# Patient Record
Sex: FEMALE | Race: WHITE | Employment: UNEMPLOYED | URBAN - METROPOLITAN AREA
[De-identification: names, ages, dates, MRNs, and addresses within clinical notes are randomized per-mention and may not be internally consistent; named-entity substitution may affect disease eponyms.]

---

## 2017-01-17 ENCOUNTER — TELEPHONE (OUTPATIENT)
Dept: PULMONOLOGY | Age: 72
End: 2017-01-17

## 2017-01-17 NOTE — TELEPHONE ENCOUNTER
Patient reminded to get PFT before appointment with Dr Freddy Yusuf.   When patient calls back, please give her the phone # of Maria Luz Sparks [phone]

## 2017-02-09 ENCOUNTER — HOSPITAL ENCOUNTER (OUTPATIENT)
Dept: RESPIRATORY THERAPY | Age: 72
Discharge: HOME | End: 2017-02-09
Attending: INTERNAL MEDICINE
Payer: MEDICARE

## 2017-02-09 DIAGNOSIS — R06.02 SHORTNESS OF BREATH: ICD-10-CM

## 2017-02-09 PROCEDURE — 94726 PLETHYSMOGRAPHY LUNG VOLUMES: CPT

## 2017-02-09 PROCEDURE — 94060 EVALUATION OF WHEEZING: CPT

## 2017-03-02 ENCOUNTER — TELEPHONE (OUTPATIENT)
Dept: PULMONOLOGY | Age: 72
End: 2017-03-02

## 2017-03-02 NOTE — TELEPHONE ENCOUNTER
They would like results of pft done at THE [facility]. Please call either [phone] or [phone].

## 2017-08-20 ENCOUNTER — APPOINTMENT (OUTPATIENT)
Dept: GENERAL RADIOLOGY | Age: 72
End: 2017-08-20
Attending: EMERGENCY MEDICINE
Payer: MEDICARE

## 2017-08-20 ENCOUNTER — HOSPITAL ENCOUNTER (EMERGENCY)
Age: 72
Discharge: HOME | End: 2017-08-20
Attending: INTERNAL MEDICINE
Payer: MEDICARE

## 2017-08-20 VITALS
TEMPERATURE: 97.6 F | DIASTOLIC BLOOD PRESSURE: 68 MMHG | BODY MASS INDEX: 41.65 KG/M2 | SYSTOLIC BLOOD PRESSURE: 158 MMHG | RESPIRATION RATE: 23 BRPM | WEIGHT: 250 LBS | HEART RATE: 90 BPM | HEIGHT: 65 IN | OXYGEN SATURATION: 97 %

## 2017-08-20 DIAGNOSIS — R07.89 ATYPICAL CHEST PAIN: Primary | ICD-10-CM

## 2017-08-20 DIAGNOSIS — F41.1 ANXIETY STATE: ICD-10-CM

## 2017-08-20 LAB
ALBUMIN SERPL-MCNC: 3.8 G/DL (ref 3.4–5)
ALBUMIN/GLOB SERPL: 1 {RATIO} (ref 0.8–1.7)
ALP SERPL-CCNC: 101 U/L (ref 45–117)
ALT SERPL-CCNC: 18 U/L (ref 13–56)
ANION GAP SERPL CALC-SCNC: 12 MMOL/L (ref 3–18)
AST SERPL-CCNC: 15 U/L (ref 15–37)
BASOPHILS # BLD: 0.1 K/UL (ref 0–0.06)
BASOPHILS NFR BLD: 1 % (ref 0–2)
BILIRUB SERPL-MCNC: 0.4 MG/DL (ref 0.2–1)
BUN SERPL-MCNC: 18 MG/DL (ref 7–18)
BUN/CREAT SERPL: 20 (ref 12–20)
CALCIUM SERPL-MCNC: 9.1 MG/DL (ref 8.5–10.1)
CHLORIDE SERPL-SCNC: 107 MMOL/L (ref 100–108)
CK MB CFR SERPL CALC: NORMAL % (ref 0–4)
CK MB CFR SERPL CALC: NORMAL % (ref 0–4)
CK MB SERPL-MCNC: <1 NG/ML (ref 5–25)
CK MB SERPL-MCNC: <1 NG/ML (ref 5–25)
CK SERPL-CCNC: 53 U/L (ref 26–192)
CK SERPL-CCNC: 62 U/L (ref 26–192)
CO2 SERPL-SCNC: 26 MMOL/L (ref 21–32)
CREAT SERPL-MCNC: 0.88 MG/DL (ref 0.6–1.3)
D DIMER PPP FEU-MCNC: 0.41 UG/ML(FEU)
DIFFERENTIAL METHOD BLD: ABNORMAL
EOSINOPHIL # BLD: 0.7 K/UL (ref 0–0.4)
EOSINOPHIL NFR BLD: 6 % (ref 0–5)
ERYTHROCYTE [DISTWIDTH] IN BLOOD BY AUTOMATED COUNT: 14.2 % (ref 11.6–14.5)
GLOBULIN SER CALC-MCNC: 3.7 G/DL (ref 2–4)
GLUCOSE SERPL-MCNC: 98 MG/DL (ref 74–99)
HCT VFR BLD AUTO: 44.8 % (ref 35–45)
HGB BLD-MCNC: 14.7 G/DL (ref 12–16)
LYMPHOCYTES # BLD: 3 K/UL (ref 0.9–3.6)
LYMPHOCYTES NFR BLD: 29 % (ref 21–52)
MCH RBC QN AUTO: 27.9 PG (ref 24–34)
MCHC RBC AUTO-ENTMCNC: 32.8 G/DL (ref 31–37)
MCV RBC AUTO: 85.2 FL (ref 74–97)
MONOCYTES # BLD: 0.8 K/UL (ref 0.05–1.2)
MONOCYTES NFR BLD: 8 % (ref 3–10)
NEUTS SEG # BLD: 6 K/UL (ref 1.8–8)
NEUTS SEG NFR BLD: 56 % (ref 40–73)
PLATELET # BLD AUTO: 222 K/UL (ref 135–420)
PMV BLD AUTO: 10.9 FL (ref 9.2–11.8)
POTASSIUM SERPL-SCNC: 3.8 MMOL/L (ref 3.5–5.5)
PROT SERPL-MCNC: 7.5 G/DL (ref 6.4–8.2)
RBC # BLD AUTO: 5.26 M/UL (ref 4.2–5.3)
SODIUM SERPL-SCNC: 145 MMOL/L (ref 136–145)
TROPONIN I SERPL-MCNC: <0.02 NG/ML (ref 0–0.06)
TROPONIN I SERPL-MCNC: <0.02 NG/ML (ref 0–0.06)
WBC # BLD AUTO: 10.6 K/UL (ref 4.6–13.2)

## 2017-08-20 PROCEDURE — 71010 XR CHEST PORT: CPT

## 2017-08-20 PROCEDURE — 94640 AIRWAY INHALATION TREATMENT: CPT

## 2017-08-20 PROCEDURE — 93005 ELECTROCARDIOGRAM TRACING: CPT

## 2017-08-20 PROCEDURE — 85379 FIBRIN DEGRADATION QUANT: CPT | Performed by: INTERNAL MEDICINE

## 2017-08-20 PROCEDURE — 85025 COMPLETE CBC W/AUTO DIFF WBC: CPT | Performed by: EMERGENCY MEDICINE

## 2017-08-20 PROCEDURE — 74011250637 HC RX REV CODE- 250/637: Performed by: INTERNAL MEDICINE

## 2017-08-20 PROCEDURE — 99285 EMERGENCY DEPT VISIT HI MDM: CPT

## 2017-08-20 PROCEDURE — 80053 COMPREHEN METABOLIC PANEL: CPT | Performed by: EMERGENCY MEDICINE

## 2017-08-20 PROCEDURE — 82550 ASSAY OF CK (CPK): CPT | Performed by: EMERGENCY MEDICINE

## 2017-08-20 PROCEDURE — 74011000250 HC RX REV CODE- 250: Performed by: INTERNAL MEDICINE

## 2017-08-20 PROCEDURE — 77030013140 HC MSK NEB VYRM -A

## 2017-08-20 RX ORDER — CLONAZEPAM 0.5 MG/1
0.5 TABLET ORAL
Status: COMPLETED | OUTPATIENT
Start: 2017-08-20 | End: 2017-08-20

## 2017-08-20 RX ORDER — FAMOTIDINE 20 MG/1
20 TABLET, FILM COATED ORAL 2 TIMES DAILY
Qty: 20 TAB | Refills: 0 | Status: SHIPPED | OUTPATIENT
Start: 2017-08-20 | End: 2017-08-30

## 2017-08-20 RX ORDER — GUAIFENESIN 100 MG/5ML
81 LIQUID (ML) ORAL
Status: COMPLETED | OUTPATIENT
Start: 2017-08-20 | End: 2017-08-20

## 2017-08-20 RX ORDER — DOXYCYCLINE HYCLATE 100 MG
100 TABLET ORAL 2 TIMES DAILY
Qty: 20 TAB | Refills: 0 | Status: SHIPPED | OUTPATIENT
Start: 2017-08-20 | End: 2017-08-30

## 2017-08-20 RX ORDER — GUAIFENESIN 100 MG/5ML
81 LIQUID (ML) ORAL DAILY
Qty: 15 TAB | Refills: 0 | Status: ON HOLD | OUTPATIENT
Start: 2017-08-20 | End: 2020-06-22

## 2017-08-20 RX ORDER — ALBUTEROL SULFATE 0.83 MG/ML
2.5 SOLUTION RESPIRATORY (INHALATION)
Status: COMPLETED | OUTPATIENT
Start: 2017-08-20 | End: 2017-08-20

## 2017-08-20 RX ADMIN — CLONAZEPAM 0.5 MG: 0.5 TABLET ORAL at 07:50

## 2017-08-20 RX ADMIN — ASPIRIN 81 MG 81 MG: 81 TABLET ORAL at 07:49

## 2017-08-20 RX ADMIN — ALBUTEROL SULFATE 2.5 MG: 2.5 SOLUTION RESPIRATORY (INHALATION) at 07:53

## 2017-08-20 RX ADMIN — Medication 30 ML: at 07:50

## 2017-08-20 NOTE — CALL BACK NOTE
2:29 PM  08/20/17       Called pt informed of final radiology report of CXR. Will call [facility] to [facility]. Advised to f/u with PCP for recheck. Pt understands and agrees with plan.      Junior Fonseca PA-C

## 2017-08-20 NOTE — ED PROVIDER NOTES
[address]  EMERGENCY DEPARTMENT HISTORY AND PHYSICAL EXAM       Date: 8/20/2017   Patient Name: Edmond Bauman   YOB: 1945  Medical Record Number: [id number]    History of Presenting Illness     Chief Complaint   Patient presents with    Shortness of Breath    Chest Pain        History Provided By:  patient and spouse    Additional History:   7:25 AM  Edmond Bauman is a 67 y.o. female with PMHx of bipolar disorder and COPD presenting to the ED C/O gradually worsening SOB, onset last night. Associated sxs include chest tightness. Used nebulizer at home 9 hours ago to no relief. Pt states that she had a stress test about 15 years ago, results unremarkable. Daily medications include ASA 81 mg, last does 2 days ago, and cough medicine, Quetiapine Fumarate 50 mg, Prazosin 2 mg, and Fetzima ER 80 mg. Pt also notes leg swelling with no deviation from baseline. Of note, pt does not endorse tobacco use but pt's  does. Denies Hx of PE/DVT. Denies recent travel, fever, chills, cough, or any other symptoms or complaints. Primary Care Provider: Lyudmila Carrasco MD   Specialist:    Past History     Past Medical History:   Past Medical History:   Diagnosis Date    Arthritis     Hypertension 15yrs    Ill-defined condition     h/o dizzy    Psychiatric disorder     bipolar; short term memory lose    Psychiatric disorder     depressiom        Past Surgical History:   Past Surgical History:   Procedure Laterality Date    HX CATARACT REMOVAL Left     HX HYSTERECTOMY          Family History:   Family History   Problem Relation Age of Onset    Heart Disease Mother     Cancer Mother     Heart Disease Father     Cancer Father         Social History:   Social History   Substance Use Topics    Smoking status: Never Smoker    Smokeless tobacco: Never Used    Alcohol use No        Allergies:    Allergies   Allergen Reactions    Combivent [Ipratropium-Albuterol] Shortness of Breath    Ibuprofen Hives    Adhesive Tape-Silicones Rash        Review of Systems   Review of Systems   Constitutional: Negative for chills and fever. Respiratory: Positive for chest tightness and shortness of breath. Negative for cough. Cardiovascular: Positive for leg swelling (no deviation from baseline). All other systems reviewed and are negative. Physical Exam  Vitals:    08/20/17 0754 08/20/17 0800 08/20/17 0900 08/20/17 1100   BP:  143/76 120/84 138/73   Pulse:  88 99 91   Resp:  13 24 25   Temp:    97.6 °F (36.4 °C)   SpO2: 98% 99% 95% 97%   Weight:       Height:           Physical Exam   Constitutional: She is oriented to person, place, and time. She appears well-developed and well-nourished. No distress. HENT:   Head: Normocephalic and atraumatic. Right Ear: External ear normal. No swelling or tenderness. Tympanic membrane is not perforated, not erythematous and not bulging. Left Ear: External ear normal. No swelling or tenderness. Tympanic membrane is not perforated, not erythematous and not bulging. Nose: Nose normal. No mucosal edema or rhinorrhea. Right sinus exhibits no maxillary sinus tenderness and no frontal sinus tenderness. Left sinus exhibits no maxillary sinus tenderness and no frontal sinus tenderness. Mouth/Throat: Uvula is midline and oropharynx is clear and moist. Mucous membranes are dry. No oral lesions. No trismus in the jaw. No dental abscesses, uvula swelling or lacerations. No oropharyngeal exudate, posterior oropharyngeal edema, posterior oropharyngeal erythema or tonsillar abscesses. Mucous on tongue to the left, no extra bleeding, excess tissue, cannot exclude mass   Eyes: Conjunctivae are normal. Right eye exhibits no discharge. Left eye exhibits no discharge. No scleral icterus. Neck: Normal range of motion. Neck supple. Cardiovascular: Normal rate, regular rhythm, normal heart sounds and intact distal pulses. Exam reveals no gallop and no friction rub. No murmur heard. Pulmonary/Chest: Breath sounds normal. No accessory muscle usage. No tachypnea. She is in respiratory distress. She has no decreased breath sounds. She has no wheezes. She has no rhonchi. She has no rales. Abdominal: Soft. Musculoskeletal: Normal range of motion. She exhibits no edema or tenderness. Lymphadenopathy:     She has no cervical adenopathy. Neurological: She is alert and oriented to person, place, and time. Skin: Skin is warm and dry. No rash noted. She is not diaphoretic. No erythema. Psychiatric: She has a normal mood and affect. Judgment normal.   Nursing note and vitals reviewed.        Vital signs and nursing notes reviewed      Diagnostic Study Results     Labs -      Recent Results (from the past 12 hour(s))   EKG, 12 LEAD, INITIAL    Collection Time: 08/20/17  6:21 AM   Result Value Ref Range    Ventricular Rate 93 BPM    Atrial Rate 93 BPM    P-R Interval 154 ms    QRS Duration 92 ms    Q-T Interval 372 ms    QTC Calculation (Bezet) 462 ms    Calculated P Axis 68 degrees    Calculated R Axis 8 degrees    Calculated T Axis 23 degrees    Diagnosis       Normal sinus rhythm  Incomplete right bundle branch block  Cannot rule out Inferior infarct , age undetermined  Abnormal ECG  No previous ECGs available     CARDIAC PANEL,(CK, CKMB & TROPONIN)    Collection Time: 08/20/17  6:26 AM   Result Value Ref Range    CK 62 26 - 192 U/L    CK - MB <1.0 <3.6 ng/ml    CK-MB Index  0.0 - 4.0 %     CALCULATION NOT PERFORMED WHEN RESULT IS BELOW LINEAR LIMIT    Troponin-I, Qt. <0.02 0.00 - 0.06 NG/ML   CBC WITH AUTOMATED DIFF    Collection Time: 08/20/17  6:26 AM   Result Value Ref Range    WBC 10.6 4.6 - 13.2 K/uL    RBC 5.26 4.20 - 5.30 M/uL    HGB 14.7 12.0 - 16.0 g/dL    HCT 44.8 35.0 - 45.0 %    MCV 85.2 74.0 - 97.0 FL    MCH 27.9 24.0 - 34.0 PG    MCHC 32.8 31.0 - 37.0 g/dL    RDW 14.2 11.6 - 14.5 %    PLATELET 104 184 - 235 K/uL    MPV 10.9 9.2 - 11.8 FL    NEUTROPHILS 56 40 - 73 %    LYMPHOCYTES 29 21 - 52 %    MONOCYTES 8 3 - 10 %    EOSINOPHILS 6 (H) 0 - 5 %    BASOPHILS 1 0 - 2 %    ABS. NEUTROPHILS 6.0 1.8 - 8.0 K/UL    ABS. LYMPHOCYTES 3.0 0.9 - 3.6 K/UL    ABS. MONOCYTES 0.8 0.05 - 1.2 K/UL    ABS. EOSINOPHILS 0.7 (H) 0.0 - 0.4 K/UL    ABS. BASOPHILS 0.1 (H) 0.0 - 0.06 K/UL    DF AUTOMATED     METABOLIC PANEL, COMPREHENSIVE    Collection Time: 08/20/17  6:26 AM   Result Value Ref Range    Sodium 145 136 - 145 mmol/L    Potassium 3.8 3.5 - 5.5 mmol/L    Chloride 107 100 - 108 mmol/L    CO2 26 21 - 32 mmol/L    Anion gap 12 3.0 - 18 mmol/L    Glucose 98 74 - 99 mg/dL    BUN 18 7.0 - 18 MG/DL    Creatinine 0.88 0.6 - 1.3 MG/DL    BUN/Creatinine ratio 20 12 - 20      GFR est AA >60 >60 ml/min/1.73m2    GFR est non-AA >60 >60 ml/min/1.73m2    Calcium 9.1 8.5 - 10.1 MG/DL    Bilirubin, total 0.4 0.2 - 1.0 MG/DL    ALT (SGPT) 18 13 - 56 U/L    AST (SGOT) 15 15 - 37 U/L    Alk.  phosphatase 101 45 - 117 U/L    Protein, total 7.5 6.4 - 8.2 g/dL    Albumin 3.8 3.4 - 5.0 g/dL    Globulin 3.7 2.0 - 4.0 g/dL    A-G Ratio 1.0 0.8 - 1.7     EKG, 12 LEAD, SUBSEQUENT    Collection Time: 08/20/17 10:30 AM   Result Value Ref Range    Ventricular Rate 93 BPM    Atrial Rate 93 BPM    P-R Interval 156 ms    QRS Duration 94 ms    Q-T Interval 374 ms    QTC Calculation (Bezet) 465 ms    Calculated P Axis 63 degrees    Calculated R Axis -5 degrees    Calculated T Axis 14 degrees    Diagnosis       Normal sinus rhythm  Incomplete right bundle branch block  Inferior infarct (cited on or before 20-AUG-2017)  Abnormal ECG  When compared with ECG of 20-AUG-2017 06:21,  Questionable change in initial forces of Inferior leads     CARDIAC PANEL,(CK, CKMB & TROPONIN)    Collection Time: 08/20/17 11:05 AM   Result Value Ref Range    CK 53 26 - 192 U/L    CK - MB <1.0 <3.6 ng/ml    CK-MB Index  0.0 - 4.0 %     CALCULATION NOT PERFORMED WHEN RESULT IS BELOW LINEAR LIMIT    Troponin-I, Qt. <0.02 0.00 - 0.06 NG/ML   D DIMER    Collection Time: 08/20/17 11:05 AM   Result Value Ref Range    D DIMER 0.41 <0.46 ug/ml(FEU)       Radiologic Studies -  XR CHEST PORT    (Results Pending)      7:43 AM  RADIOLOGY FINDINGS  Chest X-ray shows decrease in lung volume but no obvious acute cardiac or pulmonary process  Pending review by Radiologist  Recorded by Henrique Hamilton ED Scribe, as dictated by Katherine Frias MD      Medical Decision Making   I am the first provider for this patient. I reviewed the vital signs, available nursing notes, past medical history, past surgical history, family history and social history. Vital Signs-Reviewed the patient's vital signs. Patient Vitals for the past 12 hrs:   Temp Pulse Resp BP SpO2   08/20/17 1100 97.6 °F (36.4 °C) 91 25 138/73 97 %   08/20/17 0900 - 99 24 120/84 95 %   08/20/17 0800 - 88 13 143/76 99 %   08/20/17 0754 - - - - 98 %   08/20/17 0745 - - 22 - -   08/20/17 0700 - 88 19 124/67 91 %   08/20/17 0625 97.3 °F (36.3 °C) 93 19 156/70 95 %       Pulse Oximetry Analysis - Normal 95% on RA. No intervention needed. Cardiac Monitor:   Rate: 95 bmp  Rhythm: SV Rythm     EKG interpretation: (Preliminary)  6:21 AM  93 bpm NSR, incomplete RBBB, no stemi, age undetermined  EKG read by Dorys Tubbs. Arturo Moore MD at 6:36 AM     EKG interpretation: (Subsequent)  10:30 AM   93 bpm, NSR, incomplete RBBB, no STEMI, no significant changes  EKG read by Katherine Frias MD at 10:38 AM    Old Medical Records: Nursing notes. ED Course:     7:25 AM Initial assessment performed. 9:52 AM Pt is feeling better. 9:58 AM Pt is feeling much better. Pain is almost gone, lungs are clear, heart rrr no mrg.     Medications Given in the ED:  Medications   albuterol (PROVENTIL VENTOLIN) nebulizer solution 2.5 mg (2.5 mg Nebulization Given 8/20/17 0753)   clonazePAM (KlonoPIN) tablet 0.5 mg (0.5 mg Oral Given 8/20/17 0750)   aspirin chewable tablet 81 mg (81 mg Oral Given 8/20/17 7030)   GI COCKTAIL [facility]) (30 mL Oral Given 8/20/17 0750)        Discharge Note:  12:02 PM  Patients results have been reviewed with them. Patient and/or family have verbally conveyed their understanding and agreement of the patient's signs, symptoms, diagnosis, treatment and prognosis and additionally agree to follow up as recommended or return to the Emergency Room should their condition change prior to their follow-up appointment. Patient verbally agrees with the care-plan and verbally conveys that all of their questions have been answered. Discharge instructions have also been provided to the patient with some educational information regarding their diagnosis as well a list of reasons why they would want to return to the ER prior to their follow-up appointment should their condition change. Diagnosis   Clinical Impression:   1. Atypical chest pain    2. Anxiety state         Follow-up Information     Follow up With Details Comments Orestes Da Silva MD In 1 day for cardio follow up [address]      THE [facility] EMERGENCY DEPT  As needed, If symptoms worsen [address] [place] [id number]  [phone]          Current Discharge Medication List      START taking these medications    Details   aspirin 81 mg chewable tablet Take 1 Tab by mouth daily. Qty: 15 Tab, Refills: 0      famotidine (PEPCID) 20 mg tablet Take 1 Tab by mouth two (2) times a day for 10 days. Qty: 20 Tab, Refills: 0         STOP taking these medications       aspirin delayed-release 81 mg tablet Comments:   Reason for Stopping:               _______________________________   Attestations:     SCRIBE ATTESTATION:  This note is prepared by Shellie Cardenas, acting as Scribe for Jaclyn Terrazas MD.    PROVIDER ATTESTATION:  Jaclyn Terrazas MD: The scribe's documentation has been prepared under my direction and personally reviewed by me in its entirety.  I confirm that the note above accurately reflects all work, treatment, procedures, and medical decision making performed by me.   _______________________________

## 2017-08-20 NOTE — ED TRIAGE NOTES
States began having SOB yesterday evening and when she woke up at 0545 she continued to feel SOB and now has inspirational mid chest pain. Denies cough, anxious upon arrival and  states Shanon Kat is frequently anxious and suffers from Bipolar and PTSD\". Sepsis Screening completed    (  )Patient meets SIRS criteria. ( xx )Patient does not meet SIRS criteria.       SIRS Criteria is achieved when two or more of the following are present   Temperature < 96.8°F (36°C) or > 100.9°F (38.3°C)   Heart Rate > 90 beats per minute   Respiratory Rate > 20 breaths per minute   WBC count > 12,000 or <4,000 or > 10% bands

## 2017-08-20 NOTE — ED NOTES
I have reviewed discharge instructions with the patient and spouse. The patient and spouse verbalized understanding. Patient armband removed and shredded. Two prescriptions sent to pharmacy which was reviewed with patient.

## 2017-08-20 NOTE — ED NOTES
Repeat 12-lead EKG performed and shown to MD. Repeat cardiac enzymes drawn and sent to lab for analysis.

## 2017-08-20 NOTE — ED NOTES
Report received from Freida Godoy RN. Assumed care of patient at this time. Patient presents complaining of shortness of breath and mid-sternal chest tightness onset last night. Patient reports symptoms worsen upon exertion. Patient reports she tried to take breathing treatment last night but states it did not improve her symptoms. Patient also states she was recently told she had the beginning stages of COPD. Patient denies history of smoking. Patient in no apparent respiratory distress. Patient intermittently tachypneac upon exam. Patient able to speak in complete sentences and skin color appropriate for ethnicity. Patient maintaining SpO2 sats on room air. Lung sounds clear upon auscultation. Patient with no further complaints or requests. Call bell within reach. Will continue to monitor and assess.

## 2017-08-20 NOTE — DISCHARGE INSTRUCTIONS
Chest Pain: Care Instructions  Your Care Instructions  There are many things that can cause chest pain. Some are not serious and will get better on their own in a few days. But some kinds of chest pain need more testing and treatment. Your doctor may have recommended a follow-up visit in the next 8 to 12 hours. If you are not getting better, you may need more tests or treatment. Even though your doctor has released you, you still need to watch for any problems. The doctor carefully checked you, but sometimes problems can develop later. If you have new symptoms or if your symptoms do not get better, get medical care right away. If you have worse or different chest pain or pressure that lasts more than 5 minutes or you passed out (lost consciousness), call 911 or seek other emergency help right away. A medical visit is only one step in your treatment. Even if you feel better, you still need to do what your doctor recommends, such as going to all suggested follow-up appointments and taking medicines exactly as directed. This will help you recover and help prevent future problems. How can you care for yourself at home? · Rest until you feel better. · Take your medicine exactly as prescribed. Call your doctor if you think you are having a problem with your medicine. · Do not drive after taking a prescription pain medicine. When should you call for help? Call 911 if:  · You passed out (lost consciousness). · You have severe difficulty breathing. · You have symptoms of a heart attack. These may include:  ¨ Chest pain or pressure, or a strange feeling in your chest.  ¨ Sweating. ¨ Shortness of breath. ¨ Nausea or vomiting. ¨ Pain, pressure, or a strange feeling in your back, neck, jaw, or upper belly or in one or both shoulders or arms. ¨ Lightheadedness or sudden weakness. ¨ A fast or irregular heartbeat.   After you call 911, the  may tell you to chew 1 adult-strength or 2 to 4 low-dose aspirin. Wait for an ambulance. Do not try to drive yourself. Call your doctor today if:  · You have any trouble breathing. · Your chest pain gets worse. · You are dizzy or lightheaded, or you feel like you may faint. · You are not getting better as expected. · You are having new or different chest pain. Where can you learn more? Go to http://justin-riley.info/. Enter A120 in the search box to learn more about \"Chest Pain: Care Instructions. \"  Current as of: March 20, 2017  Content Version: 11.3  © 3186-5380 [employer]. Care instructions adapted under license by [facility] (which disclaims liability or warranty for this information). If you have questions about a medical condition or this instruction, always ask your healthcare professional. [address] any warranty or liability for your use of this information. Anxiety Disorder: Care Instructions  Your Care Instructions  Anxiety is a normal reaction to stress. Difficult situations can cause you to have symptoms such as sweaty palms and a nervous feeling. In an anxiety disorder, the symptoms are far more severe. Constant worry, muscle tension, trouble sleeping, nausea and diarrhea, and other symptoms can make normal daily activities difficult or impossible. These symptoms may occur for no reason, and they can affect your work, school, or social life. Medicines, counseling, and self-care can all help. Follow-up care is a key part of your treatment and safety. Be sure to make and go to all appointments, and call your doctor if you are having problems. It's also a good idea to know your test results and keep a list of the medicines you take. How can you care for yourself at home? · Take medicines exactly as directed. Call your doctor if you think you are having a problem with your medicine. · Go to your counseling sessions and follow-up appointments.   · Recognize and accept your anxiety. Then, when you are in a situation that makes you anxious, say to yourself, \"This is not an emergency. I feel uncomfortable, but I am not in danger. I can keep going even if I feel anxious. \"  · Be kind to your body:  ¨ Relieve tension with exercise or a massage. ¨ Get enough rest.  ¨ Avoid alcohol, caffeine, nicotine, and illegal drugs. They can increase your anxiety level and cause sleep problems. ¨ Learn and do relaxation techniques. See below for more about these techniques. · Engage your mind. Get out and do something you enjoy. Go to a Donay movie, or take a walk or hike. Plan your day. Having too much or too little to do can make you anxious. · Keep a record of your symptoms. Discuss your fears with a good friend or family member, or join a support group for people with similar problems. Talking to others sometimes relieves stress. · Get involved in social groups, or volunteer to help others. Being alone sometimes makes things seem worse than they are. · Get at least 30 minutes of exercise on most days of the week to relieve stress. Walking is a good choice. You also may want to do other activities, such as running, swimming, cycling, or playing tennis or team sports. Relaxation techniques  Do relaxation exercises 10 to 20 minutes a day. You can play soothing, relaxing music while you do them, if you wish. · Tell others in your house that you are going to do your relaxation exercises. Ask them not to disturb you. · Find a comfortable place, away from all distractions and noise. · Lie down on your back, or sit with your back straight. · Focus on your breathing. Make it slow and steady. · Breathe in through your nose. Breathe out through either your nose or mouth. · Breathe deeply, filling up the area between your navel and your rib cage. Breathe so that your belly goes up and down. · Do not hold your breath. · Breathe like this for 5 to 10 minutes.  Notice the feeling of calmness throughout your whole body. As you continue to breathe slowly and deeply, relax by doing the following for another 5 to 10 minutes:  · Tighten and relax each muscle group in your body. You can begin at your toes and work your way up to your head. · Imagine your muscle groups relaxing and becoming heavy. · Empty your mind of all thoughts. · Let yourself relax more and more deeply. · Become aware of the state of calmness that surrounds you. · When your relaxation time is over, you can bring yourself back to alertness by moving your fingers and toes and then your hands and feet and then stretching and moving your entire body. Sometimes people fall asleep during relaxation, but they usually wake up shortly afterward. · Always give yourself time to return to full alertness before you drive a car or do anything that might cause an accident if you are not fully alert. Never play a relaxation tape while you drive a car. When should you call for help? Call 911 anytime you think you may need emergency care. For example, call if:  · You feel you cannot stop from hurting yourself or someone else. Keep the numbers for these national suicide hotlines: 0-113-941-TALK (9-389.484.7706) and 9-677-QNMPMQI (1-264.344.6872). If you or someone you know talks about suicide or feeling hopeless, get help right away. Watch closely for changes in your health, and be sure to contact your doctor if:  · You have anxiety or fear that affects your life. · You have symptoms of anxiety that are new or different from those you had before. Where can you learn more? Go to http://justin-riley.info/. Enter P754 in the search box to learn more about \"Anxiety Disorder: Care Instructions. \"  Current as of: July 26, 2016  Content Version: 11.3  © 7850-1104 [employer]. Care instructions adapted under license by [employer] (which disclaims liability or warranty for this information).  If you have questions about a medical condition or this instruction, always ask your healthcare professional. [place] [address] any warranty or liability for your use of this information.

## 2017-08-20 NOTE — ED NOTES
Report and bedside SBAR given to ELBA Landa RN. Plan of care discussed with patient/family at this time. Bed in low/locked position. Guard rails up. Call light within reach.

## 2017-08-20 NOTE — ED NOTES
Rounded on patient. Patient reports symptoms have improved after receiving medication. Patient has no requests or complaints at this time. Vital signs stable. No distress noted. Call bell within reach of patient. Will continue to monitor and assess.

## 2017-08-23 LAB
ATRIAL RATE: 93 BPM
ATRIAL RATE: 93 BPM
CALCULATED P AXIS, ECG09: 63 DEGREES
CALCULATED P AXIS, ECG09: 68 DEGREES
CALCULATED R AXIS, ECG10: -5 DEGREES
CALCULATED R AXIS, ECG10: 8 DEGREES
CALCULATED T AXIS, ECG11: 14 DEGREES
CALCULATED T AXIS, ECG11: 23 DEGREES
DIAGNOSIS, 93000: NORMAL
DIAGNOSIS, 93000: NORMAL
P-R INTERVAL, ECG05: 154 MS
P-R INTERVAL, ECG05: 156 MS
Q-T INTERVAL, ECG07: 372 MS
Q-T INTERVAL, ECG07: 374 MS
QRS DURATION, ECG06: 92 MS
QRS DURATION, ECG06: 94 MS
QTC CALCULATION (BEZET), ECG08: 462 MS
QTC CALCULATION (BEZET), ECG08: 465 MS
VENTRICULAR RATE, ECG03: 93 BPM
VENTRICULAR RATE, ECG03: 93 BPM

## 2018-12-29 ENCOUNTER — APPOINTMENT (OUTPATIENT)
Dept: CT IMAGING | Age: 73
End: 2018-12-29
Attending: EMERGENCY MEDICINE
Payer: MEDICARE

## 2018-12-29 ENCOUNTER — HOSPITAL ENCOUNTER (EMERGENCY)
Age: 73
Discharge: HOME | End: 2018-12-29
Attending: EMERGENCY MEDICINE | Admitting: EMERGENCY MEDICINE
Payer: MEDICARE

## 2018-12-29 VITALS
SYSTOLIC BLOOD PRESSURE: 142 MMHG | OXYGEN SATURATION: 98 % | DIASTOLIC BLOOD PRESSURE: 60 MMHG | WEIGHT: 217 LBS | HEIGHT: 65 IN | BODY MASS INDEX: 36.15 KG/M2 | HEART RATE: 95 BPM | RESPIRATION RATE: 22 BRPM | TEMPERATURE: 98 F

## 2018-12-29 DIAGNOSIS — T17.320A CHOKING DUE TO FOOD (REGURGITATED), INITIAL ENCOUNTER: Primary | ICD-10-CM

## 2018-12-29 LAB
ALBUMIN SERPL-MCNC: 3.9 G/DL (ref 3.4–5)
ALBUMIN/GLOB SERPL: 1.1 {RATIO} (ref 0.8–1.7)
ALP SERPL-CCNC: 122 U/L (ref 45–117)
ALT SERPL-CCNC: 29 U/L (ref 13–56)
ANION GAP SERPL CALC-SCNC: 13 MMOL/L (ref 3–18)
AST SERPL-CCNC: 29 U/L (ref 15–37)
BASOPHILS # BLD: 0 K/UL (ref 0–0.1)
BASOPHILS NFR BLD: 0 % (ref 0–3)
BILIRUB SERPL-MCNC: 0.7 MG/DL (ref 0.2–1)
BUN SERPL-MCNC: 12 MG/DL (ref 7–18)
BUN/CREAT SERPL: 12
CALCIUM SERPL-MCNC: 9 MG/DL (ref 8.5–10.1)
CHLORIDE SERPL-SCNC: 108 MMOL/L (ref 100–108)
CK MB CFR SERPL CALC: 2 % (ref 0–4)
CK MB SERPL-MCNC: 1.2 NG/ML (ref 5–25)
CK SERPL-CCNC: 60 U/L (ref 26–192)
CO2 SERPL-SCNC: 21 MMOL/L (ref 21–32)
CREAT SERPL-MCNC: 0.99 MG/DL (ref 0.6–1.3)
DIFFERENTIAL METHOD BLD: ABNORMAL
EOSINOPHIL # BLD: 0.4 K/UL (ref 0–0.4)
EOSINOPHIL NFR BLD: 3 % (ref 0–5)
ERYTHROCYTE [DISTWIDTH] IN BLOOD BY AUTOMATED COUNT: 14.8 % (ref 11.6–14.5)
GLOBULIN SER CALC-MCNC: 3.4 G/DL (ref 2–4)
GLUCOSE SERPL-MCNC: 149 MG/DL (ref 74–99)
HCT VFR BLD AUTO: 50.6 % (ref 35–45)
HGB BLD-MCNC: 16.5 G/DL (ref 12–16)
LYMPHOCYTES # BLD: 4.6 K/UL (ref 0.8–3.5)
LYMPHOCYTES NFR BLD: 32 % (ref 20–51)
MAGNESIUM SERPL-MCNC: 2.2 MG/DL (ref 1.6–2.6)
MCH RBC QN AUTO: 27.9 PG (ref 24–34)
MCHC RBC AUTO-ENTMCNC: 32.6 G/DL (ref 31–37)
MCV RBC AUTO: 85.6 FL (ref 74–97)
MONOCYTES # BLD: 1.4 K/UL (ref 0–1)
MONOCYTES NFR BLD: 10 % (ref 2–9)
NEUTS SEG # BLD: 8 K/UL (ref 1.8–8)
NEUTS SEG NFR BLD: 55 % (ref 42–75)
PLATELET # BLD AUTO: 192 K/UL (ref 135–420)
PLATELET COMMENTS,PCOM: ABNORMAL
PMV BLD AUTO: 11.8 FL (ref 9.2–11.8)
POTASSIUM SERPL-SCNC: 3.8 MMOL/L (ref 3.5–5.5)
PROT SERPL-MCNC: 7.3 G/DL (ref 6.4–8.2)
RBC # BLD AUTO: 5.91 M/UL (ref 4.2–5.3)
RBC MORPH BLD: ABNORMAL
SODIUM SERPL-SCNC: 142 MMOL/L (ref 136–145)
TROPONIN I SERPL-MCNC: <0.02 NG/ML (ref 0–0.04)
WBC # BLD AUTO: 14.4 K/UL (ref 4.6–13.2)

## 2018-12-29 PROCEDURE — 82553 CREATINE MB FRACTION: CPT

## 2018-12-29 PROCEDURE — 71250 CT THORAX DX C-: CPT

## 2018-12-29 PROCEDURE — 93005 ELECTROCARDIOGRAM TRACING: CPT

## 2018-12-29 PROCEDURE — 99285 EMERGENCY DEPT VISIT HI MDM: CPT

## 2018-12-29 PROCEDURE — 85025 COMPLETE CBC W/AUTO DIFF WBC: CPT

## 2018-12-29 PROCEDURE — 74011250636 HC RX REV CODE- 250/636: Performed by: EMERGENCY MEDICINE

## 2018-12-29 PROCEDURE — 80053 COMPREHEN METABOLIC PANEL: CPT

## 2018-12-29 PROCEDURE — 96360 HYDRATION IV INFUSION INIT: CPT

## 2018-12-29 PROCEDURE — 83735 ASSAY OF MAGNESIUM: CPT

## 2018-12-29 RX ADMIN — SODIUM CHLORIDE 1000 ML: 900 INJECTION, SOLUTION INTRAVENOUS at 14:02

## 2018-12-29 NOTE — ED NOTES
Patient armband removed and shredded. I have reviewed discharge instructions with the patient and spouse. The patient and spouse verbalized understanding.

## 2018-12-29 NOTE — ED TRIAGE NOTES
Patient arrived to ED via EMS. Reports she was eating and choked on a piece of hamburger. Patient complains of sore throat but denies difficulty swallowing and shortness of breath.

## 2018-12-29 NOTE — ED PROVIDER NOTES
EMERGENCY DEPARTMENT HISTORY AND PHYSICAL EXAM 
 
Date: 12/29/2018 Patient Name: Cintia Corea History of Presenting Illness Chief Complaint Patient presents with  Choking History Provided By: Patient Chief Complaint: choking episode Duration: PTA Timing:  Acute Location: throat Quality: tight Modifying Factors: Heimlich maneuver resolved choking Associated Symptoms:  SOB (resolved, but now hoarse) and sore throat Additional History (Context):  
1:53 PM 
Cintia Corea is a 68 y.o. female with PMHX of HTN and bipolar who presents to the emergency department C/O choking on french fries PTA. Associated sxs include SOB (resolved, but now hoarse) and sore throat. Pt states that someone did heimlich maneuver on her and she was able to get it out. Allergy reported to Ibuprofen (hives). Pt denies current difficulty swallowing, fever, and any other sxs or complaints. PCP: Hudson Nam MD 
 
Current Outpatient Medications Medication Sig Dispense Refill  aspirin 81 mg chewable tablet Take 1 Tab by mouth daily. 15 Tab 0  
 ipratropium-albuterol (COMBIVENT RESPIMAT)  mcg/actuation inhaler Take 1 Puff by inhalation three (3) times daily. 1 Inhaler 3  
 fluticasone (FLONASE) 50 mcg/actuation nasal spray 2 Sprays by Both Nostrils route nightly. 1 Bottle 3  
 levomilnacipran (FETZIMA) 40 mg Cs24 Take 40 mg by mouth daily.  GLUCOSAMINE-CONDROITIN-HRB#182 PO Take  by mouth daily.  furosemide (LASIX) 40 mg tablet Take 40 mg by mouth daily.  loratadine (CLARITIN) 10 mg tablet Take 10 mg by mouth daily as needed for Allergies.  lamotrigine (LAMICTAL) 150 mg tablet Take  by mouth two (2) times a day. Indications: BIPOLAR DISORDER IN REMISSION  trazodone (DESYREL) 300 mg tablet Take 300 mg by mouth nightly.  amlodipine (NORVASC) 5 mg tablet Take 5 mg by mouth daily.  buPROPion XL (WELLBUTRIN XL) 300 mg XL tablet Take 150 mg by mouth every morning.  donepezil (ARICEPT) 10 mg tablet Take 10 mg by mouth nightly.  olanzapine (ZYPREXA) 5 mg tablet Take  by mouth nightly.  simvastatin (ZOCOR) 20 mg tablet Take  by mouth nightly.  clonazepam (KLONOPIN) 0.5 mg tablet Take  by mouth nightly. Past History Past Medical History: 
Past Medical History:  
Diagnosis Date  Arthritis  Hypertension 15yrs  Ill-defined condition   
 h/o dizzy  Psychiatric disorder   
 bipolar; short term memory lose  Psychiatric disorder   
 depressiom Past Surgical History: 
Past Surgical History:  
Procedure Laterality Date  HX CATARACT REMOVAL Left  HX HYSTERECTOMY Family History: 
Family History Problem Relation Age of Onset  Heart Disease Mother  Cancer Mother  Heart Disease Father  Cancer Father Social History: 
Social History Tobacco Use  Smoking status: Never Smoker  Smokeless tobacco: Never Used Substance Use Topics  Alcohol use: No  
 Drug use: No  
 
 
Allergies: Allergies Allergen Reactions  Combivent [Ipratropium-Albuterol] Shortness of Breath  Ibuprofen Hives  Adhesive Tape-Silicones Rash Review of Systems Review of Systems Constitutional: Negative for fever. HENT: Positive for sore throat. Negative for trouble swallowing. Respiratory: Positive for choking and shortness of breath (resolved). All other systems reviewed and are negative. Physical Exam  
 
Vitals:  
 12/29/18 1353 12/29/18 1415 BP: 148/62 129/58 Pulse: (!) 120 (!) 110 Resp: 18 19 Temp: 98 °F (36.7 °C) SpO2: 98% Weight: 98.4 kg (217 lb) Height: 5' 5\" (1.651 m) Physical Exam  
Nursing note and vitals reviewed. Vital signs and nursing notes reviewed CONSTITUTIONAL: Alert, in mild acute distress; well-developed; well-nourished. HEAD:  Normocephalic, atraumatic EYES: PERRL; EOM's intact. ENTM: Nose: no rhinorrhea;  Throat: no erythema or exudate, mucous membranes moist 
Neck:  No JVD, supple without lymphadenopathy RESP: Chest clear, equal breath sounds. CV: Rapid rate. Regular rhythm. S1 and S2 WNL; No murmurs, gallops or rubs. GI: Normal bowel sounds, abdomen soft and non-tender. No masses or organomegaly. : No costo-vertebral angle tenderness. BACK:  Non-tender UPPER EXT:  Normal inspection LOWER EXT: No edema, no calf tenderness. Distal pulses intact. NEURO: CN intact, reflexes 2/4 and sym, strength 5/5 and sym, sensation intact. SKIN: No rashes; Normal for age and stage. PSYCH:  Alert and oriented, normal affect. Diagnostic Study Results Labs - Recent Results (from the past 12 hour(s)) EKG, 12 LEAD, INITIAL Collection Time: 12/29/18  1:51 PM  
Result Value Ref Range Ventricular Rate 127 BPM  
 Atrial Rate 127 BPM  
 P-R Interval 136 ms QRS Duration 88 ms Q-T Interval 314 ms QTC Calculation (Bezet) 456 ms Calculated P Axis 16 degrees Calculated R Axis -10 degrees Calculated T Axis 24 degrees Diagnosis Sinus tachycardia RSR' or QR pattern in V1 suggests right ventricular conduction delay Minimal voltage criteria for LVH, may be normal variant Inferior infarct (cited on or before 20-AUG-2017) Abnormal ECG When compared with ECG of 20-AUG-2017 10:30, 
ST now depressed in Lateral leads CBC WITH AUTOMATED DIFF Collection Time: 12/29/18  1:59 PM  
Result Value Ref Range WBC 14.4 (H) 4.6 - 13.2 K/uL  
 RBC 5.91 (H) 4.20 - 5.30 M/uL  
 HGB 16.5 (H) 12.0 - 16.0 g/dL HCT 50.6 (H) 35.0 - 45.0 % MCV 85.6 74.0 - 97.0 FL  
 MCH 27.9 24.0 - 34.0 PG  
 MCHC 32.6 31.0 - 37.0 g/dL  
 RDW 14.8 (H) 11.6 - 14.5 % PLATELET 086 284 - 354 K/uL MPV 11.8 9.2 - 11.8 FL  
 NEUTROPHILS 55 42 - 75 % LYMPHOCYTES 32 20 - 51 % MONOCYTES 10 (H) 2 - 9 % EOSINOPHILS 3 0 - 5 % BASOPHILS 0 0 - 3 %  
 ABS. NEUTROPHILS 8.0 1.8 - 8.0 K/UL  
 ABS. LYMPHOCYTES 4.6 (H) 0.8 - 3.5 K/UL ABS. MONOCYTES 1.4 (H) 0 - 1.0 K/UL  
 ABS. EOSINOPHILS 0.4 0.0 - 0.4 K/UL  
 ABS. BASOPHILS 0.0 0.0 - 0.1 K/UL PLATELET COMMENTS ADEQUATE PLATELETS    
 RBC COMMENTS NORMOCYTIC, NORMOCHROMIC    
 DF MANUAL METABOLIC PANEL, COMPREHENSIVE Collection Time: 12/29/18  1:59 PM  
Result Value Ref Range Sodium 142 136 - 145 mmol/L Potassium 3.8 3.5 - 5.5 mmol/L Chloride 108 100 - 108 mmol/L  
 CO2 21 21 - 32 mmol/L Anion gap 13 3.0 - 18 mmol/L Glucose 149 (H) 74 - 99 mg/dL BUN 12 7.0 - 18 MG/DL Creatinine 0.99 0.6 - 1.3 MG/DL  
 BUN/Creatinine ratio 12 GFR est AA >60 >60 ml/min/1.73m2 GFR est non-AA 55 (L) >60 ml/min/1.73m2 Calcium 9.0 8.5 - 10.1 MG/DL Bilirubin, total 0.7 0.2 - 1.0 MG/DL  
 ALT (SGPT) 29 13 - 56 U/L  
 AST (SGOT) 29 15 - 37 U/L Alk. phosphatase 122 (H) 45 - 117 U/L Protein, total 7.3 6.4 - 8.2 g/dL Albumin 3.9 3.4 - 5.0 g/dL Globulin 3.4 2.0 - 4.0 g/dL A-G Ratio 1.1 0.8 - 1.7 MAGNESIUM Collection Time: 12/29/18  1:59 PM  
Result Value Ref Range Magnesium 2.2 1.6 - 2.6 mg/dL CARDIAC PANEL,(CK, CKMB & TROPONIN) Collection Time: 12/29/18  1:59 PM  
Result Value Ref Range CK 60 26 - 192 U/L  
 CK - MB 1.2 <3.6 ng/ml CK-MB Index 2.0 0.0 - 4.0 % Troponin-I, QT <0.02 0.0 - 0.045 NG/ML Radiologic Studies -  
 
CT Results  (Last 48 hours) 12/29/18 1505  CT CHEST WO CONT Final result Impression:  IMPRESSION:  
   
1. Small amount of secretions is present within the proximal trachea without  
narrowing. No foreign body is present at end below the level of the vocal cords. Mild dependent atelectasis. 2. Small to moderate hiatal hernia is present. Narrative:  EXAM: CT chest   
   
INDICATION: Shortness of breath. Choking. COMPARISON: Chest x-ray 8/20/2017 TECHNIQUE: Axial CT imaging from the thoracic inlet through the diaphragm without intravenous contrast. Multiplanar reformats were generated. One or more  
dose reduction techniques were used on this CT: automated exposure control,  
adjustment of the mAs and/or kVp according to patient size, and iterative  
reconstruction techniques. The specific techniques used on this CT exam have  
been documented in the patient's electronic medical record.  
   
_______________ FINDINGS:  
   
BASE OF THE NECK: Normal.  
   
LUNGS: No suspicious nodule or mass. Mild dependent atelectasis. AIRWAY: Small amount of layering secretions which are not causing narrowing are  
present in the proximal trachea. No foreign body visualized within the airway at  
and below the level of the vocal cords. PLEURA: Normal.  
   
MEDIASTINUM: Normal heart size. No pericardial effusion. Vasculature is  
unremarkable. Small to moderate hiatal hernia is present. Fluid is present  
within the esophagus. LYMPH NODES: No enlarged lymph nodes. UPPER ABDOMEN: Unremarkable. BONES: No acute or aggressive osseous abnormalities identified. OTHER: None.  
   
_______________ CXR Results  (Last 48 hours) None Medications given in the ED- Medications  
sodium chloride 0.9 % bolus infusion 1,000 mL (0 mL IntraVENous IV Completed 12/29/18 1521) Medical Decision Making I am the first provider for this patient. I reviewed the vital signs, available nursing notes, past medical history, past surgical history, family history and social history. Vital Signs-Reviewed the patient's vital signs. Pulse Oximetry Analysis - 100% on room air Cardiac Monitor: 
Rate: 116 bpm 
Rhythm: sinus tachycardia EKG interpretation: (Preliminary) 1:51 PM  
Sinus tachycardia at 127 bpm. RSR' or QR pattern in V1 suggests right ventricular conduction delay. Minimal voltage criteria for LVH, may be nl variant. Inferior infarct, age undetermined.  No STEMI 
 EKG read by Herminia Ortiz MD  
 
EKG interpretation: (Secondary) 2:15 PM  
NSR at 86 bpm. Septal infarct, age undetermined. No STEMI. EKG read by Herminia Ortiz MD 
 
Records Reviewed: Nursing Notes and Old Medical Records Provider Notes (Medical Decision Making): DDx: foreign body aspiration, esophageal foreign body, resolved foreign body, aspiration PNA Procedures: 
Procedures ED Course:  
1:53 PM Initial assessment performed. The patients presenting problems have been discussed, and they are in agreement with the care plan formulated and outlined with them. I have encouraged them to ask questions as they arise throughout their visit. 2:13 PM Pt takes her Losartan in the morning. Will give her a second dose of that now. 3:33 PM Re-evaluated pt. Pt is asymptomatic. Heart rate at 93 bpm. 
 
Diagnosis and Disposition DISCHARGE NOTE: 
3:31 PM 
Brandin Jose's  results have been reviewed with her. She has been counseled regarding her diagnosis, treatment, and plan. She verbally conveys understanding and agreement of the signs, symptoms, diagnosis, treatment and prognosis and additionally agrees to follow up as discussed. She also agrees with the care-plan and conveys that all of her questions have been answered. I have also provided discharge instructions for her that include: educational information regarding their diagnosis and treatment, and list of reasons why they would want to return to the ED prior to their follow-up appointment, should her condition change. She has been provided with education for proper emergency department utilization. Discussion: Pt presented by EMS after choking incident with eating.  and bystander helped give pt Heimlich maneuver. Pt states she did get something up but  said maybe not. Otherwise stable. Tachycardia present on arrival has resolved. Pt has been back to baseline. Instructions given for mechanical soft diet. PCP f/u. Given return precautions for aspiration. Pt agrees with plan. Will d/c home. CLINICAL IMPRESSION: 
 
1. Choking due to food (regurgitated), initial encounter PLAN: 
1. D/C Home 2. Current Discharge Medication List  
  
CONTINUE these medications which have NOT CHANGED Details  
aspirin 81 mg chewable tablet Take 1 Tab by mouth daily. Qty: 15 Tab, Refills: 0  
  
ipratropium-albuterol (COMBIVENT RESPIMAT)  mcg/actuation inhaler Take 1 Puff by inhalation three (3) times daily. Qty: 1 Inhaler, Refills: 3  
  
fluticasone (FLONASE) 50 mcg/actuation nasal spray 2 Sprays by Both Nostrils route nightly. Qty: 1 Bottle, Refills: 3  
  
levomilnacipran (FETZIMA) 40 mg Cs24 Take 40 mg by mouth daily. GLUCOSAMINE-CONDROITIN-HRB#182 PO Take  by mouth daily. furosemide (LASIX) 40 mg tablet Take 40 mg by mouth daily. loratadine (CLARITIN) 10 mg tablet Take 10 mg by mouth daily as needed for Allergies. lamotrigine (LAMICTAL) 150 mg tablet Take  by mouth two (2) times a day. Indications: BIPOLAR DISORDER IN REMISSION  
  
trazodone (DESYREL) 300 mg tablet Take 300 mg by mouth nightly. amlodipine (NORVASC) 5 mg tablet Take 5 mg by mouth daily. buPROPion XL (WELLBUTRIN XL) 300 mg XL tablet Take 150 mg by mouth every morning. donepezil (ARICEPT) 10 mg tablet Take 10 mg by mouth nightly. olanzapine (ZYPREXA) 5 mg tablet Take  by mouth nightly. simvastatin (ZOCOR) 20 mg tablet Take  by mouth nightly. clonazepam (KLONOPIN) 0.5 mg tablet Take  by mouth nightly. 3.  
Follow-up Information Follow up With Specialties Details Why Contact Info Brady Beauchamp MD Family Practice Schedule an appointment as soon as possible for a visit For Primary Care [facility] [address] [facility] [address] 
[phone]  [facility] EMERGENCY DEPT Emergency Medicine Go to If symptoms worsen, As needed 2 Mirella Archuleta 
 [address] [facility] 
[phone]  
  
 
_______________________________ Attestations: This note is prepared by Brianna Tyler, acting as Scribe for Love Jewell MD. 
 
Love Jewell MD:  The scribe's documentation has been prepared under my direction and personally reviewed by me in its entirety. I confirm that the note above accurately reflects all work, treatment, procedures, and medical decision making performed by me. 
_______________________________

## 2019-05-14 LAB
ATRIAL RATE: 127 BPM
CALCULATED P AXIS, ECG09: 16 DEGREES
CALCULATED R AXIS, ECG10: -10 DEGREES
CALCULATED T AXIS, ECG11: 24 DEGREES
DIAGNOSIS, 93000: NORMAL
P-R INTERVAL, ECG05: 136 MS
Q-T INTERVAL, ECG07: 314 MS
QRS DURATION, ECG06: 88 MS
QTC CALCULATION (BEZET), ECG08: 456 MS
VENTRICULAR RATE, ECG03: 127 BPM

## 2020-06-21 ENCOUNTER — HOSPITAL ENCOUNTER (INPATIENT)
Age: 75
LOS: 3 days | Discharge: HOME HEALTH | DRG: 872 | End: 2020-06-24
Attending: EMERGENCY MEDICINE | Admitting: HOSPITALIST
Payer: MEDICARE

## 2020-06-21 ENCOUNTER — APPOINTMENT (OUTPATIENT)
Dept: GENERAL RADIOLOGY | Age: 75
DRG: 872 | End: 2020-06-21
Attending: EMERGENCY MEDICINE
Payer: MEDICARE

## 2020-06-21 DIAGNOSIS — A41.9 SEPSIS WITHOUT ACUTE ORGAN DYSFUNCTION, DUE TO UNSPECIFIED ORGANISM (HCC): Primary | ICD-10-CM

## 2020-06-21 DIAGNOSIS — N30.00 ACUTE CYSTITIS WITHOUT HEMATURIA: ICD-10-CM
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
## 2020-06-21 LAB
ALBUMIN SERPL-MCNC: 3.1 G/DL (ref 3.4–5)
ALBUMIN/GLOB SERPL: 1 {RATIO} (ref 0.8–1.7)
ALP SERPL-CCNC: 70 U/L (ref 45–117)
ALT SERPL-CCNC: 7 U/L (ref 13–56)
ANION GAP SERPL CALC-SCNC: 8 MMOL/L (ref 3–18)
APPEARANCE UR: ABNORMAL
AST SERPL-CCNC: 13 U/L (ref 10–38)
ATRIAL RATE: 116 BPM
BACTERIA URNS QL MICRO: ABNORMAL /HPF
BASOPHILS # BLD: 0 K/UL (ref 0–0.1)
BASOPHILS NFR BLD: 0 % (ref 0–2)
BILIRUB SERPL-MCNC: 0.7 MG/DL (ref 0.2–1)
BILIRUB UR QL: NEGATIVE
BUN SERPL-MCNC: 21 MG/DL (ref 7–18)
BUN/CREAT SERPL: 26 (ref 12–20)
CALCIUM SERPL-MCNC: 8.9 MG/DL (ref 8.5–10.1)
CALCULATED P AXIS, ECG09: 33 DEGREES
CALCULATED R AXIS, ECG10: 0 DEGREES
CALCULATED T AXIS, ECG11: -2 DEGREES
CHLORIDE SERPL-SCNC: 112 MMOL/L (ref 100–111)
CO2 SERPL-SCNC: 25 MMOL/L (ref 21–32)
COLOR UR: YELLOW
CREAT SERPL-MCNC: 0.81 MG/DL (ref 0.6–1.3)
DIAGNOSIS, 93000: NORMAL
DIFFERENTIAL METHOD BLD: ABNORMAL
EOSINOPHIL # BLD: 0.1 K/UL (ref 0–0.4)
EOSINOPHIL NFR BLD: 0 % (ref 0–5)
EPITH CASTS URNS QL MICRO: ABNORMAL /LPF (ref 0–5)
ERYTHROCYTE [DISTWIDTH] IN BLOOD BY AUTOMATED COUNT: 13.5 % (ref 11.6–14.5)
GLOBULIN SER CALC-MCNC: 3.2 G/DL (ref 2–4)
GLUCOSE SERPL-MCNC: 121 MG/DL (ref 74–99)
GLUCOSE UR STRIP.AUTO-MCNC: NEGATIVE MG/DL
HCT VFR BLD AUTO: 43.2 % (ref 35–45)
HGB BLD-MCNC: 13.5 G/DL (ref 12–16)
HGB UR QL STRIP: ABNORMAL
KETONES UR QL STRIP.AUTO: 40 MG/DL
LACTATE SERPL-SCNC: 1.1 MMOL/L (ref 0.4–2)
LACTATE SERPL-SCNC: 3.4 MMOL/L (ref 0.4–2)
LEUKOCYTE ESTERASE UR QL STRIP.AUTO: ABNORMAL
LYMPHOCYTES # BLD: 0.4 K/UL (ref 0.9–3.6)
LYMPHOCYTES NFR BLD: 3 % (ref 21–52)
MCH RBC QN AUTO: 30.6 PG (ref 24–34)
MCHC RBC AUTO-ENTMCNC: 31.3 G/DL (ref 31–37)
MCV RBC AUTO: 98 FL (ref 74–97)
MONOCYTES # BLD: 0.7 K/UL (ref 0.05–1.2)
MONOCYTES NFR BLD: 5 % (ref 3–10)
NEUTS SEG # BLD: 12.7 K/UL (ref 1.8–8)
NEUTS SEG NFR BLD: 92 % (ref 40–73)
NITRITE UR QL STRIP.AUTO: POSITIVE
P-R INTERVAL, ECG05: 134 MS
PH UR STRIP: 5.5 [PH] (ref 5–8)
PLATELET # BLD AUTO: 138 K/UL (ref 135–420)
PMV BLD AUTO: 10.9 FL (ref 9.2–11.8)
POTASSIUM SERPL-SCNC: 3.6 MMOL/L (ref 3.5–5.5)
PROT SERPL-MCNC: 6.3 G/DL (ref 6.4–8.2)
PROT UR STRIP-MCNC: 100 MG/DL
Q-T INTERVAL, ECG07: 328 MS
QRS DURATION, ECG06: 94 MS
QTC CALCULATION (BEZET), ECG08: 455 MS
RBC # BLD AUTO: 4.41 M/UL (ref 4.2–5.3)
RBC #/AREA URNS HPF: ABNORMAL /HPF (ref 0–5)
SODIUM SERPL-SCNC: 145 MMOL/L (ref 136–145)
SP GR UR REFRACTOMETRY: 1.02 (ref 1–1.03)
UROBILINOGEN UR QL STRIP.AUTO: 1 EU/DL (ref 0.2–1)
VENTRICULAR RATE, ECG03: 116 BPM
WBC # BLD AUTO: 13.9 K/UL (ref 4.6–13.2)
WBC URNS QL MICRO: ABNORMAL /HPF (ref 0–5)

## 2020-06-21 PROCEDURE — 96365 THER/PROPH/DIAG IV INF INIT: CPT

## 2020-06-21 PROCEDURE — 87077 CULTURE AEROBIC IDENTIFY: CPT

## 2020-06-21 PROCEDURE — 83605 ASSAY OF LACTIC ACID: CPT

## 2020-06-21 PROCEDURE — 80053 COMPREHEN METABOLIC PANEL: CPT

## 2020-06-21 PROCEDURE — 87040 BLOOD CULTURE FOR BACTERIA: CPT

## 2020-06-21 PROCEDURE — 74011250637 HC RX REV CODE- 250/637: Performed by: HOSPITALIST

## 2020-06-21 PROCEDURE — 93005 ELECTROCARDIOGRAM TRACING: CPT

## 2020-06-21 PROCEDURE — 65270000029 HC RM PRIVATE

## 2020-06-21 PROCEDURE — 96366 THER/PROPH/DIAG IV INF ADDON: CPT

## 2020-06-21 PROCEDURE — 81001 URINALYSIS AUTO W/SCOPE: CPT

## 2020-06-21 PROCEDURE — 87186 SC STD MICRODIL/AGAR DIL: CPT

## 2020-06-21 PROCEDURE — 99285 EMERGENCY DEPT VISIT HI MDM: CPT

## 2020-06-21 PROCEDURE — 74011250636 HC RX REV CODE- 250/636: Performed by: EMERGENCY MEDICINE

## 2020-06-21 PROCEDURE — 96367 TX/PROPH/DG ADDL SEQ IV INF: CPT

## 2020-06-21 PROCEDURE — 51701 INSERT BLADDER CATHETER: CPT

## 2020-06-21 PROCEDURE — 74011000258 HC RX REV CODE- 258: Performed by: EMERGENCY MEDICINE

## 2020-06-21 PROCEDURE — 71101 X-RAY EXAM UNILAT RIBS/CHEST: CPT

## 2020-06-21 PROCEDURE — 85025 COMPLETE CBC W/AUTO DIFF WBC: CPT

## 2020-06-21 PROCEDURE — 74011250636 HC RX REV CODE- 250/636: Performed by: HOSPITALIST

## 2020-06-21 PROCEDURE — 87086 URINE CULTURE/COLONY COUNT: CPT

## 2020-06-21 RX ORDER — SODIUM CHLORIDE 9 MG/ML
100 INJECTION, SOLUTION INTRAVENOUS CONTINUOUS
Status: DISCONTINUED | OUTPATIENT
Start: 2020-06-21 | End: 2020-06-22

## 2020-06-21 RX ORDER — LEVOFLOXACIN 5 MG/ML
750 INJECTION, SOLUTION INTRAVENOUS EVERY 24 HOURS
Status: DISCONTINUED | OUTPATIENT
Start: 2020-06-21 | End: 2020-06-24

## 2020-06-21 RX ORDER — HEPARIN SODIUM 5000 [USP'U]/ML
5000 INJECTION, SOLUTION INTRAVENOUS; SUBCUTANEOUS EVERY 8 HOURS
Status: DISCONTINUED | OUTPATIENT
Start: 2020-06-21 | End: 2020-06-24 | Stop reason: HOSPADM

## 2020-06-21 RX ORDER — VANCOMYCIN 2 GRAM/500 ML IN 0.9 % SODIUM CHLORIDE INTRAVENOUS
2000 ONCE
Status: COMPLETED | OUTPATIENT
Start: 2020-06-21 | End: 2020-06-21

## 2020-06-21 RX ORDER — DIPHENHYDRAMINE HYDROCHLORIDE 50 MG/ML
12.5 INJECTION, SOLUTION INTRAMUSCULAR; INTRAVENOUS
Status: DISCONTINUED | OUTPATIENT
Start: 2020-06-21 | End: 2020-06-24 | Stop reason: HOSPADM

## 2020-06-21 RX ORDER — ACETAMINOPHEN 325 MG/1
650 TABLET ORAL
Status: DISCONTINUED | OUTPATIENT
Start: 2020-06-21 | End: 2020-06-24 | Stop reason: HOSPADM

## 2020-06-21 RX ORDER — DOCUSATE SODIUM 100 MG/1
100 CAPSULE, LIQUID FILLED ORAL 2 TIMES DAILY
Status: DISCONTINUED | OUTPATIENT
Start: 2020-06-21 | End: 2020-06-22

## 2020-06-21 RX ORDER — SODIUM CHLORIDE 0.9 % (FLUSH) 0.9 %
5-10 SYRINGE (ML) INJECTION AS NEEDED
Status: DISCONTINUED | OUTPATIENT
Start: 2020-06-21 | End: 2020-06-24 | Stop reason: HOSPADM

## 2020-06-21 RX ORDER — ONDANSETRON 2 MG/ML
4 INJECTION INTRAMUSCULAR; INTRAVENOUS
Status: DISCONTINUED | OUTPATIENT
Start: 2020-06-21 | End: 2020-06-24 | Stop reason: HOSPADM

## 2020-06-21 RX ORDER — NALOXONE HYDROCHLORIDE 0.4 MG/ML
0.4 INJECTION, SOLUTION INTRAMUSCULAR; INTRAVENOUS; SUBCUTANEOUS AS NEEDED
Status: DISCONTINUED | OUTPATIENT
Start: 2020-06-21 | End: 2020-06-24 | Stop reason: HOSPADM

## 2020-06-21 RX ADMIN — SODIUM CHLORIDE 100 ML/HR: 900 INJECTION, SOLUTION INTRAVENOUS at 17:59

## 2020-06-21 RX ADMIN — ACETAMINOPHEN 650 MG: 325 TABLET ORAL at 22:38

## 2020-06-21 RX ADMIN — VANCOMYCIN HYDROCHLORIDE 2000 MG: 10 INJECTION, POWDER, LYOPHILIZED, FOR SOLUTION INTRAVENOUS at 12:24

## 2020-06-21 RX ADMIN — PIPERACILLIN AND TAZOBACTAM 3.38 G: 3; .375 INJECTION, POWDER, LYOPHILIZED, FOR SOLUTION INTRAVENOUS at 18:02

## 2020-06-21 RX ADMIN — SODIUM CHLORIDE 503 ML: 900 INJECTION, SOLUTION INTRAVENOUS at 10:01

## 2020-06-21 RX ADMIN — SODIUM CHLORIDE 1000 ML: 900 INJECTION, SOLUTION INTRAVENOUS at 10:00

## 2020-06-21 RX ADMIN — ACETAMINOPHEN 650 MG: 325 TABLET ORAL at 18:02

## 2020-06-21 RX ADMIN — LEVOFLOXACIN 750 MG: 5 INJECTION, SOLUTION INTRAVENOUS at 10:51

## 2020-06-21 RX ADMIN — HEPARIN SODIUM 5000 UNITS: 5000 INJECTION INTRAVENOUS; SUBCUTANEOUS at 18:02

## 2020-06-21 RX ADMIN — PIPERACILLIN AND TAZOBACTAM 4.5 G: 4; .5 INJECTION, POWDER, LYOPHILIZED, FOR SOLUTION INTRAVENOUS; PARENTERAL at 10:01

## 2020-06-21 NOTE — H&P
History & Physical    Patient: Kristen Lewis MRN: [id number]  CSN: [id number]    YOB: 1945  Age: 76 y.o. Sex: female      DOA: 6/21/2020  Primary Care Provider:  Neeru Lopes MD      Assessment/Plan     Hospital Problems  Date Reviewed: 10/28/2016          Codes Class Noted POA    Parkinson's disease ([facility] [address] ICD-10-CM: G20  ICD-9-CM: 332.0  Unknown Unknown        Psychiatric disorder ICD-10-CM: F99  ICD-9-CM: 300.9  Unknown Unknown    Overview Signed 6/21/2020  2:46 PM by Ron Grant MD     bipolar; short term memory lose             Hypertension ICD-10-CM: I10  ICD-9-CM: 401.9  Unknown Unknown        UTI (urinary tract infection) ICD-10-CM: N39.0  ICD-9-CM: 599.0  Unknown Unknown        * (Principal) Sepsis ([facility] ICD-10-CM: A41.9  ICD-9-CM: 038.9, 995.91  Unknown Unknown        Fall at home ICD-10-CM: Maldonado Zamora. [place], [place]  ICD-9-CM: E888.9, E849.0  6/21/2020 Unknown                Admit to medical       Sepsis   Due to uti   Septic protocol, pan cx , iv fluid ,lactic acid monitoring  Iv abx started in Er and will continue, received iv n bolus    uti   On broad coverage iv abx  Will f/u with cx    Parkinson's disease   Continue home medication   Fall /aspiration precaution     Psychiatric disorder   Continue home medication     HTN   Need verify home medication   hydralazine prn for now        Fall at home   No fracture fracture        AC:  I have had a discussion with patient regarding code status. Particularly, described potential options in event of cardiac or respiratory arrest. I have explained what being full code entails, including cardiorespiratory resuscitation attempts with chest compressions, potential cariodioversion/ \" shocks\" as well as intubation. I have also explained Do not resuscitate which would mean we allow a natural death with out aggressive interventions. Full code  AC 32 min       Estimate  length of stay : 2-3 day    DVT : heparin   CC: fall        HPI:     Kristen Lewis is a 76 y.o. female with PMHX of Parkinson disease, hypertension, psychiatric disorder came to ER after fall. She used a walker at home, feel weak and fall, no head injury. Reported rib pain. cxr no fracture noted. #1 was called. She was a found UTI, lactic acid 3.4. Leukocytosis. Tachycardia. Sepsis protocol was started. Normal saline bolus was given per ER.  IV antibiotics started. She denies any dysuria, hematuria. But reported chills. Not going out, low risk for COVID 19. Also reported choked while eating. Also reported abdominal pain. Not sure when it started. Denies any slurred speech/headache/cp/n/v/blurred vission/d/c/palpitation/gait change/bleeding. Denies smoking/ any alcohol or drug use. Visit Vitals  /84   Pulse 83   Temp 98.9 °F (37.2 °C)   Resp 26   Ht 5' 2\" (1.575 m)   Wt 85.1 kg (187 lb 9.6 oz)   SpO2 99%   BMI 34.31 kg/m²      O2 Device: Room air      Past Medical History:   Diagnosis Date    Arthritis     Hypertension 15yrs    Ill-defined condition     h/o dizzy    Parkinson's disease ([address]     Psychiatric disorder     bipolar; short term memory lose    Psychiatric disorder     depressiom       Past Surgical History:   Procedure Laterality Date    HX CATARACT REMOVAL Left     HX HYSTERECTOMY         Family History   Problem Relation Age of Onset    Heart Disease Mother     Cancer Mother     Heart Disease Father     Cancer Father        Social History     Socioeconomic History    Marital status:      Spouse name: Not on file    Number of children: Not on file    Years of education: Not on file    Highest education level: Not on file   Tobacco Use    Smoking status: Never Smoker    Smokeless tobacco: Never Used   Substance and Sexual Activity    Alcohol use: No    Drug use: No       Prior to Admission medications    Medication Sig Start Date End Date Taking? Authorizing Provider   aspirin 81 mg chewable tablet Take 1 Tab by mouth daily.  8/20/17   Zain Leonardo MD JAMAL   ipratropium-albuterol (COMBIVENT RESPIMAT)  mcg/actuation inhaler Take 1 Puff by inhalation three (3) times daily. 3/3/17   Carolyn Mims MD   fluticasone (FLONASE) 50 mcg/actuation nasal spray 2 Sprays by Both Nostrils route nightly. 10/28/16   Olegario Gibbs MD   levomilnacipran (FETZIMA) 40 mg Cs24 Take 40 mg by mouth daily. Provider, Historical   GLUCOSAMINE-CONDROITIN-HRB#182 PO Take  by mouth daily. Provider, Historical   furosemide (LASIX) 40 mg tablet Take 40 mg by mouth daily. Provider, Historical   loratadine (CLARITIN) 10 mg tablet Take 10 mg by mouth daily as needed for Allergies. Provider, Historical   lamotrigine (LAMICTAL) 150 mg tablet Take  by mouth two (2) times a day. Indications: BIPOLAR DISORDER IN REMISSION    Provider, Historical   trazodone (DESYREL) 300 mg tablet Take 300 mg by mouth nightly. Provider, Historical   amlodipine (NORVASC) 5 mg tablet Take 5 mg by mouth daily. Provider, Historical   buPROPion XL (WELLBUTRIN XL) 300 mg XL tablet Take 150 mg by mouth every morning. Provider, Historical   donepezil (ARICEPT) 10 mg tablet Take 10 mg by mouth nightly. Provider, Historical   olanzapine (ZYPREXA) 5 mg tablet Take  by mouth nightly. Provider, Historical   simvastatin (ZOCOR) 20 mg tablet Take  by mouth nightly. Provider, Historical   clonazepam (KLONOPIN) 0.5 mg tablet Take  by mouth nightly. Provider, Historical       Allergies   Allergen Reactions    Combivent [Ipratropium-Albuterol] Shortness of Breath    Ibuprofen Hives    Adhesive Tape-Silicones Rash       Review of Systems  Gen: No fever, +chills, no  malaise, weight loss/gain. Heent: No headache, rhinorrhea, epistaxis, ear pain, hearing loss, sinus pain, neck pain/stiffness, sore throat. Heart: No chest pain, palpitations, [place], pnd, or orthopnea. Resp: No cough, hemoptysis, wheezing and shortness of breath.    GI: No nausea, vomiting, diarrhea, constipation, melena or hematochezia. + abdomen pain   : No urinary obstruction, dysuria or hematuria. Derm: No rash, new skin lesion or pruritis. Musc/skeletal: no bone or joint complains. + fall   Vasc: No edema, cyanosis or claudication. Endo: No heat/cold intolerance, no polyuria,polydipsia or polyphagia. Neuro: No unilateral weakness, numbness, tingling. No seizures. Heme: No easy bruising or bleeding. Physical Exam:     Physical Exam:  Visit Vitals  /84   Pulse 83   Temp 98.9 °F (37.2 °C)   Resp 26   Ht 5' 2\" (1.575 m)   Wt 85.1 kg (187 lb 9.6 oz)   SpO2 99%   BMI 34.31 kg/m²      O2 Device: Room air    Temp (24hrs), Av.4 °F (37.4 °C), Min:98.9 °F (37.2 °C), Max:99.8 °F (37.7 °C)     07 -  1900  In: 1000 [I.V.:1000]  Out: -    No intake/output data recorded. General:  Awake, cooperative, no distress. Head:  Normocephalic, without obvious abnormality, atraumatic. Eyes:  Conjunctivae/corneas clear, sclera anicteric, PERRL, EOMs intact. Nose: Nares normal. No drainage or sinus tenderness. Throat: Lips, mucosa, and tongue normal. .   Neck: Supple, symmetrical, trachea midline, no adenopathy. Lungs:   Clear to auscultation bilaterally. Heart:  Regular rate and rhythm, S1, S2 normal, no murmur, click, rub or gallop. Abdomen: Soft, non-tender. Bowel sounds normal. No masses,  No organomegaly. Extremities: Extremities normal, atraumatic, no cyanosis or edema. Pulses: 2+ and symmetric all extremities. Skin: Skin color-pink, texture, turgor normal. No rashes or lesions. Capillary refill normal    Neurologic: CNII-XII intact. No focal motor or sensory deficit.  Resting tremor noted        Labs Reviewed:    BMP:   Lab Results   Component Value Date/Time     2020 09:30 AM    K 3.6 2020 09:30 AM     (H) 2020 09:30 AM    CO2 25 2020 09:30 AM    AGAP 8 2020 09:30 AM     (H) 2020 09:30 AM    BUN 21 (H) 2020 09:30 AM    CREA 0.81 06/21/2020 09:30 AM    GFRAA >60 06/21/2020 09:30 AM    GFRNA >60 06/21/2020 09:30 AM     CMP:   Lab Results   Component Value Date/Time     06/21/2020 09:30 AM    K 3.6 06/21/2020 09:30 AM     (H) 06/21/2020 09:30 AM    CO2 25 06/21/2020 09:30 AM    AGAP 8 06/21/2020 09:30 AM     (H) 06/21/2020 09:30 AM    BUN 21 (H) 06/21/2020 09:30 AM    CREA 0.81 06/21/2020 09:30 AM    GFRAA >60 06/21/2020 09:30 AM    GFRNA >60 06/21/2020 09:30 AM    CA 8.9 06/21/2020 09:30 AM    ALB 3.1 (L) 06/21/2020 09:30 AM    TP 6.3 (L) 06/21/2020 09:30 AM    GLOB 3.2 06/21/2020 09:30 AM    AGRAT 1.0 06/21/2020 09:30 AM    ALT 7 (L) 06/21/2020 09:30 AM     CBC:   Lab Results   Component Value Date/Time    WBC 13.9 (H) 06/21/2020 09:30 AM    HGB 13.5 06/21/2020 09:30 AM    HCT 43.2 06/21/2020 09:30 AM     06/21/2020 09:30 AM     All Cardiac Markers in the last 24 hours: No results found for: CPK, CK, CKMMB, CKMB, RCK3, CKMBT, CKNDX, CKND1, BABITA, TROPT, TROIQ, NATANAEL, TROPT, TNIPOC, BNP, BNPP  Recent Glucose Results:   Lab Results   Component Value Date/Time     (H) 06/21/2020 09:30 AM     ABG: No results found for: PH, PHI, PCO2, PCO2I, PO2, PO2I, HCO3, HCO3I, FIO2, FIO2I  COAGS: No results found for: APTT, PTP, INR, INREXT, INREXT  Liver Panel:   Lab Results   Component Value Date/Time    ALB 3.1 (L) 06/21/2020 09:30 AM    TP 6.3 (L) 06/21/2020 09:30 AM    GLOB 3.2 06/21/2020 09:30 AM    AGRAT 1.0 06/21/2020 09:30 AM    ALT 7 (L) 06/21/2020 09:30 AM    AP 70 06/21/2020 09:30 AM     Pancreatic Markers: No results found for: AMYLPOCT, AML, LIPPOCT, LPSE    Xr Ribs Lt W Pa Cxr Min 3 V    Result Date: 6/21/2020  EXAM: Left rib series with PA chest. CLINICAL INDICATION/HISTORY: Left chest wall pain following trauma. COMPARISON: 08/20/2017. TECHNIQUE: PA upright chest as well as multiple views of the ribs were obtained. _______________ FINDINGS: There is no acute fracture.   There is no pneumothorax or pleural effusion. The lungs are clear of focal infiltrate. Heart size is within normal limits. There is no significant vascular congestion. _______________     IMPRESSION: 1. No acute cardiopulmonary process. 2. Negative for left rib fracture.     Procedures/imaging: see electronic medical records for all procedures/Xrays and details which were not copied into this note but were reviewed prior to creation of Leopoldo Acosta, MD, Internal Medicine     CC: Vicky Fried MD

## 2020-06-21 NOTE — ED PROVIDER NOTES
EMERGENCY DEPARTMENT HISTORY AND PHYSICAL EXAM    Date: 6/21/2020  Patient Name: Jordan Urbina    History of Presenting Illness     Chief Complaint   Patient presents with    Fall     History Provided By: Patient and EMS     History Dave Ahumada):   9:28 AM  Jordan Urbina is a 76 y.o. female with PMHX of arthritis, hypertension, Parkinson's, bipolar who presents to the emergency department by EMS C/O falling onset this morning. Associated sxs include left side pain. Pt denies any other sxs or complaints. EMS reports that the patient had fallen this morning using her rolling walker trying to get from the bedroom to the bathroom.  called 46 for this. They and the patient both report that last night she was eating shrimp in a restaurant and ended up choking on one. Bystander Heimlich maneuver was performed expelling the shrimp from her mouth. Patient did not seek care for this at the time. Patient states that she has had pain in her left lower rib cage since that time. EMS also reports that at the time of their arrival patient would become hypoxic with any activity at home. He is responsive to oxygen. Chief Complaint: fall  Duration: 1 houf   Timing:  acute  Location: left ribs  Quality: Sharp  Severity: Moderate  Modifying Factors: Nothing makes it better, movement or touching it makes it worse.   Associated Symptoms: denies any other associated signs or symptoms    PCP: Hemant Armijo MD     Current Facility-Administered Medications   Medication Dose Route Frequency Provider Last Rate Last Dose    sodium chloride (NS) flush 5-10 mL  5-10 mL IntraVENous PRN Dionisio Phoenix MD        levoFLOXacin (LEVAQUIN) 750 mg in D5W IVPB  750 mg IntraVENous Q24H Dionisio Phoenix  mL/hr at 06/21/20 1051 750 mg at 06/21/20 1051    Vancomycin Pharmacy to dose  1 Each Other Rx Dosing/Monitoring Dionisio Phoenix MD        piperacillin-tazobactam (ZOSYN) 3.375 g in 0.9% sodium chloride (MBP/ADV) 100 mL MBP 3.375 g IntraVENous Q6H Wiley Chavira MD         Current Outpatient Medications   Medication Sig Dispense Refill    aspirin 81 mg chewable tablet Take 1 Tab by mouth daily. 15 Tab 0    ipratropium-albuterol (COMBIVENT RESPIMAT)  mcg/actuation inhaler Take 1 Puff by inhalation three (3) times daily. 1 Inhaler 3    fluticasone (FLONASE) 50 mcg/actuation nasal spray 2 Sprays by Both Nostrils route nightly. 1 Bottle 3    levomilnacipran (FETZIMA) 40 mg Cs24 Take 40 mg by mouth daily.  GLUCOSAMINE-CONDROITIN-HRB#182 PO Take  by mouth daily.  furosemide (LASIX) 40 mg tablet Take 40 mg by mouth daily.  loratadine (CLARITIN) 10 mg tablet Take 10 mg by mouth daily as needed for Allergies.  lamotrigine (LAMICTAL) 150 mg tablet Take  by mouth two (2) times a day. Indications: BIPOLAR DISORDER IN REMISSION      trazodone (DESYREL) 300 mg tablet Take 300 mg by mouth nightly.  amlodipine (NORVASC) 5 mg tablet Take 5 mg by mouth daily.  buPROPion XL (WELLBUTRIN XL) 300 mg XL tablet Take 150 mg by mouth every morning.  donepezil (ARICEPT) 10 mg tablet Take 10 mg by mouth nightly.  olanzapine (ZYPREXA) 5 mg tablet Take  by mouth nightly.  simvastatin (ZOCOR) 20 mg tablet Take  by mouth nightly.  clonazepam (KLONOPIN) 0.5 mg tablet Take  by mouth nightly.          Past History     Past Medical History:  Past Medical History:   Diagnosis Date    Arthritis     Hypertension 15yrs    Ill-defined condition     h/o dizzy    Parkinson's disease ([facility] [address]     Psychiatric disorder     bipolar; short term memory lose    Psychiatric disorder     depressiom       Past Surgical History:  Past Surgical History:   Procedure Laterality Date    HX CATARACT REMOVAL Left     HX HYSTERECTOMY         Family History:  Family History   Problem Relation Age of Onset    Heart Disease Mother     Cancer Mother     Heart Disease Father     Cancer Father        Social History:  Social History     Tobacco Use    Smoking status: Never Smoker    Smokeless tobacco: Never Used   Substance Use Topics    Alcohol use: No    Drug use: No       Allergies: Allergies   Allergen Reactions    Combivent [Ipratropium-Albuterol] Shortness of Breath    Ibuprofen Hives    Adhesive Tape-Silicones Rash         Review of Systems   Review of Systems   Constitutional: Negative for chills and fever. Respiratory: Negative for shortness of breath. Cardiovascular: Positive for chest pain. Gastrointestinal: Negative for abdominal pain, nausea and vomiting. Musculoskeletal: Positive for gait problem. All other systems reviewed and are negative. Physical Exam     Vitals:    06/21/20 1110 06/21/20 1120 06/21/20 1350 06/21/20 1400   BP: 102/71 115/66 104/47 121/67   Pulse: 96 96 88 86   Resp: (!) 31 24 (!) 31 (!) 39   Temp:       SpO2: 97% 96% 99% 98%   Weight:       Height:         Physical Exam  Vitals signs and nursing note reviewed. Vital signs and nursing notes reviewed  CONSTITUTIONAL: Alert, in mild apparent distress; well-developed; well-nourished. HEAD:  Normocephalic, atraumatic  EYES: PERRL; EOM's intact. ENTM: Nose: no rhinorrhea; Throat: no erythema or exudate, mucous membranes moist  Neck:  No JVD, supple without lymphadenopathy  RESP: Chest clear, equal breath sounds. CHEST: Tender at left lower anterior lateral ribs  CV: S1 and S2 WNL; No murmurs, gallops or rubs. GI: Normal bowel sounds, abdomen soft and non-tender. No masses or organomegaly. : No costo-vertebral angle tenderness. BACK:  Non-tender  UPPER EXT:  Normal inspection  LOWER EXT: No edema, no calf tenderness. Distal pulses intact. NEURO: CN intact, reflexes 2/4 and sym, strength symmetric in all extremities +4/5, sensation intact. SKIN: No rashes; Normal for age and stage. PSYCH:  Alert and oriented, normal affect.      Diagnostic Study Results     Labs -     Recent Results (from the past 12 hour(s))   CBC WITH AUTOMATED DIFF    Collection Time: 06/21/20  9:30 AM   Result Value Ref Range    WBC 13.9 (H) 4.6 - 13.2 K/uL    RBC 4.41 4.20 - 5.30 M/uL    HGB 13.5 12.0 - 16.0 g/dL    HCT 43.2 35.0 - 45.0 %    MCV 98.0 (H) 74.0 - 97.0 FL    MCH 30.6 24.0 - 34.0 PG    MCHC 31.3 31.0 - 37.0 g/dL    RDW 13.5 11.6 - 14.5 %    PLATELET 411 737 - 023 K/uL    MPV 10.9 9.2 - 11.8 FL    NEUTROPHILS 92 (H) 40 - 73 %    LYMPHOCYTES 3 (L) 21 - 52 %    MONOCYTES 5 3 - 10 %    EOSINOPHILS 0 0 - 5 %    BASOPHILS 0 0 - 2 %    ABS. NEUTROPHILS 12.7 (H) 1.8 - 8.0 K/UL    ABS. LYMPHOCYTES 0.4 (L) 0.9 - 3.6 K/UL    ABS. MONOCYTES 0.7 0.05 - 1.2 K/UL    ABS. EOSINOPHILS 0.1 0.0 - 0.4 K/UL    ABS. BASOPHILS 0.0 0.0 - 0.1 K/UL    DF AUTOMATED     METABOLIC PANEL, COMPREHENSIVE    Collection Time: 06/21/20  9:30 AM   Result Value Ref Range    Sodium 145 136 - 145 mmol/L    Potassium 3.6 3.5 - 5.5 mmol/L    Chloride 112 (H) 100 - 111 mmol/L    CO2 25 21 - 32 mmol/L    Anion gap 8 3.0 - 18 mmol/L    Glucose 121 (H) 74 - 99 mg/dL    BUN 21 (H) 7.0 - 18 MG/DL    Creatinine 0.81 0.6 - 1.3 MG/DL    BUN/Creatinine ratio 26 (H) 12 - 20      GFR est AA >60 >60 ml/min/1.73m2    GFR est non-AA >60 >60 ml/min/1.73m2    Calcium 8.9 8.5 - 10.1 MG/DL    Bilirubin, total 0.7 0.2 - 1.0 MG/DL    ALT (SGPT) 7 (L) 13 - 56 U/L    AST (SGOT) 13 10 - 38 U/L    Alk.  phosphatase 70 45 - 117 U/L    Protein, total 6.3 (L) 6.4 - 8.2 g/dL    Albumin 3.1 (L) 3.4 - 5.0 g/dL    Globulin 3.2 2.0 - 4.0 g/dL    A-G Ratio 1.0 0.8 - 1.7     LACTIC ACID    Collection Time: 06/21/20  9:30 AM   Result Value Ref Range    Lactic acid 3.4 (HH) 0.4 - 2.0 MMOL/L   EKG, 12 LEAD, INITIAL    Collection Time: 06/21/20  9:37 AM   Result Value Ref Range    Ventricular Rate 116 BPM    Atrial Rate 116 BPM    P-R Interval 134 ms    QRS Duration 94 ms    Q-T Interval 328 ms    QTC Calculation (Bezet) 455 ms    Calculated P Axis 33 degrees    Calculated R Axis 0 degrees    Calculated T Axis -2 degrees Diagnosis       Sinus tachycardia  Incomplete right bundle branch block  Inferior infarct (cited on or before 20-AUG-2017)  Abnormal ECG  When compared with ECG of 29-DEC-2018 13:51,  Non-specific change in ST segment in Inferior leads     URINALYSIS W/ RFLX MICROSCOPIC    Collection Time: 06/21/20  9:45 AM   Result Value Ref Range    Color YELLOW      Appearance TURBID      Specific gravity 1.020 1.005 - 1.030      pH (UA) 5.5 5.0 - 8.0      Protein 100 (A) NEG mg/dL    Glucose Negative NEG mg/dL    Ketone 40 (A) NEG mg/dL    Bilirubin Negative NEG      Blood MODERATE (A) NEG      Urobilinogen 1.0 0.2 - 1.0 EU/dL    Nitrites Positive (A) NEG      Leukocyte Esterase LARGE (A) NEG     URINE MICROSCOPIC ONLY    Collection Time: 06/21/20  9:45 AM   Result Value Ref Range    WBC TOO NUMEROUS TO COUNT 0 - 5 /hpf    RBC 10 to 20 0 - 5 /hpf    Epithelial cells FEW 0 - 5 /lpf    Bacteria 4+ (A) NEG /hpf       Radiologic Studies -   XR RIBS LT W PA CXR MIN 3 V   Final Result   IMPRESSION:      1. No acute cardiopulmonary process. 2. Negative for left rib fracture.         CT Results  (Last 48 hours)    None        CXR Results  (Last 48 hours)    None          Medications given in the ED-  Medications   sodium chloride (NS) flush 5-10 mL (has no administration in time range)   levoFLOXacin (LEVAQUIN) 750 mg in D5W IVPB (750 mg IntraVENous New Bag 6/21/20 1051)   Vancomycin Pharmacy to dose (has no administration in time range)   piperacillin-tazobactam (ZOSYN) 3.375 g in 0.9% sodium chloride (MBP/ADV) 100 mL MBP (has no administration in time range)   piperacillin-tazobactam (ZOSYN) 4.5 g in 0.9% sodium chloride (MBP/ADV) 100 mL MBP (4.5 g IntraVENous New Bag 6/21/20 1001)   sodium chloride 0.9 % bolus infusion 1,000 mL (1,000 mL IntraVENous New Bag 6/21/20 1000)     Followed by   sodium chloride 0.9 % bolus infusion 503 mL (503 mL IntraVENous New Bag 6/21/20 1001)   vancomycin (VANCOCIN) 2000 mg in  ml infusion (2,000 mg IntraVENous New Bag 6/21/20 1224)         Medical Decision Making   I am the first provider for this patient. I reviewed the vital signs, available nursing notes, past medical history, past surgical history, family history and social history. Vital Signs-Reviewed the patient's vital signs. Pulse Oximetry Analysis - 95% on room air    Cardiac Monitor:  Rate: 122 bpm  Rhythm: Sinus tachycardia    EKG interpretation: (Preliminary)  Rhythm: sinus tachycardia. Rate: 116 bpm; no STEMI, QT/QTc: 328/455 ms  EKG read by Tana De Leon MD at 9:37 AM     Records Reviewed: Nursing Notes    Provider Notes (Medical Decision Making):   DDX: Fall, UTI, sepsis    Procedures:  Procedures    ED Course:   9:28 AM Initial assessment performed. The patients presenting problems have been discussed, and they are in agreement with the care plan formulated and outlined with them. I have encouraged them to ask questions as they arise throughout their visit. 2:47 PM Dr. Jessica Williamson for tele admission      Diagnosis and Disposition     Discussion:  76 y.o. female with fall this morning patient is also having symptoms of sepsis and a UTI. Discussed with hospitalist who will admit patient. Patient understands and agrees with this admission. Her family was also notified. Critical Care Time:   I have spent 45 minutes of critical care time involved in lab review, consultations with specialist, family decision-making, and documentation. During this entire length of time I was immediately available to the patient. Critical Care: The reason for providing this level of medical care for this critically ill patient was due a critical illness that impaired one or more vital organ systems such that there was a high probability of imminent or life threatening deterioration in the patients condition.  This care involved high complexity decision making to assess, manipulate, and support vital system functions, to treat this degreee vital organ system failure and to prevent further life threatening deterioration of the patients condition. Critical Care Time: 45 minutes    Core Measures:  For Hospitalized Patients:    1. Hospitalization Decision Time:  The decision to hospitalize the patient was made by Andrea Andrews MD, MD at 2:30 PM on 6/21/2020    2. Aspirin: Aspirin was not given because the patient did not present with a stroke at the time of their Emergency Department evaluation    2:47 PM Patient is being admitted to the hospital by Dr. Alda Dwyer. The results of their tests and reasons for their admission have been discussed with them and/or available family. They convey agreement and understanding for the need to be admitted and for their admission diagnosis. CONDITIONS ON ADMISSION:  Sepsis is present at the time of admission. Deep Vein Thrombosis is not present at the time of admission. Thrombosis is not present at the time of admission. Urinary Tract Infection is present at the time of admission. Pneumonia is not present at the time of admission. MRSA is not present at the time of admission. Wound infection is not present at the time of admission. Pressure Ulcer is not present at the time of admission. CLINICAL IMPRESSION:    1. Sepsis without acute organ dysfunction, due to unspecified organism ([address]    2. Acute cystitis without hematuria    3. Fall, initial encounter      Dragon Disclaimer     Please note that this dictation was completed with [email or web address], the computer voice recognition software. Quite often unanticipated grammatical, syntax, homophones, and other interpretive errors are inadvertently transcribed by the computer software. Please disregard these errors. Please excuse any errors that have escaped final proofreading.     Xi Ambrocio MD

## 2020-06-21 NOTE — ED TRIAGE NOTES
Pt arrives per EMS from home w/ c/o fall this morning while walking w/ rollator out of her bedroom. EMS reports pt was at [facility] last night and choked on shrimp and received the heimlich. Pt has hx of aspiration. EMS reports pt o2 sats at 89% upon arrival at room air w/ a HR of 131. Pt ANOx4 at this time during triage.

## 2020-06-21 NOTE — ED NOTES
TRANSFER - OUT REPORT:    Verbal report given to ivan (name) on Uri Ferrer  being transferred to 323(unit) for routine progression of care       Report consisted of patients Situation, Background, Assessment and   Recommendations(SBAR). Information from the following report(s) SBAR, Kardex and ED Summary was reviewed with the receiving nurse. Lines:   Peripheral IV 06/21/20 Left (Active)       Peripheral IV 06/21/20 Right Forearm (Active)   Site Assessment Clean, dry, & intact 6/21/2020  5:10 PM   Hub Color/Line Status Blue 6/21/2020  5:10 PM        Opportunity for questions and clarification was provided.       Patient transported with:   Registered Nurse

## 2020-06-21 NOTE — PROGRESS NOTES
Pharmacy Dosing Services: Vancomycin    Consult for Vancomycin Dosing by Pharmacy by Dr. Dima Tay provided for this 76y.o. year old female , for indication of Sepsis unknown origin. Day of Therapy 1    Ht Readings from Last 1 Encounters:   06/21/20 157.5 cm (62\")        Wt Readings from Last 1 Encounters:   06/21/20 85.1 kg (187 lb 9.6 oz)        Previous Regimen    Last Level    Other Current Antibiotics Zosyn  and levaquin   Significant Cultures    Serum Creatinine Lab Results   Component Value Date/Time    Creatinine 0.81 06/21/2020 09:30 AM      Creatinine Clearance Estimated Creatinine Clearance: 60.7 mL/min (based on SCr of 0.81 mg/dL). BUN Lab Results   Component Value Date/Time    BUN 21 (H) 06/21/2020 09:30 AM      WBC Lab Results   Component Value Date/Time    WBC 13.9 (H) 06/21/2020 09:30 AM      H/H Lab Results   Component Value Date/Time    HGB 13.5 06/21/2020 09:30 AM      Platelets Lab Results   Component Value Date/Time    PLATELET 205 58/61/8422 09:30 AM      Temp 99.8 °F (37.7 °C)     Start Vancomycin therapy, with loading dose of 2000 (mg) at 6/21/20(time/date). Follow with maintenance dose of 1250(mg) 12 hours after Loading dose    Dose calculated to approximate a therapeutic trough of 15-20 mcg/mL. Pharmacy to follow daily and will make changes to dose and/or frequency based on clinical status.         Pharmacist Vilma Sawyer, [facility]

## 2020-06-22 PROBLEM — R78.81 BACTEREMIA: Status: ACTIVE | Noted: 2020-06-22

## 2020-06-22 LAB
ANION GAP SERPL CALC-SCNC: 9 MMOL/L (ref 3–18)
BASOPHILS # BLD: 0 K/UL (ref 0–0.1)
BASOPHILS NFR BLD: 0 % (ref 0–2)
BUN SERPL-MCNC: 16 MG/DL (ref 7–18)
BUN/CREAT SERPL: 29 (ref 12–20)
CALCIUM SERPL-MCNC: 8.2 MG/DL (ref 8.5–10.1)
CHLORIDE SERPL-SCNC: 113 MMOL/L (ref 100–111)
CO2 SERPL-SCNC: 23 MMOL/L (ref 21–32)
CREAT SERPL-MCNC: 0.56 MG/DL (ref 0.6–1.3)
DIFFERENTIAL METHOD BLD: ABNORMAL
EOSINOPHIL # BLD: 0.1 K/UL (ref 0–0.4)
EOSINOPHIL NFR BLD: 1 % (ref 0–5)
ERYTHROCYTE [DISTWIDTH] IN BLOOD BY AUTOMATED COUNT: 13.4 % (ref 11.6–14.5)
GLUCOSE SERPL-MCNC: 73 MG/DL (ref 74–99)
HCT VFR BLD AUTO: 39.9 % (ref 35–45)
HGB BLD-MCNC: 12.2 G/DL (ref 12–16)
LYMPHOCYTES # BLD: 1 K/UL (ref 0.9–3.6)
LYMPHOCYTES NFR BLD: 12 % (ref 21–52)
MAGNESIUM SERPL-MCNC: 2.1 MG/DL (ref 1.6–2.6)
MCH RBC QN AUTO: 30 PG (ref 24–34)
MCHC RBC AUTO-ENTMCNC: 30.6 G/DL (ref 31–37)
MCV RBC AUTO: 98.3 FL (ref 74–97)
MONOCYTES # BLD: 1.4 K/UL (ref 0.05–1.2)
MONOCYTES NFR BLD: 17 % (ref 3–10)
NEUTS SEG # BLD: 5.6 K/UL (ref 1.8–8)
NEUTS SEG NFR BLD: 70 % (ref 40–73)
PLATELET # BLD AUTO: 122 K/UL (ref 135–420)
PMV BLD AUTO: 11 FL (ref 9.2–11.8)
POTASSIUM SERPL-SCNC: 3.9 MMOL/L (ref 3.5–5.5)
RBC # BLD AUTO: 4.06 M/UL (ref 4.2–5.3)
SODIUM SERPL-SCNC: 145 MMOL/L (ref 136–145)
WBC # BLD AUTO: 8 K/UL (ref 4.6–13.2)

## 2020-06-22 PROCEDURE — 97116 GAIT TRAINING THERAPY: CPT

## 2020-06-22 PROCEDURE — 97535 SELF CARE MNGMENT TRAINING: CPT

## 2020-06-22 PROCEDURE — 74011250637 HC RX REV CODE- 250/637: Performed by: HOSPITALIST

## 2020-06-22 PROCEDURE — 97167 OT EVAL HIGH COMPLEX 60 MIN: CPT

## 2020-06-22 PROCEDURE — 87040 BLOOD CULTURE FOR BACTERIA: CPT

## 2020-06-22 PROCEDURE — 74011250636 HC RX REV CODE- 250/636: Performed by: HOSPITALIST

## 2020-06-22 PROCEDURE — 85025 COMPLETE CBC W/AUTO DIFF WBC: CPT

## 2020-06-22 PROCEDURE — 92526 ORAL FUNCTION THERAPY: CPT

## 2020-06-22 PROCEDURE — 36415 COLL VENOUS BLD VENIPUNCTURE: CPT

## 2020-06-22 PROCEDURE — 74011000258 HC RX REV CODE- 258: Performed by: EMERGENCY MEDICINE

## 2020-06-22 PROCEDURE — 80048 BASIC METABOLIC PNL TOTAL CA: CPT

## 2020-06-22 PROCEDURE — 74011250636 HC RX REV CODE- 250/636: Performed by: EMERGENCY MEDICINE

## 2020-06-22 PROCEDURE — 83735 ASSAY OF MAGNESIUM: CPT

## 2020-06-22 PROCEDURE — 92610 EVALUATE SWALLOWING FUNCTION: CPT

## 2020-06-22 PROCEDURE — 65270000029 HC RM PRIVATE

## 2020-06-22 PROCEDURE — 97162 PT EVAL MOD COMPLEX 30 MIN: CPT

## 2020-06-22 RX ORDER — DULOXETIN HYDROCHLORIDE 60 MG/1
60 CAPSULE, DELAYED RELEASE ORAL 2 TIMES DAILY
COMMUNITY

## 2020-06-22 RX ORDER — CARBIDOPA AND LEVODOPA 25; 100 MG/1; MG/1
1 TABLET ORAL 3 TIMES DAILY
COMMUNITY

## 2020-06-22 RX ORDER — TROSPIUM CHLORIDE 20 MG/1
60 TABLET, FILM COATED ORAL
Status: ON HOLD | COMMUNITY
End: 2020-06-22 | Stop reason: DRUGHIGH

## 2020-06-22 RX ORDER — GABAPENTIN 100 MG/1
200 CAPSULE ORAL EVERY 12 HOURS
COMMUNITY
End: 2022-10-02 | Stop reason: SINTOL

## 2020-06-22 RX ORDER — MEMANTINE HYDROCHLORIDE 5 MG/1
10 TABLET ORAL DAILY
Status: DISCONTINUED | OUTPATIENT
Start: 2020-06-23 | End: 2020-06-24 | Stop reason: HOSPADM

## 2020-06-22 RX ORDER — GABAPENTIN 100 MG/1
100 CAPSULE ORAL EVERY 24 HOURS
COMMUNITY
End: 2022-10-02 | Stop reason: SINTOL

## 2020-06-22 RX ORDER — ATORVASTATIN CALCIUM 10 MG/1
10 TABLET, FILM COATED ORAL DAILY
Status: DISCONTINUED | OUTPATIENT
Start: 2020-06-23 | End: 2020-06-24 | Stop reason: HOSPADM

## 2020-06-22 RX ORDER — AMLODIPINE BESYLATE 10 MG/1
10 TABLET ORAL DAILY
COMMUNITY
End: 2022-01-13

## 2020-06-22 RX ORDER — DULOXETIN HYDROCHLORIDE 60 MG/1
60 CAPSULE, DELAYED RELEASE ORAL 2 TIMES DAILY
Status: DISCONTINUED | OUTPATIENT
Start: 2020-06-22 | End: 2020-06-24 | Stop reason: HOSPADM

## 2020-06-22 RX ORDER — TROSPIUM CHLORIDE ER 60 MG/1
60 CAPSULE ORAL
Status: DISCONTINUED | OUTPATIENT
Start: 2020-06-23 | End: 2020-06-24 | Stop reason: HOSPADM

## 2020-06-22 RX ORDER — MEMANTINE HYDROCHLORIDE 10 MG/1
10 TABLET ORAL DAILY
COMMUNITY

## 2020-06-22 RX ORDER — OLANZAPINE 2.5 MG/1
5 TABLET ORAL
Status: DISCONTINUED | OUTPATIENT
Start: 2020-06-22 | End: 2020-06-22

## 2020-06-22 RX ORDER — DIVALPROEX SODIUM 500 MG/1
1000 TABLET, DELAYED RELEASE ORAL
COMMUNITY

## 2020-06-22 RX ORDER — TROSPIUM CHLORIDE ER 60 MG/1
60 CAPSULE ORAL
COMMUNITY
End: 2022-10-02 | Stop reason: SINTOL

## 2020-06-22 RX ORDER — OMEPRAZOLE 40 MG/1
40 CAPSULE, DELAYED RELEASE ORAL DAILY
COMMUNITY

## 2020-06-22 RX ORDER — DIVALPROEX SODIUM 500 MG/1
1000 TABLET, DELAYED RELEASE ORAL
Status: DISCONTINUED | OUTPATIENT
Start: 2020-06-22 | End: 2020-06-24 | Stop reason: HOSPADM

## 2020-06-22 RX ORDER — DONEPEZIL HYDROCHLORIDE 5 MG/1
10 TABLET, FILM COATED ORAL
Status: DISCONTINUED | OUTPATIENT
Start: 2020-06-22 | End: 2020-06-24 | Stop reason: HOSPADM

## 2020-06-22 RX ORDER — GABAPENTIN 100 MG/1
200 CAPSULE ORAL EVERY 12 HOURS
Status: DISCONTINUED | OUTPATIENT
Start: 2020-06-22 | End: 2020-06-24 | Stop reason: HOSPADM

## 2020-06-22 RX ORDER — CARBIDOPA AND LEVODOPA 25; 100 MG/1; MG/1
1 TABLET ORAL 3 TIMES DAILY
Status: DISCONTINUED | OUTPATIENT
Start: 2020-06-22 | End: 2020-06-24 | Stop reason: HOSPADM

## 2020-06-22 RX ORDER — GABAPENTIN 100 MG/1
100 CAPSULE ORAL EVERY 24 HOURS
Status: DISCONTINUED | OUTPATIENT
Start: 2020-06-22 | End: 2020-06-24 | Stop reason: HOSPADM

## 2020-06-22 RX ORDER — PRIMIDONE 50 MG/1
50 TABLET ORAL
COMMUNITY

## 2020-06-22 RX ORDER — FLUTICASONE PROPIONATE 50 MCG
2 SPRAY, SUSPENSION (ML) NASAL
Status: DISCONTINUED | OUTPATIENT
Start: 2020-06-22 | End: 2020-06-24 | Stop reason: HOSPADM

## 2020-06-22 RX ADMIN — HEPARIN SODIUM 5000 UNITS: 5000 INJECTION INTRAVENOUS; SUBCUTANEOUS at 09:40

## 2020-06-22 RX ADMIN — GABAPENTIN 200 MG: 100 CAPSULE ORAL at 19:37

## 2020-06-22 RX ADMIN — ACETAMINOPHEN 650 MG: 325 TABLET ORAL at 11:54

## 2020-06-22 RX ADMIN — DULOXETINE HYDROCHLORIDE 60 MG: 60 CAPSULE, DELAYED RELEASE ORAL at 23:24

## 2020-06-22 RX ADMIN — ACETAMINOPHEN 650 MG: 325 TABLET ORAL at 03:25

## 2020-06-22 RX ADMIN — DIVALPROEX SODIUM 1000 MG: 500 TABLET, DELAYED RELEASE ORAL at 23:24

## 2020-06-22 RX ADMIN — VANCOMYCIN HYDROCHLORIDE 1250 MG: 1.25 INJECTION, POWDER, LYOPHILIZED, FOR SOLUTION INTRAVENOUS at 00:13

## 2020-06-22 RX ADMIN — PIPERACILLIN AND TAZOBACTAM 3.38 G: 3; .375 INJECTION, POWDER, LYOPHILIZED, FOR SOLUTION INTRAVENOUS at 00:14

## 2020-06-22 RX ADMIN — LEVOFLOXACIN 750 MG: 5 INJECTION, SOLUTION INTRAVENOUS at 10:11

## 2020-06-22 RX ADMIN — DONEPEZIL HYDROCHLORIDE 10 MG: 5 TABLET, FILM COATED ORAL at 23:24

## 2020-06-22 RX ADMIN — ACETAMINOPHEN 650 MG: 325 TABLET ORAL at 19:35

## 2020-06-22 RX ADMIN — PIPERACILLIN AND TAZOBACTAM 3.38 G: 3; .375 INJECTION, POWDER, LYOPHILIZED, FOR SOLUTION INTRAVENOUS at 06:45

## 2020-06-22 RX ADMIN — CARBIDOPA AND LEVODOPA 1 TABLET: 25; 100 TABLET ORAL at 23:24

## 2020-06-22 RX ADMIN — HEPARIN SODIUM 5000 UNITS: 5000 INJECTION INTRAVENOUS; SUBCUTANEOUS at 19:41

## 2020-06-22 RX ADMIN — CARBIDOPA AND LEVODOPA 1 TABLET: 25; 100 TABLET ORAL at 17:59

## 2020-06-22 RX ADMIN — HEPARIN SODIUM 5000 UNITS: 5000 INJECTION INTRAVENOUS; SUBCUTANEOUS at 03:25

## 2020-06-22 RX ADMIN — PIPERACILLIN AND TAZOBACTAM 3.38 G: 3; .375 INJECTION, POWDER, LYOPHILIZED, FOR SOLUTION INTRAVENOUS at 13:00

## 2020-06-22 RX ADMIN — PIPERACILLIN AND TAZOBACTAM 3.38 G: 3; .375 INJECTION, POWDER, LYOPHILIZED, FOR SOLUTION INTRAVENOUS at 17:59

## 2020-06-22 NOTE — PROGRESS NOTES
RebecaSteven Spouse [phone]     No one answer the phone. Message left   Home medication verified per RN twice today. Restarted , discussed with pharmacist also.

## 2020-06-22 NOTE — PROGRESS NOTES
Problem: Falls - Risk of  Goal: *Absence of Falls  Description: Document Lainey Subramanian Fall Risk and appropriate interventions in the flowsheet. Outcome: Progressing Towards Goal  Note: Fall Risk Interventions:  Mobility Interventions: PT Consult for mobility concerns    Mentation Interventions: Adequate sleep, hydration, pain control, Door open when patient unattended, Update white board    Medication Interventions: Bed/chair exit alarm    Elimination Interventions: Call light in reach, Patient to call for help with toileting needs    History of Falls Interventions: Door open when patient unattended, Consult care management for discharge planning         Problem: Patient Education: Go to Patient Education Activity  Goal: Patient/Family Education  Outcome: Progressing Towards Goal     Problem: Pressure Injury - Risk of  Goal: *Prevention of pressure injury  Description: Document Vj Scale and appropriate interventions in the flowsheet.   Outcome: Progressing Towards Goal  Note: Pressure Injury Interventions:  Sensory Interventions: Assess changes in LOC    Moisture Interventions: Absorbent underpads, Apply protective barrier, creams and emollients, Check for incontinence Q2 hours and as needed, Minimize layers, Moisture barrier    Activity Interventions: PT/OT evaluation    Mobility Interventions: Pressure redistribution bed/mattress (bed type)    Nutrition Interventions: Document food/fluid/supplement intake, Offer support with meals,snacks and hydration    Friction and Shear Interventions: Apply protective barrier, creams and emollients, HOB 30 degrees or less, Minimize layers                Problem: Patient Education: Go to Patient Education Activity  Goal: Patient/Family Education  Outcome: Progressing Towards Goal     Problem: Pain  Goal: *Control of Pain  Outcome: Progressing Towards Goal     Problem: Patient Education: Go to Patient Education Activity  Goal: Patient/Family Education  Outcome: Progressing Towards Goal

## 2020-06-22 NOTE — PROGRESS NOTES
TRANSFER - IN REPORT:    Verbal report received from LINDSAY Riley RN(name) on Geoffrey Samuel  being received from ED(unit) for routine progression of care      Report consisted of patients Situation, Background, Assessment and   Recommendations(SBAR). Information from the following report(s) SBAR, Kardex and MAR was reviewed with the receiving nurse. Opportunity for questions and clarification was provided. Assessment completed upon patients arrival to unit and care assumed. Talked to family both  and son,arrangement made to do zoom call,explained the family visitation restriction in place,the supervisor made  Aware of these concerns and oncoming nurse to give a follow up phone call. Bedside and Verbal shift change report given to ELICEO Magallanes RN (oncoming nurse) by Edith Pacheco RN   (offgoing nurse). Report included the following information SBAR, Kardex and MAR.
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
## 2020-06-22 NOTE — PROGRESS NOTES
MedRec update: Spoke w/ oncoming RN Michael Frances who states that pt  had brought in med bottles and JOSE Salmeron went over them with the pt and updated the home med list.    Jess Powell [facility] 6/22/2020 15:32     Admission medrec by [facility] in process. No answer when attempted to contact pt via telephone so as to maintain social distancing during the COVID-19 outbreak. Dr. Jem Horn had reached out to me to request I do medrec on this ED admission from yesterday. It appears that the RN Marilynn Burkitt already did it today. Venus Murray [facility], BS Pharm.   Clinical Pharmacist  [phone]

## 2020-06-22 NOTE — PROGRESS NOTES
Bedside and verbal report given to ELICEO Loja RN (oncoming nurse) by FAITH Osorio RN  (off going nurse). Report included the following information SBAR, Kardex, OR Summary, Intake/Output, and MAR.    2209   Pt refused colace, states that it isn't needed because her stools are loose. 2238  Pt complained of 6/10 pain to upper abd, treated per [facility]    0325  Pt complained of 6/10 pain to the upper abd, treated per [facility]    0551  Lab called with critical blood culture. Aerobic positive for gram negative rods. [facility] hospitalist    7462  Dr. Elvira Villa notified; ordered a repeat blood culture    0906  Bedside and verbal report given by (off going nurse) ELICEO Loja RN to (oncoming nurse) Elena Woods RN. Report included the following information SBAR, Kardex, OR Summary, Intake/Output, and MAR.

## 2020-06-22 NOTE — PROGRESS NOTES
ADMISSION MEDICATION RECONCILIATION      Medication Reconciliation has been performed by pharmacist on this patient admitted through the ED yesterday. Iliana Wylie is a 76 y.o. female with the following Problems:  Assessment This Admission:   Chief complaint:   Chief Complaint   Patient presents with    Fall      Principal Problem:    Sepsis ([facility] [address] ()    Active Problems:    Parkinson's disease ([facility] [address] ()      Psychiatric disorder ()      Overview: bipolar; short term memory lose      Hypertension ()      UTI (urinary tract infection) ()      Fall at home (6/21/2020)      Bacteremia (6/22/2020)         Allergies as of 06/21/2020 - Review Complete 06/21/2020   Allergen Reaction Noted    Combivent [ipratropium-albuterol] Shortness of Breath 11/16/2016    Ibuprofen Hives 09/19/2014    Adhesive tape-silicones Rash 63/17/8598        Prior to Admission Medications   Prescriptions Last Dose Informant Patient Reported? Taking? DULoxetine (CYMBALTA) 60 mg capsule 6/15/2020 at Unknown time  Yes Yes   Sig: Take 60 mg by mouth two (2) times a day. amLODIPine (NORVASC) 10 mg tablet   Yes Yes   Sig: Take 10 mg by mouth daily. carbidopa-levodopa (SINEMET)  mg per tablet 6/15/2020 at Unknown time  Yes Yes   Sig: Take 1 Tab by mouth three (3) times daily. divalproex DR (DEPAKOTE) 500 mg tablet 6/15/2020 at Unknown time  Yes Yes   Sig: Take 1,000 mg by mouth nightly. donepezil (ARICEPT) 10 mg tablet 6/15/2020 at Unknown time  Yes Yes   Sig: Take 10 mg by mouth nightly.   gabapentin (NEURONTIN) 100 mg capsule 6/15/2020 at Unknown time  Yes Yes   Sig: Take 200 mg by mouth every twelve (12) hours every twelve (12) hours. 2 caps am, 1 cap afternoon, 2 caps pm    gabapentin (NEURONTIN) 100 mg capsule   Yes Yes   Sig: Take 100 mg by mouth every twenty-four (24) hours. In the afternoon in addition to 200mg in am and pm   memantine (NAMENDA) 10 mg tablet 6/15/2020 at Unknown time  Yes Yes   Sig: Take  by mouth daily. omeprazole (PRILOSEC) 40 mg capsule 6/15/2020 at Unknown time  Yes Yes   Sig: Take 40 mg by mouth daily. primidone (MYSOLINE) 50 mg tablet 6/15/2020 at Unknown time  Yes Yes   Sig: Take 50 mg by mouth two (2) times daily as needed. simvastatin (ZOCOR) 20 mg tablet 6/15/2020 at Unknown time  Yes Yes   Sig: Take 20 mg by mouth nightly. trospium (SANCTURA XL) 60 mg capsule   Yes Yes   Sig: Take 60 mg by mouth Daily (before breakfast). valbenazine (Ingrezza) 80 mg cap   Yes Yes   Sig: Take 80 mg by mouth daily. Facility-Administered Medications: None      Unable to interview the pt as she is currently very confused. Evidently the patient brought her meds to the hospital with her and RN Dewitte Goodell updated the home medication list and marked all meds on the list as the last dose being taken on 6/15/2020. JOSE Hernandez brought the bag of home meds to the pharmacy for storage. Dr. Jessica Williamson contacted me to perform a secondary medrec. I reviewed the RX bottles brought from home and made the following modifications to the [facility]. PAML was  marked complete and did require modification. Medication Compliance Issues and/or Medication Concerns: Added to PAML: ingrezza    Removed from [facility]: trazodone, zyprexa, ASA, wellbutrin S, clonazepam, flonase, lasix, glucsamine chondroitin, combivent respimat, lamicatal, fetzima, claritin    Modified dose/freq of PAML entry: norvasc, mysoline, sanctura    Alerted Dr. Jessica Williamson  via [employer] that clinically significant changes have been made to PAML/PTA medication list after being reviewed  by admitting physician.       Ritu Villavicencio [facility], Norah Elizalde    Clinical Pharmacist  [phone] Attending Only

## 2020-06-22 NOTE — PROGRESS NOTES
Reason for Admission:   Fall                   RUR Score:    12%                 Plan for utilizing home health:   TBD       PCP: First and Last name:     Name of Practice:    Are you a current patient: Yes/No:    Approximate date of last visit:    Can you participate in a virtual visit with your PCP:                     Current Advanced Directive/Advance Care Plan: Not on file                         Transition of Care Plan:  [facility] and physician follow up vs SNF                   Chart reviewed. Per H&P Frankie Fuentes is a 76 y.o. female with PMHX of Parkinson disease, hypertension, psychiatric disorder came to ER after fall. She used a walker at home, feel weak and fall, no head injury. Reported rib pain. cxr no fracture noted. #1 was called. She was a found UTI, lactic acid 3.4. Leukocytosis. Tachycardia. Sepsis protocol was started. Normal saline bolus was given per ER.  IV antibiotics started. She denies any dysuria, hematuria. But reported chills. Not going out, low risk for COVID 19. Also reported choked while eating. Also reported abdominal pain. Not sure when it started. \"    Noted pt with therapy consults pending. CM to await outcome of therapy evaluations to assist with identifying an appropriate transition of care. CM to continue to follow and assist.    1155:  CM met with pt at bedside to discuss transition of care. Pt has indicated she was receiving  services through [facility]. Pt would like to continue with this agency. CMS has been notified to assist.  Pt uses a cane to ambulate as needed at home. Pt would like CM to discuss transition of care with her , Pham Pang ([phone]). CM will contact pt's  to further discuss transition of care.   CM to continue to follow and assist.    Care Management Interventions  Transition of Care Consult (CM Consult): Discharge Planning, [address]: No  Reason Outside [place]: Patient already serviced by other home care/hospice agency(Current with [facility])  Physical Therapy Consult: Yes  Occupational Therapy Consult: Yes  Speech Therapy Consult: Yes  Current Support Network: Lives with Spouse  Confirm Follow Up Transport: Family  The Plan for Transition of Care is Related to the Following Treatment Goals : [facility] and physician follow up   The Patient and/or Patient Representative was Provided with a Choice of Provider and Agrees with the Discharge Plan?: Yes  Name of the Patient Representative Who was Provided with a Choice of Provider and Agrees with the Discharge Plan: pt  Freedom of Choice List was Provided with Basic Dialogue that Supports the Patient's Individualized Plan of Care/Goals, Treatment Preferences and Shares the Quality Data Associated with the Providers?: Yes  Discharge Location  Discharge Placement: Home with home health(current with [facility])

## 2020-06-23 LAB
ANION GAP SERPL CALC-SCNC: 7 MMOL/L (ref 3–18)
BASOPHILS # BLD: 0 K/UL (ref 0–0.1)
BASOPHILS NFR BLD: 0 % (ref 0–2)
BUN SERPL-MCNC: 7 MG/DL (ref 7–18)
BUN/CREAT SERPL: 13 (ref 12–20)
CALCIUM SERPL-MCNC: 8.2 MG/DL (ref 8.5–10.1)
CHLORIDE SERPL-SCNC: 107 MMOL/L (ref 100–111)
CO2 SERPL-SCNC: 28 MMOL/L (ref 21–32)
CREAT SERPL-MCNC: 0.55 MG/DL (ref 0.6–1.3)
DIFFERENTIAL METHOD BLD: ABNORMAL
EOSINOPHIL # BLD: 0.1 K/UL (ref 0–0.4)
EOSINOPHIL NFR BLD: 1 % (ref 0–5)
ERYTHROCYTE [DISTWIDTH] IN BLOOD BY AUTOMATED COUNT: 13.1 % (ref 11.6–14.5)
GLUCOSE SERPL-MCNC: 94 MG/DL (ref 74–99)
HCT VFR BLD AUTO: 35.9 % (ref 35–45)
HGB BLD-MCNC: 11.5 G/DL (ref 12–16)
LYMPHOCYTES # BLD: 1.2 K/UL (ref 0.9–3.6)
LYMPHOCYTES NFR BLD: 21 % (ref 21–52)
MAGNESIUM SERPL-MCNC: 1.8 MG/DL (ref 1.6–2.6)
MCH RBC QN AUTO: 30 PG (ref 24–34)
MCHC RBC AUTO-ENTMCNC: 32 G/DL (ref 31–37)
MCV RBC AUTO: 93.7 FL (ref 74–97)
MONOCYTES # BLD: 1.2 K/UL (ref 0.05–1.2)
MONOCYTES NFR BLD: 21 % (ref 3–10)
NEUTS SEG # BLD: 3.2 K/UL (ref 1.8–8)
NEUTS SEG NFR BLD: 57 % (ref 40–73)
PLATELET # BLD AUTO: 137 K/UL (ref 135–420)
PMV BLD AUTO: 10.9 FL (ref 9.2–11.8)
POTASSIUM SERPL-SCNC: 3.1 MMOL/L (ref 3.5–5.5)
RBC # BLD AUTO: 3.83 M/UL (ref 4.2–5.3)
SODIUM SERPL-SCNC: 142 MMOL/L (ref 136–145)
WBC # BLD AUTO: 5.6 K/UL (ref 4.6–13.2)

## 2020-06-23 PROCEDURE — 74011250636 HC RX REV CODE- 250/636: Performed by: EMERGENCY MEDICINE

## 2020-06-23 PROCEDURE — 65270000029 HC RM PRIVATE

## 2020-06-23 PROCEDURE — 92526 ORAL FUNCTION THERAPY: CPT

## 2020-06-23 PROCEDURE — 83735 ASSAY OF MAGNESIUM: CPT

## 2020-06-23 PROCEDURE — 85025 COMPLETE CBC W/AUTO DIFF WBC: CPT

## 2020-06-23 PROCEDURE — 74011250636 HC RX REV CODE- 250/636: Performed by: HOSPITALIST

## 2020-06-23 PROCEDURE — 74011000258 HC RX REV CODE- 258: Performed by: EMERGENCY MEDICINE

## 2020-06-23 PROCEDURE — 80048 BASIC METABOLIC PNL TOTAL CA: CPT

## 2020-06-23 PROCEDURE — 74011250637 HC RX REV CODE- 250/637: Performed by: HOSPITALIST

## 2020-06-23 PROCEDURE — 36415 COLL VENOUS BLD VENIPUNCTURE: CPT

## 2020-06-23 RX ORDER — TROSPIUM CHLORIDE ER 60 MG/1
60 CAPSULE ORAL
Status: ON HOLD | COMMUNITY
End: 2020-06-23 | Stop reason: CLARIF

## 2020-06-23 RX ADMIN — CARBIDOPA AND LEVODOPA 1 TABLET: 25; 100 TABLET ORAL at 16:41

## 2020-06-23 RX ADMIN — TROSPIUM CHLORIDE 60 MG: 60 CAPSULE, EXTENDED RELEASE ORAL at 06:30

## 2020-06-23 RX ADMIN — GABAPENTIN 200 MG: 100 CAPSULE ORAL at 18:16

## 2020-06-23 RX ADMIN — PIPERACILLIN AND TAZOBACTAM 3.38 G: 3; .375 INJECTION, POWDER, LYOPHILIZED, FOR SOLUTION INTRAVENOUS at 12:01

## 2020-06-23 RX ADMIN — DIVALPROEX SODIUM 1000 MG: 500 TABLET, DELAYED RELEASE ORAL at 21:12

## 2020-06-23 RX ADMIN — ACETAMINOPHEN 650 MG: 325 TABLET ORAL at 09:40

## 2020-06-23 RX ADMIN — GABAPENTIN 200 MG: 100 CAPSULE ORAL at 06:30

## 2020-06-23 RX ADMIN — PIPERACILLIN AND TAZOBACTAM 3.38 G: 3; .375 INJECTION, POWDER, LYOPHILIZED, FOR SOLUTION INTRAVENOUS at 18:17

## 2020-06-23 RX ADMIN — MEMANTINE 10 MG: 5 TABLET ORAL at 09:40

## 2020-06-23 RX ADMIN — HEPARIN SODIUM 5000 UNITS: 5000 INJECTION INTRAVENOUS; SUBCUTANEOUS at 18:16

## 2020-06-23 RX ADMIN — ACETAMINOPHEN 650 MG: 325 TABLET ORAL at 00:48

## 2020-06-23 RX ADMIN — HEPARIN SODIUM 5000 UNITS: 5000 INJECTION INTRAVENOUS; SUBCUTANEOUS at 09:40

## 2020-06-23 RX ADMIN — LEVOFLOXACIN 750 MG: 5 INJECTION, SOLUTION INTRAVENOUS at 09:40

## 2020-06-23 RX ADMIN — CARBIDOPA AND LEVODOPA 1 TABLET: 25; 100 TABLET ORAL at 21:12

## 2020-06-23 RX ADMIN — DONEPEZIL HYDROCHLORIDE 10 MG: 5 TABLET, FILM COATED ORAL at 21:12

## 2020-06-23 RX ADMIN — HEPARIN SODIUM 5000 UNITS: 5000 INJECTION INTRAVENOUS; SUBCUTANEOUS at 01:00

## 2020-06-23 RX ADMIN — ACETAMINOPHEN 650 MG: 325 TABLET ORAL at 14:04

## 2020-06-23 RX ADMIN — CARBIDOPA AND LEVODOPA 1 TABLET: 25; 100 TABLET ORAL at 09:40

## 2020-06-23 RX ADMIN — PIPERACILLIN AND TAZOBACTAM 3.38 G: 3; .375 INJECTION, POWDER, LYOPHILIZED, FOR SOLUTION INTRAVENOUS at 06:29

## 2020-06-23 RX ADMIN — DULOXETINE HYDROCHLORIDE 60 MG: 60 CAPSULE, DELAYED RELEASE ORAL at 21:12

## 2020-06-23 RX ADMIN — ACETAMINOPHEN 650 MG: 325 TABLET ORAL at 18:16

## 2020-06-23 RX ADMIN — PIPERACILLIN AND TAZOBACTAM 3.38 G: 3; .375 INJECTION, POWDER, LYOPHILIZED, FOR SOLUTION INTRAVENOUS at 00:48

## 2020-06-23 RX ADMIN — ATORVASTATIN CALCIUM 10 MG: 10 TABLET, FILM COATED ORAL at 09:40

## 2020-06-23 RX ADMIN — DULOXETINE HYDROCHLORIDE 60 MG: 60 CAPSULE, DELAYED RELEASE ORAL at 09:40

## 2020-06-23 NOTE — PROGRESS NOTES
1345: Pt on the phone with spouse. 1359: Pt refusing PT at this time, requesting pain meds, notified nurse. Will follow up as schedule permits. 1508: 3rd attempt, pt refusing stating \"my pain medicine is not working, I can;t do therapy. \"

## 2020-06-23 NOTE — PROGRESS NOTES
D/C Plan: [facility] and physician follow up vs SNF        CM attempted to contact pt's , Savita Peters ([phone]), to discuss and confirm transition of care. CM left a message requesting a return call.   CM to continue to follow and assist.    Care Management Interventions  Transition of Care Consult (CM Consult): Discharge Planning, [address]: No  Reason Outside [place]: Patient already serviced by other home care/hospice agency(Current with [facility])  Physical Therapy Consult: Yes  Occupational Therapy Consult: Yes  Speech Therapy Consult: Yes  Current Support Network: Lives with Spouse  Confirm Follow Up Transport: Family  The Plan for Transition of Care is Related to the Following Treatment Goals : [facility] and physician follow up   The Patient and/or Patient Representative was Provided with a Choice of Provider and Agrees with the Discharge Plan?: Yes  Name of the Patient Representative Who was Provided with a Choice of Provider and Agrees with the Discharge Plan: pt  Freedom of Choice List was Provided with Basic Dialogue that Supports the Patient's Individualized Plan of Care/Goals, Treatment Preferences and Shares the Quality Data Associated with the Providers?: Yes  Discharge Location  Discharge Placement: Home with home health(current with [facility])

## 2020-06-23 NOTE — PROGRESS NOTES
Written shift change report left for Tammy Orr (oncoming nurse) by Iwona Bejarano RN (offgoing nurse). Report included the following information SBAR, Kardex, Intake/Output and MAR.

## 2020-06-23 NOTE — PROGRESS NOTES
Spoke with  via phone and confirmed with him that all the  medications in ziploc bag that came with pt from home  are current and that she is taking all of them. Other meds listed in the pt's PTA not found in the bag were read to him also and he verified that \"if they aren't in the bag she's not taking them\" . Notified Linda Mcdowell in pharmacy and made her aware of current medication.  also made aware.

## 2020-06-23 NOTE — ROUTINE PROCESS
Bedside and Verbal shift change report given to Marlene Virk (oncoming nurse) by Marthann Brunner (offgoing nurse). Report included the following information SBAR, Kardex and MAR.

## 2020-06-23 NOTE — PROGRESS NOTES
Hospitalist Progress Note    Patient: Jordan Urbina MRN: [id number]  CSN: [id number]    YOB: 1945  Age: 76 y.o. Sex: female    DOA: 6/21/2020 LOS:  LOS: 2 days                Assessment/Plan     Patient Active Problem List   Diagnosis Code    Parkinson's disease ([facility] [address] G20    Psychiatric disorder F99    Hypertension I10    UTI (urinary tract infection) N39.0    Sepsis ([facility] [address] A41.9    Fall at home [address] [place], Y92.009    Bacteremia R65.80            76 y.o. female with PMHX of Parkinson disease, hypertension, psychiatric disorder came to ER after fall. Admitted for sepsis, UTI, bacteremia. Feels better    Sepsis-  Secondary to UTI/bacteremia, improving. UTI/bacteremia-  Continue with Zosyn, Levaquin  Blood cultures growing gram-negative rods  Follow repeat blood cultures  Follow urine cultures    Hypertension-  Blood pressure controlled, on hydralazine as needed    Parkinson's disease-  Continue with Sinemet    Fall-  No fracture on imaging  PT/OT  DVT prophylaxis with heparin. Called and spoke with  on phone, updated hospitalization. Disposition : 1 to 2 days    Review of systems  General: No fevers or chills. Cardiovascular: No chest pain or pressure. No palpitations. Pulmonary: No shortness of breath. Gastrointestinal: No nausea, vomiting. Physical Exam:  General: Awake, cooperative, no acute distress    HEENT: NC, Atraumatic. PERRLA, anicteric sclerae. Lungs: CTA Bilaterally. No Wheezing/Rhonchi/Rales. Heart:  S1 S2,  No murmur, No Rubs, No Gallops  Abdomen: Soft, Non distended, Non tender.  +Bowel sounds,   Extremities: No c/c/e  Psych:   Not anxious or agitated. Neurologic:  No acute neurological deficit.            Vital signs/Intake and Output:  Visit Vitals  /75 (BP 1 Location: Left arm, BP Patient Position: At rest)   Pulse 78   Temp 97.8 °F (36.6 °C)   Resp 18   Ht 5' 2\" (1.575 m)   Wt 89.2 kg (196 lb 9.6 oz)   SpO2 98%   BMI 35.96 kg/m² Current Shift:  06/23 0701 - 06/23 1900  In: 120 [P.O.:120]  Out: -   Last three shifts:  06/21 1901 - 06/23 0700  In: 240 [P.O.:240]  Out: -             Labs: Results:       Chemistry Recent Labs     06/23/20  0350 06/22/20  0436 06/21/20  0930   GLU 94 73* 121*    145 145   K 3.1* 3.9 3.6    113* 112*   CO2 28 23 25   BUN 7 16 21*   CREA 0.55* 0.56* 0.81   CA 8.2* 8.2* 8.9   AGAP 7 9 8   BUCR 13 29* 26*   AP  --   --  70   TP  --   --  6.3*   ALB  --   --  3.1*   GLOB  --   --  3.2   AGRAT  --   --  1.0      CBC w/Diff Recent Labs     06/23/20  0350 06/22/20  0436 06/21/20  0930   WBC 5.6 8.0 13.9*   RBC 3.83* 4.06* 4.41   HGB 11.5* 12.2 13.5   HCT 35.9 39.9 43.2    122* 138   GRANS 57 70 92*   LYMPH 21 12* 3*   EOS 1 1 0      Cardiac Enzymes No results for input(s): CPK, CKND1, BABITA in the last 72 hours. No lab exists for component: CKRMB, TROIP   Coagulation No results for input(s): PTP, INR, APTT, INREXT in the last 72 hours. Lipid Panel No results found for: CHOL, CHOLPOCT, CHOLX, CHLST, CHOLV, 462040, HDL, HDLP, LDL, LDLC, DLDLP, 397407, VLDLC, VLDL, TGLX, TRIGL, TRIGP, TGLPOCT, CHHD, CHHDX   BNP No results for input(s): BNPP in the last 72 hours.    Liver Enzymes Recent Labs     06/21/20  0930   TP 6.3*   ALB 3.1*   AP 70      Thyroid Studies No results found for: T4, T3U, TSH, TSHEXT     Procedures/imaging: see electronic medical records for all procedures/Xrays and details which were not copied into this note but were reviewed prior to creation of Plan

## 2020-06-23 NOTE — PROGRESS NOTES
Problem: Dysphagia (Adult)  Goal: *Acute Goals and Plan of Care (Insert Text)  Description: Rec:     Mechanical soft Diet with thin liquids  Aspiration precautions  HOB >45 during po intake, remain >30 for 30-45 minutes after po   Small bites/sips; alternate liquid/solid with slow feeding rate   Oral care TID  Meds per preference    Patient will:  1. Tolerate PO trials with 0 s/s overt distress in 4/5 trials. 2. Utilize compensatory swallow strategies/maneuvers (decrease bite/sip, size/rate, alt. liq/sol) with min cues in 4/5 trials. 3. Perform oral-motor/laryngeal exercises to increase oropharyngeal swallow function with min cues. 4. Complete an objective swallow study (i.e., MBSS) to assess swallow integrity, r/o aspiration, and determine of safest LRD, min A.  5. Pt will utilize comp strategies to improve respiration to swallow coordination to reduce aspiration risk and improve activity tolerance for sustained nutrition/ hydration given min-mod cues. Outcome: Progressing Towards Goal  SPEECH-LANGUAGE PATHOLOGY BEDSIDE SWALLOW   EVALUATION & TREATMENT     Patient: Dai Wolff (5 y.o. female)  Date: 6/22/2020  Primary Diagnosis: Sepsis ([facility] [address] [A41.9]  UTI (urinary tract infection) [N39.0]    Precautions: Fall, Aspiration  PLOF: Regular, per patient report    ASSESSMENT :  Based on the objective data described below, the patient presents with mild oropharyngeal dysphagia in the setting of Parkinson's disease. Patient states her difficulty with swallowing is helped with the medication that she takes 3 times daily, and states her  manages her medications and meals. Clinical beside swallow eval completed per MD orders. OM examination revealed oral motor structures functional for mastication and deglutition. Presented with ice chips, thin liquid, puree, and solid trials. Exhibited delayed/incomplete bolus cohesion, manipulation and A-P transit.  Further exhibited delayed swallow timing/reflex and hyolaryngeal excursion upon palpation. Pt safe for mechanical soft textures, thin liquid diet with aspiration precautions. SLP educated pt on role of speech therapist in current setting with re: speech/swallow; verbalized comprehension. Results d/w RN. TREATMENT :  Skilled therapy initiated; Educated pt on aspiration precautions and importance of compensatory swallow techniques to decrease aspiration risk (decrease rate of intake & sip/bite size, upright @HOB for all po intake and ~30 minutes after po); verbalized comprehension. SLP to follow as indicated. Patient will benefit from skilled intervention to address the above impairments. Patient's rehabilitation potential is considered to be Good  Factors which may influence rehabilitation potential include:   []            None noted  [x]            Mental ability/status  [x]            Medical condition  [x]            Home/family situation and support systems  [x]            Safety awareness  []            Pain tolerance/management  []            Other:      PLAN :  Recommendations and Planned Interventions:  ST followup per POC  Frequency/Duration: Patient will be followed by speech-language pathology 3 times a week to address goals. Discharge Recommendations: To Be Determined     SUBJECTIVE:   Patient stated My  takes care of all of my medications and meals.     OBJECTIVE:     Past Medical History:   Diagnosis Date    Arthritis     Hypertension 15yrs    Ill-defined condition     h/o dizzy    [facility]'s [facility] [address]     Psychiatric disorder     bipolar; short term memory lose    Psychiatric disorder     depressiom     Past Surgical History:   Procedure Laterality Date    HX CATARACT REMOVAL Left     HX HYSTERECTOMY       Home Situation:   Home Situation  Home Environment: Private residence(condo)  # Steps to Enter: 0(at garage; 4-5 at front w/ wide B HR)  One/Two Story Residence: One story  Living Alone: No  Support Systems: Spouse/Significant Other/Partner  Patient Expects to be Discharged to[de-identified] Private residence  Current DME Used/Available at Home: Juan Ramiro, rollator, Shower chair, Cane, straight  Tub or Shower Type: Shower    Diet prior to admission: Regular per patient report  Current Diet:  Mechanical soft     Cognitive and Communication Status:  Neurologic State: Alert, Confused, Eyes open spontaneously  Orientation Level: Oriented to place, Oriented to person  Cognition: Decreased attention/concentration, Memory loss, Follows commands  Perception: Tactile, Verbal, Visual  Perseveration: Perseverates during conversation  Safety/Judgement: Decreased awareness of need for assistance, Decreased awareness of need for safety  Oral Assessment:  Oral Assessment  Labial: Decreased rate  Dentition: Intact  Oral Hygiene: (Adequate)  Lingual: Decreased rate, Decreased strength, Incoordinated  Velum: No impairment  Mandible: No impairment  Gag Reflex: Other (comment)(Not tested)  P.O. Trials:  Patient Position: (HOB >45)  Vocal quality prior to P.O.:    Consistency Presented: Ice chips, Puree, Solid, Thin liquid  How Presented: Self-fed/presented, SLP-fed/presented, Spoon, Straw  Bolus Acceptance: No impairment  Bolus Formation/Control: Impaired  Type of Impairment: Delayed, Incomplete  Propulsion: Delayed (# of seconds)  Oral Residue: Less than 10% of bolus  Initiation of Swallow: Delayed (# of seconds)  Laryngeal Elevation: Decreased, Functional  Aspiration Signs/Symptoms: None  Pharyngeal Phase Characteristics: Audible swallow, Easily fatigued , Poor endurance  Effective Modifications:  Alternate liquids/solids, Straw, Spoon, Small sips and bites  Cues for Modifications: Minimal    Oral Phase Severity: Mild  Pharyngeal Phase Severity : Mild    PAIN:  Pain level pre-treatment: 0/10   Pain level post-treatment: 0/10     After treatment:   []            Patient left in no apparent distress sitting up in chair  [x]            Patient left in no apparent distress in bed  [x]            Call bell left within reach  [x]            Nursing notified  []            Family present  []            Caregiver present  []            Bed alarm activated    COMMUNICATION/EDUCATION:   [x]            Aspiration precautions; swallow safety; compensatory techniques. [x]            Patient/family have participated as able in goal setting and plan of care. [x]            Patient/family agree to work toward stated goals and plan of care. []            Patient understands intent and goals of therapy; neutral about participation. []            Patient unable to participate in goal setting/plan of care; educ ongoing with interdisciplinary staff  []         Posted safety precautions in patient's room. Thank you for this referral.  TERRY Flaherty.Ed, CCC-SLP     Time Calculation: 23 mins  Evaluation Time: 15 minutes   Treatment Time: 8 minutes

## 2020-06-23 NOTE — PROGRESS NOTES
0000: Bedside report received from Neha Park RN. Assumed care of pt at this time. Pt in bed with no signs of distress. Pt left with call light within reach and encouraged to call for assistance. 8364: Requesting Tylenol. Shift summary: Patient had uneventful shift. Patient remained in bed. Incontinence care completed. 0740: Bedside shift change report given to Filemon Sandy RN (oncoming nurse) by Roberta Wood (offgoing nurse). Report included the following information SBAR, Kardex and MAR.

## 2020-06-23 NOTE — PROGRESS NOTES
6155 - Bedside shift report received from CHRIS Dick RN. Assumed care of patient. Patient noted resting in bed at this time. Call light in reach. 6488 - Assessment completed as per flowsheet. Patient alert and oriented to person, place, and situation. Respirations even and unlabored. Denies any SOB/chest pain. C/O 8/10 pain to left side of abdomen. PRN Tylenol administered. No bruising noted. Patient states pain may be from heimlich maneuver before admission when she was at [facility] and got choked on a shrimp. Patient incontinent of bowel and bladder. Patient resting in bed with call light in reach. 1404 - PRN Tylenol administered for left sided abdominal pain and pain during therapy. 1816 - PRN Tylenol administered for pain to abdomen. Patient resting in bed with call light in reach.

## 2020-06-23 NOTE — PROGRESS NOTES
Problem: Dysphagia (Adult)  Goal: *Acute Goals and Plan of Care (Insert Text)  Description: Rec:     Mechanical soft Diet with thin liquids  Aspiration precautions  HOB >45 during po intake, remain >30 for 30-45 minutes after po   Small bites/sips; alternate liquid/solid with slow feeding rate   Oral care TID  Meds per preference    Patient will:  1. Tolerate PO trials with 0 s/s overt distress in 4/5 trials. 2. Utilize compensatory swallow strategies/maneuvers (decrease bite/sip, size/rate, alt. liq/sol) with min cues in 4/5 trials. 3. Perform oral-motor/laryngeal exercises to increase oropharyngeal swallow function with min cues. 4. Complete an objective swallow study (i.e., MBSS) to assess swallow integrity, r/o aspiration, and determine of safest LRD, min A.  5. Pt will utilize comp strategies to improve respiration to swallow coordination to reduce aspiration risk and improve activity tolerance for sustained nutrition/ hydration given min-mod cues. Outcome: Progressing Towards Goal  SPEECH-LANGUAGE PATHOLOGY BEDSIDE SWALLOW TREATMENT      Patient: Oren Aj (30 y.o. female)  Date: 6/23/2020  Primary Diagnosis: Sepsis ([facility] [address] [A41.9]  UTI (urinary tract infection) [N39.0]     Precautions: Fall, Aspiration  PLOF: Regular, per patient report     ASSESSMENT :  Patient presents with mild oropharyngeal dysphagia in the setting of Parkinson's disease. Patient states her difficulty with swallowing is helped with the medication that she takes 3 times daily, and states her  manages her medications and meals.       Patient exhibited delayed/incomplete bolus cohesion, manipulation and A-P transit. Further exhibited delayed swallow timing/reflex and hyolaryngeal excursion upon palpation. Pt safe for mechanical soft textures, thin liquid diet with aspiration precautions. SLP educated pt on role of speech therapist in current setting with re: speech/swallow; verbalized comprehension.  Results d/w RN.     TREATMENT :  Skilled therapy initiated; Educated pt on aspiration precautions and importance of compensatory swallow techniques to decrease aspiration risk (decrease rate of intake & sip/bite size, upright @HOB for all po intake and ~30 minutes after po); verbalized comprehension. SLP to follow as indicated.      Patient will benefit from skilled intervention to address the above impairments. Patient's rehabilitation potential is considered to be Good  Factors which may influence rehabilitation potential include:   []? None noted  [x]? Mental ability/status  [x]? Medical condition  [x]? Home/family situation and support systems  [x]? Safety awareness  []? Pain tolerance/management  []? Other:       PLAN :  Recommendations and Planned Interventions:  ST followup per POC  Frequency/Duration: Patient will be followed by speech-language pathology 3 times a week to address goals. Discharge Recommendations: To Be Determined      SUBJECTIVE:   Patient stated I just took tylenol. Just waiting for it to kick in.      OBJECTIVE:           Past Medical History:   Diagnosis Date    Arthritis      Hypertension 15yrs    Ill-defined condition       h/o dizzy    Parkinson's disease ([facility] [address]      Psychiatric disorder       bipolar; short term memory lose    Psychiatric disorder       depressiom            Past Surgical History:   Procedure Laterality Date    HX CATARACT REMOVAL Left      HX HYSTERECTOMY          Home Situation:   Home Situation  Home Environment: Private residence(condo)  # Steps to Enter: 0(at garage; 4-5 at front w/ wide B HR)  One/Two Story Residence: One story  Living Alone: No  Support Systems: Spouse/Significant Other/Partner  Patient Expects to be Discharged to[de-identified] Private residence  Current DME Used/Available at Home: Walker, rollator, Shower chair, Cane, straight  Tub or Shower Type: Shower     Diet prior to admission: Regular per patient report  Current Diet:  Mechanical soft      Cognitive and Communication Status:  Neurologic State: Alert, Confused, Eyes open spontaneously  Orientation Level: Oriented to place, Oriented to person  Cognition: Decreased attention/concentration, Memory loss, Follows commands  Perception: Tactile, Verbal, Visual  Perseveration: Perseverates during conversation  Safety/Judgement: Decreased awareness of need for assistance, Decreased awareness of need for safety  Oral Assessment:  Oral Assessment  Labial: Decreased rate  Dentition: Intact  Oral Hygiene: (Adequate)  Lingual: Decreased rate, Decreased strength, Incoordinated  Velum: No impairment  Mandible: No impairment  Gag Reflex: Other (comment)(Not tested)  P.O. Trials:  Patient Position: (HOB >45)  Vocal quality prior to P.O.:    Consistency Presented: Ice chips, Puree, Solid, Thin liquid  How Presented: Self-fed/presented, SLP-fed/presented, Spoon, Straw  Bolus Acceptance: No impairment  Bolus Formation/Control: Impaired  Type of Impairment: Delayed, Incomplete  Propulsion: Delayed (# of seconds)  Oral Residue: Less than 10% of bolus  Initiation of Swallow: Delayed (# of seconds)  Laryngeal Elevation: Decreased, Functional  Aspiration Signs/Symptoms: None  Pharyngeal Phase Characteristics: Audible swallow, Easily fatigued , Poor endurance  Effective Modifications: Alternate liquids/solids, Straw, Spoon, Small sips and bites  Cues for Modifications: Minimal     Oral Phase Severity: Mild  Pharyngeal Phase Severity : Mild     PAIN:  Pain level pre-treatment: 0/10   Pain level post-treatment: 0/10      After treatment:   []? Patient left in no apparent distress sitting up in chair  [x]? Patient left in no apparent distress in bed  [x]? Call bell left within reach  [x]? Nursing notified  []? Family present  []? Caregiver present  []?             Bed alarm activated     COMMUNICATION/EDUCATION:   [x]? Aspiration precautions; swallow safety; compensatory techniques. [x]? Patient/family have participated as able in goal setting and plan of care. [x]? Patient/family agree to work toward stated goals and plan of care. []? Patient understands intent and goals of therapy; neutral about participation. []? Patient unable to participate in goal setting/plan of care; educ ongoing with interdisciplinary staff  []? Posted safety precautions in patient's room.     Thank you for this referral.  TERRY Flaherty.Eddie, CCC-SLP     Time Calculation: 17 mins

## 2020-06-24 VITALS
SYSTOLIC BLOOD PRESSURE: 134 MMHG | HEART RATE: 73 BPM | DIASTOLIC BLOOD PRESSURE: 66 MMHG | BODY MASS INDEX: 34.52 KG/M2 | WEIGHT: 187.61 LBS | HEIGHT: 62 IN | OXYGEN SATURATION: 93 % | TEMPERATURE: 97.7 F | RESPIRATION RATE: 18 BRPM

## 2020-06-24 PROBLEM — N12 PYELONEPHRITIS: Status: ACTIVE | Noted: 2020-06-24

## 2020-06-24 PROBLEM — R65.20 SEPSIS DUE TO ESCHERICHIA COLI WITH ACUTE ORGAN DYSFUNCTION WITHOUT SEPTIC SHOCK (HCC): Status: ACTIVE | Noted: 2020-06-24

## 2020-06-24 PROBLEM — A41.51 SEPSIS DUE TO ESCHERICHIA COLI WITH ACUTE ORGAN DYSFUNCTION WITHOUT SEPTIC SHOCK (HCC): Status: ACTIVE | Noted: 2020-06-24

## 2020-06-24 LAB
ANION GAP SERPL CALC-SCNC: 5 MMOL/L (ref 3–18)
BACTERIA SPEC CULT: ABNORMAL
BASOPHILS # BLD: 0 K/UL (ref 0–0.1)
BASOPHILS NFR BLD: 0 % (ref 0–2)
BUN SERPL-MCNC: 7 MG/DL (ref 7–18)
BUN/CREAT SERPL: 10 (ref 12–20)
CALCIUM SERPL-MCNC: 8.5 MG/DL (ref 8.5–10.1)
CHLORIDE SERPL-SCNC: 110 MMOL/L (ref 100–111)
CO2 SERPL-SCNC: 30 MMOL/L (ref 21–32)
CREAT SERPL-MCNC: 0.68 MG/DL (ref 0.6–1.3)
DIFFERENTIAL METHOD BLD: ABNORMAL
EOSINOPHIL # BLD: 0.2 K/UL (ref 0–0.4)
EOSINOPHIL NFR BLD: 3 % (ref 0–5)
ERYTHROCYTE [DISTWIDTH] IN BLOOD BY AUTOMATED COUNT: 13.2 % (ref 11.6–14.5)
GLUCOSE SERPL-MCNC: 88 MG/DL (ref 74–99)
GRAM STN SPEC: ABNORMAL
GRAM STN SPEC: ABNORMAL
HCT VFR BLD AUTO: 39.1 % (ref 35–45)
HGB BLD-MCNC: 12.2 G/DL (ref 12–16)
LYMPHOCYTES # BLD: 1.8 K/UL (ref 0.9–3.6)
LYMPHOCYTES NFR BLD: 27 % (ref 21–52)
MAGNESIUM SERPL-MCNC: 2 MG/DL (ref 1.6–2.6)
MCH RBC QN AUTO: 29.3 PG (ref 24–34)
MCHC RBC AUTO-ENTMCNC: 31.2 G/DL (ref 31–37)
MCV RBC AUTO: 94 FL (ref 74–97)
MONOCYTES # BLD: 1.1 K/UL (ref 0.05–1.2)
MONOCYTES NFR BLD: 17 % (ref 3–10)
NEUTS SEG # BLD: 3.6 K/UL (ref 1.8–8)
NEUTS SEG NFR BLD: 53 % (ref 40–73)
PLATELET # BLD AUTO: 156 K/UL (ref 135–420)
PMV BLD AUTO: 10.6 FL (ref 9.2–11.8)
POTASSIUM SERPL-SCNC: 3.4 MMOL/L (ref 3.5–5.5)
RBC # BLD AUTO: 4.16 M/UL (ref 4.2–5.3)
SERVICE CMNT-IMP: ABNORMAL
SODIUM SERPL-SCNC: 145 MMOL/L (ref 136–145)
WBC # BLD AUTO: 6.6 K/UL (ref 4.6–13.2)

## 2020-06-24 PROCEDURE — 92526 ORAL FUNCTION THERAPY: CPT

## 2020-06-24 PROCEDURE — 85025 COMPLETE CBC W/AUTO DIFF WBC: CPT

## 2020-06-24 PROCEDURE — 74011250637 HC RX REV CODE- 250/637: Performed by: INTERNAL MEDICINE

## 2020-06-24 PROCEDURE — 74011250636 HC RX REV CODE- 250/636: Performed by: EMERGENCY MEDICINE

## 2020-06-24 PROCEDURE — 80048 BASIC METABOLIC PNL TOTAL CA: CPT

## 2020-06-24 PROCEDURE — 74011250636 HC RX REV CODE- 250/636: Performed by: HOSPITALIST

## 2020-06-24 PROCEDURE — 36415 COLL VENOUS BLD VENIPUNCTURE: CPT

## 2020-06-24 PROCEDURE — 74011000258 HC RX REV CODE- 258: Performed by: EMERGENCY MEDICINE

## 2020-06-24 PROCEDURE — 97535 SELF CARE MNGMENT TRAINING: CPT

## 2020-06-24 PROCEDURE — 97530 THERAPEUTIC ACTIVITIES: CPT

## 2020-06-24 PROCEDURE — 74011250637 HC RX REV CODE- 250/637: Performed by: HOSPITALIST

## 2020-06-24 PROCEDURE — 97116 GAIT TRAINING THERAPY: CPT

## 2020-06-24 PROCEDURE — 83735 ASSAY OF MAGNESIUM: CPT

## 2020-06-24 RX ORDER — SULFAMETHOXAZOLE AND TRIMETHOPRIM 800; 160 MG/1; MG/1
1 TABLET ORAL EVERY 12 HOURS
Qty: 22 TAB | Refills: 0 | Status: SHIPPED | OUTPATIENT
Start: 2020-06-24 | End: 2020-07-05

## 2020-06-24 RX ORDER — SULFAMETHOXAZOLE AND TRIMETHOPRIM 800; 160 MG/1; MG/1
1 TABLET ORAL EVERY 12 HOURS
Status: DISCONTINUED | OUTPATIENT
Start: 2020-06-24 | End: 2020-06-24 | Stop reason: HOSPADM

## 2020-06-24 RX ADMIN — LEVOFLOXACIN 750 MG: 5 INJECTION, SOLUTION INTRAVENOUS at 09:12

## 2020-06-24 RX ADMIN — PIPERACILLIN AND TAZOBACTAM 3.38 G: 3; .375 INJECTION, POWDER, LYOPHILIZED, FOR SOLUTION INTRAVENOUS at 05:46

## 2020-06-24 RX ADMIN — ATORVASTATIN CALCIUM 10 MG: 10 TABLET, FILM COATED ORAL at 09:12

## 2020-06-24 RX ADMIN — SULFAMETHOXAZOLE AND TRIMETHOPRIM 1 TABLET: 800; 160 TABLET ORAL at 13:57

## 2020-06-24 RX ADMIN — ACETAMINOPHEN 650 MG: 325 TABLET ORAL at 11:43

## 2020-06-24 RX ADMIN — MEMANTINE 10 MG: 5 TABLET ORAL at 09:13

## 2020-06-24 RX ADMIN — HEPARIN SODIUM 5000 UNITS: 5000 INJECTION INTRAVENOUS; SUBCUTANEOUS at 01:47

## 2020-06-24 RX ADMIN — DULOXETINE HYDROCHLORIDE 60 MG: 60 CAPSULE, DELAYED RELEASE ORAL at 09:12

## 2020-06-24 RX ADMIN — PIPERACILLIN AND TAZOBACTAM 3.38 G: 3; .375 INJECTION, POWDER, LYOPHILIZED, FOR SOLUTION INTRAVENOUS at 11:43

## 2020-06-24 RX ADMIN — TROSPIUM CHLORIDE 60 MG: 60 CAPSULE, EXTENDED RELEASE ORAL at 06:53

## 2020-06-24 RX ADMIN — HEPARIN SODIUM 5000 UNITS: 5000 INJECTION INTRAVENOUS; SUBCUTANEOUS at 09:13

## 2020-06-24 RX ADMIN — ACETAMINOPHEN 650 MG: 325 TABLET ORAL at 06:13

## 2020-06-24 RX ADMIN — GABAPENTIN 200 MG: 100 CAPSULE ORAL at 06:14

## 2020-06-24 RX ADMIN — ACETAMINOPHEN 650 MG: 325 TABLET ORAL at 01:48

## 2020-06-24 RX ADMIN — CARBIDOPA AND LEVODOPA 1 TABLET: 25; 100 TABLET ORAL at 09:13

## 2020-06-24 RX ADMIN — PIPERACILLIN AND TAZOBACTAM 3.38 G: 3; .375 INJECTION, POWDER, LYOPHILIZED, FOR SOLUTION INTRAVENOUS at 00:44

## 2020-06-24 NOTE — CONSULTS
[facility]  (A Division of [address] [place] [address])    Consultation Note      Date of Admission: 6/21/2020    Date of Consultation: 6/24/2020    Referred by: Sajan Bethea MD    Reason for Referral: Septic pyelonephritis    Current Antimicrobials: Prior Antimicrobials   1. Pip/tzb IV (6/21-) #3  2. Levo IV (6/21-) #3 1. vanco IV (6/21-22)       Assessment: Plan:   E coli sepsis/pyelonephritis    Better clearly. Got eyes on bug/target. Should be able to switch to PO tmp-smx to complete therapy (2wks total); would stay away from FQ class given her Parkinson's dementia. She can swallow pills ok. ->Pip/tzb + Levo #3/14    Transition to PO tmp-smx DS bid today and complete 11 more days at home. OK by me to DC today and f/u PCM in 1-2wks. Parkinson's Disease/dementia    HTN    Fall due to sepsis          Microbiology:  6/22 - BCx  (-)     6/21 - BCx (+) E coli (S - all)      UA (+) Lactose-fermenting GNR (E coli)      Lines / Catheters: peripheral      HPI:  CC:  I feel much stronger  Ms Cristian Hannon is a 76y WF with underlying Parkinson's disease with early dementia who was at a [employer] with her  on 6/20 when she collapsed after choking on some food; rallied and went home. But early 6/21, she fell at home and was brought by EMS here to ER for evaluation. Had some L sided back pain, but no lower UTI symptoms. No f/s/c. Just weak/confused - all cleared now.     Retired [employer] Problems    Diagnosis Date Noted    Bacteremia 06/22/2020    Fall at home 06/21/2020    Parkinson's disease [facility])     Psychiatric disorder      bipolar; short term memory lose      Hypertension     UTI (urinary tract infection)     Sepsis ([facility] [address]      Past Medical History:   Diagnosis Date    Arthritis     Hypertension 15yrs    Ill-defined condition     h/o dizzy    Parkinson's disease ([facility] [address]     Psychiatric disorder     bipolar; short term memory lose    Psychiatric disorder     depressiom     Past Surgical History:   Procedure Laterality Date    HX CATARACT REMOVAL Left     HX HYSTERECTOMY       Family History   Problem Relation Age of Onset    Heart Disease Mother     Cancer Mother     Heart Disease Father     Cancer Father      Social History     Socioeconomic History    Marital status:      Spouse name: Not on file    Number of children: Not on file    Years of education: Not on file    Highest education level: Not on file   Occupational History    Not on file   Social Needs    Financial resource strain: Not on file    Food insecurity     Worry: Not on file     Inability: Not on file    Transportation needs     Medical: Not on file     Non-medical: Not on file   Tobacco Use    Smoking status: Never Smoker    Smokeless tobacco: Never Used   Substance and Sexual Activity    Alcohol use: No    Drug use: No    Sexual activity: Not on file   Lifestyle    Physical activity     Days per week: Not on file     Minutes per session: Not on file    Stress: Not on file   Relationships    Social connections     Talks on phone: Not on file     Gets together: Not on file     Attends Jehovah's witness service: Not on file     Active member of club or organization: Not on file     Attends meetings of clubs or organizations: Not on file     Relationship status: Not on file    Intimate partner violence     Fear of current or ex partner: Not on file     Emotionally abused: Not on file     Physically abused: Not on file     Forced sexual activity: Not on file   Other Topics Concern    Not on file   Social History Narrative    Not on file       Allergies:  Combivent [ipratropium-albuterol]; Ibuprofen; and Adhesive tape-silicones .      Medications:  Current Facility-Administered Medications   Medication Dose Route Frequency    trimethoprim-sulfamethoxazole (BACTRIM DS, SEPTRA DS) 160-800 mg per tablet 1 Tab  1 Tab Oral Q12H    divalproex DR (DEPAKOTE) tablet 1,000 mg  1,000 mg Oral QHS    donepeziL (ARICEPT) tablet 10 mg  10 mg Oral QHS    fluticasone propionate (FLONASE) 50 mcg/actuation nasal spray 2 Spray  2 Spray Both Nostrils QHS    carbidopa-levodopa (SINEMET)  mg per tablet 1 Tab  1 Tab Oral TID    DULoxetine (CYMBALTA) capsule 60 mg  60 mg Oral BID    gabapentin (NEURONTIN) capsule 200 mg  200 mg Oral Q12H    [Held by provider] gabapentin (NEURONTIN) capsule 100 mg  100 mg Oral Q24H    memantine (NAMENDA) tablet 10 mg  10 mg Oral DAILY    trospium (SANCTURA XL) SR capsule 60 mg   60 mg Oral ACB    valbenazine cap 80 mg   80 mg Oral DAILY    atorvastatin (LIPITOR) tablet 10 mg  10 mg Oral DAILY    sodium chloride (NS) flush 5-10 mL  5-10 mL IntraVENous PRN    acetaminophen (TYLENOL) tablet 650 mg  650 mg Oral Q4H PRN    naloxone (NARCAN) injection 0.4 mg  0.4 mg IntraVENous PRN    diphenhydrAMINE (BENADRYL) injection 12.5 mg  12.5 mg IntraVENous Q4H PRN    ondansetron (ZOFRAN) injection 4 mg  4 mg IntraVENous Q4H PRN    heparin (porcine) injection 5,000 Units  5,000 Units SubCUTAneous Q8H        ROS:  Constitutional: positive for malaise, negative for fevers, chills and sweats  Respiratory: negative for cough or sputum  Cardiovascular: negative for dyspnea  Gastrointestinal: negative for nausea, vomiting and abdominal pain  Genitourinary:negative for frequency, dysuria and nocturia     Physical Exam:  Temp (24hrs), Av.9 °F (36.6 °C), Min:97.3 °F (36.3 °C), Max:98.6 °F (37 °C)    Visit Vitals  /66 (BP 1 Location: Left arm, BP Patient Position: At rest)   Pulse 73   Temp 97.7 °F (36.5 °C)   Resp 18   Ht 5' 2\" (1.575 m)   Wt 85.1 kg (187 lb 9.8 oz)   SpO2 93%   BMI 34.31 kg/m²       General: Well developed, well nourished 76 y.o.  female in no acute distress.   ENT: ENT exam normal, no neck nodes or sinus tenderness  Head: normocephalic, without obvious abnormality  Mouth:  mucous membranes moist, pharynx normal without lesions  Neck: supple, symmetrical, trachea midline   Cardio:  regular rate and rhythm, S1, S2 normal, no murmur, click, rub or gallop  Chest: inspection normal - no chest wall deformities or tenderness, respiratory effort normal  Lungs: clear to auscultation, no wheezes or rales and unlabored breathing  Abdomen: soft, non-tender. Bowel sounds normal. No masses, no organomegaly. Extremities:  no redness or tenderness in the calves or thighs, no edema  Neuro: Grossly normal     Lab results:    Chemistry  Recent Labs     06/24/20  0210 06/23/20  0350 06/22/20  0436   GLU 88 94 73*    142 145   K 3.4* 3.1* 3.9    107 113*   CO2 30 28 23   BUN 7 7 16   CREA 0.68 0.55* 0.56*   CA 8.5 8.2* 8.2*   AGAP 5 7 9   BUCR 10* 13 29*       CBC w/ Diff  Recent Labs     06/24/20  0210 06/23/20  0350 06/22/20  0436   WBC 6.6 5.6 8.0   RBC 4.16* 3.83* 4.06*   HGB 12.2 11.5* 12.2   HCT 39.1 35.9 39.9    137 122*   GRANS 53 57 70   LYMPH 27 21 12*   EOS 3 1 1       Microbiology  All Micro Results     Procedure Component Value Units Date/Time    CULTURE, URINE [[id number]]  (Abnormal) Collected:  06/21/20 0945    Order Status:  Completed Specimen:  Urine from Clean catch Updated:  06/24/20 1035     Special Requests: NO SPECIAL REQUESTS        [place] Count --        >100,000  COLONIES/mL       Culture result:       LACTOSE FERMENTING GRAM NEGATIVE RODS IDENTIFICATION AND SUSCEPTIBILITY TO FOLLOW          CULTURE, BLOOD [[id number]]  (Abnormal)  (Susceptibility) Collected:  06/21/20 0930    Order Status:  Completed Specimen:  Blood Updated:  06/24/20 1016     Special Requests: NO SPECIAL REQUESTS        GRAM STAIN       AEROBIC BOTTLE GRAM NEGATIVE RODS                  SMEAR CALLED TO AND CORRECTLY REPEATED BY: CHRISTIANO CHACKO RN 3S ON 6/22/20 AT 0552 TO [facility].            Culture result:       ESCHERICHIA COLI GROWING IN THIS AEROBIC BOTTLE (NO SITE)          CULTURE, BLOOD [[id number]] Collected:  06/22/20 0937    Order Status: Completed Specimen:  Blood Updated:  06/24/20 0851     Special Requests: NO SPECIAL REQUESTS        Culture result: NO GROWTH 2 DAYS              Mat Jain MD  Cell [phone]  [facility]  6/24/2020   1:50 PM

## 2020-06-24 NOTE — ROUTINE PROCESS
Bedside and Verbal shift change report given to LUCAS Wisdom RN by Lou Tarango RN. Report included the following information SBAR, Kardex, OR Summary, Intake/Output and MAR.

## 2020-06-24 NOTE — ROUTINE PROCESS
Bedside and Verbal shift change report given to AYAKA Tapia RN (oncoming nurse) by EDY Forte RN (offgoing nurse). Report included the following information SBAR, Kardex, ED Summary, Intake/Output, MAR and Recent Results.

## 2020-06-24 NOTE — PROGRESS NOTES
D/C Plan: [facility] and physician follow up     Pt with an ID consult pending. CM to await outcome of ID evaluation to further assist with transition of care. CM to continue to follow. 1445:  Noted pt will transition home with po ABX. Anticipate pt will transition home with [facility] and physician follow up.     Care Management Interventions  Transition of Care Consult (CM Consult): Discharge Planning, [address]: No  Reason Outside [place]: Patient already serviced by other home care/hospice agency(Current with [facility])  Physical Therapy Consult: Yes  Occupational Therapy Consult: Yes  Speech Therapy Consult: Yes  Current Support Network: Lives with Spouse  Confirm Follow Up Transport: Family  The Plan for Transition of Care is Related to the Following Treatment Goals : [facility] and physician follow up   The Patient and/or Patient Representative was Provided with a Choice of Provider and Agrees with the Discharge Plan?: Yes  Name of the Patient Representative Who was Provided with a Choice of Provider and Agrees with the Discharge Plan: pt  Freedom of Choice List was Provided with Basic Dialogue that Supports the Patient's Individualized Plan of Care/Goals, Treatment Preferences and Shares the Quality Data Associated with the Providers?: Yes  Discharge Location  Discharge Placement: Home with home health(current with [facility])

## 2020-06-24 NOTE — PROGRESS NOTES
[address]. care at this time. 2108 - Patient A&Ox3 disoriented to time, RA. Denies chest pain and SOB. SCD compression device bilaterally. Denies numbness/tingling/calf pain. Pt c/o abdominal pain to left side of 5/10 she states due to her fall prior to admission and Heimlich performed at restaurant performed. Pt educated pain management. Pt verbalized understanding, no concerns voiced. Call bell within reach, bed in lowest position. Pt encouraged to call for assistance.

## 2020-06-24 NOTE — PROGRESS NOTES
Discharge instructions reviewed with the patient. Patient verbalized understanding and verified by teach back. All questions answered. IV discontinued by primary nurse, no redness, swelling or pain noted. Patient awaiting spouse for transportation home, with an ETA of 10 min*.  Patient armband removed and shredded

## 2020-06-24 NOTE — PROGRESS NOTES
Problem: Self Care Deficits Care Plan (Adult)  Goal: *Acute Goals and Plan of Care (Insert Text)  Description: Initial Occupational Therapy Goals (6/22/2020) Within 7 day(s):    1. Patient will perform grooming standing sinkside with Supervision for 3-4 minutes for increased independence with ADLs. 2. Patient will perform UB dressing with setup  for increased independence with ADLs. 3. Patient will perform LB dressing with minimal assist & A/E PRN for increased independence with ADLs. 4. Patient will perform bowel and bladder management with Supervision for increased independence with ADLs. 5. Patient will perform toilet transfer with Supervision in preparation for bowel and bladder management. 6. Patient will utilize energy conservation techniques with 1 verbal cue(s) for increased independence with ADLs. Outcome: Progressing Towards Goal   OCCUPATIONAL THERAPY TREATMENT    Patient: María Cruz (48 y.o. female)  Date: 6/24/2020  Diagnosis: Sepsis ([facility] [address] [A41.9]  UTI (urinary tract infection) [N39.0]   Sepsis due to Escherichia coli with acute organ dysfunction without septic shock [facility])       Precautions: Fall    Chart, occupational therapy assessment, plan of care, and goals were reviewed. ASSESSMENT:  Based on the information below, pt presents with decreased strength, balance, safety, and activity tolerance limiting pt's independence with ADL's and transfers. Pt received sitting upright on the bed with RN present. Per RN, pt was getting ready to be dc'ed home. OT told RN that she would assist pt with getting her clothes on for preparation for dc home. Pt required Max A to scoot to the EOB. Pt kept stating \"I've been in bed for 4 days. I'm so weak. Can you help me? \" Pt wouldn't push from the bed to assist with scooting and kept pulling on OT. As a result, pt required Max A to scoot to the EOB.  Pt donned UB clothing with Max A and LB clothing with Mod A. OT asked pt how she was going to get dressed at home and she stated \"my  will do it for me. \" Pt needed max encouragement to complete dressing tasks and kept stating \"I can't. You do it for me. I'm to weak. \" Pt did sit to stand from EOB with CGA and vc's for hand placement/safety. Pt left sitting on EOB at the end of the session with bed alarm on. RN aware. Progression toward goals:  []          Improving appropriately and progressing toward goals  [x]          Improving slowly and progressing toward goals  []          Not making progress toward goals and plan of care will be adjusted     PLAN:  Patient continues to benefit from skilled intervention to address the above impairments. Continue treatment per established plan of care. Discharge Recommendations:  Home Health  Further Equipment Recommendations for Discharge:  N/A     SUBJECTIVE:   Patient stated \"I've been in bed for 4 days. I'm so weak. Can you help me? \"    OBJECTIVE DATA SUMMARY:   Cognitive/Behavioral Status:  Neurologic State: Alert  Orientation Level: Oriented to person, Oriented to place, Oriented to situation  Cognition: Decreased attention/concentration, Decreased command following, Poor safety awareness, Impaired decision making  Safety/Judgement: Decreased insight into deficits, Decreased awareness of need for safety, Decreased awareness of need for assistance    Functional Mobility and Transfers for ADLs:   Bed Mobility:   Scooting: Maximum assistance   Transfers:  Sit to Stand: Contact guard assistance      Balance:  Sitting: Intact  Standing: Intact; With support    ADL Intervention:  Feeding  Feeding Assistance: Set-up  Drink to Mouth: Set-up    Upper Body Dressing Assistance  Dressing Assistance: Maximum assistance  Front Opened Shirt: Maximum assistance    Lower Body Dressing Assistance  Dressing Assistance:  Moderate assistance  Pants With Elastic Waist: Moderate assistance  Leg Crossed Method Used: No  Position Performed: Seated edge of bed;Standing  Cues: Don;Verbal cues provided    Cognitive Retraining  Safety/Judgement: Decreased insight into deficits; Decreased awareness of need for safety;Decreased awareness of need for assistance    Pain:  Pain level pre-treatment: 4/10   Pain level post-treatment: 4/10  Pain Intervention(s): Medication administered by RN (see MAR); Rest, Ice, Repositioning  Response to intervention: Nurse notified, See doc flow sheet    Activity Tolerance:    Poor. Pt needed max encouragement to complete tasks on her own. Pt wanted OT to complete all tasks for her. Pt limited by decreased motivation, balance, and safety. Please refer to the flowsheet for vital signs taken during this treatment. After treatment:   [x]  Patient left in no apparent distress sitting up on EOB  []  Patient left in no apparent distress in bed  [x]  Call bell left within reach  [x]  Nursing notified  []  Caregiver present  [x]  Bed alarm activated    COMMUNICATION/EDUCATION:   [x] Role of Occupational Therapy in the acute care setting  [x] Home safety education was provided and the patient/caregiver indicated understanding. [x] Patient/family have participated as able in working towards goals and plan of care. [x] Patient/family agree to work toward stated goals and plan of care. [] Patient understands intent and goals of therapy, but is neutral about his/her participation. [] Patient is unable to participate in goal setting and plan of care.       Thank you for this referral.  Carly Aden OTR/L MOT  Time Calculation: 18 mins

## 2020-06-24 NOTE — DISCHARGE INSTRUCTIONS
Patient Education        Sepsis: Care Instructions  Overview     Sepsis is an intense reaction to an infection. It can cause damage to the body and lead to dangerously low blood pressure. You may have inflammation across large areas of your body. It can damage tissue and even go deep into your organs. Infections that can lead to sepsis include:  · A skin infection such as from a cut. · A lung infection like pneumonia. · A kidney infection. · A gut infection such as E. coli. Sepsis is treated with antibiotics. Your doctor will try to find the infection that led to sepsis. Charlanne Seeds also get fluids through a vein (IV). Machines will track your vital signs, including temperature, blood pressure, breathing rate, and pulse rate. The physical and mental effects of sepsis may not be seen for several weeks after treatment. And they may last long after the infection is gone. Physical problems may include:  · Feeling weak and tired. · Feeling out of breath. · Aches and pains. · Problems with getting around. · Trouble falling asleep or staying asleep. · Dry and itchy skin, brittle nails, and hair loss. Some of these effects can lead to problems with your organs or your feet, legs, hands, or arms. Sepsis can also affect your mind and emotions. Problems may include:  · Self-doubt. · Anxiety. · Nightmares. · Depression and mood problems. · Wanting to avoid other people. · Confusion. · Flashbacks and bad memories of your illness. It's important to care for yourself and try to avoid infections. This may lower your risk of getting sepsis again. Follow-up care is a key part of your treatment and safety. Be sure to make and go to all appointments, and call your doctor if you are having problems. It's also a good idea to know your test results and keep a list of the medicines you take. How can you care for yourself at home? · Be safe with medicines. Take your medicines exactly as prescribed.  Call your doctor if you think you are having a problem with your medicine. · If your doctor prescribed antibiotics, take them as directed. Do not stop taking them just because you feel better. You need to take the full course of antibiotics. · Help prevent infections that could again lead to sepsis. ? Try to avoid colds and flu. If you must be around people who have a cold or the flu, wash your hands often. And get a flu vaccine every year. ? Ask your doctor if you need a pneumococcal vaccine (to prevent pneumonia, meningitis, and other infections). If you have had one before, ask your doctor if you need another dose. ? Clean any wounds or scrapes. · Do not smoke or use other tobacco products. When you quit smoking, you are less likely to get a cold, the flu, bronchitis, and pneumonia. If you need help quitting, talk to your doctor about stop-smoking programs and medicines. These can increase your chances of quitting for good. · Drink plenty of fluids to prevent dehydration. Choose water and other caffeine-free clear liquids until you feel better. If you have kidney, heart, or liver disease and have to limit fluids, talk with your doctor before you increase the amount of fluids you drink. · Eat a healthy diet. Include fruits, vegetables, and whole grains in your diet every day. · If your doctor recommends it, try doing some physical activity. Walking is a good choice. Bit by bit, increase the amount you walk every day. · Talk with your family and friends about your challenges. Ask for help if you need it. · Keep a journal. Writing down your thoughts and feelings can help reduce your stress. · Ask family members to fill in gaps in your memory. · Set small goals for yourself that you can reach. Reward yourself for success. When should you call for help? Call  911 anytime you think you may need emergency care. For example, call if:  · You passed out (lost consciousness).   Call your doctor now or seek immediate medical care if:  · You have symptoms such as:  ? Shortness of breath. ? Feeling very sick. ? Severe pain. ? A fast heart rate. ? Cool, pale, or clammy skin. ? Feeling confused. ? Feeling very sleepy, or you are hard to wake up. · You are dizzy or lightheaded, or you feel like you may faint. · You have a fever or chills. Watch closely for changes in your health, and be sure to contact your doctor if:  · You do not get better as expected. Where can you learn more? Go to http://justin-riley.info/  Enter T383 in the search box to learn more about \"Sepsis: Care Instructions. \"  Current as of: February 11, 2020               Content Version: 12.5  © 0566-7373 [employer]. Care instructions adapted under license by [employer] (which disclaims liability or warranty for this information). If you have questions about a medical condition or this instruction, always ask your healthcare professional. [address] any warranty or liability for your use of this information.

## 2020-06-24 NOTE — PROGRESS NOTES
Problem: Mobility Impaired (Adult and Pediatric)  Goal: *Acute Goals and Plan of Care (Insert Text)  Description: Physical Therapy Goals   Initiated 6/22/2020 and to be accomplished within 5 day(s)  1. Patient will move from supine <> sit with S/SBA in prep for out of bed activity and change of position. 2.  Patient will perform sit<> stand with S/SBA /RW in prep for transfers/ambulation. 3.  Patient will transfer from bed <> chair with S/SBA /RW for time up in chair for completion of ADL activity. 4.  Patient will ambulate 100 feet with S/SBA /RW for improved functional mobility/safe discharge. 5.  Patient will ascend/descend 3-5 stairs with handrail(s) with minimal assistance/contact guard assist for home re-entry as needed. Outcome: Progressing Towards Goal   PHYSICAL THERAPY TREATMENT    Patient: Leola Jolly (21 y.o. female)  Date: 6/24/2020  Diagnosis: Sepsis ([facility] [A41.9]  UTI (urinary tract infection) [N39.0]   Sepsis ([facility]    Precautions: Fall, Aspiration  PLOF: ambulatory with a cane, has a rollator, PD    ASSESSMENT:  Pt required a lot of time to carry out all activities. Darin for supine to sit. Sit to stand performed with bed in lowest position without difficulty. Ambulated to the bathroom, voided and small loose BM, assistance needed for alan-care. Pt requires VC for RW mgmt, decreased step height and length (B)LEs, freezing multiple times. Darin with LEs back to supine. Progression toward goals:   []      Improving appropriately and progressing toward goals  [x]      Improving slowly and progressing toward goals  []      Not making progress toward goals and plan of care will be adjusted     PLAN:  Patient continues to benefit from skilled intervention to address the above impairments. Continue treatment per established plan of care.   Discharge Recommendations:  Home Health  Further Equipment Recommendations for Discharge:  N/A     SUBJECTIVE:   Patient stated I use a cane.    OBJECTIVE DATA SUMMARY:   Critical Behavior:  Neurologic State: Alert, Appropriate for age  Orientation Level: Oriented to person, Oriented to place, Oriented to situation, Disoriented to time  Cognition: Appropriate decision making, Follows commands  Safety/Judgement: Decreased awareness of need for assistance, Decreased awareness of need for safety  Functional Mobility Training:  Bed Mobility:    Supine to Sit: Minimum assistance; Additional time  Sit to Supine: Minimum assistance; Additional time  Transfers:  Sit to Stand: Contact guard assistance  Stand to Sit: Contact guard assistance  Balance:  Sitting: Intact  Standing: Intact; With support   Ambulation/Gait Training:  Distance (ft): 20 Feet (ft)  Assistive Device: Gait belt;Walker, rolling  Ambulation - Level of Assistance: Contact guard assistance;Minimal assistance; Additional time  Gait Abnormalities: Decreased step clearance  Speed/Uzma: Delayed; Slow  Step Length: Right shortened;Left shortened        Pain:  Pain level pre-treatment: 5/10  Pain level post-treatment: 4/10   Pain Intervention(s): Medication (see MAR); Rest, Ice, Repositioning-   Response to intervention: Nurse notified, See doc flow    Activity Tolerance:   Fair  Please refer to the flowsheet for vital signs taken during this treatment. After treatment:   [] Patient left in no apparent distress sitting up in chair  [x] Patient left in no apparent distress in bed  [x] Call bell left within reach  [] Nursing notified  [] Caregiver present  [] Bed alarm activated  [x] SCDs applied      COMMUNICATION/EDUCATION:   [x]         Role of Physical Therapy in the acute care setting. [x]         Fall prevention education was provided and the patient/caregiver indicated understanding. [x]         Patient/family have participated as able in working toward goals and plan of care. [x]         Patient/family agree to work toward stated goals and plan of care.   []         Patient understands intent and goals of therapy, but is neutral about his/her participation.   []         Patient is unable to participate in stated goals/plan of care: ongoing with therapy staff.  []         Other:        Faustina Michael PTA   Time Calculation: 25 mins

## 2020-06-24 NOTE — DISCHARGE SUMMARY
Discharge Summary    Patient: Riki Minaya MRN: [id number]  CSN: [id number]    YOB: 1945  Age: 76 y.o. Sex: female    DOA: 6/21/2020 LOS:  LOS: 3 days   Discharge Date:      Primary Care Provider:  Jax Mcmillan MD    Admission Diagnoses: Sepsis ([facility] [A41.9]  UTI (urinary tract infection) [N39.0]    Discharge Diagnoses:    Problem List as of 6/24/2020 Date Reviewed: 10/28/2016          Codes Class Noted - Resolved    * (Principal) Sepsis due to Escherichia coli with acute organ dysfunction without septic shock ([facility] ICD-10-CM: A41.51, R65.20  ICD-9-CM: 038.42, 995.92  6/24/2020 - Present        Pyelonephritis ICD-10-CM: N12  ICD-9-CM: 590.80  6/24/2020 - Present        Bacteremia ICD-10-CM: R78.81  ICD-9-CM: 790.7  6/22/2020 - Present        Parkinson's disease ([facility] ICD-10-CM: G20  ICD-9-CM: 332.0  Unknown - Present        Psychiatric disorder ICD-10-CM: F99  ICD-9-CM: 300.9  Unknown - Present    Overview Signed 6/21/2020  2:46 PM by Ethyl Sever, MD     bipolar; short term memory lose             Hypertension ICD-10-CM: I10  ICD-9-CM: 401.9  Unknown - Present        UTI (urinary tract infection) ICD-10-CM: N39.0  ICD-9-CM: 599.0  Unknown - Present        Sepsis ([facility] ICD-10-CM: A41.9  ICD-9-CM: 038.9, 995.91  Unknown - Present        Fall at home ICD-10-CM: [address] [place]  ICD-9-CM: [id number], E849.0  6/21/2020 - Present        RESOLVED: Vision blurred ICD-10-CM: H53.8  ICD-9-CM: 368.8  12/9/2014 - 12/9/2014        RESOLVED: Vision blurred ICD-10-CM: H53.8  ICD-9-CM: 368.8  9/23/2014 - 9/23/2014              Discharge Medications:     Current Discharge Medication List      START taking these medications    Details   trimethoprim-sulfamethoxazole (BACTRIM DS, SEPTRA DS) 160-800 mg per tablet Take 1 Tab by mouth every twelve (12) hours for 11 days.   Qty: 22 Tab, Refills: 0         CONTINUE these medications which have NOT CHANGED    Details   !! gabapentin (NEURONTIN) 100 mg capsule Take 200 mg by mouth every twelve (12) hours every twelve (12) hours. 2 caps am, 1 cap afternoon, 2 caps pm       omeprazole (PRILOSEC) 40 mg capsule Take 40 mg by mouth daily. memantine (NAMENDA) 10 mg tablet Take 10 mg by mouth daily. primidone (MYSOLINE) 50 mg tablet Take 50 mg by mouth two (2) times daily as needed. Indications: essential tremor      DULoxetine (CYMBALTA) 60 mg capsule Take 60 mg by mouth two (2) times a day. divalproex DR (DEPAKOTE) 500 mg tablet Take 1,000 mg by mouth nightly. carbidopa-levodopa (SINEMET)  mg per tablet Take 1 Tab by mouth three (3) times daily. amLODIPine (NORVASC) 10 mg tablet Take 10 mg by mouth daily. !! gabapentin (NEURONTIN) 100 mg capsule Take 100 mg by mouth every twenty-four (24) hours. In the afternoon in addition to 200mg in am and pm      trospium (SANCTURA XL) 60 mg capsule Take 60 mg by mouth Daily (before breakfast). valbenazine (Ingrezza) 80 mg cap Take 80 mg by mouth daily. donepezil (ARICEPT) 10 mg tablet Take 10 mg by mouth nightly. simvastatin (ZOCOR) 20 mg tablet Take 20 mg by mouth nightly. !! - Potential duplicate medications found. Please discuss with provider. Discharge Condition: Good    Procedures : None    Consults: Infectious Disease      PHYSICAL EXAM   Visit Vitals  /66 (BP 1 Location: Left arm, BP Patient Position: At rest)   Pulse 73   Temp 97.7 °F (36.5 °C)   Resp 18   Ht 5' 2\" (1.575 m)   Wt 85.1 kg (187 lb 9.8 oz)   SpO2 93%   BMI 34.31 kg/m²     General: Awake, cooperative, no acute distress    HEENT: NC, Atraumatic. PERRLA, EOMI. Anicteric sclerae. Lungs:  CTA Bilaterally. No Wheezing/Rhonchi/Rales. Heart:  Regular  rhythm,  No murmur, No Rubs, No Gallops  Abdomen: Soft, Non distended, Non tender. +Bowel sounds,   Extremities: No c/c/e  Psych:   Not anxious or agitated. Neurologic:  No acute neurological deficits.                                      Admission HPI :   Jordan tena a 76 y.o. female with PMHX of Parkinson disease, hypertension, psychiatric disorder came to ER after fall. She used a walker at home, feel weak and fall, no head injury. Reported rib pain. cxr no fracture noted. #1 was called. She was a found UTI, lactic acid 3.4. Leukocytosis. Tachycardia. Sepsis protocol was started. Normal saline bolus was given per ER.  IV antibiotics started. She denies any dysuria, hematuria. But reported chills. Not going out, low risk for COVID 19. Also reported choked while eating. Also reported abdominal pain. Not sure when it started. Hospital Course :   Ms. Kathi Jaeger was admitted to medical floor, she was seen by infectious disease. She did not had any acute events during hospitalization. Sepsis-  Secondary to UTI/bacteremia now resolved. Next    UTI/bacteremia-  Blood and urine culture showed growth of E. coli. Follow-up blood cultures no growth to date she was started initially on Zosyn and Levaquin. She was seen by infectious disease and recommended discharging on Bactrim to complete 14-day course. Hypertension-  Blood pressure controlled during hospitalization. Parkinson's disease-  Continued with Sinemet. Activity: Activity as tolerated    Diet: Regular Diet    Follow-up: PCP    Disposition: home with home health    Minutes spent on discharge: 45       Labs: Results:       Chemistry Recent Labs     06/24/20 0210 06/23/20  0350 06/22/20  0436   GLU 88 94 73*    142 145   K 3.4* 3.1* 3.9    107 113*   CO2 30 28 23   BUN 7 7 16   CREA 0.68 0.55* 0.56*   CA 8.5 8.2* 8.2*   AGAP 5 7 9   BUCR 10* 13 29*      CBC w/Diff Recent Labs     06/24/20 0210 06/23/20  0350 06/22/20  0436   WBC 6.6 5.6 8.0   RBC 4.16* 3.83* 4.06*   HGB 12.2 11.5* 12.2   HCT 39.1 35.9 39.9    137 122*   GRANS 53 57 70   LYMPH 27 21 12*   EOS 3 1 1      Cardiac Enzymes No results for input(s): CPK, CKND1, BABITA in the last 72 hours.     No lab exists for component: CKRMB, TROIP   Coagulation No results for input(s): PTP, INR, APTT, INREXT in the last 72 hours. Lipid Panel No results found for: CHOL, CHOLPOCT, CHOLX, CHLST, CHOLV, 241076, HDL, HDLP, LDL, LDLC, DLDLP, 455079, VLDLC, VLDL, TGLX, TRIGL, TRIGP, TGLPOCT, CHHD, CHHDX   BNP No results for input(s): BNPP in the last 72 hours. Liver Enzymes No results for input(s): TP, ALB, TBIL, AP in the last 72 hours. No lab exists for component: SGOT, GPT, DBIL   Thyroid Studies No results found for: T4, T3U, TSH, TSHEXT         Significant Diagnostic Studies: Xr Ribs Lt W Pa Cxr Min 3 V    Result Date: 6/21/2020  EXAM: Left rib series with PA chest. CLINICAL INDICATION/HISTORY: Left chest wall pain following trauma. COMPARISON: 08/20/2017. TECHNIQUE: PA upright chest as well as multiple views of the ribs were obtained. _______________ FINDINGS: There is no acute fracture. There is no pneumothorax or pleural effusion. The lungs are clear of focal infiltrate. Heart size is within normal limits. There is no significant vascular congestion. _______________     IMPRESSION: 1. No acute cardiopulmonary process. 2. Negative for left rib fracture. No results found for this or any previous visit. Please note that this dictation was completed with [employer], the computer voice recognition software. Quite often unanticipated grammatical, syntax, homophones, and other interpretive errors are inadvertently transcribed by the computer software. Please disregard these errors. Please excuse any errors that have escaped final proofreading.      CC: Shaun Weller MD

## 2020-06-25 LAB
BACTERIA SPEC CULT: ABNORMAL
CC UR VC: ABNORMAL
SERVICE CMNT-IMP: ABNORMAL

## 2020-06-28 LAB
BACTERIA SPEC CULT: NORMAL
SERVICE CMNT-IMP: NORMAL

## 2022-01-07 ENCOUNTER — APPOINTMENT (OUTPATIENT)
Dept: GENERAL RADIOLOGY | Age: 77
DRG: 177 | End: 2022-01-07
Attending: EMERGENCY MEDICINE
Payer: MEDICARE

## 2022-01-07 ENCOUNTER — HOSPITAL ENCOUNTER (INPATIENT)
Age: 77
LOS: 6 days | Discharge: HOME | DRG: 177 | End: 2022-01-13
Attending: EMERGENCY MEDICINE | Admitting: INTERNAL MEDICINE
Payer: MEDICARE

## 2022-01-07 ENCOUNTER — APPOINTMENT (OUTPATIENT)
Dept: CT IMAGING | Age: 77
DRG: 177 | End: 2022-01-07
Attending: EMERGENCY MEDICINE
Payer: MEDICARE

## 2022-01-07 DIAGNOSIS — T17.908A ASPIRATION INTO AIRWAY, INITIAL ENCOUNTER: Primary | ICD-10-CM

## 2022-01-07 DIAGNOSIS — R06.03 RESPIRATORY DISTRESS: ICD-10-CM

## 2022-01-07 PROBLEM — J44.9 COPD (CHRONIC OBSTRUCTIVE PULMONARY DISEASE) (HCC): Status: ACTIVE | Noted: 2022-01-07

## 2022-01-07 LAB
ALBUMIN SERPL-MCNC: 3 G/DL (ref 3.4–5)
ALBUMIN/GLOB SERPL: 0.9 {RATIO} (ref 0.8–1.7)
ALP SERPL-CCNC: 102 U/L (ref 45–117)
ALT SERPL-CCNC: 6 U/L (ref 13–56)
ANION GAP SERPL CALC-SCNC: 5 MMOL/L (ref 3–18)
ARTERIAL PATENCY WRIST A: POSITIVE
AST SERPL-CCNC: 10 U/L (ref 10–38)
B PERT DNA SPEC QL NAA+PROBE: NOT DETECTED
BASE EXCESS BLD CALC-SCNC: 3.5 MMOL/L
BASOPHILS # BLD: 0 K/UL (ref 0–0.1)
BASOPHILS NFR BLD: 0 % (ref 0–2)
BDY SITE: ABNORMAL
BILIRUB SERPL-MCNC: 0.3 MG/DL (ref 0.2–1)
BNP SERPL-MCNC: 67 PG/ML (ref 0–1800)
BORDETELLA PARAPERTUSSIS PCR, BORPAR: NOT DETECTED
BUN SERPL-MCNC: 21 MG/DL (ref 7–18)
BUN/CREAT SERPL: 25 (ref 12–20)
C PNEUM DNA SPEC QL NAA+PROBE: NOT DETECTED
CALCIUM SERPL-MCNC: 9.1 MG/DL (ref 8.5–10.1)
CHLORIDE SERPL-SCNC: 107 MMOL/L (ref 100–111)
CO2 SERPL-SCNC: 30 MMOL/L (ref 21–32)
COVID-19 RAPID TEST, COVR: NOT DETECTED
CREAT SERPL-MCNC: 0.84 MG/DL (ref 0.6–1.3)
DIFFERENTIAL METHOD BLD: ABNORMAL
EOSINOPHIL # BLD: 0.3 K/UL (ref 0–0.4)
EOSINOPHIL NFR BLD: 3 % (ref 0–5)
ERYTHROCYTE [DISTWIDTH] IN BLOOD BY AUTOMATED COUNT: 14 % (ref 11.6–14.5)
FLUAV AG NPH QL IA: NEGATIVE
FLUAV H1 2009 PAND RNA SPEC QL NAA+PROBE: NOT DETECTED
FLUAV H1 RNA SPEC QL NAA+PROBE: NOT DETECTED
FLUAV H3 RNA SPEC QL NAA+PROBE: NOT DETECTED
FLUAV SUBTYP SPEC NAA+PROBE: NOT DETECTED
FLUBV AG NOSE QL IA: NEGATIVE
FLUBV RNA SPEC QL NAA+PROBE: NOT DETECTED
GAS FLOW.O2 O2 DELIVERY SYS: ABNORMAL L/MIN
GLOBULIN SER CALC-MCNC: 3.4 G/DL (ref 2–4)
GLUCOSE SERPL-MCNC: 80 MG/DL (ref 74–99)
HADV DNA SPEC QL NAA+PROBE: NOT DETECTED
HCO3 BLD-SCNC: 29.6 MMOL/L (ref 22–26)
HCOV 229E RNA SPEC QL NAA+PROBE: DETECTED
HCOV HKU1 RNA SPEC QL NAA+PROBE: NOT DETECTED
HCOV NL63 RNA SPEC QL NAA+PROBE: NOT DETECTED
HCOV OC43 RNA SPEC QL NAA+PROBE: NOT DETECTED
HCT VFR BLD AUTO: 47.8 % (ref 35–45)
HGB BLD-MCNC: 14.7 G/DL (ref 12–16)
HMPV RNA SPEC QL NAA+PROBE: NOT DETECTED
HPIV1 RNA SPEC QL NAA+PROBE: NOT DETECTED
HPIV2 RNA SPEC QL NAA+PROBE: NOT DETECTED
HPIV3 RNA SPEC QL NAA+PROBE: NOT DETECTED
HPIV4 RNA SPEC QL NAA+PROBE: NOT DETECTED
IMM GRANULOCYTES # BLD AUTO: 0.1 K/UL (ref 0–0.04)
IMM GRANULOCYTES NFR BLD AUTO: 0 % (ref 0–0.5)
LACTATE SERPL-SCNC: 1.3 MMOL/L (ref 0.4–2)
LIPASE SERPL-CCNC: 43 U/L (ref 73–393)
LYMPHOCYTES # BLD: 1.7 K/UL (ref 0.9–3.6)
LYMPHOCYTES NFR BLD: 15 % (ref 21–52)
M PNEUMO DNA SPEC QL NAA+PROBE: NOT DETECTED
MCH RBC QN AUTO: 29.1 PG (ref 24–34)
MCHC RBC AUTO-ENTMCNC: 30.8 G/DL (ref 31–37)
MCV RBC AUTO: 94.7 FL (ref 78–100)
MONOCYTES # BLD: 1.1 K/UL (ref 0.05–1.2)
MONOCYTES NFR BLD: 10 % (ref 3–10)
NEUTS SEG # BLD: 8.3 K/UL (ref 1.8–8)
NEUTS SEG NFR BLD: 72 % (ref 40–73)
NRBC # BLD: 0 K/UL (ref 0–0.01)
NRBC BLD-RTO: 0 PER 100 WBC
PCO2 BLD: 49.3 MMHG (ref 35–45)
PH BLD: 7.39 [PH] (ref 7.35–7.45)
PLATELET # BLD AUTO: 131 K/UL (ref 135–420)
PMV BLD AUTO: 11.4 FL (ref 9.2–11.8)
PO2 BLD: 68 MMHG (ref 80–100)
POTASSIUM SERPL-SCNC: 4.1 MMOL/L (ref 3.5–5.5)
PROT SERPL-MCNC: 6.4 G/DL (ref 6.4–8.2)
RBC # BLD AUTO: 5.05 M/UL (ref 4.2–5.3)
RSV RNA SPEC QL NAA+PROBE: NOT DETECTED
RV+EV RNA SPEC QL NAA+PROBE: NOT DETECTED
SAO2 % BLD: 92.6 % (ref 92–97)
SARS-COV-2 PCR, COVPCR: NOT DETECTED
SERVICE CMNT-IMP: ABNORMAL
SODIUM SERPL-SCNC: 142 MMOL/L (ref 136–145)
SOURCE, COVRS: NORMAL
SPECIMEN TYPE: ABNORMAL
TROPONIN-HIGH SENSITIVITY: 5 NG/L (ref 0–54)
WBC # BLD AUTO: 11.4 K/UL (ref 4.6–13.2)

## 2022-01-07 PROCEDURE — 65270000029 HC RM PRIVATE

## 2022-01-07 PROCEDURE — 80053 COMPREHEN METABOLIC PANEL: CPT

## 2022-01-07 PROCEDURE — 74011000636 HC RX REV CODE- 636: Performed by: EMERGENCY MEDICINE

## 2022-01-07 PROCEDURE — 74011250637 HC RX REV CODE- 250/637: Performed by: INTERNAL MEDICINE

## 2022-01-07 PROCEDURE — 0202U NFCT DS 22 TRGT SARS-COV-2: CPT

## 2022-01-07 PROCEDURE — 36600 WITHDRAWAL OF ARTERIAL BLOOD: CPT

## 2022-01-07 PROCEDURE — 83605 ASSAY OF LACTIC ACID: CPT

## 2022-01-07 PROCEDURE — 85025 COMPLETE CBC W/AUTO DIFF WBC: CPT

## 2022-01-07 PROCEDURE — 74011000258 HC RX REV CODE- 258: Performed by: EMERGENCY MEDICINE

## 2022-01-07 PROCEDURE — 74011250636 HC RX REV CODE- 250/636: Performed by: INTERNAL MEDICINE

## 2022-01-07 PROCEDURE — 84484 ASSAY OF TROPONIN QUANT: CPT

## 2022-01-07 PROCEDURE — 71045 X-RAY EXAM CHEST 1 VIEW: CPT

## 2022-01-07 PROCEDURE — 99285 EMERGENCY DEPT VISIT HI MDM: CPT

## 2022-01-07 PROCEDURE — 87040 BLOOD CULTURE FOR BACTERIA: CPT

## 2022-01-07 PROCEDURE — 87186 SC STD MICRODIL/AGAR DIL: CPT

## 2022-01-07 PROCEDURE — 87077 CULTURE AEROBIC IDENTIFY: CPT

## 2022-01-07 PROCEDURE — 96368 THER/DIAG CONCURRENT INF: CPT

## 2022-01-07 PROCEDURE — 74011250636 HC RX REV CODE- 250/636: Performed by: EMERGENCY MEDICINE

## 2022-01-07 PROCEDURE — 87804 INFLUENZA ASSAY W/OPTIC: CPT

## 2022-01-07 PROCEDURE — 74011000250 HC RX REV CODE- 250: Performed by: INTERNAL MEDICINE

## 2022-01-07 PROCEDURE — 71275 CT ANGIOGRAPHY CHEST: CPT

## 2022-01-07 PROCEDURE — C9113 INJ PANTOPRAZOLE SODIUM, VIA: HCPCS | Performed by: INTERNAL MEDICINE

## 2022-01-07 PROCEDURE — 96365 THER/PROPH/DIAG IV INF INIT: CPT

## 2022-01-07 PROCEDURE — 83880 ASSAY OF NATRIURETIC PEPTIDE: CPT

## 2022-01-07 PROCEDURE — 65660000000 HC RM CCU STEPDOWN

## 2022-01-07 PROCEDURE — 83690 ASSAY OF LIPASE: CPT

## 2022-01-07 PROCEDURE — 82803 BLOOD GASES ANY COMBINATION: CPT

## 2022-01-07 PROCEDURE — 87635 SARS-COV-2 COVID-19 AMP PRB: CPT

## 2022-01-07 PROCEDURE — 87070 CULTURE OTHR SPECIMN AEROBIC: CPT

## 2022-01-07 PROCEDURE — 93005 ELECTROCARDIOGRAM TRACING: CPT

## 2022-01-07 RX ORDER — ACETAMINOPHEN 650 MG/1
650 SUPPOSITORY RECTAL
Status: DISCONTINUED | OUTPATIENT
Start: 2022-01-07 | End: 2022-01-13 | Stop reason: HOSPADM

## 2022-01-07 RX ORDER — GABAPENTIN 100 MG/1
100 CAPSULE ORAL EVERY 24 HOURS
Status: CANCELLED | OUTPATIENT
Start: 2022-01-07

## 2022-01-07 RX ORDER — SODIUM CHLORIDE 0.9 % (FLUSH) 0.9 %
5-40 SYRINGE (ML) INJECTION AS NEEDED
Status: DISCONTINUED | OUTPATIENT
Start: 2022-01-07 | End: 2022-01-13 | Stop reason: HOSPADM

## 2022-01-07 RX ORDER — ENOXAPARIN SODIUM 100 MG/ML
40 INJECTION SUBCUTANEOUS DAILY
Status: DISCONTINUED | OUTPATIENT
Start: 2022-01-08 | End: 2022-01-13 | Stop reason: HOSPADM

## 2022-01-07 RX ORDER — ONDANSETRON 2 MG/ML
4 INJECTION INTRAMUSCULAR; INTRAVENOUS
Status: DISCONTINUED | OUTPATIENT
Start: 2022-01-07 | End: 2022-01-13 | Stop reason: HOSPADM

## 2022-01-07 RX ORDER — AMLODIPINE BESYLATE 5 MG/1
5 TABLET ORAL DAILY
Status: DISCONTINUED | OUTPATIENT
Start: 2022-01-08 | End: 2022-01-13 | Stop reason: HOSPADM

## 2022-01-07 RX ORDER — ROPINIROLE 0.25 MG/1
0.5 TABLET, FILM COATED ORAL 2 TIMES DAILY
Status: DISCONTINUED | OUTPATIENT
Start: 2022-01-07 | End: 2022-01-08

## 2022-01-07 RX ORDER — GABAPENTIN 100 MG/1
100 CAPSULE ORAL EVERY 24 HOURS
Status: DISCONTINUED | OUTPATIENT
Start: 2022-01-08 | End: 2022-01-13 | Stop reason: HOSPADM

## 2022-01-07 RX ORDER — ONDANSETRON 4 MG/1
4 TABLET, ORALLY DISINTEGRATING ORAL
Status: DISCONTINUED | OUTPATIENT
Start: 2022-01-07 | End: 2022-01-13 | Stop reason: HOSPADM

## 2022-01-07 RX ORDER — ATORVASTATIN CALCIUM 10 MG/1
10 TABLET, FILM COATED ORAL
Status: DISCONTINUED | OUTPATIENT
Start: 2022-01-07 | End: 2022-01-13 | Stop reason: HOSPADM

## 2022-01-07 RX ORDER — ACETAMINOPHEN 325 MG/1
650 TABLET ORAL
Status: DISCONTINUED | OUTPATIENT
Start: 2022-01-07 | End: 2022-01-13 | Stop reason: HOSPADM

## 2022-01-07 RX ORDER — AMLODIPINE BESYLATE 5 MG/1
10 TABLET ORAL DAILY
Status: DISCONTINUED | OUTPATIENT
Start: 2022-01-08 | End: 2022-01-07

## 2022-01-07 RX ORDER — ALBUTEROL SULFATE 0.83 MG/ML
2.5 SOLUTION RESPIRATORY (INHALATION)
Status: DISCONTINUED | OUTPATIENT
Start: 2022-01-07 | End: 2022-01-13 | Stop reason: HOSPADM

## 2022-01-07 RX ORDER — DIVALPROEX SODIUM 500 MG/1
1000 TABLET, DELAYED RELEASE ORAL
Status: DISCONTINUED | OUTPATIENT
Start: 2022-01-07 | End: 2022-01-13 | Stop reason: HOSPADM

## 2022-01-07 RX ORDER — GABAPENTIN 100 MG/1
200 CAPSULE ORAL EVERY 12 HOURS
Status: DISCONTINUED | OUTPATIENT
Start: 2022-01-07 | End: 2022-01-13 | Stop reason: HOSPADM

## 2022-01-07 RX ORDER — GABAPENTIN 100 MG/1
100 CAPSULE ORAL EVERY 24 HOURS
Status: DISCONTINUED | OUTPATIENT
Start: 2022-01-07 | End: 2022-01-07

## 2022-01-07 RX ORDER — POLYETHYLENE GLYCOL 3350 17 G/17G
17 POWDER, FOR SOLUTION ORAL DAILY PRN
Status: DISCONTINUED | OUTPATIENT
Start: 2022-01-07 | End: 2022-01-13 | Stop reason: HOSPADM

## 2022-01-07 RX ORDER — DULOXETIN HYDROCHLORIDE 60 MG/1
60 CAPSULE, DELAYED RELEASE ORAL 2 TIMES DAILY
Status: DISCONTINUED | OUTPATIENT
Start: 2022-01-07 | End: 2022-01-13 | Stop reason: HOSPADM

## 2022-01-07 RX ORDER — SODIUM CHLORIDE 0.9 % (FLUSH) 0.9 %
5-40 SYRINGE (ML) INJECTION EVERY 8 HOURS
Status: DISCONTINUED | OUTPATIENT
Start: 2022-01-07 | End: 2022-01-13 | Stop reason: HOSPADM

## 2022-01-07 RX ORDER — CLINDAMYCIN PHOSPHATE 600 MG/50ML
600 INJECTION, SOLUTION INTRAVENOUS EVERY 8 HOURS
Status: DISCONTINUED | OUTPATIENT
Start: 2022-01-07 | End: 2022-01-11

## 2022-01-07 RX ORDER — SODIUM CHLORIDE 9 MG/ML
75 INJECTION, SOLUTION INTRAVENOUS CONTINUOUS
Status: DISPENSED | OUTPATIENT
Start: 2022-01-07 | End: 2022-01-08

## 2022-01-07 RX ORDER — DONEPEZIL HYDROCHLORIDE 5 MG/1
10 TABLET, FILM COATED ORAL
Status: DISCONTINUED | OUTPATIENT
Start: 2022-01-07 | End: 2022-01-13 | Stop reason: HOSPADM

## 2022-01-07 RX ORDER — VANCOMYCIN/0.9 % SOD CHLORIDE 1.5G/250ML
1500 PLASTIC BAG, INJECTION (ML) INTRAVENOUS ONCE
Status: COMPLETED | OUTPATIENT
Start: 2022-01-07 | End: 2022-01-07

## 2022-01-07 RX ORDER — MEMANTINE HYDROCHLORIDE 5 MG/1
10 TABLET ORAL DAILY
Status: DISCONTINUED | OUTPATIENT
Start: 2022-01-08 | End: 2022-01-08

## 2022-01-07 RX ORDER — CARBIDOPA AND LEVODOPA 25; 100 MG/1; MG/1
1 TABLET ORAL 3 TIMES DAILY
Status: DISCONTINUED | OUTPATIENT
Start: 2022-01-07 | End: 2022-01-08

## 2022-01-07 RX ADMIN — GABAPENTIN 200 MG: 100 CAPSULE ORAL at 20:35

## 2022-01-07 RX ADMIN — IOPAMIDOL 100 ML: 755 INJECTION, SOLUTION INTRAVENOUS at 14:55

## 2022-01-07 RX ADMIN — CLINDAMYCIN PHOSPHATE 600 MG: 600 INJECTION, SOLUTION INTRAVENOUS at 22:27

## 2022-01-07 RX ADMIN — ROPINIROLE HYDROCHLORIDE 0.5 MG: 0.25 TABLET, FILM COATED ORAL at 20:35

## 2022-01-07 RX ADMIN — DONEPEZIL HYDROCHLORIDE 10 MG: 5 TABLET, FILM COATED ORAL at 22:27

## 2022-01-07 RX ADMIN — DIVALPROEX SODIUM 1000 MG: 500 TABLET, DELAYED RELEASE ORAL at 22:27

## 2022-01-07 RX ADMIN — DULOXETINE HYDROCHLORIDE 60 MG: 60 CAPSULE, DELAYED RELEASE ORAL at 20:36

## 2022-01-07 RX ADMIN — ATORVASTATIN CALCIUM 10 MG: 10 TABLET, FILM COATED ORAL at 22:27

## 2022-01-07 RX ADMIN — CLINDAMYCIN PHOSPHATE 600 MG: 600 INJECTION, SOLUTION INTRAVENOUS at 15:47

## 2022-01-07 RX ADMIN — VANCOMYCIN HYDROCHLORIDE 1500 MG: 500 INJECTION, POWDER, LYOPHILIZED, FOR SOLUTION INTRAVENOUS at 17:05

## 2022-01-07 RX ADMIN — SODIUM CHLORIDE 40 MG: 9 INJECTION, SOLUTION INTRAMUSCULAR; INTRAVENOUS; SUBCUTANEOUS at 20:35

## 2022-01-07 RX ADMIN — PIPERACILLIN AND TAZOBACTAM 4.5 G: 4; .5 INJECTION, POWDER, LYOPHILIZED, FOR SOLUTION INTRAVENOUS; PARENTERAL at 15:47

## 2022-01-07 RX ADMIN — CARBIDOPA AND LEVODOPA 1 TABLET: 25; 100 TABLET ORAL at 22:27

## 2022-01-07 RX ADMIN — PIPERACILLIN AND TAZOBACTAM 4.5 G: 4; .5 INJECTION, POWDER, LYOPHILIZED, FOR SOLUTION INTRAVENOUS; PARENTERAL at 20:35

## 2022-01-07 NOTE — PROGRESS NOTES
.[address] [facility] Pharmacokinetic Monitoring Service - Vancomycin     Fátima Lees is a 68 y.o. female starting on vancomycin therapy for HCAP. Pharmacy consulted by Dr. Rickie Caceres for monitoring and adjustment. Target Concentration: Goal AUC/CHANTE 400-600 mg*hr/L    Additional Antimicrobials: Clindamycin, Zosyn    Pertinent Laboratory Values: Wt Readings from Last 1 Encounters:   01/07/22 72.6 kg (160 lb)     Temp Readings from Last 1 Encounters:   01/07/22 96.9 °F (36.1 °C)     No components found for: PROCAL  Estimated Creatinine Clearance: 55.7 mL/min (based on SCr of 0.84 mg/dL). Recent Labs     01/07/22  1139   WBC 11.4       Pertinent Cultures:  Culture Date Source Results   1/7/22 Blood In process   1/7/22 Sputum In process   MRSA Nasal Swab: not ordered.      Dosing recommendations based on Bayesian software  Start vancomycin 1500 mg then maintenance dose Vancomycin 750 mg  IV Q 12 hours  Anticipated AUC of 528 and trough concentration of 17.8 at steady state  Renal labs as indicated   Pharmacy will continue to monitor patient and adjust therapy as indicated    Thank you for the consult,  Evan Taylor, [facility]  1/7/2022 3:59 PM

## 2022-01-07 NOTE — H&P
History & Physical    Patient: Whit Johnson MRN: [id number]  CSN: [id number]    YOB: 1945  Age: 68 y.o. Sex: female      DOA: 1/7/2022    Chief Complaint:   Chief Complaint   Patient presents with    Cough          HPI:     Whit Johnson is a 68 y.o.  female who has history of Parkinson's dementia supranuclear palsy presents to the emergency room with worsening cough congestion and dyspnea. In the emergency room she was found to be in respiratory distress with increased heart rate and hypoxemia she was suctioned with thick globs of mucus removal she was started on oxygen with improvement of her distress. CTA done in the emergency room showed no PE a rapid Covid test was negative patient is vaccinated and boosted from the COVID-19 virus she has no known exposures. She lives with her  of 62 years who is an smoker she herself does not smoke. The  states that over the last 8 months she has had increasing difficulty with walking and moving as well as decreased appetite and trouble with choking on foods.   She has had swallowing test and evaluations in the past which confirm hiatal hernia and reflux  Patient recently had her Parkinson medicines adjusted primidone was discontinued the  wishes for her to be a full code for now however patient stated she does not want to be resuscitated.      ,    Past Medical History:   Diagnosis Date    Arthritis     Hypertension 15yrs    Ill-defined condition     h/o dizzy    Parkinson's disease ([address]     Psychiatric disorder     bipolar; short term memory lose    Psychiatric disorder     depressiom       Past Surgical History:   Procedure Laterality Date    HX CATARACT REMOVAL Left     HX HYSTERECTOMY         Family History   Problem Relation Age of Onset    Heart Disease Mother     Cancer Mother     Heart Disease Father     Cancer Father        Social History     Socioeconomic History    Marital status:    Tobacco Use    Smoking status: Never Smoker    Smokeless tobacco: Never Used   Substance and Sexual Activity    Alcohol use: No    Drug use: No       Prior to Admission medications    Medication Sig Start Date End Date Taking? Authorizing Provider   gabapentin (NEURONTIN) 100 mg capsule Take 200 mg by mouth every twelve (12) hours every twelve (12) hours. 2 caps am, 1 cap afternoon, 2 caps pm     Provider, Historical   omeprazole (PRILOSEC) 40 mg capsule Take 40 mg by mouth daily. Provider, Historical   memantine (NAMENDA) 10 mg tablet Take 10 mg by mouth daily. Provider, Historical   primidone (MYSOLINE) 50 mg tablet Take 50 mg by mouth two (2) times daily as needed. Indications: essential tremor    Provider, Historical   DULoxetine (CYMBALTA) 60 mg capsule Take 60 mg by mouth two (2) times a day. Provider, Historical   divalproex DR (DEPAKOTE) 500 mg tablet Take 1,000 mg by mouth nightly. Provider, Historical   carbidopa-levodopa (SINEMET)  mg per tablet Take 1 Tab by mouth three (3) times daily. Provider, Historical   amLODIPine (NORVASC) 10 mg tablet Take 10 mg by mouth daily. Provider, Historical   gabapentin (NEURONTIN) 100 mg capsule Take 100 mg by mouth every twenty-four (24) hours. In the afternoon in addition to 200mg in am and pm    Provider, Historical   trospium (SANCTURA XL) 60 mg capsule Take 60 mg by mouth Daily (before breakfast). Provider, Historical   valbenazine (Ingrezza) 80 mg cap Take 80 mg by mouth daily. Provider, Historical   donepezil (ARICEPT) 10 mg tablet Take 10 mg by mouth nightly. Provider, Historical   simvastatin (ZOCOR) 20 mg tablet Take 20 mg by mouth nightly.     Provider, Historical       Allergies   Allergen Reactions    Combivent [Ipratropium-Albuterol] Shortness of Breath    Ibuprofen Other (comments)     Has had since    Adhesive Tape-Silicones Rash         Review of Systems  GENERAL: Patient alert, awake and oriented times 3, able to communicate full sentences and not in distress. HEENT: No change in vision, no earache, tinnitus, sore throat or sinus congestion. NECK: No pain or stiffness. PULMONARY: + shortness of breath, cough or wheeze. Cardiovascular: no pnd or orthopnea, no CP  GASTROINTESTINAL: No abdominal pain, nausea, vomiting or diarrhea, melena or bright red blood per rectum. GENITOURINARY: No urinary frequency, urgency, hesitancy or dysuria. MUSCULOSKELETAL: No joint or muscle pain, no back pain, no recent trauma. DERMATOLOGIC: No rash, no itching, no lesions. ENDOCRINE: No polyuria, polydipsia, no heat or cold intolerance. No recent change in weight. HEMATOLOGICAL: No anemia or easy bruising or bleeding. NEUROLOGIC: No headache, seizures, numbness, + \"tingling + weakness. Physical Exam:     Physical Exam:  Visit Vitals  BP (!) 104/47   Pulse 85   Temp 96.9 °F (36.1 °C)   Resp 15   Ht 5' 4\" (1.626 m)   Wt 72.6 kg (160 lb)   SpO2 100%   BMI 27.46 kg/m²      O2 Device: None (Room air)    Temp (24hrs), Av.9 °F (36.1 °C), Min:96.9 °F (36.1 °C), Max:96.9 °F (36.1 °C)    No intake/output data recorded. No intake/output data recorded. General:  Alert, cooperative, no distress, appears stated age. Head: Normocephalic, without obvious abnormality, atraumatic. Eyes:  Conjunctivae/corneas clear. PERRL, EOMs intact. Nose: Nares normal. No drainage or sinus tenderness. Neck: Supple, symmetrical, trachea midline, no adenopathy, thyroid: no enlargement, no carotid bruit and no JVD. Lungs:   Clear to auscultation bilaterally. Heart:  Regular rate and rhythm, S1, S2 normal.     Abdomen: Soft, non-tender. Bowel sounds normal.    Extremities: Extremities normal, atraumatic, no cyanosis or edema. Pulses: 2+ and symmetric all extremities. Skin:  No rashes or lesions   Neurologic: AAOx3, No focal motor or sensory deficit. Labs Reviewed: All lab results for the last 24 hours reviewed.    and EKG    Procedures/imaging: see electronic medical records for all procedures/Xrays and details which were not copied into this note but were reviewed prior to creation of Plan      Assessment/Plan     1. Aspiration pneumonia  Patient is placed on aspiration precautions may need to be n.p.o. we will do a bedside swallow to determine   She is started on IV Zosyn a urine analysis is pending at this time  2. Generalized weakness likely worsened from Parkinson's PT and OT consulted we will also check CT of the head neurology was consulted in the emergency room  3. Probable COPD although not documented likely from secondhand smoke was a started on albuterol we will not add steroids as she already has a tremor from Parkinson's    4. Parkinson's she will be continued on her Requip and Sinemet await verification of medication list CT of the head will be ordered as she is fallen await neurology input on MRI    5. Dementia patient continued on her Namenda and Aricept I anticipate she will have some trouble with sundowning will increase her Depakote as needed    6. Hypotension we will adjust her blood pressure medicine while in hospital her Norvasc will be changed to 5 mg    7. Dehydration she started on normal saline  8.   Leg edema check t Dopplers of leg  End-of-life issues discussed will obtain palliative care consult   wants to hold off on DO NOT RESUSCITATE for now  DVT/GI Prophylaxis: Lovenox        Halie Chester MD  1/7/2022 5:01 PM

## 2022-01-07 NOTE — ED PROVIDER NOTES
68-year-old female with multiple medical conditions including arthritis, hypertension, and worsening Parkinson's disorder with known bipolar depression presents to the emergency department in acute respiratory distress. Patient presented with acute tachypnea and hypoxia. She was immediately brought to room on arrival and immediately had deep suctioning which removed a large sputum bolus and immediately improved patient's airway. IV was started labs were sent ABG was ordered patient was placed on oxygen placed in isolation and COVID-19 test ordered. Past Medical History:   Diagnosis Date    Arthritis     Hypertension 15yrs    Ill-defined condition     h/o dizzy    Parkinson's disease ([address]     Psychiatric disorder     bipolar; short term memory lose    Psychiatric disorder     depressiom       Past Surgical History:   Procedure Laterality Date    HX CATARACT REMOVAL Left     HX HYSTERECTOMY           Family History:   Problem Relation Age of Onset    Heart Disease Mother     Cancer Mother     Heart Disease Father     Cancer Father        Social History     Socioeconomic History    Marital status:      Spouse name: Not on file    Number of children: Not on file    Years of education: Not on file    Highest education level: Not on file   Occupational History    Not on file   Tobacco Use    Smoking status: Never Smoker    Smokeless tobacco: Never Used   Substance and Sexual Activity    Alcohol use: No    Drug use: No    Sexual activity: Not on file   Other Topics Concern    Not on file   Social History Narrative    Not on file     Social Determinants of Health     Financial Resource Strain:     Difficulty of Paying Living Expenses: Not on file   Food Insecurity:     Worried About Running Out of Food in the Last Year: Not on file    Charissa of Food in the Last Year: Not on file   Transportation Needs:     Lack of Transportation (Medical):  Not on file    Lack of Transportation (Non-Medical): Not on file   Physical Activity:     Days of Exercise per Week: Not on file    Minutes of Exercise per Session: Not on file   Stress:     Feeling of Stress : Not on file   Social Connections:     Frequency of Communication with Friends and Family: Not on file    Frequency of Social Gatherings with Friends and Family: Not on file    Attends Alevism Services: Not on file    Active Member of [address] [place] [address] or Organizations: Not on file    Attends Club or Organization Meetings: Not on file    Marital Status: Not on file   Intimate Partner Violence:     Fear of Current or Ex-Partner: Not on file    Emotionally Abused: Not on file    Physically Abused: Not on file    Sexually Abused: Not on file   Housing Stability:     Unable to Pay for Housing in the Last Year: Not on file    Number of [place] in the Last Year: Not on file    Unstable Housing in the Last Year: Not on file         ALLERGIES: Combivent [ipratropium-albuterol], Ibuprofen, and Adhesive tape-silicones    Review of Systems   Constitutional: Positive for activity change, appetite change and fatigue. Negative for chills, diaphoresis, fever and unexpected weight change. HENT: Positive for congestion, postnasal drip, rhinorrhea, sinus pressure and trouble swallowing. Negative for drooling, ear discharge and ear pain. Eyes: Negative for pain, discharge and itching. Respiratory: Positive for cough, choking and shortness of breath. Negative for apnea, chest tightness, wheezing and stridor. Cardiovascular: Negative for chest pain, palpitations and leg swelling. Gastrointestinal: Negative for abdominal distention, abdominal pain, anal bleeding, blood in stool, constipation, diarrhea and nausea. Endocrine: Negative. Genitourinary: Negative. Musculoskeletal: Negative. Skin: Negative. Neurological: Positive for speech difficulty. Negative for facial asymmetry. Hematological: Negative. Psychiatric/Behavioral: Negative. Vitals:    01/07/22 1134   BP: (!) 124/95   Pulse: 94   Resp: 23   Temp: 96.9 °F (36.1 °C)   SpO2: 98%   Weight: 72.6 kg (160 lb)   Height: 5' 4\" (1.626 m)            Physical Exam  Vitals and nursing note reviewed. Constitutional:       General: She is in acute distress. Appearance: She is ill-appearing. She is not toxic-appearing or diaphoretic. HENT:      Head: Normocephalic and atraumatic. Nose: Congestion and rhinorrhea present. Mouth/Throat:      Mouth: Mucous membranes are dry. Pharynx: No oropharyngeal exudate or posterior oropharyngeal erythema. Eyes:      General:         Right eye: No discharge. Left eye: No discharge. Pupils: Pupils are equal, round, and reactive to light. Cardiovascular:      Rate and Rhythm: Tachycardia present. Pulses: Normal pulses. Heart sounds: Normal heart sounds. No murmur heard. Pulmonary:      Effort: Respiratory distress present. Breath sounds: Rhonchi present. Abdominal:      General: Abdomen is flat. There is no distension. Palpations: Abdomen is soft. There is no mass. Tenderness: There is no abdominal tenderness. There is no guarding. Hernia: No hernia is present. Musculoskeletal:         General: No swelling, tenderness or deformity. Normal range of motion. Psychiatric:         Mood and Affect: Mood normal.         Behavior: Behavior normal.          MDM  Number of Diagnoses or Management Options  Diagnosis management comments: 51-year-old female with history of Parkinson's, arthritis, hypertension presents to the emergency department acute respiratory distress with tachypnea, hypoxia and airway obstruction. He was seen immediately on arrival stridors no wheezes as well as rhonchi was heard and she had immediate deep suctioning which removed several large sputum collections and immediately cleared patient's airway.   Despite having multiple comorbidities including worsening son and recent diagnosis of Parkinson's patient continues to be a heavy tobacco user. Patient has difficulty speaking at baseline and history is largely obtained from her , per  she has also become more bedbound with difficulty walking and speaking over the last 3 weeks. After initial resuscitative efforts patient was examined again extensively. Speaking in 1-2 word sentences, not just as a result of respiratory distress spell secondary to Parkinson's, lungs showed scattered rhonchorous noises and mild stridor, heart sounds were tachycardic without murmur gallops or rubs. Abdomen was soft nontender nondistended. He was started labs were sent. Chest x-ray was done. Sputum culture was done. Patient placed on isolation precautions COVID-19 testing sent. Initial examination suspect worsening Parkinson syndrome with possible atelectasis and COPD exacerbation. Patient was covered for infectious process and in the face of structural lung disease with antibiotics ABG was done and patient shows acute decline but not emergent intubation criteria with hypoxia and some retention. He was placed on 2 L of oxygen. CT of the chest was done showed no PE small vessel lung disease, small airway changes. Spoke with Dr. Mery Zhang of neurology who will be happy to consult on inpatient. Will discuss with the hospitalist for admission severe COPD exacerbation complicated by likely aspiration and Parkinson worsening.   Will admit to tele         Critical Care  Performed by: Markel Marquis MD  Authorized by: Markel Marquis MD     Critical care provider statement:     Critical care time (minutes):  90    Critical care was necessary to treat or prevent imminent or life-threatening deterioration of the following conditions:  Respiratory failure    Critical care was time spent personally by me on the following activities:  Development of treatment plan with patient or surrogate, discussions with consultants, evaluation of patient's response to treatment, examination of patient, obtaining history from patient or surrogate, ordering and performing treatments and interventions, ordering and review of laboratory studies, ordering and review of radiographic studies, pulse oximetry and re-evaluation of patient's condition

## 2022-01-07 NOTE — Clinical Note
Status[de-identified] INPATIENT [101]   Type of Bed: Telemetry [19]   Cardiac Monitoring Required?: Yes   Inpatient Hospitalization Certified Necessary for the Following Reasons: 3.  Patient receiving treatment that can only be provided in an inpatient setting (further clarification in H&P documentation)   Admitting Diagnosis: Aspiration into airway [6725383]   Admitting Diagnosis: Respiratory distress [672949]   Admitting Diagnosis: COPD (chronic obstructive pulmonary disease) [facility]) [249675]   Admitting Physician: Yadira Arciniega [[id number]]   Attending Physician: Yadira Arciniega [[id number]]   Estimated Length of Stay: 5-7 Midnights   Discharge Plan[de-identified] [address] [facility]

## 2022-01-07 NOTE — ED NOTES
Pt arrives with  with report of audible gurgling from secretions in the throat. Pt suctioned by nurse at bedside and pt able to cough. Pt with productive cough of white sputum. Pts  sts pt has parkinson's and has difficulty swallowing. Pt is able to speak however has hx of dementia and is difficult to understand. Pt is in NAD at this time.

## 2022-01-07 NOTE — ED TRIAGE NOTES
Demented patient arrives with  in wheelchair with complaints of cough and breathing difficulty that began this week, audibly wet cough present

## 2022-01-08 LAB
ANION GAP SERPL CALC-SCNC: 7 MMOL/L (ref 3–18)
BUN SERPL-MCNC: 18 MG/DL (ref 7–18)
BUN/CREAT SERPL: 31 (ref 12–20)
CALCIUM SERPL-MCNC: 8.4 MG/DL (ref 8.5–10.1)
CHLORIDE SERPL-SCNC: 109 MMOL/L (ref 100–111)
CO2 SERPL-SCNC: 29 MMOL/L (ref 21–32)
CREAT SERPL-MCNC: 0.59 MG/DL (ref 0.6–1.3)
GLUCOSE SERPL-MCNC: 79 MG/DL (ref 74–99)
POTASSIUM SERPL-SCNC: 3.9 MMOL/L (ref 3.5–5.5)
SODIUM SERPL-SCNC: 145 MMOL/L (ref 136–145)

## 2022-01-08 PROCEDURE — 74011250636 HC RX REV CODE- 250/636: Performed by: INTERNAL MEDICINE

## 2022-01-08 PROCEDURE — 97530 THERAPEUTIC ACTIVITIES: CPT

## 2022-01-08 PROCEDURE — 97167 OT EVAL HIGH COMPLEX 60 MIN: CPT

## 2022-01-08 PROCEDURE — 80048 BASIC METABOLIC PNL TOTAL CA: CPT

## 2022-01-08 PROCEDURE — 74011250637 HC RX REV CODE- 250/637: Performed by: PSYCHIATRY & NEUROLOGY

## 2022-01-08 PROCEDURE — 97162 PT EVAL MOD COMPLEX 30 MIN: CPT

## 2022-01-08 PROCEDURE — 74011000250 HC RX REV CODE- 250: Performed by: INTERNAL MEDICINE

## 2022-01-08 PROCEDURE — 92526 ORAL FUNCTION THERAPY: CPT

## 2022-01-08 PROCEDURE — 74011250637 HC RX REV CODE- 250/637: Performed by: INTERNAL MEDICINE

## 2022-01-08 PROCEDURE — 65660000000 HC RM CCU STEPDOWN

## 2022-01-08 PROCEDURE — C9113 INJ PANTOPRAZOLE SODIUM, VIA: HCPCS | Performed by: INTERNAL MEDICINE

## 2022-01-08 PROCEDURE — 74011000258 HC RX REV CODE- 258: Performed by: EMERGENCY MEDICINE

## 2022-01-08 PROCEDURE — 92610 EVALUATE SWALLOWING FUNCTION: CPT

## 2022-01-08 PROCEDURE — 36415 COLL VENOUS BLD VENIPUNCTURE: CPT

## 2022-01-08 PROCEDURE — 74011250636 HC RX REV CODE- 250/636: Performed by: EMERGENCY MEDICINE

## 2022-01-08 RX ORDER — ROPINIROLE 0.25 MG/1
0.25 TABLET, FILM COATED ORAL 2 TIMES DAILY
Status: DISCONTINUED | OUTPATIENT
Start: 2022-01-08 | End: 2022-01-13 | Stop reason: HOSPADM

## 2022-01-08 RX ORDER — CARBIDOPA AND LEVODOPA 25; 100 MG/1; MG/1
2 TABLET ORAL 3 TIMES DAILY
Status: DISCONTINUED | OUTPATIENT
Start: 2022-01-08 | End: 2022-01-13 | Stop reason: HOSPADM

## 2022-01-08 RX ORDER — MEMANTINE HYDROCHLORIDE 5 MG/1
10 TABLET ORAL 2 TIMES DAILY
Status: DISCONTINUED | OUTPATIENT
Start: 2022-01-08 | End: 2022-01-13 | Stop reason: HOSPADM

## 2022-01-08 RX ADMIN — SODIUM CHLORIDE 40 MG: 9 INJECTION, SOLUTION INTRAMUSCULAR; INTRAVENOUS; SUBCUTANEOUS at 22:02

## 2022-01-08 RX ADMIN — SODIUM CHLORIDE, PRESERVATIVE FREE 10 ML: 5 INJECTION INTRAVENOUS at 22:02

## 2022-01-08 RX ADMIN — CARBIDOPA AND LEVODOPA 1 TABLET: 25; 100 TABLET ORAL at 16:39

## 2022-01-08 RX ADMIN — DONEPEZIL HYDROCHLORIDE 10 MG: 5 TABLET, FILM COATED ORAL at 22:03

## 2022-01-08 RX ADMIN — CARBIDOPA AND LEVODOPA 1 TABLET: 25; 100 TABLET ORAL at 09:38

## 2022-01-08 RX ADMIN — ROPINIROLE HYDROCHLORIDE 0.25 MG: 0.25 TABLET, FILM COATED ORAL at 22:01

## 2022-01-08 RX ADMIN — AMLODIPINE BESYLATE 5 MG: 5 TABLET ORAL at 09:37

## 2022-01-08 RX ADMIN — VANCOMYCIN HYDROCHLORIDE 750 MG: 750 INJECTION, POWDER, LYOPHILIZED, FOR SOLUTION INTRAVENOUS at 04:28

## 2022-01-08 RX ADMIN — MEMANTINE HYDROCHLORIDE 10 MG: 5 TABLET ORAL at 22:03

## 2022-01-08 RX ADMIN — ROPINIROLE HYDROCHLORIDE 0.5 MG: 0.25 TABLET, FILM COATED ORAL at 09:36

## 2022-01-08 RX ADMIN — DULOXETINE HYDROCHLORIDE 60 MG: 60 CAPSULE, DELAYED RELEASE ORAL at 22:04

## 2022-01-08 RX ADMIN — ATORVASTATIN CALCIUM 10 MG: 10 TABLET, FILM COATED ORAL at 22:04

## 2022-01-08 RX ADMIN — CLINDAMYCIN PHOSPHATE 600 MG: 600 INJECTION, SOLUTION INTRAVENOUS at 22:20

## 2022-01-08 RX ADMIN — GABAPENTIN 200 MG: 100 CAPSULE ORAL at 22:03

## 2022-01-08 RX ADMIN — PIPERACILLIN AND TAZOBACTAM 4.5 G: 4; .5 INJECTION, POWDER, LYOPHILIZED, FOR SOLUTION INTRAVENOUS; PARENTERAL at 16:33

## 2022-01-08 RX ADMIN — CARBIDOPA AND LEVODOPA 2 TABLET: 25; 100 TABLET ORAL at 22:02

## 2022-01-08 RX ADMIN — DULOXETINE HYDROCHLORIDE 60 MG: 60 CAPSULE, DELAYED RELEASE ORAL at 09:37

## 2022-01-08 RX ADMIN — CLINDAMYCIN PHOSPHATE 600 MG: 600 INJECTION, SOLUTION INTRAVENOUS at 06:21

## 2022-01-08 RX ADMIN — PIPERACILLIN AND TAZOBACTAM 4.5 G: 4; .5 INJECTION, POWDER, LYOPHILIZED, FOR SOLUTION INTRAVENOUS; PARENTERAL at 02:33

## 2022-01-08 RX ADMIN — MEMANTINE HYDROCHLORIDE 10 MG: 5 TABLET ORAL at 12:11

## 2022-01-08 RX ADMIN — DIVALPROEX SODIUM 1000 MG: 500 TABLET, DELAYED RELEASE ORAL at 22:04

## 2022-01-08 RX ADMIN — ENOXAPARIN SODIUM 40 MG: 100 INJECTION SUBCUTANEOUS at 09:45

## 2022-01-08 RX ADMIN — CLINDAMYCIN PHOSPHATE 600 MG: 600 INJECTION, SOLUTION INTRAVENOUS at 16:33

## 2022-01-08 RX ADMIN — PIPERACILLIN AND TAZOBACTAM 4.5 G: 4; .5 INJECTION, POWDER, LYOPHILIZED, FOR SOLUTION INTRAVENOUS; PARENTERAL at 22:01

## 2022-01-08 RX ADMIN — PIPERACILLIN AND TAZOBACTAM 4.5 G: 4; .5 INJECTION, POWDER, LYOPHILIZED, FOR SOLUTION INTRAVENOUS; PARENTERAL at 09:45

## 2022-01-08 RX ADMIN — GABAPENTIN 100 MG: 100 CAPSULE ORAL at 12:11

## 2022-01-08 RX ADMIN — GABAPENTIN 200 MG: 100 CAPSULE ORAL at 09:36

## 2022-01-08 RX ADMIN — VANCOMYCIN HYDROCHLORIDE 750 MG: 750 INJECTION, POWDER, LYOPHILIZED, FOR SOLUTION INTRAVENOUS at 18:01

## 2022-01-08 NOTE — PROGRESS NOTES
Patient's chart is reviewed. She has frontotemtporal dementia concerns for PSP. Last visit with neurologist Ms. Garzaouse 12/21/2021 at [facility]. Patient new baseline is wheelchair bound. Not motivated to walk on PT homehealth. CT head in ER was unremarkable. PD medication inpatient was adjusted according to her neurology note. Continue Sinemet 25/100 2 tabs QAM, 2 tabs QPM and 1 tab QHS, Requip 0.25 mg BID, Namenda 10 mg BID. No need to have brain MRI due to current COVID and respiratory distress especially gait difficulties has been subacute and CT head shows no acute infarct. Neurology sign off, please do not hesitate to call for question.

## 2022-01-08 NOTE — PROGRESS NOTES
Reason for Admission:  Chart reviewed; per H&P, patient is a \" 68 y.o.  female who has history of Parkinson's dementia supranuclear palsy presents to the emergency room with worsening cough congestion and dyspnea. In the emergency room she was found to be in respiratory distress with increased heart rate and hypoxemia she was suctioned with thick globs of mucus removal she was started on oxygen with improvement of her distress. \"  Patient is positive for coronavirus 229E.                     RUR Score:         Low, 7%            Plan for utilizing home health:   TBD       PCP: First and Last name:  Miguel Angel Rojo MD     Name of Practice: [address] [place] [address]   Are you a current patient: Yes/No: yes   Approximate date of last visit: 3 months ago   Can you participate in a virtual visit with your PCP: no                    Current Advanced Directive/Advance Care Plan: Full Code      Healthcare Decision Maker:   Click here to complete [address] including selection of the Healthcare Decision Maker Relationship (ie \"Primary\")           Mickey Villarreal -  - Primary - [phone]                  Transition of Care Plan:  Care manager met with patient wearing droplet plus PPE; patient states she lives w/  of 72 yrs; has a wheelchair to use and does not where home O2 - states she has   [facility] care but it has not begun - care manager contacted  Shalini Hussein who clarified that they have been  64 years and that the [address] [place], [place] [address]d was [facility], arranged for by her [facility] neurologist but had not yet started. Per , he will be bringing her meds up for comparison. Care manager will follow for continued needs. Care Management Interventions  PCP Verified by CM:  Yes  Mode of Transport at Discharge: Self  Transition of Care Consult (CM Consult): Discharge Planning  Support Systems: Spouse/Significant Other  Confirm Follow Up Transport: Family  The Plan for Transition of Care is Related to the Following Treatment Goals : respiratory distress  The Patient and/or Patient Representative was Provided with a Choice of Provider and Agrees with the Discharge Plan?: Yes  Name of the Patient Representative Who was Provided with a Choice of Provider and Agrees with the Discharge Plan: Viridiana Juárez, patient  Freedom of Choice List was Provided with Basic Dialogue that Supports the Patient's Individualized Plan of Care/Goals, Treatment Preferences and Shares the Quality Data Associated with the Providers?: Yes  Discharge Location  Discharge Placement: Home with family assistance

## 2022-01-08 NOTE — PROGRESS NOTES
SPEECH LANGUAGE PATHOLOGY BEDSIDE SWALLOW   EVALUATION & TREATMENT     Patient: Rachelle Richter (19 y.o. female)  Date: 1/8/2022  Primary Diagnosis: Aspiration into airway [T17.908A]  Respiratory distress [R06.03]  COPD (chronic obstructive pulmonary disease) ([facility]) [J44.9]       Precautions: aspiration risk, fall risk, COVID+   PLOF: regular/thin    ASSESSMENT :  Based on the objective data described below, the patient presents with mild-moderate oropharyngeal dysphagia, hx of Parkinson. Therapeutic dx trials of easy to chew, soft and bite size, thin liquids via cup and NTL via cup administered as pt with the following impairments: wet vocal quality after the swallow, delayed swallow response 3-5 seconds, coughing with thin liquids. Pt with anterior pocketing and required verbal cues to use liquid wash to clear bolus. No overt s/sx with soft and bite size/NTL. Diet downgraded to LRD. Pt present with mild tremors throughout evaluation. TREATMENT :  Skilled therapy initiated; Educated pt on aspiration precautions and importance of compensatory swallow techniques to decrease aspiration risk (decrease rate of intake & sip/bite size, upright @HOB for all po intake and ~30 minutes after po); verbalized comprehension. SLP to follow as indicated. Patient will benefit from skilled intervention to address the above impairments.    Patient's rehabilitation potential is considered to be Good  Factors which may influence rehabilitation potential include:  [ ]            None noted  [ ]            Mental ability/status  [x ]            Medical condition  [ x]            Home/family situation and support systems  [ x]            Safety awareness  [ ]            Pain tolerance/management  [ ]            Other:     PLAN :  Recommendations and Planned Interventions:  Diet: soft and bite size/nectar thick liquids, dysphagia therapy  Frequency/Duration: Patient will be followed by speech-language pathology 1 time per day/4-7 days per week to address goals. Discharge Recommendations: To Be Determined    SUBJECTIVE:  Patient stated \"I have Parkinson's\". OBJECTIVE:  Past Medical History:  No date: Arthritis  15yrs: Hypertension  No date: Ill-defined condition      Comment:  h/o dizzy  No date: Parkinson's disease ([address]  No date: Psychiatric disorder      Comment:  bipolar; short term memory lose  No date: Psychiatric disorder      Comment:  depressiomPast Surgical History:  No date: HX CATARACT REMOVAL; Left  No date: HX HYSTERECTOMY  Home Situation: Home with        Diet prior to admission: regular/thin  Current Diet:  easy to chew/thin    Cognitive and Communication Status: informally assessed to be mild-moderate cognitive impairment with dx of dementia       Oral Assessment: mild impairment with lingual, labial, pharyngeal      P.O. Trials: see above     Vocal quality prior to P.O.:  WFL    PAIN:  No pain reported    After treatment:   [ ]            Patient left in no apparent distress sitting up in chair  [x ]            Patient left in no apparent distress in bed  [x ]            Call bell left within reach  [x ]            Nursing notified  [ ]            Family present  [ ]            Caregiver present  [ ]            Bed alarm activated    COMMUNICATION/EDUCATION:  [x]            Aspiration precautions; swallow safety; compensatory techniques. [ ]            Patient/family have participated as able in goal setting and plan of care. [ ]            Patient/family agree to work toward stated goals and plan of care. [ ]            Patient understands intent and goals of therapy; neutral about participation. [ ]            Patient unable to participate in goal setting/plan of care; educ ongoing with interdisciplinary staff  [ ]         Posted safety precautions in patient's room.     Thank you for this referral.  Mary Alice Pichardo, SLP  Time Calculation: 25 mins  Evaluation Time: 10 minutes   Treatment Time: 15 minutes Problem: Dysphagia (Adult)  Goal: *Acute Goals and Plan of Care (Insert Text)  Outcome: Progressing Towards Goal

## 2022-01-08 NOTE — PROGRESS NOTES
Hospitalist Progress Note    Patient: Dorie Greenberg MRN: [id number]  CSN: [id number]    YOB: 1945  Age: 68 y.o. Sex: female    DOA: 1/7/2022 LOS:  LOS: 1 day          Chief Complaint:    Dorie Greenberg is a 68 y.o.  female who has history of Parkinson's dementia supranuclear palsy presents to the emergency room with worsening cough congestion and dyspnea. In the emergency room she was found to be in respiratory distress with increased heart rate and hypoxemia she was suctioned with thick globs of mucus removal she was started on oxygen with improvement of her distress. CTA done in the emergency room showed no PE a rapid Covid test was negative patient is vaccinated and boosted from the COVID-19 virus she has no known exposures. She lives with her  of 62 years who is an smoker she herself does not smoke. The  states that over the last 8 months she has had increasing difficulty     Covid  PCR negative but Covid 229E positive URI  Assessment/Plan       Aspiration pneumonia  Patient is placed on aspiration precautions may need to be n.p.o. we will do a bedside swallow to determine   She is started on IV Zosyn a urine analysis is pending at this time  covid pcr negative but 229 e positive   Attempted to call [facility] no answer    2. Generalized weakness likely worsened from Parkinson's PT and OT consulted we will also check CT of the head neurology was consulted in the emergency room apparantly not done yesterday     3. Probable COPD although not documented likely from secondhand smoke was a started on albuterol we will not add steroids as she already has a tremor from Parkinson's and today her dyspnea is improved 1/8//22     4.  Parkinson's she will be continued on her Requip and Sinemet await verification of medication list CT of the head will be ordered as she is fallen await neurology input on MRI     5.   Dementia patient continued on her Namenda and Aricept I anticipate she will have some trouble with sundowning will increase her Depakote as needed     6. Hypotension we will adjust her blood pressure medicine while in hospital her Norvasc will be changed to 5 mg     7. Dehydration she started on normal saline appears better  8. Leg edema check t Dopplers of leg  End-of-life issues discussed will obtain palliative care consult    Patient Active Problem List   Diagnosis Code    Parkinson's disease ([facility] G20    Psychiatric disorder F99    Hypertension I10    UTI (urinary tract infection) N39.0    Sepsis ([facility] A41.9    Fall at home Rm Aiken. Janneth Ochoa, Y92.009    Bacteremia R78.81    Sepsis due to Escherichia coli with acute organ dysfunction without septic shock ([facility]) A41.51, R65.20    Pyelonephritis N12    COPD (chronic obstructive pulmonary disease) ([facility]) J44.9    Respiratory distress R06.03    Aspiration into airway T17.908A       Subjective:        Review of systems:    Constitutional: denies fevers, chills, myalgias  Respiratory+ SOB, cough  Cardiovascular: denies chest pain, palpitations  Gastrointestinal: denies nausea, vomiting, diarrhea      Vital signs/Intake and Output:  Visit Vitals  /61   Pulse 78   Temp 97.5 °F (36.4 °C)   Resp 16   Ht 5' 4\" (1.626 m)   Wt 72.6 kg (160 lb)   SpO2 100%   BMI 27.46 kg/m²     Current Shift:  No intake/output data recorded.   Last three shifts:  01/06 1901 - 01/08 0700  In: 100 [P.O.:100]  Out: -     Exam:    General: Well developed, alert, NAD, OX3  Head/Neck: NCAT, supple, No masses, No lymphadenopathy  CVS:Regular rate and rhythm, no M/R/G, S1/S2 heard, no thrill  Lungs:Clear to auscultation bilaterally, no wheezes, rhonchi, or rales  Abdomen: Soft, Nontender, No distention, Normal Bowel sounds, No hepatomegaly  Extremities: No C/C/E, pulses palpable 2+  Skin:normal texture and turgor, no rashes, no lesions  Neuro:grossly normal , follows commands  Psych:appropriate                Labs: Results:       Chemistry Recent Labs 01/08/22  0257 01/07/22  1139   GLU 79 80    142   K 3.9 4.1    107   CO2 29 30   BUN 18 21*   CREA 0.59* 0.84   CA 8.4* 9.1   AGAP 7 5   BUCR 31* 25*   AP  --  102   TP  --  6.4   ALB  --  3.0*   GLOB  --  3.4   AGRAT  --  0.9      CBC w/Diff Recent Labs     01/07/22  1139   WBC 11.4   RBC 5.05   HGB 14.7   HCT 47.8*   *   GRANS 72   LYMPH 15*   EOS 3      Cardiac Enzymes No results for input(s): CPK, CKND1, BABITA in the last 72 hours. No lab exists for component: CKRMB, TROIP   Coagulation No results for input(s): PTP, INR, APTT, INREXT in the last 72 hours. Lipid Panel No results found for: CHOL, CHOLPOCT, CHOLX, CHLST, CHOLV, 480515, HDL, HDLP, LDL, LDLC, DLDLP, 332883, VLDLC, VLDL, TGLX, TRIGL, TRIGP, TGLPOCT, CHHD, CHHDX   BNP No results for input(s): BNPP in the last 72 hours.    Liver Enzymes Recent Labs     01/07/22  1139   TP 6.4   ALB 3.0*         Thyroid Studies No results found for: T4, T3U, TSH, TSHEXT     Procedures/imaging: see electronic medical records for all procedures/Xrays and details which were not copied into this note but were reviewed prior to creation of Sunil Christina MD

## 2022-01-08 NOTE — PROGRESS NOTES
1930- Report from previous shift, results state that pt is Covid Positive. Previous shift nurse informed  of pt's results and asked him to leave. 2030- Assessment complete, Pt's lung sounds are Rhonchi, pt has moist cough. Aspiration precautions so pills should be crushed, pt will take with applesauce. Pt has trending soft Bps. Generalized edema and scattered bruising. Informed procedure schedule Thursday 3/18 arrive 1045.    After your Covid test please reman quarantined until the day of your procedure, if you did not self quarantine you will be cancelled  We will do a temperature screen upon arrival; please call if you develop temperature greater than 100.0  You will be allowed one support person who must remain in your room throughout your stay (the person with you would be in your room during your health history)(that person may not leave and return); or we may call your ride and give discharge information over the phone

## 2022-01-09 ENCOUNTER — APPOINTMENT (OUTPATIENT)
Dept: VASCULAR SURGERY | Age: 77
DRG: 177 | End: 2022-01-09
Attending: INTERNAL MEDICINE
Payer: MEDICARE

## 2022-01-09 ENCOUNTER — APPOINTMENT (OUTPATIENT)
Dept: CT IMAGING | Age: 77
DRG: 177 | End: 2022-01-09
Attending: INTERNAL MEDICINE
Payer: MEDICARE

## 2022-01-09 LAB
ANION GAP SERPL CALC-SCNC: 4 MMOL/L (ref 3–18)
ATRIAL RATE: 94 BPM
BUN SERPL-MCNC: 11 MG/DL (ref 7–18)
BUN/CREAT SERPL: 19 (ref 12–20)
CALCIUM SERPL-MCNC: 8.5 MG/DL (ref 8.5–10.1)
CALCULATED P AXIS, ECG09: 75 DEGREES
CALCULATED R AXIS, ECG10: 13 DEGREES
CALCULATED T AXIS, ECG11: 9 DEGREES
CHLORIDE SERPL-SCNC: 104 MMOL/L (ref 100–111)
CO2 SERPL-SCNC: 35 MMOL/L (ref 21–32)
CREAT SERPL-MCNC: 0.58 MG/DL (ref 0.6–1.3)
DIAGNOSIS, 93000: NORMAL
GLUCOSE SERPL-MCNC: 69 MG/DL (ref 74–99)
P-R INTERVAL, ECG05: 144 MS
POTASSIUM SERPL-SCNC: 3.9 MMOL/L (ref 3.5–5.5)
Q-T INTERVAL, ECG07: 356 MS
QRS DURATION, ECG06: 94 MS
QTC CALCULATION (BEZET), ECG08: 445 MS
SODIUM SERPL-SCNC: 143 MMOL/L (ref 136–145)
VANCOMYCIN SERPL-MCNC: 12 UG/ML (ref 5–40)
VENTRICULAR RATE, ECG03: 94 BPM

## 2022-01-09 PROCEDURE — 74011000250 HC RX REV CODE- 250: Performed by: INTERNAL MEDICINE

## 2022-01-09 PROCEDURE — 74011250636 HC RX REV CODE- 250/636: Performed by: EMERGENCY MEDICINE

## 2022-01-09 PROCEDURE — 93970 EXTREMITY STUDY: CPT

## 2022-01-09 PROCEDURE — 74011250637 HC RX REV CODE- 250/637: Performed by: PSYCHIATRY & NEUROLOGY

## 2022-01-09 PROCEDURE — 80202 ASSAY OF VANCOMYCIN: CPT

## 2022-01-09 PROCEDURE — 80048 BASIC METABOLIC PNL TOTAL CA: CPT

## 2022-01-09 PROCEDURE — 70450 CT HEAD/BRAIN W/O DYE: CPT

## 2022-01-09 PROCEDURE — 74011250636 HC RX REV CODE- 250/636: Performed by: INTERNAL MEDICINE

## 2022-01-09 PROCEDURE — C9113 INJ PANTOPRAZOLE SODIUM, VIA: HCPCS | Performed by: INTERNAL MEDICINE

## 2022-01-09 PROCEDURE — 65660000000 HC RM CCU STEPDOWN

## 2022-01-09 PROCEDURE — 74011250637 HC RX REV CODE- 250/637: Performed by: INTERNAL MEDICINE

## 2022-01-09 PROCEDURE — 74011000258 HC RX REV CODE- 258: Performed by: EMERGENCY MEDICINE

## 2022-01-09 PROCEDURE — 77010033678 HC OXYGEN DAILY

## 2022-01-09 PROCEDURE — 36415 COLL VENOUS BLD VENIPUNCTURE: CPT

## 2022-01-09 RX ADMIN — CLINDAMYCIN PHOSPHATE 600 MG: 600 INJECTION, SOLUTION INTRAVENOUS at 06:33

## 2022-01-09 RX ADMIN — CLINDAMYCIN PHOSPHATE 600 MG: 600 INJECTION, SOLUTION INTRAVENOUS at 14:48

## 2022-01-09 RX ADMIN — ROPINIROLE HYDROCHLORIDE 0.25 MG: 0.25 TABLET, FILM COATED ORAL at 21:12

## 2022-01-09 RX ADMIN — ROPINIROLE HYDROCHLORIDE 0.25 MG: 0.25 TABLET, FILM COATED ORAL at 09:49

## 2022-01-09 RX ADMIN — MEMANTINE HYDROCHLORIDE 10 MG: 5 TABLET ORAL at 09:50

## 2022-01-09 RX ADMIN — DULOXETINE HYDROCHLORIDE 60 MG: 60 CAPSULE, DELAYED RELEASE ORAL at 21:12

## 2022-01-09 RX ADMIN — ENOXAPARIN SODIUM 40 MG: 100 INJECTION SUBCUTANEOUS at 09:50

## 2022-01-09 RX ADMIN — GABAPENTIN 200 MG: 100 CAPSULE ORAL at 09:49

## 2022-01-09 RX ADMIN — SODIUM CHLORIDE 40 MG: 9 INJECTION, SOLUTION INTRAMUSCULAR; INTRAVENOUS; SUBCUTANEOUS at 21:11

## 2022-01-09 RX ADMIN — SODIUM CHLORIDE, PRESERVATIVE FREE 10 ML: 5 INJECTION INTRAVENOUS at 06:32

## 2022-01-09 RX ADMIN — DULOXETINE HYDROCHLORIDE 60 MG: 60 CAPSULE, DELAYED RELEASE ORAL at 09:53

## 2022-01-09 RX ADMIN — VANCOMYCIN HYDROCHLORIDE 1000 MG: 1 INJECTION, POWDER, LYOPHILIZED, FOR SOLUTION INTRAVENOUS at 21:15

## 2022-01-09 RX ADMIN — PIPERACILLIN AND TAZOBACTAM 4.5 G: 4; .5 INJECTION, POWDER, LYOPHILIZED, FOR SOLUTION INTRAVENOUS; PARENTERAL at 22:00

## 2022-01-09 RX ADMIN — DONEPEZIL HYDROCHLORIDE 10 MG: 5 TABLET, FILM COATED ORAL at 21:15

## 2022-01-09 RX ADMIN — CARBIDOPA AND LEVODOPA 2 TABLET: 25; 100 TABLET ORAL at 09:49

## 2022-01-09 RX ADMIN — MEMANTINE HYDROCHLORIDE 10 MG: 5 TABLET ORAL at 21:11

## 2022-01-09 RX ADMIN — PIPERACILLIN AND TAZOBACTAM 4.5 G: 4; .5 INJECTION, POWDER, LYOPHILIZED, FOR SOLUTION INTRAVENOUS; PARENTERAL at 15:38

## 2022-01-09 RX ADMIN — ATORVASTATIN CALCIUM 10 MG: 10 TABLET, FILM COATED ORAL at 21:15

## 2022-01-09 RX ADMIN — CARBIDOPA AND LEVODOPA 2 TABLET: 25; 100 TABLET ORAL at 15:38

## 2022-01-09 RX ADMIN — AMLODIPINE BESYLATE 5 MG: 5 TABLET ORAL at 09:53

## 2022-01-09 RX ADMIN — PIPERACILLIN AND TAZOBACTAM 4.5 G: 4; .5 INJECTION, POWDER, LYOPHILIZED, FOR SOLUTION INTRAVENOUS; PARENTERAL at 06:35

## 2022-01-09 RX ADMIN — CARBIDOPA AND LEVODOPA 2 TABLET: 25; 100 TABLET ORAL at 21:16

## 2022-01-09 RX ADMIN — VANCOMYCIN HYDROCHLORIDE 1000 MG: 1 INJECTION, POWDER, LYOPHILIZED, FOR SOLUTION INTRAVENOUS at 09:50

## 2022-01-09 RX ADMIN — DIVALPROEX SODIUM 1000 MG: 500 TABLET, DELAYED RELEASE ORAL at 21:15

## 2022-01-09 RX ADMIN — SODIUM CHLORIDE, PRESERVATIVE FREE 10 ML: 5 INJECTION INTRAVENOUS at 21:22

## 2022-01-09 RX ADMIN — GABAPENTIN 200 MG: 100 CAPSULE ORAL at 21:12

## 2022-01-09 RX ADMIN — PIPERACILLIN AND TAZOBACTAM 4.5 G: 4; .5 INJECTION, POWDER, LYOPHILIZED, FOR SOLUTION INTRAVENOUS; PARENTERAL at 11:58

## 2022-01-09 RX ADMIN — GABAPENTIN 100 MG: 100 CAPSULE ORAL at 11:58

## 2022-01-09 NOTE — PROGRESS NOTES
OCCUPATIONAL THERAPY EVALUATION/DISCHARGE    Patient: Sky Cervantes (50 y.o. female)  Date: 1/8/2022  Primary Diagnosis: Aspiration into airway [T17.908A]  Respiratory distress [R06.03]  COPD (chronic obstructive pulmonary disease) ([facility] [J44.9]        Precautions:   Fall (droplet)  PLOF: total assistance for ADLs and transfers     ASSESSMENT AND RECOMMENDATIONS:  Based on the objective data described below, the patient presents requiring total assistance for ADLs and transfers following above mentioned medical diagnosis. Pt presented long sitting in bed at the beginning of session and working with PT at this time. Pt requires total assistance for bed mobility, scooting towards HOB and unable to follow commands for attempts for transfers. Pt was left in long sitting in bed at the beginning of session. Skilled occupational therapy is not indicated at this time. Discharge Recommendations: None  Further Equipment Recommendations for Discharge: N/A      SUBJECTIVE:   Patient stated  I am doing alright.     OBJECTIVE DATA SUMMARY:     Past Medical History:   Diagnosis Date    Arthritis     Hypertension 15yrs    Ill-defined condition     h/o dizzy    Parkinson's disease ([facility]     Psychiatric disorder     bipolar; short term memory lose    Psychiatric disorder     depressiom     Past Surgical History:   Procedure Laterality Date    HX CATARACT REMOVAL Left     HX HYSTERECTOMY       Barriers to Learning/Limitations: None  Compensate with: visual, verbal, tactile, kinesthetic cues/model    Home Situation:   Home Situation  Home Environment: Private residence  One/Two Story Residence: One story  Living Alone: No  Support Systems: Spouse/Significant Other  Patient Expects to be Discharged to[de-identified] House  Current DME Used/Available at Home: Wheelchair (mechanical chintan lift)  []     Right hand dominant   []     Left hand dominant    Cognitive/Behavioral Status:  Neurologic State: Alert;Confused  Orientation Level: Oriented to person  Cognition: Decreased command following  Safety/Judgement: Fall prevention    Skin: intact  Edema: none    Vision/Perceptual:    Tracking: Able to track stimulus in all quadrants w/o difficulty    Coordination: BUE  Coordination: Generally decreased, functional  Fine Motor Skills-Upper: Left Intact; Right Intact    Gross Motor Skills-Upper: Left Intact; Right Intact  Balance:    Strength: BUE  Strength: Generally decreased, functional  Tone & Sensation: BUE  Tone: Abnormal  Sensation: Intact  Range of Motion: BUE  AROM: Generally decreased, functional  Functional Mobility and Transfers for ADLs:  Bed Mobility:  Scooting: Moderate assistance  Transfers:  ADL Assessment:  Feeding: Maximum assistance    Oral Facial Hygiene/Grooming: Maximum assistance    Bathing: Maximum assistance    Upper Body Dressing: Total assistance    Lower Body Dressing: Total assistance    Toileting: Total assistance  ADL Intervention:  Cognitive Retraining  Safety/Judgement: Fall prevention  Pain:  Pain level pre-treatment: 0/10   Pain level post-treatment: 0/10   Pain Intervention(s): Medication (see MAR); Rest, Ice, Repositioning   Response to intervention: Nurse notified, See doc flow    Activity Tolerance:   Fair     Please refer to the flowsheet for vital signs taken during this treatment. After treatment:   []  Patient left in no apparent distress sitting up in chair  [x]  Patient left in no apparent distress in bed  [x]  Call bell left within reach  [x]  Nursing notified  []  Caregiver present  []  Bed alarm activated    COMMUNICATION/EDUCATION:   [x]      Role of Occupational Therapy in the acute care setting  [x]      Home safety education was provided and the patient/caregiver indicated understanding. [x]      Patient/family have participated as able and agree with findings and recommendations. []      Patient is unable to participate in plan of care at this time.     Thank you for this referral.  Maryann Lozada, OTR/L  Time Calculation: 20 mins      Eval Complexity: History: HIGH Complexity : Extensive review of history including physical, cognitive and psychosocial history ; Examination: HIGH Complexity : 5 or more performance deficits relating to physical, cognitive , or psychosocial skils that result in activity limitations and / or participation restrictions; Decision Making:HIGH Complexity : Patient presents with comorbidities that affect occupational performance.  Signifigant modification of tasks or assistance (eg, physical or verbal) with assessment (s) is necessary to enable patient to complete evaluation

## 2022-01-09 NOTE — PROGRESS NOTES
Vancomycin random level = 12.0 @ 0505 01/09/2022       Regimen: 1000 mg IV every 12 hours.   Start time: 07:26 on 01/09/2022  Exposure target: AUC24 (range)400-600 mg/L.hr   AUC24,ss: 533 mg/L.hr  Probability of AUC24 > 400: 93 %  Ctrough,ss: 16.9 mg/L  Probability of Ctrough,ss > 20: 25 %  Probability of nephrotoxicity (Lodise JASWANT 2009): 13 %

## 2022-01-09 NOTE — PROGRESS NOTES
D/C Plan : Home with spouse assistance, per pt H/H arranged prior to this admission by her [facility] neurologist.

## 2022-01-09 NOTE — PROGRESS NOTES
Problem: Mobility Impaired (Adult and Pediatric)  Goal: *Acute Goals and Plan of Care (Insert Text)  Description: No goals necessary at this time as pt demonstrates functional mobility as at baseline. Outcome: Resolved/Met  PHYSICAL THERAPY EVALUATION & DISCHARGE    Patient: Itz Bryson (55 y.o. female)  Date: 1/8/2022  Primary Diagnosis: Aspiration into airway [T17.908A]  Respiratory distress [R06.03]  COPD (chronic obstructive pulmonary disease) ([facility] [J44.9]  Precautions:   Fall (droplet) Plus Pt seen in full PPE  PLOF: W/c level dependent    ASSESSMENT AND RECOMMENDATIONS:  Based on the objective data described below, the patient presents with limitations in ROM/motor performance, and overall dependence in functional mobility. Pt with tray and meal in front of pt on PT arrival. Pt with tremors R hand noted while attempting to feed self H/O Parkinsons. Completed feeding of pt, including spoon feeding of thickened water. OT arrived and assisted with completion of evaluation. Pt alert, confused, with decr'd command following- moving LE's when instructed to move UE's and vice versa. Pt required max/total assist for shifting up in bed and subsequently left with pt in long sitting position. Call bell in reach. Pt not candidate for further IPPT as pt fuctioning at baseline- w/c/max/total assist. Skilled physical therapy is not indicated at this time. Pt Education: Role of physical therapy in acute care setting, fall prevention and safety/technique during functional mobility tasks   Discharge Recommendations: None  Further Equipment Recommendations for Discharge: N/A      SUBJECTIVE:   Patient stated I'm alright.     OBJECTIVE DATA SUMMARY:     Past Medical History:   Diagnosis Date    Arthritis     Hypertension 15yrs    Ill-defined condition     h/o dizzy    Parkinson's disease ([facility]     Psychiatric disorder     bipolar; short term memory lose    Psychiatric disorder     depressiom     Past Surgical History:   Procedure Laterality Date    HX CATARACT REMOVAL Left     HX HYSTERECTOMY       Barriers to Learning/Limitations: yes;  physical and altered mental status (i.e.Sedation, Confusion)  Compensate with: visual, verbal, tactile, kinesthetic cues/model  Prior Level of Function/Home Situation:   Home Situation  Home Environment: Private residence  One/Two Story Residence: One story  Living Alone: No  Support Systems: Spouse/Significant Other  Patient Expects to be Discharged to[de-identified] House  Current DME Used/Available at Home: Wheelchair (mechanical chintan lift)  Critical Behavior:  Neurologic State: Alert;Confused  Orientation Level: Oriented to person  Cognition: Decreased command following  Safety/Judgement: Fall prevention  Psychosocial  Patient Behaviors: Calm;Confused  Purposeful Interaction: Yes  Pt Identified Daily Priority: Clinical issues (comment)  Caritas Process: Nurture loving kindness  Caring Interventions: Reassure  Reassure: Therapeutic listening  Strength:    Strength: Generally decreased, functional  Tone & Sensation:   Tone: Abnormal  Sensation: Intact  Range Of Motion:  AROM: Generally decreased, functional  Functional Mobility:  Bed Mobility:  Scooting: Moderate assistance  Pain:  Pain Scale 1: Numeric (0 - 10)  Pain Intensity 1: 0  Activity Tolerance:   Fair   Please refer to the flowsheet for vital signs taken during this treatment. After treatment:   []         Patient left in no apparent distress sitting up in chair  [x]         Patient left in no apparent distress in bed  [x]         Call bell left within reach  [x]         Nursing notified  []         Caregiver present  []         Bed alarm activated    COMMUNICATION/EDUCATION:   []         Fall prevention education was provided and the patient/caregiver indicated understanding. []         Patient/family have participated as able in goal setting and plan of care.   []         Patient/family agree to work toward stated goals and plan of care.  []         Patient understands intent and goals of therapy, but is neutral about his/her participation. [x]         Patient is unable to participate in goal setting and plan of care.     Eval Complexity: History: HIGH Complexity :3+ comorbidities / personal factors will impact the outcome/ POC Exam:HIGH Complexity : 4+ Standardized tests and measures addressing body structure, function, activity limitation and / or participation in recreation  Presentation: LOW Complexity : Stable, uncomplicated  Clinical Decision Making:Medium Complexity    Overall Complexity:MEDIUM    Thank you for this referral.  Juve Andrews, PT   Time Calculation: 25 mins

## 2022-01-10 PROBLEM — F03.90 DEMENTIA (HCC): Status: ACTIVE | Noted: 2022-01-10

## 2022-01-10 LAB
ANION GAP SERPL CALC-SCNC: 6 MMOL/L (ref 3–18)
BACTERIA SPEC CULT: ABNORMAL
BASOPHILS # BLD: 0 K/UL (ref 0–0.1)
BASOPHILS NFR BLD: 0 % (ref 0–2)
BUN SERPL-MCNC: 11 MG/DL (ref 7–18)
BUN/CREAT SERPL: 20 (ref 12–20)
CALCIUM SERPL-MCNC: 8.3 MG/DL (ref 8.5–10.1)
CHLORIDE SERPL-SCNC: 104 MMOL/L (ref 100–111)
CO2 SERPL-SCNC: 31 MMOL/L (ref 21–32)
CREAT SERPL-MCNC: 0.55 MG/DL (ref 0.6–1.3)
DIFFERENTIAL METHOD BLD: ABNORMAL
EOSINOPHIL # BLD: 0.2 K/UL (ref 0–0.4)
EOSINOPHIL NFR BLD: 2 % (ref 0–5)
ERYTHROCYTE [DISTWIDTH] IN BLOOD BY AUTOMATED COUNT: 13.6 % (ref 11.6–14.5)
GLUCOSE SERPL-MCNC: 73 MG/DL (ref 74–99)
GRAM STN SPEC: ABNORMAL
HCT VFR BLD AUTO: 40.6 % (ref 35–45)
HGB BLD-MCNC: 12.4 G/DL (ref 12–16)
IMM GRANULOCYTES # BLD AUTO: 0 K/UL (ref 0–0.04)
IMM GRANULOCYTES NFR BLD AUTO: 1 % (ref 0–0.5)
LYMPHOCYTES # BLD: 2.2 K/UL (ref 0.9–3.6)
LYMPHOCYTES NFR BLD: 30 % (ref 21–52)
MCH RBC QN AUTO: 29 PG (ref 24–34)
MCHC RBC AUTO-ENTMCNC: 30.5 G/DL (ref 31–37)
MCV RBC AUTO: 95.1 FL (ref 78–100)
MONOCYTES # BLD: 1.1 K/UL (ref 0.05–1.2)
MONOCYTES NFR BLD: 16 % (ref 3–10)
NEUTS SEG # BLD: 3.8 K/UL (ref 1.8–8)
NEUTS SEG NFR BLD: 51 % (ref 40–73)
NRBC # BLD: 0 K/UL (ref 0–0.01)
NRBC BLD-RTO: 0 PER 100 WBC
PLATELET # BLD AUTO: 122 K/UL (ref 135–420)
PMV BLD AUTO: 11.8 FL (ref 9.2–11.8)
POTASSIUM SERPL-SCNC: 3.8 MMOL/L (ref 3.5–5.5)
RBC # BLD AUTO: 4.27 M/UL (ref 4.2–5.3)
SERVICE CMNT-IMP: ABNORMAL
SODIUM SERPL-SCNC: 141 MMOL/L (ref 136–145)
WBC # BLD AUTO: 7.4 K/UL (ref 4.6–13.2)

## 2022-01-10 PROCEDURE — 80048 BASIC METABOLIC PNL TOTAL CA: CPT

## 2022-01-10 PROCEDURE — 74011250637 HC RX REV CODE- 250/637: Performed by: PSYCHIATRY & NEUROLOGY

## 2022-01-10 PROCEDURE — 74011000250 HC RX REV CODE- 250: Performed by: INTERNAL MEDICINE

## 2022-01-10 PROCEDURE — 85025 COMPLETE CBC W/AUTO DIFF WBC: CPT

## 2022-01-10 PROCEDURE — 74011250636 HC RX REV CODE- 250/636: Performed by: INTERNAL MEDICINE

## 2022-01-10 PROCEDURE — 65660000000 HC RM CCU STEPDOWN

## 2022-01-10 PROCEDURE — 74011000258 HC RX REV CODE- 258: Performed by: EMERGENCY MEDICINE

## 2022-01-10 PROCEDURE — 92526 ORAL FUNCTION THERAPY: CPT

## 2022-01-10 PROCEDURE — 76450000000

## 2022-01-10 PROCEDURE — 74011250636 HC RX REV CODE- 250/636: Performed by: EMERGENCY MEDICINE

## 2022-01-10 PROCEDURE — 99222 1ST HOSP IP/OBS MODERATE 55: CPT | Performed by: NURSE PRACTITIONER

## 2022-01-10 PROCEDURE — 74011250637 HC RX REV CODE- 250/637: Performed by: INTERNAL MEDICINE

## 2022-01-10 PROCEDURE — 36415 COLL VENOUS BLD VENIPUNCTURE: CPT

## 2022-01-10 RX ORDER — PANTOPRAZOLE SODIUM 40 MG/1
40 TABLET, DELAYED RELEASE ORAL
Status: DISCONTINUED | OUTPATIENT
Start: 2022-01-10 | End: 2022-01-13 | Stop reason: HOSPADM

## 2022-01-10 RX ADMIN — ENOXAPARIN SODIUM 40 MG: 100 INJECTION SUBCUTANEOUS at 11:09

## 2022-01-10 RX ADMIN — VANCOMYCIN HYDROCHLORIDE 1000 MG: 1 INJECTION, POWDER, LYOPHILIZED, FOR SOLUTION INTRAVENOUS at 11:08

## 2022-01-10 RX ADMIN — GABAPENTIN 200 MG: 100 CAPSULE ORAL at 11:07

## 2022-01-10 RX ADMIN — DIVALPROEX SODIUM 1000 MG: 500 TABLET, DELAYED RELEASE ORAL at 21:21

## 2022-01-10 RX ADMIN — PIPERACILLIN AND TAZOBACTAM 4.5 G: 4; .5 INJECTION, POWDER, LYOPHILIZED, FOR SOLUTION INTRAVENOUS; PARENTERAL at 23:23

## 2022-01-10 RX ADMIN — PIPERACILLIN AND TAZOBACTAM 4.5 G: 4; .5 INJECTION, POWDER, LYOPHILIZED, FOR SOLUTION INTRAVENOUS; PARENTERAL at 11:07

## 2022-01-10 RX ADMIN — CLINDAMYCIN PHOSPHATE 600 MG: 600 INJECTION, SOLUTION INTRAVENOUS at 21:22

## 2022-01-10 RX ADMIN — PIPERACILLIN AND TAZOBACTAM 4.5 G: 4; .5 INJECTION, POWDER, LYOPHILIZED, FOR SOLUTION INTRAVENOUS; PARENTERAL at 05:00

## 2022-01-10 RX ADMIN — MEMANTINE HYDROCHLORIDE 10 MG: 5 TABLET ORAL at 21:01

## 2022-01-10 RX ADMIN — ATORVASTATIN CALCIUM 10 MG: 10 TABLET, FILM COATED ORAL at 21:21

## 2022-01-10 RX ADMIN — SODIUM CHLORIDE, PRESERVATIVE FREE 10 ML: 5 INJECTION INTRAVENOUS at 21:02

## 2022-01-10 RX ADMIN — CARBIDOPA AND LEVODOPA 2 TABLET: 25; 100 TABLET ORAL at 21:21

## 2022-01-10 RX ADMIN — CARBIDOPA AND LEVODOPA 2 TABLET: 25; 100 TABLET ORAL at 11:07

## 2022-01-10 RX ADMIN — ROPINIROLE HYDROCHLORIDE 0.25 MG: 0.25 TABLET, FILM COATED ORAL at 11:07

## 2022-01-10 RX ADMIN — DONEPEZIL HYDROCHLORIDE 10 MG: 5 TABLET, FILM COATED ORAL at 21:21

## 2022-01-10 RX ADMIN — DULOXETINE HYDROCHLORIDE 60 MG: 60 CAPSULE, DELAYED RELEASE ORAL at 11:07

## 2022-01-10 RX ADMIN — DULOXETINE HYDROCHLORIDE 60 MG: 60 CAPSULE, DELAYED RELEASE ORAL at 21:01

## 2022-01-10 RX ADMIN — ROPINIROLE HYDROCHLORIDE 0.25 MG: 0.25 TABLET, FILM COATED ORAL at 21:01

## 2022-01-10 RX ADMIN — AMLODIPINE BESYLATE 5 MG: 5 TABLET ORAL at 11:20

## 2022-01-10 RX ADMIN — SODIUM CHLORIDE, PRESERVATIVE FREE 10 ML: 5 INJECTION INTRAVENOUS at 06:29

## 2022-01-10 RX ADMIN — CLINDAMYCIN PHOSPHATE 600 MG: 600 INJECTION, SOLUTION INTRAVENOUS at 00:13

## 2022-01-10 RX ADMIN — GABAPENTIN 200 MG: 100 CAPSULE ORAL at 21:01

## 2022-01-10 RX ADMIN — MEMANTINE HYDROCHLORIDE 10 MG: 5 TABLET ORAL at 11:07

## 2022-01-10 NOTE — CONSULTS
Palliative Medicine Consult    Patient Name: Guillermnia Coyle  YOB: 1945    Date of Initial Consult: 1/10/2022  Reason for Consult: Goals of care discussions  Requesting Provider: Dr. Mell Garcia  Primary Care Physician: Jan Strange MD      SUMMARY:   Guillermina Coyle is a 68 y.o. with a past history of Parkinson's disease, Parkinson's dementia, hypertension, COPD, who was admitted on 1/7/2022 from home with a diagnosis of aspiration pneumonia, generalized weakness, dyspnea, Parkinson's and dementia. Current medical issues leading to Palliative Medicine involvement include: Support and goals of care discussions. PALLIATIVE DIAGNOSES:   1. Goals of care discussions  2. Aspiration pneumonia  3. Parkinson's disease  4. Dementia       PLAN:   1. Goals of care discussions: Palliative medicine team including Derrick Lopez RN and I met with patient at patient's bedside. She is awake and oriented x4, though fatigues easily with discussion. She was able to verbalize that she has Parkinson's and mild dementia. Second visit with spouse at bedside, reviewed current health situation. Discussed the benefits and burdens of continuing aggressive measures versus implementing comfort measures with the support of hospice. Discussed the benefits and burdens of resuscitation in the setting of advanced age and debility and chronic comorbidities. At this time he requests full code with full interventions.  is very concerned that he cannot care for her at home, and he does not have resources to pay out of pocket for help- he would like to speak to case management regarding safe discharge options. He acknowledged that his wife told the ED that she did not want to be resuscitated, but  would like her to remain a full code with full interventions at this time. Patient was too fatigued during this conversation to answer.   Discussed aspiration pneumonia, and how swallowing difficulties can worsen with Parkinson's and dementia. Discussed the benefits and burdens of feeding tubes in the setting of dysphagia. Further discussions pending speech-language pathology suggestions/ po intake. Further goals of care discussions are warranted, we will continue to follow closely. 2. Aspiration pneumonia: On aspiration precautions, SLP to review safety swallowing recommendations. On IV antibiotics. Covid-19 PCR negative but 229 ED positive. 3. Parkinson's disease: [address] [facility] neurology, on medications for this. She is bedbound/wheelchair bound,  has to transfer her and she requires total assistance. 4. Dementia: Early onset, patient is even able to tell me she has this. 5. Initial consult note routed to primary continuity provider  6. Communicated plan of care with: Palliative IDT       GOALS OF CARE / TREATMENT PREFERENCES:   [====Goals of Care====]  GOALS OF CARE: Full code with full interventions  Patient/Health Care Proxy Stated Goals: Prolong life      TREATMENT PREFERENCES:   Code Status: Full Code    Advance Care Planning:  No flowsheet data found. Medical Interventions: Full interventions           The palliative care team has discussed with patient / health care proxy about goals of care / treatment preferences for patient.  [====Goals of Care====]         HISTORY:     History obtained from: Patient, chart,     CHIEF COMPLAINT: Generalized weakness and fatigue    HPI/SUBJECTIVE:    The patient is:   [x] Verbal and participatory  [] Non-participatory due to:   Oriented x4    Clinical Pain Assessment (nonverbal scale for severity on nonverbal patients):   Clinical Pain Assessment  Severity: 0            FUNCTIONAL ASSESSMENT:     Palliative Performance Scale (PPS):  PPS: 40       PSYCHOSOCIAL/SPIRITUAL SCREENING:     Advance Care Planning:  No flowsheet data found.      Any spiritual / Lutheran concerns:  [] Yes /  [x] No    Caregiver Burnout: He is discussing with case management his resource options, as he needs assistance caring for her at home  [x] Yes /  [] No /  [] No Caregiver Present      Anticipatory grief assessment:   [x] Normal  / [] Maladaptive          REVIEW OF SYSTEMS:     Positive and pertinent negative findings in ROS are noted above in HPI. The following systems were [x] reviewed / [] unable to be reviewed as noted in HPI  Other findings are noted below. Systems: constitutional, ears/nose/mouth/throat, respiratory, gastrointestinal, genitourinary, musculoskeletal, integumentary, neurologic, psychiatric, endocrine. Positive findings noted below. Modified ESAS Completed by: provider   Fatigue: 5       Pain: 0   Anxiety: 0 Nausea: 0     Dyspnea: 0     Constipation: No     Stool Occurrence(s): 1        PHYSICAL EXAM:     From RN flowsheet:  Wt Readings from Last 3 Encounters:   01/09/22 72.6 kg (160 lb 0.9 oz)   06/23/20 85.1 kg (187 lb 9.8 oz)   12/29/18 98.4 kg (217 lb)     Blood pressure 127/81, pulse 65, temperature 98 °F (36.7 °C), resp. rate 24, height 5' 4\" (1.626 m), weight 72.6 kg (160 lb 0.9 oz), SpO2 98 %.     Pain Scale 1: Numeric (0 - 10)  Pain Intensity 1: 0                     Constitutional: Awake, alert, lying in bed, appears chronically debilitated  Eyes: pupils equal, anicteric  ENMT: no nasal discharge, moist mucous membranes  Cardiovascular: regular rhythm, distal pulses intact  Respiratory: breathing not labored, symmetric  Gastrointestinal: soft non-tender, +bowel sounds  Musculoskeletal: no deformity, no tenderness to palpation  Skin: warm, dry  Neurologic: following commands, moving all extremities, oriented x4  Psychiatric: full affect, no hallucinations         HISTORY:     Principal Problem:    Respiratory distress (1/7/2022)    Active Problems:    COPD (chronic obstructive pulmonary disease) ([facility] [address] (1/7/2022)      Aspiration into airway (1/7/2022)      Past Medical History:   Diagnosis Date    Arthritis     Hypertension 15yrs    Ill-defined condition     h/o dizzy    [facility]'s [facility] ([facility] [address]     Psychiatric disorder     bipolar; short term memory lose    Psychiatric disorder     depressiom      Past Surgical History:   Procedure Laterality Date    HX CATARACT REMOVAL Left     HX HYSTERECTOMY        Family History   Problem Relation Age of Onset    Heart Disease Mother     Cancer Mother     Heart Disease Father     Cancer Father       History reviewed, no pertinent family history.   Social History     Tobacco Use    Smoking status: Never Smoker    Smokeless tobacco: Never Used   Substance Use Topics    Alcohol use: No     Allergies   Allergen Reactions    Combivent [Ipratropium-Albuterol] Shortness of Breath    Ibuprofen Other (comments)     Has had since    Adhesive Tape-Silicones Rash      Current Facility-Administered Medications   Medication Dose Route Frequency    pantoprazole (PROTONIX) tablet 40 mg  40 mg Oral ACB    carbidopa-levodopa (SINEMET)  mg per tablet 2 Tablet  2 Tablet Oral TID    memantine (NAMENDA) tablet 10 mg  10 mg Oral BID    rOPINIRole (REQUIP) tablet 0.25 mg  0.25 mg Oral BID    piperacillin-tazobactam (ZOSYN) 4.5 g in 0.9% sodium chloride (MBP/ADV) 100 mL MBP  4.5 g IntraVENous Q6H    clindamycin (CLEOCIN) 600mg NS 50 mL IVPB (premix)  600 mg IntraVENous Q8H    albuterol (PROVENTIL VENTOLIN) nebulizer solution 2.5 mg  2.5 mg Nebulization Q2H PRN    divalproex DR (DEPAKOTE) tablet 1,000 mg  1,000 mg Oral QHS    donepeziL (ARICEPT) tablet 10 mg  10 mg Oral QHS    DULoxetine (CYMBALTA) capsule 60 mg  60 mg Oral BID    gabapentin (NEURONTIN) capsule 200 mg  200 mg Oral Q12H    atorvastatin (LIPITOR) tablet 10 mg  10 mg Oral QHS    valbenazine cap 80 mg (Patient Supplied)  80 mg Oral DAILY    sodium chloride (NS) flush 5-40 mL  5-40 mL IntraVENous Q8H    sodium chloride (NS) flush 5-40 mL  5-40 mL IntraVENous PRN    acetaminophen (TYLENOL) tablet 650 mg  650 mg Oral Q6H PRN    Or    acetaminophen (TYLENOL) suppository 650 mg  650 mg Rectal Q6H PRN    polyethylene glycol (MIRALAX) packet 17 g  17 g Oral DAILY PRN    ondansetron (ZOFRAN ODT) tablet 4 mg  4 mg Oral Q8H PRN    Or    ondansetron (ZOFRAN) injection 4 mg  4 mg IntraVENous Q6H PRN    enoxaparin (LOVENOX) injection 40 mg  40 mg SubCUTAneous DAILY    amLODIPine (NORVASC) tablet 5 mg  5 mg Oral DAILY    gabapentin (NEURONTIN) capsule 100 mg  100 mg Oral Q24H          LAB AND IMAGING FINDINGS:     Lab Results   Component Value Date/Time    WBC 7.4 01/10/2022 03:14 AM    HGB 12.4 01/10/2022 03:14 AM    PLATELET 463 (L) 65/33/8502 03:14 AM     Lab Results   Component Value Date/Time    Sodium 141 01/10/2022 03:14 AM    Potassium 3.8 01/10/2022 03:14 AM    Chloride 104 01/10/2022 03:14 AM    CO2 31 01/10/2022 03:14 AM    BUN 11 01/10/2022 03:14 AM    Creatinine 0.55 (L) 01/10/2022 03:14 AM    Calcium 8.3 (L) 01/10/2022 03:14 AM    Magnesium 2.0 06/24/2020 02:10 AM      Lab Results   Component Value Date/Time    Alk. phosphatase 102 01/07/2022 11:39 AM    Protein, total 6.4 01/07/2022 11:39 AM    Albumin 3.0 (L) 01/07/2022 11:39 AM    Globulin 3.4 01/07/2022 11:39 AM     No results found for: INR, PTMR, PTP, PT1, PT2, APTT, INREXT, INREXT   No results found for: IRON, FE, TIBC, IBCT, PSAT, FERR   No results found for: PH, PCO2, PO2  No components found for: [place]   Lab Results   Component Value Date/Time    CK 60 12/29/2018 01:59 PM    CK - MB 1.2 12/29/2018 01:59 PM                Total time: 50 minutes  Counseling / coordination time, spent as noted above:   > 50% counseling / coordination?:  Yes, patient, family, medical team    Prolonged service was provided for  []30 min   []75 min in face to face time in the presence of the patient, spent as noted above. Time Start:   Time End:   Note: this can only be billed with 88899 (initial) or 86755 (follow up). If multiple start / stop times, list each separately.

## 2022-01-10 NOTE — PROGRESS NOTES
Hospitalist Progress Note    Patient: Raina Nova MRN: [id number]  CSN: [id number]    YOB: 1945  Age: 68 y.o. Sex: female    DOA: 1/7/2022 LOS:  LOS: 2 days          Chief Complaint:    Raina Nova is a 68 y.o.  female who has history of Parkinson's dementia supranuclear palsy presents to the emergency room with worsening cough congestion and dyspnea. In the emergency room she was found to be in respiratory distress with increased heart rate and hypoxemia she was suctioned with thick globs of mucus removal she was started on oxygen with improvement of her distress. CTA done in the emergency room showed no PE a rapid Covid test was negative patient is vaccinated and boosted from the COVID-19 virus she has no known exposures. She lives with her  of 62 years who is an smoker she herself does not smoke. The  states that over the last 8 months she has had increasing difficulty     Covid  PCR negative but Covid 229E positive URI  Assessment/Plan       Aspiration pneumonia  Patient is placed on aspiration precautions may need to be n.p.o. we will do a bedside swallow to determine   She is started on IV Zosyn a urine analysis is pending at this time  covid pcr negative but 229 e positive   Attempted to call [facility] no answer    2. Generalized weakness likely worsened from Parkinson's PT and OT consulted we will also check CT of the head neurology was consulted in the emergency room apparantly not done yesterday     3. Probable COPD although not documented likely from secondhand smoke was a started on albuterol we will not add steroids as she already has a tremor from Parkinson's and today her dyspnea is improved weaning off of oxygen     4.  Parkinson's she will be continued on her Requip and Sinemet await verification of medication list CT of the head will be ordered no significant change     5.   Dementia patient continued on her Namenda and Aricept I anticipate she will have some trouble with sundowning will increase her Depakote as needed     6. Hypotension we will adjust her blood pressure medicine while in hospital her Norvasc will be changed to 5 mg     7. Dehydration she started on normal saline appears better  8. Leg edema check t Dopplers of leg ordred and negative  End-of-life issues discussed will obtain palliative care consult    dispositon :  Home Monday or Tuesday  aware  Continue to wean down oxygen    Patient Active Problem List   Diagnosis Code    Parkinson's disease ([facility] [address] G20    Psychiatric disorder F99    Hypertension I10    UTI (urinary tract infection) N39.0    Sepsis ([facility] [address] A41.9    Fall at home Rebecca Son. Edgar Braydon, Y92.009    Bacteremia R78.81    Sepsis due to Escherichia coli with acute organ dysfunction without septic shock ([facility]) A41.51, R65.20    Pyelonephritis N12    COPD (chronic obstructive pulmonary disease) ([facility]) J44.9    Respiratory distress R06.03    Aspiration into airway T17.908A       Subjective:     with her at bedside  dispostion [place] with     Review of systems:    Constitutional: denies fevers, chills, myalgias  Respiratory+ SOB, cough  Cardiovascular: denies chest pain, palpitations  Gastrointestinal: denies nausea, vomiting, diarrhea      Vital signs/Intake and Output:  Visit Vitals  /70 (BP 1 Location: Left upper arm, BP Patient Position: At rest)   Pulse 81   Temp 97.9 °F (36.6 °C)   Resp 18   Ht 5' 4\" (1.626 m)   Wt 72.6 kg (160 lb 0.9 oz)   SpO2 100%   BMI 27.47 kg/m²     Current Shift:  No intake/output data recorded.   Last three shifts:  01/08 0701 - 01/09 1900  In: 360 [P.O.:360]  Out: 0     Exam:    General: Well developed, alert, NAD, OX3  Head/Neck: NCAT, supple, No masses, No lymphadenopathy  CVS:Regular rate and rhythm, no M/R/G, S1/S2 heard, no thrill  Lungs:Clear to auscultation bilaterally, no wheezes, rhonchi, or rales  Abdomen: Soft, Nontender, No distention, Normal Bowel sounds, No hepatomegaly  Extremities: No C/C/E, pulses palpable 2+  Skin:normal texture and turgor, no rashes, no lesions  Neuro:grossly normal , follows commands  Psych:appropriate                Labs: Results:       Chemistry Recent Labs     01/09/22  0505 01/08/22  0257 01/07/22  1139   GLU 69* 79 80    145 142   K 3.9 3.9 4.1    109 107   CO2 35* 29 30   BUN 11 18 21*   CREA 0.58* 0.59* 0.84   CA 8.5 8.4* 9.1   AGAP 4 7 5   BUCR 19 31* 25*   AP  --   --  102   TP  --   --  6.4   ALB  --   --  3.0*   GLOB  --   --  3.4   AGRAT  --   --  0.9      CBC w/Diff Recent Labs     01/07/22  1139   WBC 11.4   RBC 5.05   HGB 14.7   HCT 47.8*   *   GRANS 72   LYMPH 15*   EOS 3      Cardiac Enzymes No results for input(s): CPK, CKND1, BABITA in the last 72 hours. No lab exists for component: CKRMB, TROIP   Coagulation No results for input(s): PTP, INR, APTT, INREXT, INREXT in the last 72 hours. Lipid Panel No results found for: CHOL, CHOLPOCT, CHOLX, CHLST, CHOLV, 096311, HDL, HDLP, LDL, LDLC, DLDLP, 762121, VLDLC, VLDL, TGLX, TRIGL, TRIGP, TGLPOCT, CHHD, CHHDX   BNP No results for input(s): BNPP in the last 72 hours.    Liver Enzymes Recent Labs     01/07/22  1139   TP 6.4   ALB 3.0*         Thyroid Studies No results found for: T4, T3U, TSH, TSHEXT, TSHEXT     Procedures/imaging: see electronic medical records for all procedures/Xrays and details which were not copied into this note but were reviewed prior to creation of Onur Flowers MD

## 2022-01-10 NOTE — PROGRESS NOTES
Clinical Pharmacy Note: IV to PO Automatic Conversion    Patient Name: Sandra Lynn   YOB: 1945   Medical Record Number: [id number]   Date of Admission: 1/7/2022    Daily Progress Note: 1/10/2022 1:12 PM     Please note the following medication(s) (protonix) has/have been changed from IV to PO based on the following critiera:    Patient is taking scheduled oral medications  Patient is tolerating tube feeds at goal rate or an NPO (except for meds), full liquid, soft or regular diet      Current Diet Orders  Active Orders   Diet    ADULT DIET Dysphagia - Soft & Bite Sized; Mildly Thick (Nectar)        New Order:  Protonix 40 mg po daily    This IV to PO conversion is based on the [facility] P&T approved automatic conversion policy for eligible patients. Please call with questions.     Clay Kurtz, [facility]  Clinical Pharmacist  [phone]

## 2022-01-10 NOTE — PROGRESS NOTES
Hospitalist Progress Note-critical care note     Patient: Chanel Aparicio MRN: [id number]  CSN: [id number]    YOB: 1945  Age: 68 y.o. Sex: female    DOA: 1/7/2022 LOS:  LOS: 3 days            Chief complaint: parkinson  Aspiration into airway , aspiration pna     Assessment/Plan         Hospital Problems  Date Reviewed: 1/7/2022          Codes Class Noted POA    Dementia ([facility] ICD-10-CM: F03.90  ICD-9-CM: 294.20  1/10/2022 Unknown        COPD (chronic obstructive pulmonary disease) ([facility] ICD-10-CM: J44.9  ICD-9-CM: 604  1/7/2022 Unknown        * (Principal) Respiratory distress ICD-10-CM: R06.03  ICD-9-CM: 786.09  1/7/2022 Unknown        Aspiration into airway ICD-10-CM: T17.908A  ICD-9-CM: 934.9  1/7/2022 Unknown        Parkinson's disease ([facility] ICD-10-CM: G20  ICD-9-CM: 332.0  Unknown Yes        Hypertension ICD-10-CM: I10  ICD-9-CM: 401.9  Unknown Yes            Acute respiratory distress   Due to aspiration pneumonia  evidence of viral infection-coronavirus positive  Continue symptom treatment and treat pneumonia      Aspiration into ariway, aspiration PNA   Continue aspiration precaution   Continue iv abx    Will d/c vanc, continue zosyn and clinda -will continue weaning      Generalized weakness likely worsened from Parkinson's   PT and OT consulted   CT of the head no acute process  Neuro consulted per er ,  covid 9 r/o         Probable COPD   Breathing tx as needed      Parkinson's    Requip and Sinemet      Dementia   Namenda and Aricept I anticipate   Fall and aspiration precaution        Hyperetension   On low-dose norvasc       Dehydration    normal saline appears better    Leg edema   No dvt     Subjective : I am fine.  was at the bedside. He stated that he is not able to take her and needs to talk with cm for placement -discussed with CM   All questions have been answered. 35 total min's spent on patient care including >50% on counseling/coordinating care.  Discussed the above assessments. also discussed labs, medications and hospital course      Disposition :tbd,   Review of systems:    General: No fevers or chills. Cardiovascular: No chest pain or pressure. No palpitations. Pulmonary: No shortness of breath. Gastrointestinal: No nausea, vomiting. Vital signs/Intake and Output:  Visit Vitals  /81 (BP 1 Location: Left upper arm, BP Patient Position: At rest)   Pulse 65   Temp 98 °F (36.7 °C)   Resp 24   Ht 5' 4\" (1.626 m)   Wt 72.6 kg (160 lb 0.9 oz)   SpO2 98%   BMI 27.47 kg/m²     Current Shift:  No intake/output data recorded. Last three shifts:  01/08 1901 - 01/10 0700  In: 600 [P.O.:600]  Out: 0     Physical Exam:  General: WD, WN. Alert, cooperative, no acute distress    HEENT: NC, Atraumatic. PERRLA, anicteric sclerae. Lungs: CTA Bilaterally. No Wheezing/Rhonchi/Rales. Heart:  Regular  rhythm,  No murmur, No Rubs, No Gallops  Abdomen: Soft, Non distended, Non tender. +Bowel sounds,   Extremities: No c/c/e  Psych:   Not anxious or agitated. Neurologic:  No acute neurological deficit. Labs: Results:       Chemistry Recent Labs     01/10/22  0314 01/09/22  0505 01/08/22  0257   GLU 73* 69* 79    143 145   K 3.8 3.9 3.9    104 109   CO2 31 35* 29   BUN 11 11 18   CREA 0.55* 0.58* 0.59*   CA 8.3* 8.5 8.4*   AGAP 6 4 7   BUCR 20 19 31*      CBC w/Diff Recent Labs     01/10/22  0314   WBC 7.4   RBC 4.27   HGB 12.4   HCT 40.6   *   GRANS 51   LYMPH 30   EOS 2      Cardiac Enzymes No results for input(s): CPK, CKND1, BABITA in the last 72 hours. No lab exists for component: CKRMB, TROIP   Coagulation No results for input(s): PTP, INR, APTT, INREXT, INREXT in the last 72 hours. Lipid Panel No results found for: CHOL, CHOLPOCT, CHOLX, CHLST, CHOLV, 547954, HDL, HDLP, LDL, LDLC, DLDLP, 143195, VLDLC, VLDL, TGLX, TRIGL, TRIGP, TGLPOCT, CHHD, CHHDX   BNP No results for input(s): BNPP in the last 72 hours.    Liver Enzymes No results for input(s): TP, ALB, TBIL, AP in the last 72 hours. No lab exists for component: SGOT, GPT, DBIL   Thyroid Studies No results found for: T4, T3U, TSH, TSHEXT, TSHEXT     Procedures/imaging: see electronic medical records for all procedures/Xrays and details which were not copied into this note but were reviewed prior to creation of Plan    CT HEAD WO CONT    Result Date: 1/9/2022  EXAM: CT head without contrast  CLINICAL INDICATION/HISTORY: Ambulatory dysfunction, dysphasia    --Additional history: None. COMPARISON: None. TECHNIQUE: Axial CT imaging of the head was performed without intravenous contrast. One or more dose reduction techniques were used on this CT: automated exposure control, adjustment of the mAs and/or kVp according to patient size, and iterative reconstruction techniques. The specific techniques used on this CT exam have been documented in the patient's electronic medical record. Digital Imaging and Communications in Medicine (DICOM) format image data are available to nonaffiliated external healthcare facilities or entities on a secure, media free, reciprocally searchable basis with patient authorization for at least a 12 month period after this study. _______________ FINDINGS: CALVARIUM: Intact. SOFT TISSUES/SCALP: Normal. SINUSES: Minimal inflammatory mucosal thickening within the maxillary sinuses. BRAIN AND POSTERIOR FOSSA: There is proportional enlargement of the ventricles and cerebral spinal fluid spaces consistent with generalized cerebral volume loss. There is no intracranial hemorrhage, mass effect, or midline shift. There are scattered periventricular and subcortical white matter hypodensities present consistent with nonspecific gliosis. This is most commonly associated with sequelae of chronic microvascular ischemia. Small hypodensities within the left thalamus and bilateral basal ganglia likely represent lacunar infarcts.  EXTRA-AXIAL SPACES AND MENINGES: There are no abnormal extra-axial fluid collections. OTHER: None. _______________     1. No acute intracranial abnormalities. Specifically, no hemorrhage, mass effect or CT evident acute cortical infarction. 2. Generalized cerebral volume loss with sequelae of chronic microvascular ischemia. CTA CHEST W OR W WO CONT    Result Date: 1/7/2022  EXAM: CTA Chest INDICATION: Shortness of breath, difficulty breathing, tachycardia. COMPARISON: CT chest 12/29/2018 TECHNIQUE: Axial CT imaging from the thoracic inlet through the diaphragm with intravenous contrast utilizing CTA study for pulmonary artery evaluation. Coronal and sagittal MIP reformations were generated at a separate workstation. One or more dose reduction techniques were used on this CT: automated exposure control, adjustment of the mAs and/or kVp according to patient size, and iterative reconstruction techniques. The specific techniques used on this CT exam have been documented in the patient's electronic medical record. Digital Imaging and Communications in Medicine (DICOM) format image data are available to nonaffiliated external healthcare facilities or entities on a secure, media free, reciprocally searchable basis with patient authorization for at least a 12-month period after this study. _______________ FINDINGS: EXAM QUALITY: Overall exam quality is adequate. Pulmonary arterial enhancement is adequate. The breath hold is satisfactory. PULMONARY ARTERIES: No convincing evidence of pulmonary embolism. MEDIASTINUM: Normal cardiac size. Thoracic aorta normal in course and caliber. The majority of the stomach is intrathoracic in position. Gastroduodenal junction is below the diaphragm. Mild stranding noted surrounding the intrathoracic esophagus. No mediastinal fluid collection. Included portions of the stomach within the thorax appear to enhance normally. No pneumatosis. LYMPH NODES: No enlarged mediastinal or hilar nodes by size criteria. AIRWAY: Unremarkable.  LUNGS: Bibasilar atelectasis. No alveolar consolidation. Scattered areas of mosaic pulmonary attenuation. No significant abnormal septal line thickening. PLEURA: No pleural effusion or pneumothorax. UPPER ABDOMEN: Nonspecific gallbladder distention without evidence of radiopaque gallstone. . OTHER: No acute or aggressive osseous abnormalities identified. _______________     1. No evidence of pulmonary embolism. 2.  Large hiatus hernia, increase in magnitude from examination 12/29/2018. 3. Mild soft tissue stranding involving the intrathoracic esophagus; a nonspecific finding potentially related to reflux disease and/or esophagitis. 4. Dependent changes of atelectasis without pneumothorax or pleural effusion. 5. Mild mosaic pulmonary attenuation, as can be seen with small airways or small vessels disease. XR CHEST PORT    Result Date: 1/7/2022  EXAM: XR CHEST PORT CLINICAL INDICATION/HISTORY: cough, difficulty breathing -Additional: None COMPARISON: 6/21/2020 TECHNIQUE: Portable frontal view of the chest _______________ FINDINGS: SUPPORT DEVICES: None. HEART AND MEDIASTINUM: Cardiomediastinal silhouette within normal limits. LUNGS AND PLEURAL SPACES: No dense consolidation, large effusion or pneumothorax. _______________     No acute cardiopulmonary abnormality. DUPLEX LOWER EXT VENOUS BILAT    Result Date: 1/9/2022  · No evidence of deep vein thrombosis in the right lower extremity. · No evidence of deep vein thrombosis in the left lower extremity.         Candance Malm, MD

## 2022-01-10 NOTE — ACP (ADVANCE CARE PLANNING)
Advance Care Planning     General Advance Care Planning (ACP) Conversation    Date of Conversation: 1/10/2022  Conducted with: Patient and Healthcare Decision Maker: Next of Kin by law (only applies in absence of a Healthcare Power of  or Legal Guardian)    Healthcare Decision Maker:     Primary Decision Maker: Sammy [address] - [phone]    Today we documented Decision Maker(s) consistent with Legal Next of Kin hierarchy. Content/Action Overview:   Has NO ACP documents/care preferences - information provided, considering goals and options  Reviewed DNR/DNI and  elects Full Code (Attempt Resuscitation) He acknowledged that his wife told staff in the ED that she did not want to be resuscitated. She was not able to answer the question during our conversation--very fatigued. 1/10/2022 0940 Seen today in room 337 along with Sienna Lai NP. Lying in bed. Awake, alert. Oriented to person, place, and situation. States that she came to  hospital because she couldn't breathe well. Able to tell us that she had early onset dementia and Parkinson's disease. Respirations unlabored  Oxygen on at 2 lpm per NC . Came to the ED on 1/7/2022 per POV for respiratory distress. Immediate suctioning for sputum bolus removal improved her respiratory status quickly. She was admitted for aspiration pneumonia (IV antibiotics, pulmonary hygiene), Parkinson's management (PT/OT consultation), BP managment    PMH significant for dementia, Parkinson's    ABGs Ref.  Range 1/7/2022 13:40   pH (POC) Latest Ref Range: 7.35 - 7.45   7.39   pCO2 (POC) Latest Ref Range: 35.0 - 45.0 MMHG 49.3 (H)   pO2 (POC) Latest Ref Range: 80 - 100 MMHG 68 (L)   HCO3 (POC) Latest Ref Range: 22 - 26 MMOL/L 29.6 (H)   sO2 (POC) Latest Ref Range: 92 - 97 % 92.6   Base excess (POC) Latest Units: mmol/L 3.5   Specimen type (POC) Latest Units:   ARTERIAL   Site Latest Units:   RIGHT RADIAL   Device: Latest Units:   ROOM AIR   Allens test (POC) Latest Units:   Positive     The palliative care team has been consulted for end stage disease    1110: Met in the patient's room with Elmer Gino, . Introduced the role of palliative medicine for the hospitalized patient. [facility] They currently have no outside assistance. She is bed and w/c bound and he does all of her transfers. She has had a significant decline in her swallowing abilities. They have two children. --a daughter with whom they have had no contact in many years and a son, who lives in [place], who has completed treatment for esophageal cancer s/p surgery, chemotherapy, and gtube (not known if he had radiation)    Mr Gerlene Runner voiced concerns about his ability to manage the care of his wife at home without help. He does not have funds available to pay for support. He spoke of his caregiver fatigue as well as his desire to take care of his wife who has significant health needs. Discussed options re: resuscitation in the event of cardiac arrest.He said that he is open to full measures but only short-term on life support. Described the four components of cardiac resuscitation: compressions, medications, electric shocks, and intubation/ventilation. Reviewed benefits and burdens to include fractured ribs, punctured lungs, hypoxic brain injury, and long-term ventilation with need for discussion of tracheostomy or compassionate extubation. Stressed that her current debility makes the chances of extubation less likely. None of this would stop the progression of her Parkinson's disease. Reviewed the issue of her worsening swallowing. This is common in many neurological diseases and raises her risk of more aspiration episodes. Discussed that feeding tubes do not correct the underlying diseases. Their son told his father that he did not like his feeding tube and would not recommend it to his mother.  Explained that if a decision is made to not pursue feeding tube, if NPO status is recommended based on swallow eval, there will need to be discussions about possible moving care toward a comfort based approach which would allow for comfort feeds. He acknowledged all of these issues and is planning on speaking with his son. Disposition plan: to be determined based on response to treatment and family decisions    Palliative care will continue to follow Javier Delgado  and her family during her hospitalization and support them as they make healthcare decisions and define goals of care.       Arturo Weiner RN, MSN  Palliative Medicine  P: [phone]

## 2022-01-11 LAB
ANION GAP SERPL CALC-SCNC: 4 MMOL/L (ref 3–18)
BUN SERPL-MCNC: 9 MG/DL (ref 7–18)
BUN/CREAT SERPL: 16 (ref 12–20)
CALCIUM SERPL-MCNC: 8.4 MG/DL (ref 8.5–10.1)
CHLORIDE SERPL-SCNC: 104 MMOL/L (ref 100–111)
CO2 SERPL-SCNC: 32 MMOL/L (ref 21–32)
CREAT SERPL-MCNC: 0.58 MG/DL (ref 0.6–1.3)
GLUCOSE SERPL-MCNC: 89 MG/DL (ref 74–99)
POTASSIUM SERPL-SCNC: 3.3 MMOL/L (ref 3.5–5.5)
SODIUM SERPL-SCNC: 140 MMOL/L (ref 136–145)

## 2022-01-11 PROCEDURE — 77010033678 HC OXYGEN DAILY

## 2022-01-11 PROCEDURE — 92526 ORAL FUNCTION THERAPY: CPT

## 2022-01-11 PROCEDURE — 80048 BASIC METABOLIC PNL TOTAL CA: CPT

## 2022-01-11 PROCEDURE — 74011250637 HC RX REV CODE- 250/637: Performed by: PSYCHIATRY & NEUROLOGY

## 2022-01-11 PROCEDURE — 74011250636 HC RX REV CODE- 250/636: Performed by: INTERNAL MEDICINE

## 2022-01-11 PROCEDURE — 74011250636 HC RX REV CODE- 250/636: Performed by: EMERGENCY MEDICINE

## 2022-01-11 PROCEDURE — 36415 COLL VENOUS BLD VENIPUNCTURE: CPT

## 2022-01-11 PROCEDURE — 74011250637 HC RX REV CODE- 250/637: Performed by: INTERNAL MEDICINE

## 2022-01-11 PROCEDURE — 74011250637 HC RX REV CODE- 250/637: Performed by: HOSPITALIST

## 2022-01-11 PROCEDURE — 65660000000 HC RM CCU STEPDOWN

## 2022-01-11 PROCEDURE — 74011000250 HC RX REV CODE- 250: Performed by: INTERNAL MEDICINE

## 2022-01-11 PROCEDURE — 74011000258 HC RX REV CODE- 258: Performed by: EMERGENCY MEDICINE

## 2022-01-11 RX ORDER — POTASSIUM CHLORIDE 20 MEQ/1
40 TABLET, EXTENDED RELEASE ORAL
Status: COMPLETED | OUTPATIENT
Start: 2022-01-11 | End: 2022-01-11

## 2022-01-11 RX ADMIN — GABAPENTIN 100 MG: 100 CAPSULE ORAL at 13:00

## 2022-01-11 RX ADMIN — MEMANTINE HYDROCHLORIDE 10 MG: 5 TABLET ORAL at 22:58

## 2022-01-11 RX ADMIN — PANTOPRAZOLE SODIUM 40 MG: 40 TABLET, DELAYED RELEASE ORAL at 09:38

## 2022-01-11 RX ADMIN — SODIUM CHLORIDE, PRESERVATIVE FREE 5 ML: 5 INJECTION INTRAVENOUS at 14:00

## 2022-01-11 RX ADMIN — PIPERACILLIN AND TAZOBACTAM 4.5 G: 4; .5 INJECTION, POWDER, LYOPHILIZED, FOR SOLUTION INTRAVENOUS; PARENTERAL at 09:50

## 2022-01-11 RX ADMIN — GABAPENTIN 200 MG: 100 CAPSULE ORAL at 22:59

## 2022-01-11 RX ADMIN — PANTOPRAZOLE SODIUM 40 MG: 40 TABLET, DELAYED RELEASE ORAL at 06:30

## 2022-01-11 RX ADMIN — DULOXETINE HYDROCHLORIDE 60 MG: 60 CAPSULE, DELAYED RELEASE ORAL at 09:37

## 2022-01-11 RX ADMIN — ENOXAPARIN SODIUM 40 MG: 100 INJECTION SUBCUTANEOUS at 09:37

## 2022-01-11 RX ADMIN — PIPERACILLIN AND TAZOBACTAM 4.5 G: 4; .5 INJECTION, POWDER, LYOPHILIZED, FOR SOLUTION INTRAVENOUS; PARENTERAL at 17:00

## 2022-01-11 RX ADMIN — DULOXETINE HYDROCHLORIDE 60 MG: 60 CAPSULE, DELAYED RELEASE ORAL at 22:58

## 2022-01-11 RX ADMIN — SODIUM CHLORIDE, PRESERVATIVE FREE 10 ML: 5 INJECTION INTRAVENOUS at 06:28

## 2022-01-11 RX ADMIN — SODIUM CHLORIDE, PRESERVATIVE FREE 10 ML: 5 INJECTION INTRAVENOUS at 22:59

## 2022-01-11 RX ADMIN — DIVALPROEX SODIUM 1000 MG: 500 TABLET, DELAYED RELEASE ORAL at 22:58

## 2022-01-11 RX ADMIN — CLINDAMYCIN PHOSPHATE 600 MG: 600 INJECTION, SOLUTION INTRAVENOUS at 06:26

## 2022-01-11 RX ADMIN — PIPERACILLIN AND TAZOBACTAM 4.5 G: 4; .5 INJECTION, POWDER, LYOPHILIZED, FOR SOLUTION INTRAVENOUS; PARENTERAL at 06:28

## 2022-01-11 RX ADMIN — GABAPENTIN 200 MG: 100 CAPSULE ORAL at 09:37

## 2022-01-11 RX ADMIN — POTASSIUM CHLORIDE 40 MEQ: 1500 TABLET, EXTENDED RELEASE ORAL at 09:49

## 2022-01-11 RX ADMIN — MEMANTINE HYDROCHLORIDE 10 MG: 5 TABLET ORAL at 09:37

## 2022-01-11 RX ADMIN — CARBIDOPA AND LEVODOPA 2 TABLET: 25; 100 TABLET ORAL at 17:00

## 2022-01-11 RX ADMIN — ROPINIROLE HYDROCHLORIDE 0.25 MG: 0.25 TABLET, FILM COATED ORAL at 09:37

## 2022-01-11 RX ADMIN — PIPERACILLIN AND TAZOBACTAM 4.5 G: 4; .5 INJECTION, POWDER, LYOPHILIZED, FOR SOLUTION INTRAVENOUS; PARENTERAL at 23:01

## 2022-01-11 RX ADMIN — ATORVASTATIN CALCIUM 10 MG: 10 TABLET, FILM COATED ORAL at 22:58

## 2022-01-11 RX ADMIN — ROPINIROLE HYDROCHLORIDE 0.25 MG: 0.25 TABLET, FILM COATED ORAL at 22:59

## 2022-01-11 RX ADMIN — DONEPEZIL HYDROCHLORIDE 10 MG: 5 TABLET, FILM COATED ORAL at 22:58

## 2022-01-11 RX ADMIN — CARBIDOPA AND LEVODOPA 2 TABLET: 25; 100 TABLET ORAL at 09:37

## 2022-01-11 RX ADMIN — CARBIDOPA AND LEVODOPA 2 TABLET: 25; 100 TABLET ORAL at 22:59

## 2022-01-11 NOTE — PROGRESS NOTES
DC Plan: Home with [address] vs SNF    Care manager met with  at bedside - patient had eyes open but did not participate at all for any of the discussion. Patient appears very limited in ability to provide basic care for self; per  he does not want to have her placed in a SNF/LTC; explained to  that as of most recent chart review, patient does not appear to qualify for a skilled need as she is essentially not participating with therapy; discussed what items  would need to provide care at home (hospital bed, bedside commode) and that personal care aides would be out of pocket cost.   stated he had been evaluated to qualify for Medicaid but that he had too many financial resources. Discussed need to pay for personal care aides if he wants to take his wife home.  stated that he wanted to discuss this further with his son. Will continue to assist with home discharge planning. Care Management Interventions  PCP Verified by CM:  Yes  Mode of Transport at Discharge: Self  Transition of Care Consult (CM Consult): Discharge Planning  Support Systems: Spouse/Significant Other  Confirm Follow Up Transport: Family  The Plan for Transition of Care is Related to the Following Treatment Goals : respiratory distress  The Patient and/or Patient Representative was Provided with a Choice of Provider and Agrees with the Discharge Plan?: Yes  Name of the Patient Representative Who was Provided with a Choice of Provider and Agrees with the Discharge Plan: Kannan Ni, patient  Wilmer of Choice List was Provided with Basic Dialogue that Supports the Patient's Individualized Plan of Care/Goals, Treatment Preferences and Shares the Quality Data Associated with the Providers?: Yes  Discharge Location  Discharge Placement: Home with family assistance/personal care aides

## 2022-01-11 NOTE — PROGRESS NOTES
Problem: Risk for Spread of Infection  Goal: Prevent transmission of infectious organism to others  Description: Prevent the transmission of infectious organisms to other patients, staff members, and visitors. Outcome: Progressing Towards Goal     Problem: Patient Education:  Go to Education Activity  Goal: Patient/Family Education  Outcome: Progressing Towards Goal     Problem: Falls - Risk of  Goal: *Absence of Falls  Description: Document Patt Arthur Fall Risk and appropriate interventions in the flowsheet. Outcome: Progressing Towards Goal  Note: Fall Risk Interventions:  Mobility Interventions: Communicate number of staff needed for ambulation/transfer,OT consult for ADLs,PT Consult for mobility concerns,Utilize walker, cane, or other assistive device    Mentation Interventions: Bed/chair exit alarm,Increase mobility,More frequent rounding,Room close to nurse's station,Update white board,Toileting rounds    Medication Interventions: Bed/chair exit alarm    Elimination Interventions: Bed/chair exit alarm,Call light in reach,Patient to call for help with toileting needs,Toileting schedule/hourly rounds    History of Falls Interventions: Bed/chair exit alarm,Room close to nurse's station         Problem: Pressure Injury - Risk of  Goal: *Prevention of pressure injury  Description: Document Vj Scale and appropriate interventions in the flowsheet. Outcome: Progressing Towards Goal  Note: Pressure Injury Interventions:  Sensory Interventions: Assess changes in LOC,Assess need for specialty bed,Check visual cues for pain,Float heels,Minimize linen layers,Turn and reposition approx. every two hours (pillows and wedges if needed)    Moisture Interventions: Absorbent underpads,Apply protective barrier, creams and emollients,Minimize layers    Activity Interventions: Pressure redistribution bed/mattress(bed type),Increase time out of bed    Mobility Interventions: Turn and reposition approx.  every two hours(pillow and wedges),HOB 30 degrees or less,Pressure redistribution bed/mattress (bed type),Float heels    Nutrition Interventions: Document food/fluid/supplement intake,Discuss nutritional consult with provider,Offer support with meals,snacks and hydration    Friction and Shear Interventions: Apply protective barrier, creams and emollients,Foam dressings/transparent film/skin sealants,Lift sheet,Minimize layers,Transferring/repositioning devices

## 2022-01-11 NOTE — PROGRESS NOTES
Hospitalist Progress Note-critical care note     Patient: Dorie Greenberg MRN: [id number]  CSN: [id number]    YOB: 1945  Age: 68 y.o. Sex: female    DOA: 1/7/2022 LOS:  LOS: 4 days            Chief complaint: parkinson  Aspiration into airway , aspiration pna     Assessment/Plan         Hospital Problems  Date Reviewed: 1/7/2022          Codes Class Noted POA    Dementia ([facility] ICD-10-CM: F03.90  ICD-9-CM: 294.20  1/10/2022 Unknown        COPD (chronic obstructive pulmonary disease) ([facility] ICD-10-CM: J44.9  ICD-9-CM: 060  1/7/2022 Unknown        * (Principal) Respiratory distress ICD-10-CM: R06.03  ICD-9-CM: 786.09  1/7/2022 Unknown        Aspiration into airway ICD-10-CM: T17.908A  ICD-9-CM: 934.9  1/7/2022 Unknown        Parkinson's disease ([facility] ICD-10-CM: G20  ICD-9-CM: 332.0  Unknown Yes        Hypertension ICD-10-CM: I10  ICD-9-CM: 401.9  Unknown Yes            Acute respiratory distress continue weaning off nc oxygen-if not- will go home with oxygen   Due to aspiration pneumonia  evidence of viral infection-coronavirus positive  Continue symptom treatment and treat pneumonia      Aspiration into ariway, aspiration PNA   Continue aspiration precaution   Continue iv abx    continue zosyn       Generalized weakness likely worsened from Parkinson's   PT and OT consulted -talked with PT/OT and CM  CT of the head no acute process  Discussed with Luz Elena Archuleta-recommend to f/u with her neurologist as out-pt   covid 9 r/o         Probable COPD   Breathing tx as needed      Parkinson's    Requip and Sinemet      Dementia   Namenda and Aricept I anticipate   Fall and aspiration precaution        Hyperetension   On low-dose norvasc       Dehydration    normal saline appears better    Leg edema   No dvt     Subjective : I am fine.  I used wheelchair at home      was at the bedside, he can not afford personal aid and will ask son to lep     35 total min's spent on patient care including >50% on counseling/coordinating care. Discussed the above assessments. also discussed labs, medications and hospital course    Discussed with cm-need call PT/OT back to helping pt      Disposition :2-3 days   Review of systems:limited due to pt condition   Vital signs/Intake and Output:  Visit Vitals  BP (!) 106/51   Pulse 67   Temp 97.2 °F (36.2 °C)   Resp 20   Ht 5' 4\" (1.626 m)   Wt 72.6 kg (160 lb 0.9 oz)   SpO2 100%   BMI 27.47 kg/m²     Current Shift:  No intake/output data recorded. Last three shifts:  01/09 1901 - 01/11 0700  In: 480 [P.O.:480]  Out: 0     Physical Exam:  General: WD, WN. Alert, cooperative, no acute distress    HEENT: NC, Atraumatic. PERRLA, anicteric sclerae. Lungs: CTA Bilaterally. No Wheezing/Rhonchi/Rales. Heart:  Regular  rhythm,  No murmur, No Rubs, No Gallops  Abdomen: Soft, Non distended, Non tender. +Bowel sounds,   Extremities: No c/c/e  Psych:   Not anxious or agitated. Neurologic:  No acute neurological deficit. Labs: Results:       Chemistry Recent Labs     01/11/22  0145 01/10/22  0314 01/09/22  0505   GLU 89 73* 69*    141 143   K 3.3* 3.8 3.9    104 104   CO2 32 31 35*   BUN 9 11 11   CREA 0.58* 0.55* 0.58*   CA 8.4* 8.3* 8.5   AGAP 4 6 4   BUCR 16 20 19      CBC w/Diff Recent Labs     01/10/22  0314   WBC 7.4   RBC 4.27   HGB 12.4   HCT 40.6   *   GRANS 51   LYMPH 30   EOS 2      Cardiac Enzymes No results for input(s): CPK, CKND1, BABITA in the last 72 hours. No lab exists for component: CKRMB, TROIP   Coagulation No results for input(s): PTP, INR, APTT, INREXT, INREXT in the last 72 hours. Lipid Panel No results found for: CHOL, CHOLPOCT, CHOLX, CHLST, CHOLV, 200306, HDL, HDLP, LDL, LDLC, DLDLP, 881950, VLDLC, VLDL, TGLX, TRIGL, TRIGP, TGLPOCT, CHHD, CHHDX   BNP No results for input(s): BNPP in the last 72 hours. Liver Enzymes No results for input(s): TP, ALB, TBIL, AP in the last 72 hours.     No lab exists for component: SGOT, GPT, DBIL Thyroid Studies No results found for: T4, T3U, TSH, TSHEXT, TSHEXT     Procedures/imaging: see electronic medical records for all procedures/Xrays and details which were not copied into this note but were reviewed prior to creation of Plan    CT HEAD WO CONT    Result Date: 1/9/2022  EXAM: CT head without contrast  CLINICAL INDICATION/HISTORY: Ambulatory dysfunction, dysphasia    --Additional history: None. COMPARISON: None. TECHNIQUE: Axial CT imaging of the head was performed without intravenous contrast. One or more dose reduction techniques were used on this CT: automated exposure control, adjustment of the mAs and/or kVp according to patient size, and iterative reconstruction techniques. The specific techniques used on this CT exam have been documented in the patient's electronic medical record. Digital Imaging and Communications in Medicine (DICOM) format image data are available to nonaffiliated external healthcare facilities or entities on a secure, media free, reciprocally searchable basis with patient authorization for at least a 12 month period after this study. _______________ FINDINGS: CALVARIUM: Intact. SOFT TISSUES/SCALP: Normal. SINUSES: Minimal inflammatory mucosal thickening within the maxillary sinuses. BRAIN AND POSTERIOR FOSSA: There is proportional enlargement of the ventricles and cerebral spinal fluid spaces consistent with generalized cerebral volume loss. There is no intracranial hemorrhage, mass effect, or midline shift. There are scattered periventricular and subcortical white matter hypodensities present consistent with nonspecific gliosis. This is most commonly associated with sequelae of chronic microvascular ischemia. Small hypodensities within the left thalamus and bilateral basal ganglia likely represent lacunar infarcts. EXTRA-AXIAL SPACES AND MENINGES: There are no abnormal extra-axial fluid collections. OTHER: None. _______________     1. No acute intracranial abnormalities. Specifically, no hemorrhage, mass effect or CT evident acute cortical infarction. 2. Generalized cerebral volume loss with sequelae of chronic microvascular ischemia. CTA CHEST W OR W WO CONT    Result Date: 1/7/2022  EXAM: CTA Chest INDICATION: Shortness of breath, difficulty breathing, tachycardia. COMPARISON: CT chest 12/29/2018 TECHNIQUE: Axial CT imaging from the thoracic inlet through the diaphragm with intravenous contrast utilizing CTA study for pulmonary artery evaluation. Coronal and sagittal MIP reformations were generated at a separate workstation. One or more dose reduction techniques were used on this CT: automated exposure control, adjustment of the mAs and/or kVp according to patient size, and iterative reconstruction techniques. The specific techniques used on this CT exam have been documented in the patient's electronic medical record. Digital Imaging and Communications in Medicine (DICOM) format image data are available to nonaffiliated external healthcare facilities or entities on a secure, media free, reciprocally searchable basis with patient authorization for at least a 12-month period after this study. _______________ FINDINGS: EXAM QUALITY: Overall exam quality is adequate. Pulmonary arterial enhancement is adequate. The breath hold is satisfactory. PULMONARY ARTERIES: No convincing evidence of pulmonary embolism. MEDIASTINUM: Normal cardiac size. Thoracic aorta normal in course and caliber. The majority of the stomach is intrathoracic in position. Gastroduodenal junction is below the diaphragm. Mild stranding noted surrounding the intrathoracic esophagus. No mediastinal fluid collection. Included portions of the stomach within the thorax appear to enhance normally. No pneumatosis. LYMPH NODES: No enlarged mediastinal or hilar nodes by size criteria. AIRWAY: Unremarkable. LUNGS: Bibasilar atelectasis. No alveolar consolidation. Scattered areas of mosaic pulmonary attenuation.  No significant abnormal septal line thickening. PLEURA: No pleural effusion or pneumothorax. UPPER ABDOMEN: Nonspecific gallbladder distention without evidence of radiopaque gallstone. . OTHER: No acute or aggressive osseous abnormalities identified. _______________     1. No evidence of pulmonary embolism. 2.  Large hiatus hernia, increase in magnitude from examination 12/29/2018. 3. Mild soft tissue stranding involving the intrathoracic esophagus; a nonspecific finding potentially related to reflux disease and/or esophagitis. 4. Dependent changes of atelectasis without pneumothorax or pleural effusion. 5. Mild mosaic pulmonary attenuation, as can be seen with small airways or small vessels disease. XR CHEST PORT    Result Date: 1/7/2022  EXAM: XR CHEST PORT CLINICAL INDICATION/HISTORY: cough, difficulty breathing -Additional: None COMPARISON: 6/21/2020 TECHNIQUE: Portable frontal view of the chest _______________ FINDINGS: SUPPORT DEVICES: None. HEART AND MEDIASTINUM: Cardiomediastinal silhouette within normal limits. LUNGS AND PLEURAL SPACES: No dense consolidation, large effusion or pneumothorax. _______________     No acute cardiopulmonary abnormality. DUPLEX LOWER EXT VENOUS BILAT    Result Date: 1/9/2022  · No evidence of deep vein thrombosis in the right lower extremity. · No evidence of deep vein thrombosis in the left lower extremity.         Willem Kasper MD

## 2022-01-11 NOTE — PROGRESS NOTES
Problem: Dysphagia (Adult)  Goal: *Acute Goals and Plan of Care (Insert Text)  Description: Patient will:  1. Tolerate PO trials with 0 s/s overt distress in 4/5 trials. 2. Utilize compensatory swallow strategies/maneuvers (decrease bite/sip, size/rate, alt. liq/sol) with min cues in 4/5 trials. 3. Perform oral-motor/laryngeal exercises to increase oropharyngeal swallow function with min cues. 4. Complete an objective swallow study (i.e., MBSS) to assess swallow integrity, r/o aspiration, and determine of safest LRD, min A.    Rec:     Soft and bite sized with mildly thick (nectar) liquids  Aspiration precautions  HOB >45 during po intake, remain >30 for 30-45 minutes after po   Small bites/sips; alternate liquid/solid with slow feeding rate   Oral care TID  Meds per pt preference   Outcome: Progressing Towards Goal  SPEECH LANGUAGE PATHOLOGY DYSPHAGIA TREATMENT    Patient: Guillermina Coyle (83 y.o. female)  Date: 1/11/2022  Diagnosis: Aspiration into airway [T17.908A]  Respiratory distress [R06.03]  COPD (chronic obstructive pulmonary disease) ([facility] [J44.9] Respiratory distress       Precautions: Aspiration, Fall (droplet)  PLOF:Regular diet, suspect softer textures only    ASSESSMENT:  Followed up with dysphagia intervention. Observed self-feeding nectar thick +/- straw with serial swallows, pudding and soft solid trials with 0 overt s/sx of aspiration. Mildly delayed mastication of solids, however 100% oral clearance appreciated given increased time. Recommend pt continue soft and bite sized, nectar thick liquids, with meds one at a time or crushed in applesauce. Educated pt on aspiration precautions and importance of compensatory swallow techniques to decrease aspiration risk (decrease rate of intake & sip/bite size, upright @HOB for all po intake and ~30 minutes after po); verbalized comprehension but requires reinforcement.  SLP will continue to follow as indicated to assess diet tolerance and education. Progression toward goals:  [x]         Improving appropriately and progressing toward goals  []         Improving slowly and progressing toward goals  []         Not making progress toward goals and plan of care will be adjusted     PLAN:  Recommendations and Planned Interventions:  Soft & bite-sized, thin liquid   Patient continues to benefit from skilled intervention to address the above impairments. Continue treatment per established plan of care. Patient continues to benefit from skilled intervention to address the above impairments. Continue treatment per established plan of care. Discharge Recommendations: To Be Determined     SUBJECTIVE:   Patient stated PT sounds like a good idea. \"    OBJECTIVE:   Cognitive and Communication Status:  Neurologic State: Confused,Drowsy,Eyes open to voice  Orientation Level: Oriented to person,Disoriented to place,Disoriented to situation,Disoriented to time  Cognition: Memory loss,Follows commands,Recognition of people/places  Perception: Tactile,Verbal,Visual  Perseveration: No perseveration noted  Safety/Judgement: Fall prevention    PAIN:  Pain level pre-treatment: 0/10   Pain level post-treatment: 0/10     After treatment:   []              Patient left in no apparent distress sitting up in chair  [x]              Patient left in no apparent distress in bed  [x]              Call bell left within reach  [x]              Nursing notified  []              Family present  []              Caregiver present  []              Bed alarm activated    COMMUNICATION/EDUCATION:   [x] Aspiration precautions; swallow safety; compensatory techniques  []        Patient unable to participate in education; education ongoing with staff  []  Posted safety precautions in patient's room. [] Oral-motor/laryngeal strengthening exercises    Aster Ontiveros M.Ed, CCC-SLP   Time Calculation: 11 mins

## 2022-01-11 NOTE — PROGRESS NOTES
Cm called and spoke with spouse regarding d/c plan, spouse sounds as if he's leaning towards pt returning home but would like pt to be seen by PT to get pt out of bed, spouse also stated he spoke with previous cm about ordering home DME for bed, 3:1 etc, at this time spouse would like to make final decision after pt get reevaluated for d/c planning,  updated.

## 2022-01-11 NOTE — PROGRESS NOTES
Palliative Medicine    CODE STATUS: FULL CODE    AMD Status: none on file. Her , Yari Morales, is her legal next of kin     1/11/2022 1005 Seen today in room 337 along with Andres Villanueva NP. Lying in bed more alert than yesterday. Speech clear. Engaged in conversation. Had eaten a good portion of her breakfast.     1330: met with there  in the room. He has many questions about her discharge plans and has been speaking with the care management team. He has been researching home care options to support him as primary caregiver. After meeting with patient and her , the goals of care have been defined. Code status remains: FULL CODE  AMD status: none on file. , Yari Morales, is legal next of kin. Will sign off but remain available for reconsult as needed/if appropriate.      Thank you for the Palliative Medicine consult and allowing us to participate in the care of Rupali Diaz RN, MSN  Palliative Medicine     [phone]

## 2022-01-12 LAB
ANION GAP SERPL CALC-SCNC: 4 MMOL/L (ref 3–18)
BUN SERPL-MCNC: 9 MG/DL (ref 7–18)
BUN/CREAT SERPL: 18 (ref 12–20)
CALCIUM SERPL-MCNC: 8.3 MG/DL (ref 8.5–10.1)
CHLORIDE SERPL-SCNC: 105 MMOL/L (ref 100–111)
CO2 SERPL-SCNC: 33 MMOL/L (ref 21–32)
CREAT SERPL-MCNC: 0.51 MG/DL (ref 0.6–1.3)
GLUCOSE SERPL-MCNC: 75 MG/DL (ref 74–99)
POTASSIUM SERPL-SCNC: 3.8 MMOL/L (ref 3.5–5.5)
SODIUM SERPL-SCNC: 142 MMOL/L (ref 136–145)

## 2022-01-12 PROCEDURE — 74011250637 HC RX REV CODE- 250/637: Performed by: PSYCHIATRY & NEUROLOGY

## 2022-01-12 PROCEDURE — 74011000258 HC RX REV CODE- 258: Performed by: EMERGENCY MEDICINE

## 2022-01-12 PROCEDURE — 74011250636 HC RX REV CODE- 250/636: Performed by: INTERNAL MEDICINE

## 2022-01-12 PROCEDURE — 74011250636 HC RX REV CODE- 250/636: Performed by: EMERGENCY MEDICINE

## 2022-01-12 PROCEDURE — 74011000258 HC RX REV CODE- 258: Performed by: HOSPITALIST

## 2022-01-12 PROCEDURE — 77010033678 HC OXYGEN DAILY

## 2022-01-12 PROCEDURE — 65660000000 HC RM CCU STEPDOWN

## 2022-01-12 PROCEDURE — 74011000250 HC RX REV CODE- 250: Performed by: INTERNAL MEDICINE

## 2022-01-12 PROCEDURE — 74011250637 HC RX REV CODE- 250/637: Performed by: INTERNAL MEDICINE

## 2022-01-12 PROCEDURE — 74011250636 HC RX REV CODE- 250/636: Performed by: HOSPITALIST

## 2022-01-12 PROCEDURE — 80048 BASIC METABOLIC PNL TOTAL CA: CPT

## 2022-01-12 RX ADMIN — DULOXETINE HYDROCHLORIDE 60 MG: 60 CAPSULE, DELAYED RELEASE ORAL at 09:14

## 2022-01-12 RX ADMIN — ENOXAPARIN SODIUM 40 MG: 100 INJECTION SUBCUTANEOUS at 09:15

## 2022-01-12 RX ADMIN — ROPINIROLE HYDROCHLORIDE 0.25 MG: 0.25 TABLET, FILM COATED ORAL at 23:07

## 2022-01-12 RX ADMIN — CARBIDOPA AND LEVODOPA 2 TABLET: 25; 100 TABLET ORAL at 15:26

## 2022-01-12 RX ADMIN — SODIUM CHLORIDE, PRESERVATIVE FREE 10 ML: 5 INJECTION INTRAVENOUS at 14:00

## 2022-01-12 RX ADMIN — AMLODIPINE BESYLATE 5 MG: 5 TABLET ORAL at 09:00

## 2022-01-12 RX ADMIN — DONEPEZIL HYDROCHLORIDE 10 MG: 5 TABLET, FILM COATED ORAL at 23:08

## 2022-01-12 RX ADMIN — DIVALPROEX SODIUM 1000 MG: 500 TABLET, DELAYED RELEASE ORAL at 23:07

## 2022-01-12 RX ADMIN — SODIUM CHLORIDE, PRESERVATIVE FREE 10 ML: 5 INJECTION INTRAVENOUS at 23:08

## 2022-01-12 RX ADMIN — GABAPENTIN 200 MG: 100 CAPSULE ORAL at 23:08

## 2022-01-12 RX ADMIN — DULOXETINE HYDROCHLORIDE 60 MG: 60 CAPSULE, DELAYED RELEASE ORAL at 23:08

## 2022-01-12 RX ADMIN — CARBIDOPA AND LEVODOPA 2 TABLET: 25; 100 TABLET ORAL at 23:07

## 2022-01-12 RX ADMIN — PIPERACILLIN AND TAZOBACTAM 4.5 G: 4; .5 INJECTION, POWDER, LYOPHILIZED, FOR SOLUTION INTRAVENOUS; PARENTERAL at 10:42

## 2022-01-12 RX ADMIN — ATORVASTATIN CALCIUM 10 MG: 10 TABLET, FILM COATED ORAL at 23:08

## 2022-01-12 RX ADMIN — CARBIDOPA AND LEVODOPA 2 TABLET: 25; 100 TABLET ORAL at 09:14

## 2022-01-12 RX ADMIN — ROPINIROLE HYDROCHLORIDE 0.25 MG: 0.25 TABLET, FILM COATED ORAL at 09:14

## 2022-01-12 RX ADMIN — PANTOPRAZOLE SODIUM 40 MG: 40 TABLET, DELAYED RELEASE ORAL at 07:30

## 2022-01-12 RX ADMIN — MEMANTINE HYDROCHLORIDE 10 MG: 5 TABLET ORAL at 09:14

## 2022-01-12 RX ADMIN — GABAPENTIN 200 MG: 100 CAPSULE ORAL at 09:14

## 2022-01-12 RX ADMIN — SODIUM CHLORIDE, PRESERVATIVE FREE 10 ML: 5 INJECTION INTRAVENOUS at 05:48

## 2022-01-12 RX ADMIN — PIPERACILLIN AND TAZOBACTAM 4.5 G: 4; .5 INJECTION, POWDER, LYOPHILIZED, FOR SOLUTION INTRAVENOUS; PARENTERAL at 04:00

## 2022-01-12 RX ADMIN — PIPERACILLIN AND TAZOBACTAM 4.5 G: 4; .5 INJECTION, POWDER, LYOPHILIZED, FOR SOLUTION INTRAVENOUS; PARENTERAL at 15:27

## 2022-01-12 RX ADMIN — PIPERACILLIN AND TAZOBACTAM 4.5 G: 4; .5 INJECTION, POWDER, LYOPHILIZED, FOR SOLUTION INTRAVENOUS; PARENTERAL at 23:06

## 2022-01-12 RX ADMIN — MEMANTINE HYDROCHLORIDE 10 MG: 5 TABLET ORAL at 23:07

## 2022-01-12 RX ADMIN — DOXYCYCLINE 100 MG: 100 INJECTION, POWDER, LYOPHILIZED, FOR SOLUTION INTRAVENOUS at 15:26

## 2022-01-12 NOTE — PROGRESS NOTES
Per MD, patient to be weaned off oxygen therapy. Upon entering the room, pt. Had nasal cannula sitting on her chin. Oxygen saturation at 100%. Nasal Cannula removed at 1800. At 1815 O2 sat remained at 100% on room air. O2 therapy changes discussed with oncoming shift nurse.

## 2022-01-12 NOTE — PROGRESS NOTES
DC Plan: home with home care, DME    CM notified by MD of intent to discharge patient; CM had spoken with  earlier with him expressing again that he does not want LTC for patient but he has contacted personal care agencies and does not feel that he can afford home care; CM will order bedside commode and hospital bed; have reached out to new contact that may be able to assist with less expensive home care; will order [place] PT/OT in case newly ordered assessment cannot be performed; also have notified spouse of MD intent to discharge and once discharge order has been written, will provide BRANDEN. [phone]: care manager has spoken directly to PT and OT regarding rationale of new re-consult for baseline guidance; also provided spouse with phone number of nursing assistant home health provider to call for extra help; offered [facility] for [place] [facility]/OT and spouse has selected [facility]; CMS requested to place referral.    Care Management Interventions  PCP Verified by CM:  Yes  Mode of Transport at Discharge: Self  Transition of Care Consult (CM Consult): Discharge Planning  Support Systems: Spouse/Significant Other  Confirm Follow Up Transport: Family  The Plan for Transition of Care is Related to the Following Treatment Goals : respiratory distress  The Patient and/or Patient Representative was Provided with a Choice of Provider and Agrees with the Discharge Plan?: Yes  Name of the Patient Representative Who was Provided with a Choice of Provider and Agrees with the Discharge Plan: Gardenia Tse, patient  Freedom of Choice List was Provided with Basic Dialogue that Supports the Patient's Individualized Plan of Care/Goals, Treatment Preferences and Shares the Quality Data Associated with the Providers?: Yes  Discharge Location  Discharge Placement: Home with family assistance

## 2022-01-12 NOTE — PROGRESS NOTES
Hospitalist Progress Note-critical care note     Patient: Sayda Yao MRN: [id number]  CSN: [id number]    YOB: 1945  Age: 68 y.o. Sex: female    DOA: 1/7/2022 LOS:  LOS: 5 days            Chief complaint: parkinson  Aspiration into airway , aspiration pna     Assessment/Plan         Hospital Problems  Date Reviewed: 1/7/2022          Codes Class Noted POA    Dementia ([facility] ICD-10-CM: F03.90  ICD-9-CM: 294.20  1/10/2022 Unknown        COPD (chronic obstructive pulmonary disease) ([facility] ICD-10-CM: J44.9  ICD-9-CM: 391  1/7/2022 Unknown        * (Principal) Respiratory distress ICD-10-CM: R06.03  ICD-9-CM: 786.09  1/7/2022 Unknown        Aspiration into airway ICD-10-CM: T17.908A  ICD-9-CM: 934.9  1/7/2022 Unknown        Parkinson's disease ([facility] ICD-10-CM: G20  ICD-9-CM: 332.0  Unknown Yes        Hypertension ICD-10-CM: I10  ICD-9-CM: 401.9  Unknown Yes            Acute respiratory distress continue weaning off nc oxygen-if not- will go home with oxygen   Due to aspiration pneumonia  evidence of viral infection-coronavirus positive  Continue symptom treatment and treat pneumonia      Aspiration into ariway, aspiration PNA   Continue aspiration precaution   Continue iv abx    continue zosyn-add doxy  -bactrim cover both  On d/c   cx ecoli and staph      Generalized weakness likely worsened from Parkinson's   PT and OT consulted -talked with PT/OT and CM yesterday to have pt seen-not seeing the note-discussed with CM today   CT of the head no acute process  Discussed with Luz Elena Archuleta-recommend to f/u with her neurologist as out-pt   covid 9 r/o         Probable COPD   Breathing tx as needed      Parkinson's    Requip and Sinemet      Dementia   Namenda and Aricept I anticipate   Fall and aspiration precaution        Hyperetension   On low-dose norvasc       Dehydration    normal saline appears better    Leg edema   No dvt     Subjective : I am fine.  I used wheelchair at home     Talked with  for d/c planning. He wants to take her home, but he wants to see his wife can sitting up on the chair like before admission, he talked with son-not helping. 35 total min's spent on patient care including >50% on counseling/coordinating care. Discussed the above assessments. also discussed labs, medications and hospital course    Discussed with cm-need call PT/OT back to helping pt      Disposition :tomorrow   Review of systems:  General: no f/c   Lung no sob , no wheezing   Heart : no chest pain,   Gi: no n/v no abdomen pain   Vital signs/Intake and Output:  Visit Vitals  BP (!) 113/50   Pulse 63   Temp 97.5 °F (36.4 °C)   Resp 20   Ht 5' 4\" (1.626 m)   Wt 72.6 kg (160 lb 0.9 oz)   SpO2 95%   BMI 27.47 kg/m²     Current Shift:  No intake/output data recorded. Last three shifts:  01/10 1901 - 01/12 0700  In: 540 [P.O.:540]  Out: 0     Physical Exam:  General: WD, WN. Alert, cooperative, no acute distress    HEENT: NC, Atraumatic. PERRLA, anicteric sclerae. Lungs: CTA Bilaterally. No Wheezing/Rhonchi/Rales. Heart:  Regular  rhythm,  No murmur, No Rubs, No Gallops  Abdomen: Soft, Non distended, Non tender. +Bowel sounds,   Extremities: No c/c/e  Psych:   Not anxious or agitated. Neurologic:  No acute neurological deficit. Labs: Results:       Chemistry Recent Labs     01/12/22  0507 01/11/22  0145 01/10/22  0314   GLU 75 89 73*    140 141   K 3.8 3.3* 3.8    104 104   CO2 33* 32 31   BUN 9 9 11   CREA 0.51* 0.58* 0.55*   CA 8.3* 8.4* 8.3*   AGAP 4 4 6   BUCR 18 16 20      CBC w/Diff Recent Labs     01/10/22  0314   WBC 7.4   RBC 4.27   HGB 12.4   HCT 40.6   *   GRANS 51   LYMPH 30   EOS 2      Cardiac Enzymes No results for input(s): CPK, CKND1, BABITA in the last 72 hours. No lab exists for component: CKRMB, TROIP   Coagulation No results for input(s): PTP, INR, APTT, INREXT, INREXT in the last 72 hours.     Lipid Panel No results found for: CHOL, CHOLPOCT, CHOLX, [address], [place], [id number], HDL, HDLP, LDL, LDLC, DLDLP, 108392, VLDLC, VLDL, TGLX, TRIGL, TRIGP, TGLPOCT, CHHD, CHHDX   BNP No results for input(s): BNPP in the last 72 hours. Liver Enzymes No results for input(s): TP, ALB, TBIL, AP in the last 72 hours. No lab exists for component: SGOT, GPT, DBIL   Thyroid Studies No results found for: T4, T3U, TSH, TSHEXT, TSHEXT     Procedures/imaging: see electronic medical records for all procedures/Xrays and details which were not copied into this note but were reviewed prior to creation of Plan    CT HEAD WO CONT    Result Date: 1/9/2022  EXAM: CT head without contrast  CLINICAL INDICATION/HISTORY: Ambulatory dysfunction, dysphasia    --Additional history: None. COMPARISON: None. TECHNIQUE: Axial CT imaging of the head was performed without intravenous contrast. One or more dose reduction techniques were used on this CT: automated exposure control, adjustment of the mAs and/or kVp according to patient size, and iterative reconstruction techniques. The specific techniques used on this CT exam have been documented in the patient's electronic medical record. Digital Imaging and Communications in Medicine (DICOM) format image data are available to nonaffiliated external healthcare facilities or entities on a secure, media free, reciprocally searchable basis with patient authorization for at least a 12 month period after this study. _______________ FINDINGS: CALVARIUM: Intact. SOFT TISSUES/SCALP: Normal. SINUSES: Minimal inflammatory mucosal thickening within the maxillary sinuses. BRAIN AND POSTERIOR FOSSA: There is proportional enlargement of the ventricles and cerebral spinal fluid spaces consistent with generalized cerebral volume loss. There is no intracranial hemorrhage, mass effect, or midline shift. There are scattered periventricular and subcortical white matter hypodensities present consistent with nonspecific gliosis.  This is most commonly associated with sequelae of chronic microvascular ischemia. Small hypodensities within the left thalamus and bilateral basal ganglia likely represent lacunar infarcts. EXTRA-AXIAL SPACES AND MENINGES: There are no abnormal extra-axial fluid collections. OTHER: None. _______________     1. No acute intracranial abnormalities. Specifically, no hemorrhage, mass effect or CT evident acute cortical infarction. 2. Generalized cerebral volume loss with sequelae of chronic microvascular ischemia. CTA CHEST W OR W WO CONT    Result Date: 1/7/2022  EXAM: CTA Chest INDICATION: Shortness of breath, difficulty breathing, tachycardia. COMPARISON: CT chest 12/29/2018 TECHNIQUE: Axial CT imaging from the thoracic inlet through the diaphragm with intravenous contrast utilizing CTA study for pulmonary artery evaluation. Coronal and sagittal MIP reformations were generated at a separate workstation. One or more dose reduction techniques were used on this CT: automated exposure control, adjustment of the mAs and/or kVp according to patient size, and iterative reconstruction techniques. The specific techniques used on this CT exam have been documented in the patient's electronic medical record. Digital Imaging and Communications in Medicine (DICOM) format image data are available to nonaffiliated external healthcare facilities or entities on a secure, media free, reciprocally searchable basis with patient authorization for at least a 12-month period after this study. _______________ FINDINGS: EXAM QUALITY: Overall exam quality is adequate. Pulmonary arterial enhancement is adequate. The breath hold is satisfactory. PULMONARY ARTERIES: No convincing evidence of pulmonary embolism. MEDIASTINUM: Normal cardiac size. Thoracic aorta normal in course and caliber. The majority of the stomach is intrathoracic in position. Gastroduodenal junction is below the diaphragm. Mild stranding noted surrounding the intrathoracic esophagus. No mediastinal fluid collection.  Included portions of the stomach within the thorax appear to enhance normally. No pneumatosis. LYMPH NODES: No enlarged mediastinal or hilar nodes by size criteria. AIRWAY: Unremarkable. LUNGS: Bibasilar atelectasis. No alveolar consolidation. Scattered areas of mosaic pulmonary attenuation. No significant abnormal septal line thickening. PLEURA: No pleural effusion or pneumothorax. UPPER ABDOMEN: Nonspecific gallbladder distention without evidence of radiopaque gallstone. . OTHER: No acute or aggressive osseous abnormalities identified. _______________     1. No evidence of pulmonary embolism. 2.  Large hiatus hernia, increase in magnitude from examination 12/29/2018. 3. Mild soft tissue stranding involving the intrathoracic esophagus; a nonspecific finding potentially related to reflux disease and/or esophagitis. 4. Dependent changes of atelectasis without pneumothorax or pleural effusion. 5. Mild mosaic pulmonary attenuation, as can be seen with small airways or small vessels disease. XR CHEST PORT    Result Date: 1/7/2022  EXAM: XR CHEST PORT CLINICAL INDICATION/HISTORY: cough, difficulty breathing -Additional: None COMPARISON: 6/21/2020 TECHNIQUE: Portable frontal view of the chest _______________ FINDINGS: SUPPORT DEVICES: None. HEART AND MEDIASTINUM: Cardiomediastinal silhouette within normal limits. LUNGS AND PLEURAL SPACES: No dense consolidation, large effusion or pneumothorax. _______________     No acute cardiopulmonary abnormality. DUPLEX LOWER EXT VENOUS BILAT    Result Date: 1/9/2022  · No evidence of deep vein thrombosis in the right lower extremity. · No evidence of deep vein thrombosis in the left lower extremity.         Jose Alfredo Pickett MD

## 2022-01-12 NOTE — PROGRESS NOTES
1/12/2022 PT note: consult received and chart reviewed. Spoke with nurse Molly Potts who reports pt spouse has left hospital.  Received pt spouse phone number from nurse. Called and spoke with pt spouse re: pt PLOF. Spouse reports pt was able to ambulate short distances about 2 months ago, using furniture and wall assist.  Had difficulty using RW due to Parkinson's. Reports h/o 3-4 falls in the last 3-6 months all inside home. Reports hospital bed will be delivered to home tomorrow. Will f/u for PT evaluation tomorrow at ~10:30 am while spouse visiting pt.  Thank you for this referral.   Moira Acevedo, PT

## 2022-01-13 ENCOUNTER — HOME HEALTH ADMISSION (OUTPATIENT)
Dept: HOME HEALTH SERVICES | Facility: HOME HEALTH | Age: 77
End: 2022-01-13

## 2022-01-13 VITALS
WEIGHT: 160.05 LBS | HEART RATE: 66 BPM | SYSTOLIC BLOOD PRESSURE: 113 MMHG | OXYGEN SATURATION: 98 % | RESPIRATION RATE: 16 BRPM | DIASTOLIC BLOOD PRESSURE: 53 MMHG | HEIGHT: 64 IN | TEMPERATURE: 97.8 F | BODY MASS INDEX: 27.33 KG/M2

## 2022-01-13 LAB
ANION GAP SERPL CALC-SCNC: 6 MMOL/L (ref 3–18)
BACTERIA SPEC CULT: NORMAL
BACTERIA SPEC CULT: NORMAL
BUN SERPL-MCNC: 8 MG/DL (ref 7–18)
BUN/CREAT SERPL: 17 (ref 12–20)
CALCIUM SERPL-MCNC: 8.3 MG/DL (ref 8.5–10.1)
CHLORIDE SERPL-SCNC: 107 MMOL/L (ref 100–111)
CO2 SERPL-SCNC: 30 MMOL/L (ref 21–32)
CREAT SERPL-MCNC: 0.47 MG/DL (ref 0.6–1.3)
GLUCOSE BLD STRIP.AUTO-MCNC: 72 MG/DL (ref 70–110)
GLUCOSE SERPL-MCNC: 71 MG/DL (ref 74–99)
POTASSIUM SERPL-SCNC: 3.5 MMOL/L (ref 3.5–5.5)
SERVICE CMNT-IMP: NORMAL
SERVICE CMNT-IMP: NORMAL
SODIUM SERPL-SCNC: 143 MMOL/L (ref 136–145)

## 2022-01-13 PROCEDURE — 74011250636 HC RX REV CODE- 250/636: Performed by: INTERNAL MEDICINE

## 2022-01-13 PROCEDURE — 97163 PT EVAL HIGH COMPLEX 45 MIN: CPT

## 2022-01-13 PROCEDURE — 74011250636 HC RX REV CODE- 250/636: Performed by: HOSPITALIST

## 2022-01-13 PROCEDURE — 36415 COLL VENOUS BLD VENIPUNCTURE: CPT

## 2022-01-13 PROCEDURE — 82962 GLUCOSE BLOOD TEST: CPT

## 2022-01-13 PROCEDURE — 97168 OT RE-EVAL EST PLAN CARE: CPT

## 2022-01-13 PROCEDURE — 74011250637 HC RX REV CODE- 250/637: Performed by: PSYCHIATRY & NEUROLOGY

## 2022-01-13 PROCEDURE — 97162 PT EVAL MOD COMPLEX 30 MIN: CPT

## 2022-01-13 PROCEDURE — 74011000250 HC RX REV CODE- 250: Performed by: INTERNAL MEDICINE

## 2022-01-13 PROCEDURE — 97530 THERAPEUTIC ACTIVITIES: CPT

## 2022-01-13 PROCEDURE — 74011250636 HC RX REV CODE- 250/636: Performed by: EMERGENCY MEDICINE

## 2022-01-13 PROCEDURE — 74011000258 HC RX REV CODE- 258: Performed by: HOSPITALIST

## 2022-01-13 PROCEDURE — 80048 BASIC METABOLIC PNL TOTAL CA: CPT

## 2022-01-13 PROCEDURE — 74011000258 HC RX REV CODE- 258: Performed by: EMERGENCY MEDICINE

## 2022-01-13 PROCEDURE — 74011250637 HC RX REV CODE- 250/637: Performed by: INTERNAL MEDICINE

## 2022-01-13 RX ORDER — DOXYCYCLINE 100 MG/1
100 CAPSULE ORAL 2 TIMES DAILY
Qty: 10 CAPSULE | Refills: 0 | Status: SHIPPED | OUTPATIENT
Start: 2022-01-13 | End: 2022-10-02

## 2022-01-13 RX ORDER — AMLODIPINE BESYLATE 5 MG/1
5 TABLET ORAL DAILY
Qty: 30 TABLET | Refills: 0 | Status: SHIPPED | OUTPATIENT
Start: 2022-01-14

## 2022-01-13 RX ORDER — ROPINIROLE 0.25 MG/1
0.25 TABLET, FILM COATED ORAL 2 TIMES DAILY
Qty: 60 TABLET | Refills: 0 | Status: SHIPPED | OUTPATIENT
Start: 2022-01-13

## 2022-01-13 RX ORDER — NYSTATIN 100000 [USP'U]/G
POWDER TOPICAL 2 TIMES DAILY
Status: DISCONTINUED | OUTPATIENT
Start: 2022-01-13 | End: 2022-01-13 | Stop reason: HOSPADM

## 2022-01-13 RX ADMIN — SODIUM CHLORIDE, PRESERVATIVE FREE 10 ML: 5 INJECTION INTRAVENOUS at 06:19

## 2022-01-13 RX ADMIN — GABAPENTIN 200 MG: 100 CAPSULE ORAL at 06:20

## 2022-01-13 RX ADMIN — DULOXETINE HYDROCHLORIDE 60 MG: 60 CAPSULE, DELAYED RELEASE ORAL at 10:30

## 2022-01-13 RX ADMIN — ENOXAPARIN SODIUM 40 MG: 100 INJECTION SUBCUTANEOUS at 10:35

## 2022-01-13 RX ADMIN — GABAPENTIN 100 MG: 100 CAPSULE ORAL at 12:57

## 2022-01-13 RX ADMIN — CARBIDOPA AND LEVODOPA 2 TABLET: 25; 100 TABLET ORAL at 16:21

## 2022-01-13 RX ADMIN — PANTOPRAZOLE SODIUM 40 MG: 40 TABLET, DELAYED RELEASE ORAL at 06:30

## 2022-01-13 RX ADMIN — PIPERACILLIN AND TAZOBACTAM 4.5 G: 4; .5 INJECTION, POWDER, LYOPHILIZED, FOR SOLUTION INTRAVENOUS; PARENTERAL at 10:38

## 2022-01-13 RX ADMIN — CARBIDOPA AND LEVODOPA 2 TABLET: 25; 100 TABLET ORAL at 10:31

## 2022-01-13 RX ADMIN — DOXYCYCLINE 100 MG: 100 INJECTION, POWDER, LYOPHILIZED, FOR SOLUTION INTRAVENOUS at 14:55

## 2022-01-13 RX ADMIN — DOXYCYCLINE 100 MG: 100 INJECTION, POWDER, LYOPHILIZED, FOR SOLUTION INTRAVENOUS at 01:24

## 2022-01-13 RX ADMIN — MEMANTINE HYDROCHLORIDE 10 MG: 5 TABLET ORAL at 10:31

## 2022-01-13 RX ADMIN — PIPERACILLIN AND TAZOBACTAM 4.5 G: 4; .5 INJECTION, POWDER, LYOPHILIZED, FOR SOLUTION INTRAVENOUS; PARENTERAL at 03:58

## 2022-01-13 RX ADMIN — ROPINIROLE HYDROCHLORIDE 0.25 MG: 0.25 TABLET, FILM COATED ORAL at 10:31

## 2022-01-13 RX ADMIN — PIPERACILLIN AND TAZOBACTAM 4.5 G: 4; .5 INJECTION, POWDER, LYOPHILIZED, FOR SOLUTION INTRAVENOUS; PARENTERAL at 16:23

## 2022-01-13 NOTE — PROGRESS NOTES
9231-Asked kitchen not to send tray until 9:30 due to pt being a feeder. 1118-Paged MD Escudero Dears to inform her about pt groin/buttocks redness and excoriation and request antifungal medication and request her Valbenazine order be switched to nighttime,  said she takes it at bedtime and sleepiness is a side effect. 1131-Paged MD Escudero Dears awaiting callback. 1150-MD Leonid gutierrez said she would place order for excoriation and medication can be timed for bedtime, inquired if pt was on oxygen told her pt oxygen was stopped and she is 100% on room air now. 1919-Pt left with medical transport.

## 2022-01-13 NOTE — HOME CARE
Referral received for [facility]. Spoke with patient's  Rena Rob) to verify address information and explain [address] and routines. Patient states DME has been ordered and they are awaiting delivery. Referral processed.     Wallie Goodpasture, LPN  [facility]  [phone]

## 2022-01-13 NOTE — ROUTINE PROCESS
Discharge instructions reviewed with the patient  Bonnie Cobian via telephone. Patient  verbalized understanding and verified by teach back. All questions answered. IV discontinued, no redness, swelling or pain noted. Patient awaiting medical transportation home with an ETA of 1900 minutes. Patient armband removed and shredded.

## 2022-01-13 NOTE — PROGRESS NOTES
Problem: Self Care Deficits Care Plan (Adult)  Goal: *Acute Goals and Plan of Care (Insert Text)  Description: Occupational Therapy Goals  Initiated 1/13/2022 within 7 day(s). 1.  Patient will perform grooming with moderate assistance seated EOB. 2.  Patient will perform upper body dressing with minimal assistance/contact guard assist.  3.  Patient will perform toilet transfers with moderate assistance . 4. Patient will perform all aspects of toileting with moderate assistance . 5. Patient will participate in upper extremity therapeutic exercise/activities with minimal assistance/contact guard assist for 10 minutes. 6.  Patient will utilize energy conservation techniques during functional activities with verbal, visual, and tactile cues. OCCUPATIONAL THERAPY RE-EVALUATION    Patient: Christian Drew (2 y.o. female)  Date: 1/13/2022  Primary Diagnosis: Aspiration into airway [T17.908A]  Respiratory distress [R06.03]  COPD (chronic obstructive pulmonary disease) ([facility] [J44.9]        Precautions:   Fall  PLOF: mod A for ADLs and transfers     ASSESSMENT :  Based on the objective data described below, the patient presents with decreased strength, endurance and balance for carryover of ADLs and transfers. Pt participate with bed mobility, supine<>sit, sit<>stand transfers with moderate to maximum assistance. Pt tolerate sitting EOB for ~10 minutes with frequent verbal and tactile cues required to maintain midline in sitting. Pt was left supine in bed at the end of session in [facility]. Patient will benefit from skilled intervention to address the above impairments.   Patient's rehabilitation potential is considered to be Good  Factors which may influence rehabilitation potential include:   []             None noted  [x]             Mental ability/status  [x]             Medical condition  []             Home/family situation and support systems  []             Safety awareness  []             Pain tolerance/management  []             Other:      PLAN :  Recommendations and Planned Interventions:   [x]               Self Care Training                  [x]      Therapeutic Activities  [x]               Functional Mobility Training   []      Cognitive Retraining  [x]               Therapeutic Exercises           [x]      Endurance Activities  [x]               Balance Training                    []      Neuromuscular Re-Education  []               Visual/Perceptual Training     [x]      Home Safety Training  [x]               Patient Education                   [x]      Family Training/Education  []               Other (comment):    Frequency/Duration: Patient will be followed by occupational therapy 1-2 times per day/4-7 days per week to address goals. Discharge Recommendations: Home Physical Therapy  Further Equipment Recommendations for Discharge: hospital bed     SUBJECTIVE:   Patient stated  I am doing alright.     OBJECTIVE DATA SUMMARY:   Hospital course since last seen and reason for reevaluation:  change in medical condition and mental status  Past Medical History:   Diagnosis Date    Arthritis     Hypertension 15yrs    Ill-defined condition     h/o dizzy    Parkinson's disease ([facility]     Psychiatric disorder     bipolar; short term memory lose    Psychiatric disorder     depressiom     Past Surgical History:   Procedure Laterality Date    HX CATARACT REMOVAL Left     HX HYSTERECTOMY       Barriers to Learning/Limitations: yes;  altered mental status (i.e.Sedation, Confusion)  Compensate with: visual, verbal, tactile, kinesthetic cues/model    Home Situation:   Home Situation  Home Environment: Private residence  # Steps to Enter: 1  Rails to Enter: No  One/Two Story Residence: One story  Living Alone: No  Support Systems: Spouse/Significant Other  Patient Expects to be Discharged to[de-identified] House  Current DME Used/Available at Home: Walker,Wheelchair,Grab bars  []  Right hand dominant   []  Left hand dominant    Cognitive/Behavioral Status:  Neurologic State: Alert  Orientation Level: Oriented to person  Cognition: Decreased attention/concentration; Follows commands  Safety/Judgement: Decreased awareness of environment;Decreased awareness of need for assistance;Decreased awareness of need for safety;Decreased insight into deficits    Skin: intact  Edema: none    Vision/Perceptual:    Tracking: Able to track stimulus in all quadrants w/o difficulty    Acuity: Able to read clock/calendar on wall without difficulty    Coordination: BUE  Coordination: Generally decreased, functional  Fine Motor Skills-Upper: Left Intact; Right Intact    Gross Motor Skills-Upper: Left Intact; Right Intact  Balance:  Sitting: Impaired; With support; Without support  Sitting - Static: Fair (occasional) (fair -)  Sitting - Dynamic: Poor (constant support) (fair -)  Standing: Impaired;Pull to stand; With support  Standing - Static: Poor  Standing - Dynamic : Not tested  Strength: BUE  Strength: Generally decreased, functional  Tone & Sensation: BUE  Tone: Normal  Sensation: Intact  Range of Motion: BUE  AROM: Generally decreased, functional  Functional Mobility and Transfers for ADLs:  Bed Mobility:  Rolling: Moderate assistance; Additional time (vc/tc)  Supine to Sit: Moderate assistance (v/tc)  Sit to Supine: Total assistance;Assist x2  Scooting: Total assistance;Assist x2;Bed Modified  Transfers:  Sit to Stand: Assist x2;Maximum assistance  Stand to Sit: Maximum assistance; Total assistance;Assist x2  ADL Assessment:   Feeding: Minimum assistance    Oral Facial Hygiene/Grooming: Minimum assistance    Upper Body Dressing: Minimum assistance    Lower Body Dressing: Maximum assistance; Total assistance    Toileting: Maximum assistance; Total assistance  ADL Intervention:  Cognitive Retraining  Safety/Judgement: Decreased awareness of environment;Decreased awareness of need for assistance;Decreased awareness of need for safety;Decreased insight into deficits    Pain:  Pain level pre-treatment: 0/10   Pain level post-treatment: 0/10  Pain Intervention(s): Medication (see MAR); Rest, Ice, Repositioning   Response to intervention: Nurse notified, See doc flow    Activity Tolerance:   Fair     Please refer to the flowsheet for vital signs taken during this treatment. After treatment:   [] Patient left in no apparent distress sitting up in chair  [x] Patient left in no apparent distress in bed  [x] Call bell left within reach  [x] Nursing notified  [] Caregiver present  [] Bed alarm activated    COMMUNICATION/EDUCATION:   [x] Role of Occupational Therapy in the acute care setting  [x] Home safety education was provided and the patient/caregiver indicated understanding. [x] Patient/family have participated as able in goal setting and plan of care. [x] Patient/family agree to work toward stated goals and plan of care. [] Patient understands intent and goals of therapy, but is neutral about his/her participation. [] Patient is unable to participate in goal setting and plan of care.     Thank you for this referral.  Emely Neal OTR/L  Time Calculation: 46 mins

## 2022-01-13 NOTE — PROGRESS NOTES
Jenni Trevor No 281 care from [facility] Hipolito, RN.    2066 Bedside and Verbal shift change report given to Parrish Mccollum RN (oncoming nurse) by Damian Raya RN   (offgoing nurse). Report included the following information SBAR, Kardex, ED Summary, Procedure Summary, Intake/Output, MAR, Accordion, Recent Results and Med Rec Status.

## 2022-01-13 NOTE — PROGRESS NOTES
Problem: Falls - Risk of  Goal: *Absence of Falls  Description: Document Virlinda Gamma Fall Risk and appropriate interventions in the flowsheet.   Outcome: Progressing Towards Goal  Note: Fall Risk Interventions:  Mobility Interventions: Assess mobility with egress test    Mentation Interventions: Adequate sleep, hydration, pain control    Medication Interventions: Bed/chair exit alarm    Elimination Interventions: Bed/chair exit alarm    History of Falls Interventions: Bed/chair exit alarm

## 2022-01-13 NOTE — PROGRESS NOTES
DC Plan: home with home health    Care manager updated expected transport time to 1900 because hospital bed and BSC have not been delivered yet; have contacted  with this updated information. 1735: verified bed and bedside commode have been delivered to patient home. Care Management Interventions  PCP Verified by CM:  Yes  Mode of Transport at Discharge: Self  Transition of Care Consult (CM Consult): Discharge Planning  Support Systems: Spouse/Significant Other  Confirm Follow Up Transport: Family  The Plan for Transition of Care is Related to the Following Treatment Goals : respiratory distress  The Patient and/or Patient Representative was Provided with a Choice of Provider and Agrees with the Discharge Plan?: Yes  Name of the Patient Representative Who was Provided with a Choice of Provider and Agrees with the Discharge Plan: Geovanna Arreaga, patient  [place] of Choice List was Provided with Basic Dialogue that Supports the Patient's Individualized Plan of Care/Goals, Treatment Preferences and Shares the Quality Data Associated with the Providers?: Yes  Discharge Location  Discharge Placement: Home with family assistance

## 2022-01-13 NOTE — PROGRESS NOTES
2323  Bedside and verbal shift change report given to Reeda Hodgkins, RN (on coming nurse) by Ruthie Aj RN (off going nurse). Report included the following information SBAR, Kardex, OR Summary, Intake/Output and MAR.    0353  Put N/C 2L O2 back on pt.    0716  Bedside and verbal shift change report given by JOSE Snell (off going nurse) to [facility] , RN(on coming nurse). Report included the following information SBAR, Kardex, OR Summary, Intake/Output and MAR.

## 2022-01-13 NOTE — DISCHARGE SUMMARY
Discharge Summary    Patient: Dorie Greenberg MRN: [id number]  CSN: [id number]    YOB: 1945  Age: 68 y.o. Sex: female    DOA: 1/7/2022 LOS:  LOS: 6 days   Discharge Date:      Primary Care Provider:  Abena Hope MD    Admission Diagnoses: Aspiration into airway [T17.908A]  Respiratory distress [R06.03]  COPD (chronic obstructive pulmonary disease) ([facility] [J44.9]    Discharge Diagnoses:    Problem List as of 1/13/2022 Date Reviewed: 1/7/2022          Codes Class Noted - Resolved    Dementia ([facility] ICD-10-CM: F03.90  ICD-9-CM: 294.20  1/10/2022 - Present        COPD (chronic obstructive pulmonary disease) ([facility] ICD-10-CM: J44.9  ICD-9-CM: 496  1/7/2022 - Present        * (Principal) Respiratory distress ICD-10-CM: R06.03  ICD-9-CM: 786.09  1/7/2022 - Present        Aspiration into airway ICD-10-CM: T17.908A  ICD-9-CM: 934.9  1/7/2022 - Present        Sepsis due to Escherichia coli with acute organ dysfunction without septic shock ([facility] ICD-10-CM: A41.51, R65.20  ICD-9-CM: 038.42, 995.92  6/24/2020 - Present        Pyelonephritis ICD-10-CM: N12  ICD-9-CM: 590.80  6/24/2020 - Present        Bacteremia ICD-10-CM: R78.81  ICD-9-CM: 790.7  6/22/2020 - Present        Parkinson's disease ([facility] ICD-10-CM: G20  ICD-9-CM: 332.0  Unknown - Present        Psychiatric disorder ICD-10-CM: F99  ICD-9-CM: 298.9  Unknown - Present    Overview Signed 6/21/2020  2:46 PM by Edinson Elena MD     bipolar; short term memory lose             Hypertension ICD-10-CM: I10  ICD-9-CM: 401.9  Unknown - Present        UTI (urinary tract infection) ICD-10-CM: N39.0  ICD-9-CM: 599.0  Unknown - Present        Sepsis ([address] ICD-10-CM: A41.9  ICD-9-CM: 038.9, 995.91  Unknown - Present        Fall at home ICD-10-CM: [address] Canoolz  ICD-9-CM: [id number], E849.0  6/21/2020 - Present        RESOLVED: Vision blurred ICD-10-CM: H53.8  ICD-9-CM: 368.8  12/9/2014 - 12/9/2014        RESOLVED: Vision blurred ICD-10-CM: H53.8  ICD-9-CM: 368.8 9/23/2014 - 9/23/2014              Discharge Medications:     Current Discharge Medication List      START taking these medications    Details   rOPINIRole (REQUIP) 0.25 mg tablet Take 1 Tablet by mouth two (2) times a day. Qty: 60 Tablet, Refills: 0  Start date: 1/13/2022      doxycycline (MONODOX) 100 mg capsule Take 1 Capsule by mouth two (2) times a day. Qty: 10 Capsule, Refills: 0  Start date: 1/13/2022         CONTINUE these medications which have CHANGED    Details   amLODIPine (NORVASC) 5 mg tablet Take 1 Tablet by mouth daily. Qty: 30 Tablet, Refills: 0  Start date: 1/14/2022         CONTINUE these medications which have NOT CHANGED    Details   !! gabapentin (NEURONTIN) 100 mg capsule Take 200 mg by mouth every twelve (12) hours every twelve (12) hours. 2 caps am, 1 cap afternoon, 2 caps pm       omeprazole (PRILOSEC) 40 mg capsule Take 40 mg by mouth daily. memantine (NAMENDA) 10 mg tablet Take 10 mg by mouth daily. primidone (MYSOLINE) 50 mg tablet Take 50 mg by mouth two (2) times daily as needed. Indications: essential tremor      DULoxetine (CYMBALTA) 60 mg capsule Take 60 mg by mouth two (2) times a day. divalproex DR (DEPAKOTE) 500 mg tablet Take 1,000 mg by mouth nightly. carbidopa-levodopa (SINEMET)  mg per tablet Take 1 Tab by mouth three (3) times daily. !! gabapentin (NEURONTIN) 100 mg capsule Take 100 mg by mouth every twenty-four (24) hours. In the afternoon in addition to 200mg in am and pm      trospium (SANCTURA XL) 60 mg capsule Take 60 mg by mouth Daily (before breakfast). valbenazine (Ingrezza) 80 mg cap Take 80 mg by mouth daily. donepezil (ARICEPT) 10 mg tablet Take 10 mg by mouth nightly. simvastatin (ZOCOR) 20 mg tablet Take 20 mg by mouth nightly. !! - Potential duplicate medications found. Please discuss with provider.           Discharge Condition: Fair    Procedures : none    Consults: None      PHYSICAL EXAM   Visit Vitals  BP (!) 113/53 (BP 1 Location: Right arm)   Pulse 66   Temp 97.8 °F (36.6 °C)   Resp 16   Ht 5' 4\" (1.626 m)   Wt 72.6 kg (160 lb 0.9 oz)   SpO2 98%   BMI 27.47 kg/m²                                    Admission HPI :   Ilene Hammer is a 68 y.o.  female who has history of Parkinson's dementia supranuclear palsy presents to the emergency room with worsening cough congestion and dyspnea. In the emergency room she was found to be in respiratory distress with increased heart rate and hypoxemia she was suctioned with thick globs of mucus removal she was started on oxygen with improvement of her distress. CTA done in the emergency room showed no PE a rapid Covid test was negative patient is vaccinated and boosted from the COVID-19 virus she has no known exposures. She lives with her  of 62 years who is an smoker she herself does not smoke. The  states that over the last 8 months she has had increasing difficulty with walking and moving as well as decreased appetite and trouble with choking on foods. She has had swallowing test and evaluations in the past which confirm hiatal hernia and reflux      Hospital Course   1. Acute respiratory distress due to aspiration pneumonia with evidence of coronavirus upon viral swabs she was treated in the hospital with duo nebs Zosyn she was transitioned to [facility] as an outpatient    2. Parkinson's PT and OT were consulted she was at her baseline she was continued on her Sinemet and Requip neurology was consulted they recommended that she follow-up with her regular neurologist CT scan was ordered which showed no significant  3. Probable COPD she was treated with DuoNeb    4. Dementia she was placed on fall and aspiration precautions was continued on her baseline Namenda    5.   Hypertension she was started on low-dose amlodipine    Home health was offered  Hospital bed delivered  Patient will benefit from urgent care home PT OT  Activity: Activity as tolerated    Diet: Cardiac Diet    Follow-up: pcp  neuro    Disposition: home with [facility]    Minutes spent on discharge: *335      Labs: Results:       Chemistry Recent Labs     01/13/22  0530 01/12/22  0507 01/11/22  0145   GLU 71* 75 89    142 140   K 3.5 3.8 3.3*    105 104   CO2 30 33* 32   BUN 8 9 9   CREA 0.47* 0.51* 0.58*   CA 8.3* 8.3* 8.4*   AGAP 6 4 4   BUCR 17 18 16      CBC w/Diff No results for input(s): WBC, RBC, HGB, HCT, PLT, GRANS, LYMPH, EOS, HGBEXT, HCTEXT, PLTEXT, HGBEXT, HCTEXT, PLTEXT in the last 72 hours. Cardiac Enzymes No results for input(s): CPK, CKND1, BABITA in the last 72 hours. No lab exists for component: CKRMB, TROIP   Coagulation No results for input(s): PTP, INR, APTT, INREXT, INREXT in the last 72 hours. Lipid Panel No results found for: CHOL, CHOLPOCT, CHOLX, CHLST, CHOLV, 481490, HDL, HDLP, LDL, LDLC, DLDLP, [id number], VLDLC, VLDL, TGLX, TRIGL, TRIGP, TGLPOCT, CHHD, CHHDX   BNP No results for input(s): BNPP in the last 72 hours. Liver Enzymes No results for input(s): TP, ALB, TBIL, AP in the last 72 hours. No lab exists for component: SGOT, GPT, DBIL   Thyroid Studies No results found for: T4, T3U, TSH, TSHEXT, TSHEXT         Significant Diagnostic Studies: CT HEAD WO CONT    Result Date: 1/9/2022  EXAM: CT head without contrast  CLINICAL INDICATION/HISTORY: Ambulatory dysfunction, dysphasia    --Additional history: None. COMPARISON: None. TECHNIQUE: Axial CT imaging of the head was performed without intravenous contrast. One or more dose reduction techniques were used on this CT: automated exposure control, adjustment of the mAs and/or kVp according to patient size, and iterative reconstruction techniques. The specific techniques used on this CT exam have been documented in the patient's electronic medical record.  Digital Imaging and Communications in Medicine (DICOM) format image data are available to nonaffiliated external healthcare facilities or entities on a secure, media free, reciprocally searchable basis with patient authorization for at least a 12 month period after this study. _______________ FINDINGS: CALVARIUM: Intact. SOFT TISSUES/SCALP: Normal. SINUSES: Minimal inflammatory mucosal thickening within the maxillary sinuses. BRAIN AND POSTERIOR FOSSA: There is proportional enlargement of the ventricles and cerebral spinal fluid spaces consistent with generalized cerebral volume loss. There is no intracranial hemorrhage, mass effect, or midline shift. There are scattered periventricular and subcortical white matter hypodensities present consistent with nonspecific gliosis. This is most commonly associated with sequelae of chronic microvascular ischemia. Small hypodensities within the left thalamus and bilateral basal ganglia likely represent lacunar infarcts. EXTRA-AXIAL SPACES AND MENINGES: There are no abnormal extra-axial fluid collections. OTHER: None. _______________     1. No acute intracranial abnormalities. Specifically, no hemorrhage, mass effect or CT evident acute cortical infarction. 2. Generalized cerebral volume loss with sequelae of chronic microvascular ischemia. CTA CHEST W OR W WO CONT    Result Date: 1/7/2022  EXAM: CTA Chest INDICATION: Shortness of breath, difficulty breathing, tachycardia. COMPARISON: CT chest 12/29/2018 TECHNIQUE: Axial CT imaging from the thoracic inlet through the diaphragm with intravenous contrast utilizing CTA study for pulmonary artery evaluation. Coronal and sagittal MIP reformations were generated at a separate workstation. One or more dose reduction techniques were used on this CT: automated exposure control, adjustment of the mAs and/or kVp according to patient size, and iterative reconstruction techniques. The specific techniques used on this CT exam have been documented in the patient's electronic medical record.   Digital Imaging and Communications in Medicine (DICOM) format image data are available to nonaffiliated external healthcare facilities or entities on a secure, media free, reciprocally searchable basis with patient authorization for at least a 12-month period after this study. _______________ FINDINGS: EXAM QUALITY: Overall exam quality is adequate. Pulmonary arterial enhancement is adequate. The breath hold is satisfactory. PULMONARY ARTERIES: No convincing evidence of pulmonary embolism. MEDIASTINUM: Normal cardiac size. Thoracic aorta normal in course and caliber. The majority of the stomach is intrathoracic in position. Gastroduodenal junction is below the diaphragm. Mild stranding noted surrounding the intrathoracic esophagus. No mediastinal fluid collection. Included portions of the stomach within the thorax appear to enhance normally. No pneumatosis. LYMPH NODES: No enlarged mediastinal or hilar nodes by size criteria. AIRWAY: Unremarkable. LUNGS: Bibasilar atelectasis. No alveolar consolidation. Scattered areas of mosaic pulmonary attenuation. No significant abnormal septal line thickening. PLEURA: No pleural effusion or pneumothorax. UPPER ABDOMEN: Nonspecific gallbladder distention without evidence of radiopaque gallstone. . OTHER: No acute or aggressive osseous abnormalities identified. _______________     1. No evidence of pulmonary embolism. 2.  Large hiatus hernia, increase in magnitude from examination 12/29/2018. 3. Mild soft tissue stranding involving the intrathoracic esophagus; a nonspecific finding potentially related to reflux disease and/or esophagitis. 4. Dependent changes of atelectasis without pneumothorax or pleural effusion. 5. Mild mosaic pulmonary attenuation, as can be seen with small airways or small vessels disease.     XR CHEST PORT    Result Date: 1/7/2022  EXAM: XR CHEST PORT CLINICAL INDICATION/HISTORY: cough, difficulty breathing -Additional: None COMPARISON: 6/21/2020 TECHNIQUE: Portable frontal view of the chest _______________ FINDINGS: SUPPORT DEVICES: None. HEART AND MEDIASTINUM: Cardiomediastinal silhouette within normal limits. LUNGS AND PLEURAL SPACES: No dense consolidation, large effusion or pneumothorax. _______________     No acute cardiopulmonary abnormality. DUPLEX LOWER EXT VENOUS BILAT    Result Date: 1/9/2022  · No evidence of deep vein thrombosis in the right lower extremity. · No evidence of deep vein thrombosis in the left lower extremity. No results found for this or any previous visit.         CC: Gabriela Ambrocio MD

## 2022-01-13 NOTE — PROGRESS NOTES
Comprehensive Nutrition Assessment    Type and Reason for Visit: Initial,RD nutrition re-screen/LOS    Nutrition Recommendations/Plan: Will order ensure pudding TID to help meet nutritional needs. Nutrition Assessment:  PMHx: parkinson's dementia. admitted with acute respiratory distress, COVID positive. Per H&P, decreased appetite x8 month.     01/13/22 1206   Malnutrition Assessment   Context of Malnutrition Acute illness   Chronic Illness - Energy Intake 7 - 75% or less est energy requirements for 1 month or longer   Chronic Illness - Weight Loss 7.00 - Greater than 10% over 6 months   Chronic Illness - Body Fat Loss Unable to assess   Chronic Illness - Muscle Mass Loss Unable to assess   Chronic Illness - Malnutrition Score  14   Malnutrition Status Severe malnutrition     Estimated Daily Nutrient Needs:  Energy (kcal): 4056; Weight Used for Energy Requirements: Current  Protein (g): 73-87; Weight Used for Protein Requirements: Current (1-1.2g)  Fluid (ml/day): 4353; Method Used for Fluid Requirements: 1 ml/kcal    Nutrition Related Findings:  Labs and med reviewed. BM 1/11. Pt on soft and bite sized diet since 1/8. per flowsheet documentation- PO has been less than 50% of meals. Per chart review, pt lost 15% of weight x7m- significant weight loss. Most recent documentation of 1-25% this AM.      Wounds:    None       Current Nutrition Therapies:  ADULT DIET Dysphagia - Soft & Bite Sized; Mildly Thick (Nectar)    Anthropometric Measures:  Height:  5' 4\" (162.6 cm)  Current Body Wt:  72.6 kg (160 lb 0.9 oz)   Admission Body Wt:  160 lb    Usual Body Wt:  86.2 kg (190 lb) (6/7/2021 Offive visit note; -15.8% x7m)     Ideal Body Wt:  120 lbs:  133.4 %   BMI Category: Overweight (BMI 25.0-29. 9)       Nutrition Diagnosis:   Severe malnutrition related to inadequate protein-energy intake as evidenced by weight loss greater than or equal to 10% in 6 months,poor intake prior to admission,intake 0-25%    Nutrition Interventions:   Food and/or Nutrient Delivery: Continue current diet,Start oral nutrition supplement  Nutrition Education and Counseling: No recommendations at this time  Coordination of Nutrition Care: Continue to monitor while inpatient    Goals:  PO intake >50% of most meals and supplements throughout the next 3-5 days       Nutrition Monitoring and Evaluation:   Behavioral-Environmental Outcomes: None identified  Food/Nutrient Intake Outcomes: Diet advancement/tolerance,Food and nutrient intake,Supplement intake  Physical Signs/Symptoms Outcomes: Biochemical data,Meal time behavior,Skin,Weight,GI status,Nausea/vomiting    Discharge Planning:    Continue current diet,Continue oral nutrition supplement     Electronically signed by Miguel Angel Pantoja RD on 1/13/2022 at 12:07 PM

## 2022-01-13 NOTE — PROGRESS NOTES
Physician Progress Note      Daria Melchor  CSN #:                  [id number]  :                       1945  ADMIT DATE:       2022 11:21 AM  DISCH DATE:  RESPONDING  PROVIDER #:        Wanda CORREA MD          QUERY TEXT:    Pt admitted with worsening cough congestion and dyspnea. Per ED provider \"presents to the emergency department in acute respiratory distress\". Please further specify and document in progress notes and discharge summary if you are evaluating or treating any of the following: The medical record reflects the following:    Risk Factors: Advanced age (67Yrs), Parkinson's, probable COPD    Clinical Indicators:  > Per ED provider \"presents to the emergency department in acute respiratory distress Respiratory distress\"  > RR 23, 24  > Presented with worsening cough congestion and dyspnea  > ABGs 2022 13:40  pH (POC): 7.39  pCO2 (POC): 49.3 (H)  pO2 (POC): 68 (L)  HCO3 (POC): 29.6 (H)  sO2 (POC): 92.6  Base excess (POC): 3.5  >  Coronavirus 229E NOTD   Detected Abnormal    Treatment: Receiving isolation-airborne and contact, droplet, supplemental O2    Thank you,  Neomi Bosworth, RN, BSN, [facility]  [phone]  Options provided:  -- Acute Respiratory Distress Syndrome  -- Acute respiratory failure with hypoxia  -- Other - I will add my own diagnosis  -- Disagree - Not applicable / Not valid  -- Disagree - Clinically unable to determine / Unknown  -- Refer to Clinical Documentation Reviewer    PROVIDER RESPONSE TEXT:    This patient is in acute respiratory failure with hypoxia.     Query created by: Jaydon Jordan on 1/10/2022 10:08 AM      Electronically signed by:  Wanda Duvall MD 2022 8:38 PM

## 2022-01-13 NOTE — PROGRESS NOTES
Problem: Mobility Impaired (Adult and Pediatric)  Goal: *Acute Goals and Plan of Care (Insert Text)  Outcome: Progressing Towards Goal  Note: Physical Therapy Goals   Initiated 1/13/2022 and to be accomplished within 7 day(s)  1. Patient will move from supine <> sit with min A in prep for out of bed activity and change of position. 2.  Patient will demonstrate intact static sitting balance EOB with SB/CGA x 15 min for ADL/balance activity performance. 3.  Patient will perform sit<> stand with min/mod A with RW in prep for transfers/ambulation. 4.  Patient will transfer from bed <> chair with min/mod A with RW for time up in chair for completion of ADL activity. 5.  Patient will ambulate 10 feet with min/mod A with RW for improved functional mobility at discharge. PHYSICAL THERAPY EVALUATION    Patient: Guillermina Coyle (89 y.o. female)  Date: 1/13/2022  Primary Diagnosis: Aspiration into airway [T17.908A]  Respiratory distress [R06.03]  COPD (chronic obstructive pulmonary disease) ([facility] [J44.9]  Precautions:   Fall  PLOF: per pt spouse, pt able to complete tranfers w/c to commode with assist for STS and to take steps PTA. ASSESSMENT :  Based on the objective data described below, the patient is seen on telemetry unit on Droplet precautions. Pt presents with flat affect, alert and oriented to person only, initially responsive to verbalizations w/o cueing to answer, but became more drowsy and with incr'd time or no response as session progressed. Pt with decr'd ROM/motor performance BLE's, decr'd balance and limitations in overall functional mobility including bed mobility, transfers and gait. O2 sat on % and remains t/o session. Required mod assist to roll toward R and transition supine to sit. Decr'd static sitting balance EOB; pt frequently holding hands together on lap and required vc/tc to use UE's for support. STS trial with RW/max A of 2.   Poor standing balance <10sec and pt began to sit self back down. Decr'd alertness and decr'd activity tolerance midway and toward end of session. Required total A of 2 sit to supine and to shift up in bed. Pt left with HOB elevated, with all needs in reach, spouse present and nurse notified. At this point, pt may benefit from continued IP PT and from HHPT vs SNF at discharge. Patient will benefit from skilled intervention to address the above impairments. Pt Education: Role of physical therapy in acute care setting, fall prevention and safety/technique during functional mobility tasks    Patient's rehabilitation potential is considered to be Fair  Factors which may influence rehabilitation potential include:   []         None noted  [x]         Mental ability/status  [x]         Medical condition  [x]         Home/family situation and support systems  [x]         Safety awareness  []         Pain tolerance/management  []         Other:      PLAN :  Recommendations and Planned Interventions:   [x]           Bed Mobility Training             [x]    Neuromuscular Re-Education  [x]           Transfer Training                   []    Orthotic/Prosthetic Training  [x]           Gait Training                          []    Modalities  [x]           Therapeutic Exercises           []    Edema Management/Control  [x]           Therapeutic Activities            [x]    Family Training/Education  [x]           Patient Education  []           Other (comment):    Frequency/Duration: Patient will be followed by physical therapy daily to address goals. Discharge Recommendations: Home Physical Therapy vs SNF  Further Equipment Recommendations for Discharge: hospital bed     SUBJECTIVE:   Patient stated \" I feel fine.     OBJECTIVE DATA SUMMARY:     Past Medical History:   Diagnosis Date    Arthritis     Hypertension 15yrs    Ill-defined condition     h/o dizzy    Parkinson's disease ([facility]     Psychiatric disorder     bipolar; short term memory lose    Psychiatric disorder     depressiom     Past Surgical History:   Procedure Laterality Date    HX CATARACT REMOVAL Left     HX HYSTERECTOMY       Barriers to Learning/Limitations: yes;  sensory deficits-vision/hearing/speech, physical, and altered mental status (i.e.Sedation, Confusion)  Compensate with: Visual Cues, Verbal Cues, and Tactile Cues  Home Situation:  Home Situation  Home Environment: Private residence  # Steps to Enter: 1  Rails to Enter: No  One/Two Story Residence: One story  Living Alone: No  Support Systems: Spouse/Significant Other  Patient Expects to be Discharged to[de-identified] House  Current DME Used/Available at Home: Walker,Wheelchair,Grab bars  Critical Behavior:  Neurologic State: Alert  Orientation Level: Oriented to person  Cognition: Decreased attention/concentration; Follows commands  Safety/Judgement: Decreased awareness of environment;Decreased awareness of need for assistance;Decreased awareness of need for safety;Decreased insight into deficits  Psychosocial  Patient Behaviors: Calm; Cooperative  Family  Behaviors: Calm;Supportive; Hypervigilent  Caritas Process: Nurture loving kindness  Caring Interventions: Reassure; Therapeutic modalities  Reassure: Acceptance; Therapeutic listening  Skin Condition/Temp: Cool;Clammy; Inflamed  Family  Behaviors: Calm;Supportive  Skin Integrity: Excoriation (buttock/groin redness/excoriation)  Skin Integumentary  Skin Color: Red  Skin Condition/Temp: Cool;Clammy; Inflamed  Skin Integrity: Excoriation (buttock/groin redness/excoriation)  Strength:    Strength: Generally decreased, functional  Tone & Sensation:   Tone: Normal  Sensation: Intact  Range Of Motion:  AROM: Generally decreased, functional  Functional Mobility:  Bed Mobility:  Rolling: Moderate assistance; Additional time (vc/tc)  Supine to Sit: Moderate assistance (v/tc)  Sit to Supine: Total assistance;Assist x2  Scooting:  Total assistance;Assist x2;Bed Modified  Transfers:  Sit to Stand: Assist x2;Maximum assistance  Stand to Sit: Maximum assistance; Total assistance;Assist x2  Balance:   Sitting: Impaired; With support; Without support  Sitting - Static: Fair (occasional) (fair -)  Sitting - Dynamic: Poor (constant support) (fair -)  Standing: Impaired;Pull to stand; With support  Standing - Static: Poor  Standing - Dynamic : Not tested  Pain:  Pain level pre-treatment: 0/10   Pain level post-treatment: 0/10   Pain Intervention(s) : Medication (see MAR); Rest, Ice, Repositioning  Response to intervention: Nurse notified, See doc flow  Activity Tolerance:   Fair   Please refer to the flowsheet for vital signs taken during this treatment. After treatment:   []         Patient left in no apparent distress sitting up in chair  [x]         Patient left in no apparent distress in bed  [x]         Call bell left within reach  [x]         Nursing notified  [x]         Caregiver present  []         Bed alarm activated  []         SCDs applied    COMMUNICATION/EDUCATION:   []         Role of Physical Therapy in the acute care setting. []         Fall prevention education was provided and the patient/caregiver indicated understanding. []         Patient/family have participated as able in goal setting and plan of care. []         Patient/family agree to work toward stated goals and plan of care. []         Patient understands intent and goals of therapy, but is neutral about his/her participation. [x]         Patient is unable to participate in goal setting/plan of care: ongoing with therapy staff.   [x]         Other: pt spouse present and supportive    Thank you for this referral.  Markel Xie, PT   Time Calculation: 46 mins      Eval Complexity: History: HIGH Complexity :3+ comorbidities / personal factors will impact the outcome/ POC Exam:HIGH Complexity : 4+ Standardized tests and measures addressing body structure, function, activity limitation and / or participation in recreation  Presentation: MEDIUM Complexity : Evolving with changing characteristics  Clinical Decision Making:High Complexity    Overall Complexity:MEDIUM

## 2022-01-13 NOTE — DISCHARGE INSTRUCTIONS
DISCHARGE SUMMARY from Nurse    PATIENT INSTRUCTIONS:    After general anesthesia or intravenous sedation, for 24 hours or while taking prescription Narcotics:  · Limit your activities  · Do not drive and operate hazardous machinery  · Do not make important personal or business decisions  · Do  not drink alcoholic beverages  · If you have not urinated within 8 hours after discharge, please contact your surgeon on call. Report the following to your surgeon:  · Excessive pain, swelling, redness or odor of or around the surgical area  · Temperature over 100.5  · Nausea and vomiting lasting longer than 4 hours or if unable to take medications  · Any signs of decreased circulation or nerve impairment to extremity: change in color, persistent  numbness, tingling, coldness or increase pain  · Any questions    What to do at Home:  Recommended activity: Activity as tolerated,     If you experience any of the following symptoms cough, difficulty breathing, shortness of breath, coughing up colored sputum, fever above 100.5, please follow up with 911. *  Please give a list of your current medications to your Primary Care Provider. *  Please update this list whenever your medications are discontinued, doses are      changed, or new medications (including over-the-counter products) are added. *  Please carry medication information at all times in case of emergency situations. These are general instructions for a healthy lifestyle:    No smoking/ No tobacco products/ Avoid exposure to second hand smoke  Surgeon General's Warning:  Quitting smoking now greatly reduces serious risk to your health.     Obesity, smoking, and sedentary lifestyle greatly increases your risk for illness    A healthy diet, regular physical exercise & weight monitoring are important for maintaining a healthy lifestyle    You may be retaining fluid if you have a history of heart failure or if you experience any of the following symptoms: Weight gain of 3 pounds or more overnight or 5 pounds in a week, increased swelling in our hands or feet or shortness of breath while lying flat in bed. Please call your doctor as soon as you notice any of these symptoms; do not wait until your next office visit. The discharge information has been reviewed with the spouse. The spouse verbalized understanding. Discharge medications reviewed with the spouse and appropriate educational materials and side effects teaching were provided. ___________________________________________________________________________________________________________________________________      Patient Education        COPD Exacerbation Plan: Care Instructions  Your Care Instructions     If you have chronic obstructive pulmonary disease (COPD), your usual shortness of breath could suddenly get worse. You may start coughing more and have more mucus. This flare-up is called a COPD exacerbation (say \"lk-[id number]-cl-BAY-shun\"). A lung infection or air pollution could set off an exacerbation. Sometimes it can happen after a quick change in temperature or being around chemicals. Work with your doctor to make a plan for dealing with an exacerbation. You can better manage it if you plan ahead. Follow-up care is a key part of your treatment and safety. Be sure to make and go to all appointments, and call your doctor if you are having problems. It's also a good idea to know your test results and keep a list of the medicines you take. How can you care for yourself at home? During an exacerbation  · Do not panic if you start to have one. Quick treatment at home may help you prevent serious breathing problems. If you have a COPD exacerbation plan that you developed with your doctor, follow it. · Take your medicines exactly as your doctor tells you.  ? Use your inhaler as directed by your doctor.  If your symptoms do not get better after you use your medicine, have someone take you to the emergency room. Call an ambulance if necessary. ? With inhaled medicines, a spacer or a nebulizer may help you get more medicine to your lungs. Ask your doctor or pharmacist how to use them properly. Practice using the spacer in front of a mirror before you have an exacerbation. This may help you get the medicine into your lungs quickly. ? If your doctor has given you steroid pills, take them as directed. ? Your doctor may have given you a prescription for antibiotics, which you can fill if you need it. ? Talk to your doctor if you have any problems with your medicine. And call your doctor if you have to use your antibiotic or steroid pills. Preventing an exacerbation  · Do not smoke. This is the most important step you can take to prevent more damage to your lungs and prevent problems. If you already smoke, it is never too late to stop. If you need help quitting, talk to your doctor about stop-smoking programs and medicines. These can increase your chances of quitting for good. · Take your daily medicines as prescribed. · Avoid colds and flu. ? Get a pneumococcal vaccine. ? Get a flu vaccine each year, as soon as it is available. Ask those you live or work with to do the same, so they will not get the flu and infect you. ? Try to stay away from people with colds or the flu. ? Wash your hands often. · Avoid secondhand smoke; air pollution; cold, dry air; hot, humid air; and high altitudes. Stay at home with your windows closed when air pollution is bad. · Learn breathing techniques for COPD, such as breathing through pursed lips. These techniques can help you breathe easier during an exacerbation. When should you call for help? Call 911 anytime you think you may need emergency care. For example, call if:    · You have severe trouble breathing.     · You have severe chest pain.    Call your doctor now or seek immediate medical care if:    · You have new or worse shortness of breath.     · You develop new chest pain.     · You are coughing more deeply or more often, especially if you notice more mucus or a change in the color of your mucus.     · You cough up blood.     · You have new or increased swelling in your legs or belly.     · You have a fever. Watch closely for changes in your health, and be sure to contact your doctor if:    · You need to use your antibiotic or steroid pills.     · Your symptoms are getting worse. Where can you learn more? Go to http://www.gray.com/  Enter U536 in the search box to learn more about \"COPD Exacerbation Plan: Care Instructions. \"  Current as of: July 6, 2021               Content Version: 13.0  © 8945-5533 [employer]. Care instructions adapted under license by [employer] (which disclaims liability or warranty for this information). If you have questions about a medical condition or this instruction, always ask your healthcare professional. [place] [address] any warranty or liability for your use of this information. Patient Education        Learning About COPD and Clearing Your Lungs  How does clearing your lungs affect COPD? When you have COPD, you may have too much mucus in your lungs. Learning to clear your lungs may help you save energy and improves your breathing. It may also help prevent lung infections. There are three ways to clear your lungs:  · Controlled coughing  · Postural drainage  · Chest percussion  How do you do controlled coughing? Coughing is how your body tries to get rid of mucus. But the kind of coughing you cannot control makes things worse. It causes your airways to close. It also traps the mucus in your lungs. Controlled coughing comes from deep in your lungs. It loosens mucus and moves it though your airways. It is best to do it after you use your inhaler or other medicine. 1. Sit on the edge of a chair. Keep both feet on the floor. 2. Lean forward a little. Relax.  3. Breathe in slowly through your nose. Fold your arms over your belly. 4. Lean forward as you breathe out. Push your arms against your belly. 5. Cough 2 or 3 times as you exhale with your mouth slightly open. Make the coughs short and sharp. Push on your belly with your arms as you cough. The first cough brings the mucus through the lung airways. The next coughs bring it up and out. 6. Inhale again, but do it slowly and gently through your nose. Do not take quick or deep breaths through your mouth. It can block the mucus coming out of the lungs. It also can cause uncontrolled coughing. 7. Rest, and repeat if you need to. How do you do postural drainage? Postural drainage means lying down in different positions to help drain mucus from your lungs. Hold each position for 5 minutes. Do it about 30 minutes after you use your inhaler. Make sure you have an empty stomach. If you need to cough, sit up and do controlled coughing. 1. To drain the front of your lungs: Make sure your chest is lower than your hips. Put two pillows under your hips. Use a small pillow under your head. Keep your arms at your sides. Then follow these instructions for breathing:  ? Breathe in: With one hand on your belly and the other on your chest, breathe in. Push your belly out as far as possible. You should be able to feel the hand on your belly move out, while the hand on your chest should not move. ? Breathe out: When you breathe out, you should be able to feel the hand on your belly move in. This is called diaphragmatic breathing. You will use it in the other drainage positions too. 2. To drain the sides of your lungs: Place two or three pillows under your hips. Use a small pillow under your head. Make sure your chest is lower than your hips. Use diaphragmatic breathing. After 5 or 10 minutes, switch sides. 3. To drain the back of your lungs: Place two or three pillows under your hips. Use a small pillow under your head. Kneel over the pillows. Place your arms by your head. Use diaphragmatic breathing. How do you do chest percussion? Chest percussion means that you lightly tap your chest and back. The tapping loosens the mucus in your lungs. · Cup your hand, and lightly tap your chest and back. · Ask your doctor where the best spots are to tap. Avoid your spine and breastbone. · It may be easier to have someone do the tapping for you. Follow-up care is a key part of your treatment and safety. Be sure to make and go to all appointments, and call your doctor if you are having problems. It's also a good idea to know your test results and keep a list of the medicines you take. Where can you learn more? Go to http://www.gray.com/  Enter Q948 in the search box to learn more about \"Learning About COPD and Clearing Your Lungs. \"  Current as of: July 6, 2021               Content Version: 13.0  © 2006-2021 [employer]. Care instructions adapted under license by [employer] (which disclaims liability or warranty for this information). If you have questions about a medical condition or this instruction, always ask your healthcare professional. [place] [address] any warranty or liability for your use of this information. Patient Education        DASH Diet: Care Instructions  Your Care Instructions     The DASH diet is an eating plan that can help lower your blood pressure. DASH stands for Dietary Approaches to Stop Hypertension. Hypertension is high blood pressure. The DASH diet focuses on eating foods that are high in calcium, potassium, and magnesium. These nutrients can lower blood pressure. The foods that are highest in these nutrients are fruits, vegetables, low-fat dairy products, nuts, seeds, and legumes. But taking calcium, potassium, and magnesium supplements instead of eating foods that are high in those nutrients does not have the same effect.  The DASH diet also includes whole grains, fish, and poultry. The DASH diet is one of several lifestyle changes your doctor may recommend to lower your high blood pressure. Your doctor may also want you to decrease the amount of sodium in your diet. Lowering sodium while following the DASH diet can lower blood pressure even further than just the DASH diet alone. Follow-up care is a key part of your treatment and safety. Be sure to make and go to all appointments, and call your doctor if you are having problems. It's also a good idea to know your test results and keep a list of the medicines you take. How can you care for yourself at home? Following the DASH diet  · Eat 4 to 5 servings of fruit each day. A serving is 1 medium-sized piece of fruit, ½ cup chopped or canned fruit, 1/4 cup dried fruit, or 4 ounces (½ cup) of fruit juice. Choose fruit more often than fruit juice. · Eat 4 to 5 servings of vegetables each day. A serving is 1 cup of lettuce or raw leafy vegetables, ½ cup of chopped or cooked vegetables, or 4 ounces (½ cup) of vegetable juice. Choose vegetables more often than vegetable juice. · Get 2 to 3 servings of low-fat and fat-free dairy each day. A serving is 8 ounces of milk, 1 cup of yogurt, or 1 ½ ounces of cheese. · Eat 6 to 8 servings of grains each day. A serving is 1 slice of bread, 1 ounce of dry cereal, or ½ cup of cooked rice, pasta, or cooked cereal. Try to choose whole-grain products as much as possible. · Limit lean meat, poultry, and fish to 2 servings each day. A serving is 3 ounces, about the size of a deck of cards. · Eat 4 to 5 servings of nuts, seeds, and legumes (cooked dried beans, lentils, and split peas) each week. A serving is 1/3 cup of nuts, 2 tablespoons of seeds, or ½ cup of cooked beans or peas. · Limit fats and oils to 2 to 3 servings each day. A serving is 1 teaspoon of vegetable oil or 2 tablespoons of salad dressing.   · Limit sweets and added sugars to 5 servings or less a week. A serving is 1 tablespoon jelly or jam, ½ cup sorbet, or 1 cup of lemonade. · Eat less than 2,300 milligrams (mg) of sodium a day. If you limit your sodium to 1,500 mg a day, you can lower your blood pressure even more. · Be aware that all of these are the suggested number of servings for people who eat 1,800 to 2,000 calories a day. Your recommended number of servings may be different if you need more or fewer calories. Tips for success  · Start small. Do not try to make dramatic changes to your diet all at once. You might feel that you are missing out on your favorite foods and then be more likely to not follow the plan. Make small changes, and stick with them. Once those changes become habit, add a few more changes. · Try some of the following:  ? Make it a goal to eat a fruit or vegetable at every meal and at snacks. This will make it easy to get the recommended amount of fruits and vegetables each day. ? Try yogurt topped with fruit and nuts for a snack or healthy dessert. ? Add lettuce, tomato, cucumber, and onion to sandwiches. ? Combine a ready-made pizza crust with low-fat mozzarella cheese and lots of vegetable toppings. Try using tomatoes, squash, spinach, broccoli, carrots, cauliflower, and onions. ? Have a variety of cut-up vegetables with a low-fat dip as an appetizer instead of chips and dip. ? Sprinkle sunflower seeds or chopped almonds over salads. Or try adding chopped walnuts or almonds to cooked vegetables. ? Try some vegetarian meals using beans and peas. Add garbanzo or kidney beans to salads. Make burritos and tacos with mashed sprague beans or black beans. Where can you learn more? Go to http://www.caro.com/  Enter H967 in the search box to learn more about \"DASH Diet: Care Instructions. \"  Current as of: April 29, 2021               Content Version: 13.0  © 9866-6028 [employer].    Care instructions adapted under license by [employer] (which disclaims liability or warranty for this information). If you have questions about a medical condition or this instruction, always ask your healthcare professional. [address] any warranty or liability for your use of this information.

## 2022-01-14 NOTE — PROGRESS NOTES
Physician Progress Note      Mehreen Butts  CSN #:                  [id number]  :                       1945  ADMIT DATE:       2022 11:21 AM  DISCH DATE:        2022 7:21 PM  RESPONDING  PROVIDER #:        Josemanuel CORREA MD          QUERY TEXT:    Pt admitted with worsening cough congestion and dyspnea and found to have aspiration pna. Noted to have Covid PCR negative, but Covid 229E positive URI. Please document in progress notes and discharge summary if you are evaluating or treating any of the following: The medical record reflects the following:    Risk Factors: Advanced age (67Yrs), Parkinson's, probable COPD    Clinical Indicators:  > Per PN Respiratory distress  > Presented with worsening cough congestion and dyspnea  >  Coronavirus 229E NOTD   Detected Abnormal  > 1/10- Hospitalist Progress notes: \"covid 9 r/o\"    Treatment: Receiving isolation-airborne and contact, droplet, supplemental O2, Covid testing, Vancomysin, zosyn    Thank you,  Briana Santana RN, BSN, [facility]  [phone]  Options provided:  -- COVID 19 confirmed  -- Other coronavirus confirmed, Covid 19 ruled out  -- Other - I will add my own diagnosis  -- Disagree - Not applicable / Not valid  -- Disagree - Clinically unable to determine / Unknown  -- Refer to Clinical Documentation Reviewer    PROVIDER RESPONSE TEXT:    This patient has other coronavirus confirmed and Covid 19 ruled out.     Query created by: Ashutosh Sahu on 1/10/2022 9:52 AM      Electronically signed by:  Josemanuel CORREA MD 2022 4:16 PM

## 2022-01-15 ENCOUNTER — HOME CARE VISIT (OUTPATIENT)
Dept: SCHEDULING | Facility: HOME HEALTH | Age: 77
End: 2022-01-15

## 2022-03-18 PROBLEM — N12 PYELONEPHRITIS: Status: ACTIVE | Noted: 2020-06-24
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
## 2022-03-19 PROBLEM — J44.9 COPD (CHRONIC OBSTRUCTIVE PULMONARY DISEASE) (HCC): Status: ACTIVE | Noted: 2022-01-07

## 2022-03-19 PROBLEM — R06.03 RESPIRATORY DISTRESS: Status: ACTIVE | Noted: 2022-01-07

## 2022-03-19 PROBLEM — R78.81 BACTEREMIA: Status: ACTIVE | Noted: 2020-06-22

## 2022-03-19 PROBLEM — T17.908A ASPIRATION INTO AIRWAY: Status: ACTIVE | Noted: 2022-01-07

## 2022-03-19 PROBLEM — R65.20 SEPSIS DUE TO ESCHERICHIA COLI WITH ACUTE ORGAN DYSFUNCTION WITHOUT SEPTIC SHOCK (HCC): Status: ACTIVE | Noted: 2020-06-24

## 2022-03-19 PROBLEM — A41.51 SEPSIS DUE TO ESCHERICHIA COLI WITH ACUTE ORGAN DYSFUNCTION WITHOUT SEPTIC SHOCK (HCC): Status: ACTIVE | Noted: 2020-06-24

## 2022-03-19 PROBLEM — F03.90 DEMENTIA (HCC): Status: ACTIVE | Noted: 2022-01-10

## 2022-10-02 ENCOUNTER — APPOINTMENT (OUTPATIENT)
Dept: GENERAL RADIOLOGY | Age: 77
DRG: 057 | End: 2022-10-02
Attending: EMERGENCY MEDICINE
Payer: MEDICARE

## 2022-10-02 ENCOUNTER — APPOINTMENT (OUTPATIENT)
Dept: CT IMAGING | Age: 77
DRG: 057 | End: 2022-10-02
Attending: EMERGENCY MEDICINE
Payer: MEDICARE

## 2022-10-02 ENCOUNTER — HOSPITAL ENCOUNTER (INPATIENT)
Age: 77
LOS: 4 days | Discharge: NURSING, REHAB OR LONG-TERM CARE | DRG: 057 | End: 2022-10-06
Attending: EMERGENCY MEDICINE | Admitting: INTERNAL MEDICINE
Payer: MEDICARE

## 2022-10-02 DIAGNOSIS — R05.1 ACUTE COUGH: Primary | ICD-10-CM

## 2022-10-02 DIAGNOSIS — R53.1 WEAKNESS: ICD-10-CM

## 2022-10-02 DIAGNOSIS — D72.829 LEUKOCYTOSIS, UNSPECIFIED TYPE: ICD-10-CM

## 2022-10-02 PROBLEM — G20 PARKINSON DISEASE (HCC): Status: ACTIVE | Noted: 2022-10-02

## 2022-10-02 PROBLEM — R05.9 COUGH: Status: ACTIVE | Noted: 2022-10-02

## 2022-10-02 PROBLEM — G20.A1 PARKINSON DISEASE: Status: ACTIVE | Noted: 2022-10-02

## 2022-10-02 LAB
ALBUMIN SERPL-MCNC: 3.1 G/DL (ref 3.4–5)
ALBUMIN/GLOB SERPL: 0.9 {RATIO} (ref 0.8–1.7)
ALP SERPL-CCNC: 80 U/L (ref 45–117)
ALT SERPL-CCNC: <6 U/L (ref 13–56)
ANION GAP SERPL CALC-SCNC: 5 MMOL/L (ref 3–18)
APPEARANCE UR: CLEAR
AST SERPL-CCNC: 6 U/L (ref 10–38)
BACTERIA URNS QL MICRO: ABNORMAL /HPF
BASOPHILS # BLD: 0.1 K/UL (ref 0–0.1)
BASOPHILS NFR BLD: 0 % (ref 0–2)
BILIRUB SERPL-MCNC: 0.4 MG/DL (ref 0.2–1)
BILIRUB UR QL: NEGATIVE
BNP SERPL-MCNC: 254 PG/ML (ref 0–1800)
BUN SERPL-MCNC: 12 MG/DL (ref 7–18)
BUN/CREAT SERPL: 18 (ref 12–20)
CALCIUM SERPL-MCNC: 8.7 MG/DL (ref 8.5–10.1)
CHLORIDE SERPL-SCNC: 105 MMOL/L (ref 100–111)
CO2 SERPL-SCNC: 30 MMOL/L (ref 21–32)
COLOR UR: ABNORMAL
COVID-19 RAPID TEST, COVR: NOT DETECTED
CREAT SERPL-MCNC: 0.67 MG/DL (ref 0.6–1.3)
DIFFERENTIAL METHOD BLD: ABNORMAL
EOSINOPHIL # BLD: 0.1 K/UL (ref 0–0.4)
EOSINOPHIL NFR BLD: 0 % (ref 0–5)
ERYTHROCYTE [DISTWIDTH] IN BLOOD BY AUTOMATED COUNT: 15.3 % (ref 11.6–14.5)
GLOBULIN SER CALC-MCNC: 3.6 G/DL (ref 2–4)
GLUCOSE SERPL-MCNC: 110 MG/DL (ref 74–99)
GLUCOSE UR STRIP.AUTO-MCNC: NEGATIVE MG/DL
HCT VFR BLD AUTO: 38.3 % (ref 35–45)
HGB BLD-MCNC: 11.5 G/DL (ref 12–16)
HGB UR QL STRIP: NEGATIVE
IMM GRANULOCYTES # BLD AUTO: 0.1 K/UL (ref 0–0.04)
IMM GRANULOCYTES NFR BLD AUTO: 1 % (ref 0–0.5)
KETONES UR QL STRIP.AUTO: ABNORMAL MG/DL
LACTATE BLD-SCNC: 1.43 MMOL/L (ref 0.4–2)
LEUKOCYTE ESTERASE UR QL STRIP.AUTO: ABNORMAL
LYMPHOCYTES # BLD: 1.2 K/UL (ref 0.9–3.6)
LYMPHOCYTES NFR BLD: 8 % (ref 21–52)
MAGNESIUM SERPL-MCNC: 2.1 MG/DL (ref 1.6–2.6)
MCH RBC QN AUTO: 24.9 PG (ref 24–34)
MCHC RBC AUTO-ENTMCNC: 30 G/DL (ref 31–37)
MCV RBC AUTO: 83.1 FL (ref 78–100)
MONOCYTES # BLD: 1.7 K/UL (ref 0.05–1.2)
MONOCYTES NFR BLD: 11 % (ref 3–10)
MUCOUS THREADS URNS QL MICRO: ABNORMAL /LPF
NEUTS SEG # BLD: 12.1 K/UL (ref 1.8–8)
NEUTS SEG NFR BLD: 80 % (ref 40–73)
NITRITE UR QL STRIP.AUTO: NEGATIVE
NRBC # BLD: 0 K/UL (ref 0–0.01)
NRBC BLD-RTO: 0 PER 100 WBC
PH UR STRIP: 7.5 [PH] (ref 5–8)
PLATELET # BLD AUTO: 176 K/UL (ref 135–420)
PMV BLD AUTO: 10.9 FL (ref 9.2–11.8)
POTASSIUM SERPL-SCNC: 3.9 MMOL/L (ref 3.5–5.5)
PROT SERPL-MCNC: 6.7 G/DL (ref 6.4–8.2)
PROT UR STRIP-MCNC: ABNORMAL MG/DL
RBC # BLD AUTO: 4.61 M/UL (ref 4.2–5.3)
RBC #/AREA URNS HPF: ABNORMAL /HPF (ref 0–5)
SODIUM SERPL-SCNC: 140 MMOL/L (ref 136–145)
SOURCE, COVRS: NORMAL
SP GR UR REFRACTOMETRY: 1.02 (ref 1–1.03)
TROPONIN-HIGH SENSITIVITY: 4 NG/L (ref 0–54)
UROBILINOGEN UR QL STRIP.AUTO: 1 EU/DL (ref 0.2–1)
WBC # BLD AUTO: 15.1 K/UL (ref 4.6–13.2)
WBC URNS QL MICRO: ABNORMAL /HPF (ref 0–5)

## 2022-10-02 PROCEDURE — 74011000258 HC RX REV CODE- 258: Performed by: INTERNAL MEDICINE

## 2022-10-02 PROCEDURE — 80299 QUANTITATIVE ASSAY DRUG: CPT

## 2022-10-02 PROCEDURE — 71045 X-RAY EXAM CHEST 1 VIEW: CPT

## 2022-10-02 PROCEDURE — 65270000029 HC RM PRIVATE

## 2022-10-02 PROCEDURE — 74011250636 HC RX REV CODE- 250/636: Performed by: INTERNAL MEDICINE

## 2022-10-02 PROCEDURE — 74011000250 HC RX REV CODE- 250: Performed by: INTERNAL MEDICINE

## 2022-10-02 PROCEDURE — 87635 SARS-COV-2 COVID-19 AMP PRB: CPT

## 2022-10-02 PROCEDURE — 87040 BLOOD CULTURE FOR BACTERIA: CPT

## 2022-10-02 PROCEDURE — 71260 CT THORAX DX C+: CPT

## 2022-10-02 PROCEDURE — 83735 ASSAY OF MAGNESIUM: CPT

## 2022-10-02 PROCEDURE — 84484 ASSAY OF TROPONIN QUANT: CPT

## 2022-10-02 PROCEDURE — 99285 EMERGENCY DEPT VISIT HI MDM: CPT

## 2022-10-02 PROCEDURE — G0378 HOSPITAL OBSERVATION PER HR: HCPCS

## 2022-10-02 PROCEDURE — 80053 COMPREHEN METABOLIC PANEL: CPT

## 2022-10-02 PROCEDURE — 93005 ELECTROCARDIOGRAM TRACING: CPT

## 2022-10-02 PROCEDURE — 80164 ASSAY DIPROPYLACETIC ACD TOT: CPT

## 2022-10-02 PROCEDURE — 83605 ASSAY OF LACTIC ACID: CPT

## 2022-10-02 PROCEDURE — 74011250637 HC RX REV CODE- 250/637: Performed by: INTERNAL MEDICINE

## 2022-10-02 PROCEDURE — 81001 URINALYSIS AUTO W/SCOPE: CPT

## 2022-10-02 PROCEDURE — 85025 COMPLETE CBC W/AUTO DIFF WBC: CPT

## 2022-10-02 PROCEDURE — 74011000636 HC RX REV CODE- 636: Performed by: EMERGENCY MEDICINE

## 2022-10-02 PROCEDURE — 83880 ASSAY OF NATRIURETIC PEPTIDE: CPT

## 2022-10-02 RX ORDER — ATORVASTATIN CALCIUM 10 MG/1
10 TABLET, FILM COATED ORAL
Status: DISCONTINUED | OUTPATIENT
Start: 2022-10-02 | End: 2022-10-06 | Stop reason: HOSPADM

## 2022-10-02 RX ORDER — DULOXETIN HYDROCHLORIDE 60 MG/1
60 CAPSULE, DELAYED RELEASE ORAL DAILY
Status: DISCONTINUED | OUTPATIENT
Start: 2022-10-03 | End: 2022-10-06 | Stop reason: HOSPADM

## 2022-10-02 RX ORDER — AMLODIPINE BESYLATE 5 MG/1
5 TABLET ORAL DAILY
Status: DISCONTINUED | OUTPATIENT
Start: 2022-10-03 | End: 2022-10-02

## 2022-10-02 RX ORDER — PANTOPRAZOLE SODIUM 40 MG/1
40 TABLET, DELAYED RELEASE ORAL
Status: DISCONTINUED | OUTPATIENT
Start: 2022-10-03 | End: 2022-10-06 | Stop reason: HOSPADM

## 2022-10-02 RX ORDER — CARBIDOPA AND LEVODOPA 25; 100 MG/1; MG/1
1 TABLET ORAL 3 TIMES DAILY
Status: DISCONTINUED | OUTPATIENT
Start: 2022-10-02 | End: 2022-10-02

## 2022-10-02 RX ORDER — CARBIDOPA AND LEVODOPA 25; 100 MG/1; MG/1
2 TABLET ORAL 3 TIMES DAILY
Status: DISCONTINUED | OUTPATIENT
Start: 2022-10-02 | End: 2022-10-06 | Stop reason: HOSPADM

## 2022-10-02 RX ORDER — ENOXAPARIN SODIUM 100 MG/ML
40 INJECTION SUBCUTANEOUS EVERY 24 HOURS
Status: DISCONTINUED | OUTPATIENT
Start: 2022-10-02 | End: 2022-10-06 | Stop reason: HOSPADM

## 2022-10-02 RX ORDER — TRAZODONE HYDROCHLORIDE 100 MG/1
100 TABLET ORAL
Status: DISCONTINUED | OUTPATIENT
Start: 2022-10-02 | End: 2022-10-06 | Stop reason: HOSPADM

## 2022-10-02 RX ORDER — SODIUM CHLORIDE 0.9 % (FLUSH) 0.9 %
5-40 SYRINGE (ML) INJECTION AS NEEDED
Status: DISCONTINUED | OUTPATIENT
Start: 2022-10-02 | End: 2022-10-06 | Stop reason: HOSPADM

## 2022-10-02 RX ORDER — DIVALPROEX SODIUM 500 MG/1
1000 TABLET, DELAYED RELEASE ORAL 2 TIMES DAILY
Status: DISCONTINUED | OUTPATIENT
Start: 2022-10-02 | End: 2022-10-06 | Stop reason: HOSPADM

## 2022-10-02 RX ORDER — DULOXETIN HYDROCHLORIDE 60 MG/1
60 CAPSULE, DELAYED RELEASE ORAL 2 TIMES DAILY
Status: DISCONTINUED | OUTPATIENT
Start: 2022-10-02 | End: 2022-10-02

## 2022-10-02 RX ORDER — MEMANTINE HYDROCHLORIDE 5 MG/1
10 TABLET ORAL DAILY
Status: DISCONTINUED | OUTPATIENT
Start: 2022-10-03 | End: 2022-10-02

## 2022-10-02 RX ORDER — DONEPEZIL HYDROCHLORIDE 5 MG/1
10 TABLET, FILM COATED ORAL
Status: DISCONTINUED | OUTPATIENT
Start: 2022-10-02 | End: 2022-10-02

## 2022-10-02 RX ORDER — SODIUM CHLORIDE 0.9 % (FLUSH) 0.9 %
5-40 SYRINGE (ML) INJECTION EVERY 8 HOURS
Status: DISCONTINUED | OUTPATIENT
Start: 2022-10-02 | End: 2022-10-06 | Stop reason: HOSPADM

## 2022-10-02 RX ORDER — ROPINIROLE 0.25 MG/1
0.25 TABLET, FILM COATED ORAL 2 TIMES DAILY
Status: DISCONTINUED | OUTPATIENT
Start: 2022-10-02 | End: 2022-10-06 | Stop reason: HOSPADM

## 2022-10-02 RX ORDER — DIVALPROEX SODIUM 500 MG/1
1000 TABLET, DELAYED RELEASE ORAL
Status: DISCONTINUED | OUTPATIENT
Start: 2022-10-02 | End: 2022-10-02

## 2022-10-02 RX ORDER — PRIMIDONE 50 MG/1
50 TABLET ORAL 3 TIMES DAILY
Status: DISCONTINUED | OUTPATIENT
Start: 2022-10-02 | End: 2022-10-02

## 2022-10-02 RX ORDER — MEMANTINE HYDROCHLORIDE 5 MG/1
10 TABLET ORAL 2 TIMES DAILY
Status: DISCONTINUED | OUTPATIENT
Start: 2022-10-02 | End: 2022-10-06 | Stop reason: HOSPADM

## 2022-10-02 RX ADMIN — ROPINIROLE HYDROCHLORIDE 0.25 MG: 0.25 TABLET, FILM COATED ORAL at 20:42

## 2022-10-02 RX ADMIN — ATORVASTATIN CALCIUM 10 MG: 10 TABLET, FILM COATED ORAL at 21:43

## 2022-10-02 RX ADMIN — CARBIDOPA AND LEVODOPA 2 TABLET: 25; 100 TABLET ORAL at 21:42

## 2022-10-02 RX ADMIN — SODIUM CHLORIDE, PRESERVATIVE FREE 10 ML: 5 INJECTION INTRAVENOUS at 21:43

## 2022-10-02 RX ADMIN — IOPAMIDOL 100 ML: 612 INJECTION, SOLUTION INTRAVENOUS at 14:49

## 2022-10-02 RX ADMIN — ENOXAPARIN SODIUM 40 MG: 100 INJECTION SUBCUTANEOUS at 20:41

## 2022-10-02 RX ADMIN — CEFTRIAXONE 1 G: 1 INJECTION, POWDER, FOR SOLUTION INTRAMUSCULAR; INTRAVENOUS at 20:41

## 2022-10-02 RX ADMIN — MEMANTINE HYDROCHLORIDE 10 MG: 5 TABLET ORAL at 20:42

## 2022-10-02 RX ADMIN — TRAZODONE HYDROCHLORIDE 100 MG: 100 TABLET ORAL at 20:41

## 2022-10-02 RX ADMIN — DIVALPROEX SODIUM 1000 MG: 500 TABLET, DELAYED RELEASE ORAL at 20:42

## 2022-10-02 NOTE — ED TRIAGE NOTES
Pt arrives to ER via EMS, called by  who states pt is becoming increasingly weak and has a cough. Recent cognitive exam, MD confirmed advancing dementia.

## 2022-10-02 NOTE — ED PROVIDER NOTES
EMERGENCY DEPARTMENT HISTORY AND PHYSICAL EXAM    Date: 10/2/2022  Patient Name: Alexandru Arthur    History of Presenting Illness     Chief Complaint   Patient presents with    Cough    Fatigue       History Provided By: Patient and EMS     History Catalina Pollockmandy):   11:11 AM  Alexandru Arthur is a 68 y.o. female with a PMHX of Hypertension, Parkinson's  who presents to the emergency department (room 15) by EMS C/O cough onset over the last couple of days. Associated sxs include weakness, chronic leg swelling. Pt denies any other sxs or complaints. Patient has a history of Parkinson's and had decline on her recent cognitive exam which is confirming advancing dementia. Her cough has been worsening and she has been weak lately. Her  wanted to get both things checked out. Chief Complaint: Cough  Onset: Over the last couple of days  Timing:  Acute  Context: Symptoms started spontaneously, symptoms have rapidly worsened since onset  Location: Lungs  Quality:  Productive  Severity: Moderate  Modifying Factors: Nothing makes it better, or worse.   Associated Symptoms:  Weakness, chronic leg swelling    PCP: Chioma Elkins MD     Past History         Past Medical History:  Past Medical History:   Diagnosis Date    Arthritis     Hypertension 15yrs    Ill-defined condition     h/o dizzy    Parkinson's disease ([address]     Psychiatric disorder     bipolar; short term memory lose    Psychiatric disorder     depressiom       Past Surgical History:  Past Surgical History:   Procedure Laterality Date    HX CATARACT REMOVAL Left     HX HYSTERECTOMY         Family History:  Family History   Problem Relation Age of Onset    Heart Disease Mother     Cancer Mother     Heart Disease Father     Cancer Father    Reviewed and non-contributory    Social History:  Social History     Tobacco Use    Smoking status: Never    Smokeless tobacco: Never   Substance Use Topics    Alcohol use: No    Drug use: No       Medications:  Current Outpatient Medications   Medication Sig Dispense Refill    rOPINIRole (REQUIP) 0.25 mg tablet Take 1 Tablet by mouth two (2) times a day. 60 Tablet 0    amLODIPine (NORVASC) 5 mg tablet Take 1 Tablet by mouth daily. 30 Tablet 0    doxycycline (MONODOX) 100 mg capsule Take 1 Capsule by mouth two (2) times a day. 10 Capsule 0    gabapentin (NEURONTIN) 100 mg capsule Take 200 mg by mouth every twelve (12) hours every twelve (12) hours. 2 caps am, 1 cap afternoon, 2 caps pm       omeprazole (PRILOSEC) 40 mg capsule Take 40 mg by mouth daily. memantine (NAMENDA) 10 mg tablet Take 10 mg by mouth daily. primidone (MYSOLINE) 50 mg tablet Take 50 mg by mouth two (2) times daily as needed. Indications: essential tremor      DULoxetine (CYMBALTA) 60 mg capsule Take 60 mg by mouth two (2) times a day. divalproex DR (DEPAKOTE) 500 mg tablet Take 1,000 mg by mouth nightly. carbidopa-levodopa (SINEMET)  mg per tablet Take 1 Tab by mouth three (3) times daily. gabapentin (NEURONTIN) 100 mg capsule Take 100 mg by mouth every twenty-four (24) hours. In the afternoon in addition to 200mg in am and pm      trospium (SANCTURA XL) 60 mg capsule Take 60 mg by mouth Daily (before breakfast). valbenazine (Ingrezza) 80 mg cap Take 80 mg by mouth daily. donepezil (ARICEPT) 10 mg tablet Take 10 mg by mouth nightly. simvastatin (ZOCOR) 20 mg tablet Take 20 mg by mouth nightly. Allergies: Allergies   Allergen Reactions    Combivent [Ipratropium-Albuterol] Shortness of Breath    Ibuprofen Other (comments)     Has had since    Adhesive Tape-Silicones Rash       Review of Systems      Review of Systems   Constitutional:  Negative for chills and fever. HENT:  Negative for congestion, rhinorrhea and sore throat. Eyes:  Negative for pain and visual disturbance. Respiratory:  Positive for cough. Negative for shortness of breath and wheezing. Cardiovascular:  Negative for chest pain and palpitations. Gastrointestinal:  Negative for abdominal pain, diarrhea and vomiting. Genitourinary:  Negative for dysuria, flank pain, frequency and urgency. Musculoskeletal:  Negative for arthralgias and myalgias. Skin:  Negative for rash and wound. Neurological:  Positive for weakness. Negative for speech difficulty, light-headedness and headaches. Psychiatric/Behavioral:  Negative for agitation and confusion. All other systems reviewed and are negative. Physical Exam     Vitals:    10/02/22 1125 10/02/22 1420 10/02/22 1425 10/02/22 1430   BP:  113/60  (!) 118/53   Pulse:  85 84 83   Resp:  18 21 22   Temp:       SpO2: 98% 93% 92% 93%   Weight:       Height:           Physical Exam  Vitals and nursing note reviewed. Constitutional:       General: She is not in acute distress. Appearance: Normal appearance. She is normal weight. She is not ill-appearing. HENT:      Head: Normocephalic and atraumatic. Nose: Nose normal. No rhinorrhea. Mouth/Throat:      Mouth: Mucous membranes are moist.      Pharynx: No oropharyngeal exudate or posterior oropharyngeal erythema. Eyes:      Extraocular Movements: Extraocular movements intact. Conjunctiva/sclera: Conjunctivae normal.      Pupils: Pupils are equal, round, and reactive to light. Cardiovascular:      Rate and Rhythm: Normal rate and regular rhythm. Heart sounds: No murmur heard. No friction rub. No gallop. Pulmonary:      Effort: Pulmonary effort is normal. No respiratory distress. Breath sounds: No decreased air movement. Examination of the right-middle field reveals rales. Examination of the left-middle field reveals rales. Examination of the right-lower field reveals rales. Examination of the left-lower field reveals rales. Rales present. No wheezing or rhonchi. Comments: Cough and coarse breath sounds. Abdominal:      General: Bowel sounds are normal.      Palpations: Abdomen is soft. Tenderness:  There is no abdominal tenderness. There is no guarding or rebound. Musculoskeletal:         General: No swelling, tenderness or deformity. Normal range of motion. Cervical back: Normal range of motion and neck supple. No rigidity. Right lower le+ Edema present. Left lower le+ Edema present. Lymphadenopathy:      Cervical: No cervical adenopathy. Skin:     General: Skin is warm and dry. Findings: No rash. Neurological:      General: No focal deficit present. Mental Status: She is alert. Mental status is at baseline. Comments: Patient is oriented to self and situation.    Psychiatric:         Mood and Affect: Mood normal.         Behavior: Behavior normal.       Diagnostic Study Results     Labs -  Recent Results (from the past 12 hour(s))   URINALYSIS W/ RFLX MICROSCOPIC    Collection Time: 10/02/22 11:25 AM   Result Value Ref Range    Color DARK YELLOW      Appearance CLEAR      Specific gravity 1.024 1.005 - 1.030      pH (UA) 7.5 5.0 - 8.0      Protein TRACE (A) NEG mg/dL    Glucose Negative NEG mg/dL    Ketone TRACE (A) NEG mg/dL    Bilirubin Negative NEG      Blood Negative NEG      Urobilinogen 1.0 0.2 - 1.0 EU/dL    Nitrites Negative NEG      Leukocyte Esterase TRACE (A) NEG     URINE MICROSCOPIC ONLY    Collection Time: 10/02/22 11:25 AM   Result Value Ref Range    WBC 0 to 3 0 - 5 /hpf    RBC 0 to 3 0 - 5 /hpf    Bacteria 1+ (A) NEG /hpf    Mucus 1+ (A) NEG /lpf   EKG, 12 LEAD, INITIAL    Collection Time: 10/02/22 11:44 AM   Result Value Ref Range    Ventricular Rate 89 BPM    Atrial Rate 89 BPM    P-R Interval 138 ms    QRS Duration 102 ms    Q-T Interval 388 ms    QTC Calculation (Bezet) 472 ms    Calculated P Axis 58 degrees    Calculated R Axis 10 degrees    Calculated T Axis 20 degrees    Diagnosis       Normal sinus rhythm  Incomplete right bundle branch block  Borderline ECG  When compared with ECG of 2022 11:28,  fusion complexes are no longer present  Minimal criteria for Anterior infarct are no longer present  Nonspecific T wave abnormality no longer evident in Anterolateral leads     CBC WITH AUTOMATED DIFF    Collection Time: 10/02/22 11:48 AM   Result Value Ref Range    WBC 15.1 (H) 4.6 - 13.2 K/uL    RBC 4.61 4.20 - 5.30 M/uL    HGB 11.5 (L) 12.0 - 16.0 g/dL    HCT 38.3 35.0 - 45.0 %    MCV 83.1 78.0 - 100.0 FL    MCH 24.9 24.0 - 34.0 PG    MCHC 30.0 (L) 31.0 - 37.0 g/dL    RDW 15.3 (H) 11.6 - 14.5 %    PLATELET 739 157 - 146 K/uL    MPV 10.9 9.2 - 11.8 FL    NRBC 0.0 0  WBC    ABSOLUTE NRBC 0.00 0.00 - 0.01 K/uL    NEUTROPHILS 80 (H) 40 - 73 %    LYMPHOCYTES 8 (L) 21 - 52 %    MONOCYTES 11 (H) 3 - 10 %    EOSINOPHILS 0 0 - 5 %    BASOPHILS 0 0 - 2 %    IMMATURE GRANULOCYTES 1 (H) 0.0 - 0.5 %    ABS. NEUTROPHILS 12.1 (H) 1.8 - 8.0 K/UL    ABS. LYMPHOCYTES 1.2 0.9 - 3.6 K/UL    ABS. MONOCYTES 1.7 (H) 0.05 - 1.2 K/UL    ABS. EOSINOPHILS 0.1 0.0 - 0.4 K/UL    ABS. BASOPHILS 0.1 0.0 - 0.1 K/UL    ABS. IMM. GRANS. 0.1 (H) 0.00 - 0.04 K/UL    DF AUTOMATED     METABOLIC PANEL, COMPREHENSIVE    Collection Time: 10/02/22 11:48 AM   Result Value Ref Range    Sodium 140 136 - 145 mmol/L    Potassium 3.9 3.5 - 5.5 mmol/L    Chloride 105 100 - 111 mmol/L    CO2 30 21 - 32 mmol/L    Anion gap 5 3.0 - 18 mmol/L    Glucose 110 (H) 74 - 99 mg/dL    BUN 12 7.0 - 18 MG/DL    Creatinine 0.67 0.6 - 1.3 MG/DL    BUN/Creatinine ratio 18 12 - 20      GFR est AA >60 >60 ml/min/1.73m2    GFR est non-AA >60 >60 ml/min/1.73m2    Calcium 8.7 8.5 - 10.1 MG/DL    Bilirubin, total 0.4 0.2 - 1.0 MG/DL    ALT (SGPT) <6 (L) 13 - 56 U/L    AST (SGOT) 6 (L) 10 - 38 U/L    Alk.  phosphatase 80 45 - 117 U/L    Protein, total 6.7 6.4 - 8.2 g/dL    Albumin 3.1 (L) 3.4 - 5.0 g/dL    Globulin 3.6 2.0 - 4.0 g/dL    A-G Ratio 0.9 0.8 - 1.7     MAGNESIUM    Collection Time: 10/02/22 11:48 AM   Result Value Ref Range    Magnesium 2.1 1.6 - 2.6 mg/dL   NT-PRO BNP    Collection Time: 10/02/22 11:48 AM Result Value Ref Range    NT pro- 0 - 1,800 PG/ML   TROPONIN-HIGH SENSITIVITY    Collection Time: 10/02/22 11:48 AM   Result Value Ref Range    Troponin-High Sensitivity 4 0 - 54 ng/L   POC LACTIC ACID    Collection Time: 10/02/22  2:23 PM   Result Value Ref Range    Lactic Acid (POC) 1.43 0.40 - 2.00 mmol/L   COVID-19 RAPID TEST    Collection Time: 10/02/22  3:55 PM   Result Value Ref Range    Specimen source Nasopharyngeal      COVID-19 rapid test Not detected NOTD         Radiologic Studies -   CT CHEST W CONT   Final Result      1. Considerable central peribronchial wall thickening with additional   endobronchial contents perhaps representing bronchitis or reactive airway   disease with mucous plugging. Additional scattered areas of linear subsegmental   atelectasis. 2. No lobar consolidation. XR CHEST PORT   Final Result      No active cardiopulmonary disease. CT Results  (Last 48 hours)                 10/02/22 1501  CT CHEST W CONT Final result    Impression:      1. Considerable central peribronchial wall thickening with additional   endobronchial contents perhaps representing bronchitis or reactive airway   disease with mucous plugging. Additional scattered areas of linear subsegmental   atelectasis. 2. No lobar consolidation. Narrative:  EXAM: CT CHEST        INDICATION:  Cough, elevated WBC        COMPARISON: CTA chest 1/7/2022       TECHNIQUE: Axial CT imaging from the thoracic inlet through the diaphragm after   intravenous contrast administered . Multiplanar reformations were generated. One or more dose reduction techniques were used on this CT: automated exposure   control, adjustment of the mAs and/or kVp according to patient's size, and   iterative reconstruction techniques. The specific techniques utilized on this CT   exam have been documented in the patient's electronic medical record.  Digital   Imaging and Communications in Medicine (DICOM) format image data are available   to nonaffiliated external healthcare facilities or entities on a secure, media   free, reciprocally searchable basis with patient authorization for at least a   12-month period after this study. _______________       FINDINGS:       MEDIASTINUM: Minimal atherosclerotic calcifications are present in the thoracic   aorta. No pericardial effusion. Moderately large paraesophageal hernia is   present. PLEURA: Trace right pleural effusion is present. LYMPH NODES: No enlarged lymph nodes by size criteria. LUNGS/AIRWAY: Considerable central peribronchial wall thickening is present. Some additional endobronchial material is seen bilaterally particularly in the   lower lung zones. No lobar consolidation is present. Linear densities are seen   in both lower lung zones and to a lesser degree the lingula and right middle   lobe suggesting subsegmental atelectasis or mucus plugging. UPPER ABDOMEN: Unremarkable. OSSEOUS STRUCTURES: Degenerative changes are seen in the spine. OTHER: None       _______________                 CXR Results  (Last 48 hours)                 10/02/22 1140  XR CHEST PORT Final result    Impression:      No active cardiopulmonary disease. Narrative:  EXAM: CHEST RADIOGRAPH       CLINICAL INDICATION/HISTORY: cough     > Additional: None       COMPARISON: 1/7/2022       TECHNIQUE: Portable frontal view of the chest       _______________       FINDINGS:       SUPPORT DEVICES: None. HEART AND MEDIASTINUM: Heart size is normal.       LUNGS AND PLEURAL SPACES: No focal consolidation, effusion, or pneumothorax.        BONES AND SOFT TISSUES: Unremarkable.       _______________                   Medications given in the ED-  Medications   iopamidoL (ISOVUE 300) 61 % contrast injection 100 mL (100 mL IntraVENous Given 10/2/22 3146)       Procedures     Procedures    ED Course     I Yohana Almonte MD) am the first provider for this patient. I reviewed the vital signs, available nursing notes, past medical history, past surgical history, family history and social history. Records Reviewed: Nursing Notes    Cardiac Monitor:  Rate: 92 bpm  Rhythm: sinus rhythm    Pulse Oximetry Analysis - 98% on RA    EKG interpretation: (Preliminary)  Rhythm: NSR. Rate: 89 bpm; no STEMI  EKG read by Warren Kong MD at 11:44 AM    11:11 AM Initial assessment performed. The patients presenting problems have been discussed, and they are in agreement with the care plan formulated and outlined with them. I have encouraged them to ask questions as they arise throughout their visit. ED Course as of 10/02/22 2203   Michael Augustine Oct 02, 2022   1405 Discussed case with  who is now at bedside. Patient was too weak to even get off of the commode today without help from her nurses aide who he had to call for the house because she is not normally there on Sundays. This cough is come up rapidly and she normally is able to participate in her care and help with ambulation and transfers. This is not the case today. [JM]   1200 Discussed with Dr. Clemente Arrieta for Obs admission [JM]      ED Course User Index  [JM] Brenda He MD         Medical Decision Making     Provider Notes (Medical Decision Making):   DDX: URI, pneumonia, aspiration pneumonia, dementia, UTI, occult infection, CHF, ACS    Discussion:  68 y.o. female with cough most consistent with a URI, she had a negative rapid COVID in the ED negative chest x-ray negative CT of the chest.  Patient has weakness and is unable to do her ADLs. She does have a leukocytosis but does not have any other source of bacterial infection. Her belly is benign and her legs are slightly edematous but are chronically edematous. No signs of CHF or fluid overload on x-ray or on CT.  does not feel he is able to care for her at home even with home health nursing. Discussed with hospitalist for observation admission. Patient family understand and agree with the need for admission. Diagnosis and Disposition     Critical Care Time: 0 minutes    Core Measures:  For Hospitalized Patients:    1. Hospitalization Decision Time:  The decision to hospitalize the patient was made by Sarah Abarca MD, MD at 5:45 PM on 10/2/2022    2. Aspirin: Aspirin was not given because the patient did not present with a stroke at the time of their Emergency Department evaluation    6:03 PM Patient is being admitted to the hospital by Dr. Clemente Arrieta. The results of their tests and reasons for their admission have been discussed with them and/or available family. They convey agreement and understanding for the need to be admitted and for their admission diagnosis. CONDITIONS ON ADMISSION:  Sepsis is not present at the time of admission. Deep Vein Thrombosis is not present at the time of admission. Thrombosis is not present at the time of admission. Urinary Tract Infection is not present at the time of admission. Pneumonia is not present at the time of admission. MRSA is not present at the time of admission. Wound infection is not present at the time of admission. Pressure Ulcer is not present at the time of admission. CLINICAL IMPRESSION:    1. Acute cough    2. Leukocytosis, unspecified type    3. Weakness      I Warren Kong MD am the primary clinician of record. Dragon Disclaimer     Please note that this dictation was completed with [email or web address], the computer voice recognition software. Quite often unanticipated grammatical, syntax, homophones, and other interpretive errors are inadvertently transcribed by the computer software. Please disregard these errors. Please excuse any errors that have escaped final proofreading.     Warren Kong MD

## 2022-10-02 NOTE — Clinical Note
Patient Class[de-identified] OBSERVATION [104]   Type of Bed: Medical [8]   Cardiac Monitoring Required?: No   Reason for Observation: weakness, leukocytosis, parkinsons, cough - unable to accomplish ADL   Admitting Diagnosis: Weakness [067690]   Admitting Diagnosis: Leukocytosis [504956]   Admitting Diagnosis: [facility] [864266]   Admitting Diagnosis: Cough [786. 2. ICD-9-CM]   Admitting Physician: Traci Kapadia [[id number]]   Attending Physician: Andrei Varner

## 2022-10-02 NOTE — H&P
History & Physical    Patient: Vincent Mccoy MRN: [id number]  CSN: [id number]    YOB: 1945  Age: 68 y.o. Sex: female      DOA: 10/2/2022  Primary Care Provider:  Summer Leiva MD      Assessment/Plan     Active Problems:    Cough (10/2/2022)      Leukocytosis (10/2/2022)      Parkinson disease ([facility] (10/2/2022)      Weakness (10/2/2022)      Leukocytosis and fatigue: this is usually a sign of illness in her although no clear source at this point. Ceftriaxone empirically for now    PArkinsons disease: she is on home meds and  dosnt think she missed any doses    Will stop the sedating meds  such as diazapem and gabapentin to see if this could be contributing to her issues with sleepiness today    Food cut up in little pieces and medicine crushed as well. CC: sleepiness  HPI:     Vincent Mccoy is a 68 y.o. female who presented with 2 days of increasing weakness and mild cough. She was so somnolent today that her  could get her up to do anything. He called her aid and then called EMS to take her in for evaluation. SHe does have mild cough but no fever or localizing sign  or sign of systemic illness    She has been taking gabapentin for a while for arthritis pain and diazapem at bedtime    Past Medical History:   Diagnosis Date    Arthritis     Hypertension 15yrs    Ill-defined condition     h/o dizzy    Parkinson's disease ([facility]     Psychiatric disorder     bipolar; short term memory lose    Psychiatric disorder     depressiom       Past Surgical History:   Procedure Laterality Date    HX CATARACT REMOVAL Left     HX HYSTERECTOMY         Family History   Problem Relation Age of Onset    Heart Disease Mother     Cancer Mother     Heart Disease Father     Cancer Father        Social History     Socioeconomic History    Marital status:    Tobacco Use    Smoking status: Never    Smokeless tobacco: Never   Substance and Sexual Activity    Alcohol use: No    Drug use:  No Prior to Admission medications    Medication Sig Start Date End Date Taking? Authorizing Provider   rOPINIRole (REQUIP) 0.25 mg tablet Take 1 Tablet by mouth two (2) times a day. 1/13/22   Audie Ho MD   amLODIPine (NORVASC) 5 mg tablet Take 1 Tablet by mouth daily. 1/14/22   Audie Ho MD   doxycycline (MONODOX) 100 mg capsule Take 1 Capsule by mouth two (2) times a day. 1/13/22   Audie Ho MD   gabapentin (NEURONTIN) 100 mg capsule Take 200 mg by mouth every twelve (12) hours every twelve (12) hours. 2 caps am, 1 cap afternoon, 2 caps pm     Provider, Historical   omeprazole (PRILOSEC) 40 mg capsule Take 40 mg by mouth daily. Provider, Historical   memantine (NAMENDA) 10 mg tablet Take 10 mg by mouth daily. Provider, Historical   primidone (MYSOLINE) 50 mg tablet Take 50 mg by mouth two (2) times daily as needed. Indications: essential tremor    Provider, Historical   DULoxetine (CYMBALTA) 60 mg capsule Take 60 mg by mouth two (2) times a day. Provider, Historical   divalproex DR (DEPAKOTE) 500 mg tablet Take 1,000 mg by mouth nightly. Provider, Historical   carbidopa-levodopa (SINEMET)  mg per tablet Take 1 Tab by mouth three (3) times daily. Provider, Historical   gabapentin (NEURONTIN) 100 mg capsule Take 100 mg by mouth every twenty-four (24) hours. In the afternoon in addition to 200mg in am and pm    Provider, Historical   trospium (SANCTURA XL) 60 mg capsule Take 60 mg by mouth Daily (before breakfast). Provider, Historical   valbenazine (Ingrezza) 80 mg cap Take 80 mg by mouth daily. Provider, Historical   donepezil (ARICEPT) 10 mg tablet Take 10 mg by mouth nightly. Provider, Historical   simvastatin (ZOCOR) 20 mg tablet Take 20 mg by mouth nightly.     Provider, Historical       Allergies   Allergen Reactions    Combivent [Ipratropium-Albuterol] Shortness of Breath    Ibuprofen Other (comments)     Has had since    Adhesive Tape-Silicones Rash       Review of Systems  Gen: No fever, chills, malaise, weight loss/gain. Heent: No headache, rhinorrhea, epistaxis, ear pain, hearing loss, sinus pain, neck pain/stiffness, sore throat. Heart: No chest pain, palpitations, [place], pnd, or orthopnea. Resp: No cough, hemoptysis, wheezing and shortness of breath. GI: No nausea, vomiting, diarrhea, constipation, melena or hematochezia. : No urinary obstruction, dysuria or hematuria. Derm: No rash, new skin lesion or pruritis. Musc/skeletal: no bone or joint complains. Vasc: No edema, cyanosis or claudication. Endo: No heat/cold intolerance, no polyuria,polydipsia or polyphagia. Neuro: No unilateral weakness, numbness, tingling. No seizures. Heme: No easy bruising or bleeding. Physical Exam:     Physical Exam:  Visit Vitals  /68   Pulse 83   Temp 98.9 °F (37.2 °C)   Resp 22   Ht 5' 4\" (1.626 m)   Wt 93.4 kg (206 lb)   SpO2 96%   BMI 35.36 kg/m²      O2 Device: None (Room air)    Temp (24hrs), Av.9 °F (37.2 °C), Min:98.9 °F (37.2 °C), Max:98.9 °F (37.2 °C)    No intake/output data recorded. No intake/output data recorded. General:  Awake, cooperative, no distress. Head:  Normocephalic, without obvious abnormality, atraumatic. Eyes:  Conjunctivae/corneas clear, sclera anicteric, PERRL, EOMs intact. Nose: Nares normal. No drainage or sinus tenderness. Throat: Lips, mucosa, and tongue normal. .   Neck: Supple, symmetrical, trachea midline, no adenopathy. Lungs:   Clear to auscultation bilaterally. Heart:  Regular rate and rhythm, S1, S2 normal, no murmur, click, rub or gallop. Abdomen: Soft, non-tender. Bowel sounds normal. No masses,  No organomegaly. Extremities: Extremities normal, atraumatic, no cyanosis or edema. Pulses: 2+ and symmetric all extremities. Skin: Skin color, texture, turgor normal. No rashes or lesions   Neurologic: CNII-XII intact.  No focal motor or sensory deficit.        Labs Reviewed:    CMP:   Lab Results   Component Value Date/Time     10/02/2022 11:48 AM    K 3.9 10/02/2022 11:48 AM     10/02/2022 11:48 AM    CO2 30 10/02/2022 11:48 AM    AGAP 5 10/02/2022 11:48 AM     (H) 10/02/2022 11:48 AM    BUN 12 10/02/2022 11:48 AM    CREA 0.67 10/02/2022 11:48 AM    GFRAA >60 10/02/2022 11:48 AM    GFRNA >60 10/02/2022 11:48 AM    CA 8.7 10/02/2022 11:48 AM    MG 2.1 10/02/2022 11:48 AM    ALB 3.1 (L) 10/02/2022 11:48 AM    TP 6.7 10/02/2022 11:48 AM    GLOB 3.6 10/02/2022 11:48 AM    AGRAT 0.9 10/02/2022 11:48 AM    ALT <6 (L) 10/02/2022 11:48 AM     CBC:   Lab Results   Component Value Date/Time    WBC 15.1 (H) 10/02/2022 11:48 AM    HGB 11.5 (L) 10/02/2022 11:48 AM    HCT 38.3 10/02/2022 11:48 AM     10/02/2022 11:48 AM       Procedures/imaging: see electronic medical records for all procedures/Xrays and details which were not copied into this note but were reviewed prior to creation of Plan            CC: Luisa Quiroz MD
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
## 2022-10-03 PROBLEM — R78.81 BACTEREMIA: Status: RESOLVED | Noted: 2020-06-22 | Resolved: 2022-10-03

## 2022-10-03 PROBLEM — R65.20 SEPSIS DUE TO ESCHERICHIA COLI WITH ACUTE ORGAN DYSFUNCTION WITHOUT SEPTIC SHOCK (HCC): Status: RESOLVED | Noted: 2020-06-24 | Resolved: 2022-10-03
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
## 2022-10-03 PROBLEM — A41.51 SEPSIS DUE TO ESCHERICHIA COLI WITH ACUTE ORGAN DYSFUNCTION WITHOUT SEPTIC SHOCK (HCC): Status: RESOLVED | Noted: 2020-06-24 | Resolved: 2022-10-03

## 2022-10-03 PROBLEM — N12 PYELONEPHRITIS: Status: RESOLVED | Noted: 2020-06-24 | Resolved: 2022-10-03

## 2022-10-03 PROBLEM — R06.03 RESPIRATORY DISTRESS: Status: RESOLVED | Noted: 2022-01-07 | Resolved: 2022-10-03

## 2022-10-03 PROBLEM — T17.908A ASPIRATION INTO AIRWAY: Status: RESOLVED | Noted: 2022-01-07 | Resolved: 2022-10-03

## 2022-10-03 LAB
ALBUMIN SERPL-MCNC: 2.9 G/DL (ref 3.4–5)
ALBUMIN/GLOB SERPL: 0.8 {RATIO} (ref 0.8–1.7)
ALP SERPL-CCNC: 78 U/L (ref 45–117)
ALT SERPL-CCNC: <6 U/L (ref 13–56)
ANION GAP SERPL CALC-SCNC: 6 MMOL/L (ref 3–18)
AST SERPL-CCNC: 6 U/L (ref 10–38)
ATRIAL RATE: 89 BPM
BASOPHILS # BLD: 0 K/UL (ref 0–0.1)
BASOPHILS NFR BLD: 0 % (ref 0–2)
BILIRUB SERPL-MCNC: 0.4 MG/DL (ref 0.2–1)
BUN SERPL-MCNC: 11 MG/DL (ref 7–18)
BUN/CREAT SERPL: 22 (ref 12–20)
CALCIUM SERPL-MCNC: 8.8 MG/DL (ref 8.5–10.1)
CALCULATED P AXIS, ECG09: 58 DEGREES
CALCULATED R AXIS, ECG10: 10 DEGREES
CALCULATED T AXIS, ECG11: 20 DEGREES
CHLORIDE SERPL-SCNC: 104 MMOL/L (ref 100–111)
CO2 SERPL-SCNC: 30 MMOL/L (ref 21–32)
CREAT SERPL-MCNC: 0.49 MG/DL (ref 0.6–1.3)
DIAGNOSIS, 93000: NORMAL
DIFFERENTIAL METHOD BLD: ABNORMAL
EOSINOPHIL # BLD: 0.1 K/UL (ref 0–0.4)
EOSINOPHIL NFR BLD: 1 % (ref 0–5)
ERYTHROCYTE [DISTWIDTH] IN BLOOD BY AUTOMATED COUNT: 15.2 % (ref 11.6–14.5)
GLOBULIN SER CALC-MCNC: 3.6 G/DL (ref 2–4)
GLUCOSE SERPL-MCNC: 85 MG/DL (ref 74–99)
HCT VFR BLD AUTO: 38.6 % (ref 35–45)
HGB BLD-MCNC: 11.6 G/DL (ref 12–16)
IMM GRANULOCYTES # BLD AUTO: 0.1 K/UL (ref 0–0.04)
IMM GRANULOCYTES NFR BLD AUTO: 0 % (ref 0–0.5)
LYMPHOCYTES # BLD: 1.4 K/UL (ref 0.9–3.6)
LYMPHOCYTES NFR BLD: 11 % (ref 21–52)
MCH RBC QN AUTO: 24.8 PG (ref 24–34)
MCHC RBC AUTO-ENTMCNC: 30.1 G/DL (ref 31–37)
MCV RBC AUTO: 82.5 FL (ref 78–100)
MONOCYTES # BLD: 1.8 K/UL (ref 0.05–1.2)
MONOCYTES NFR BLD: 14 % (ref 3–10)
NEUTS SEG # BLD: 9.7 K/UL (ref 1.8–8)
NEUTS SEG NFR BLD: 74 % (ref 40–73)
NRBC # BLD: 0 K/UL (ref 0–0.01)
NRBC BLD-RTO: 0 PER 100 WBC
P-R INTERVAL, ECG05: 138 MS
PLATELET # BLD AUTO: 160 K/UL (ref 135–420)
PMV BLD AUTO: 11.5 FL (ref 9.2–11.8)
POTASSIUM SERPL-SCNC: 4.2 MMOL/L (ref 3.5–5.5)
PROT SERPL-MCNC: 6.5 G/DL (ref 6.4–8.2)
Q-T INTERVAL, ECG07: 388 MS
QRS DURATION, ECG06: 102 MS
QTC CALCULATION (BEZET), ECG08: 472 MS
RBC # BLD AUTO: 4.68 M/UL (ref 4.2–5.3)
SODIUM SERPL-SCNC: 140 MMOL/L (ref 136–145)
TSH SERPL DL<=0.05 MIU/L-ACNC: 0.97 UIU/ML (ref 0.36–3.74)
VALPROATE SERPL-MCNC: 37 UG/ML (ref 50–100)
VENTRICULAR RATE, ECG03: 89 BPM
WBC # BLD AUTO: 13 K/UL (ref 4.6–13.2)

## 2022-10-03 PROCEDURE — 97166 OT EVAL MOD COMPLEX 45 MIN: CPT

## 2022-10-03 PROCEDURE — 74011250636 HC RX REV CODE- 250/636: Performed by: INTERNAL MEDICINE

## 2022-10-03 PROCEDURE — 84443 ASSAY THYROID STIM HORMONE: CPT

## 2022-10-03 PROCEDURE — 77010033678 HC OXYGEN DAILY

## 2022-10-03 PROCEDURE — 65270000029 HC RM PRIVATE

## 2022-10-03 PROCEDURE — 74011000258 HC RX REV CODE- 258: Performed by: INTERNAL MEDICINE

## 2022-10-03 PROCEDURE — 80053 COMPREHEN METABOLIC PANEL: CPT

## 2022-10-03 PROCEDURE — 85025 COMPLETE CBC W/AUTO DIFF WBC: CPT

## 2022-10-03 PROCEDURE — 97162 PT EVAL MOD COMPLEX 30 MIN: CPT

## 2022-10-03 PROCEDURE — 74011000250 HC RX REV CODE- 250: Performed by: INTERNAL MEDICINE

## 2022-10-03 PROCEDURE — 74011250637 HC RX REV CODE- 250/637: Performed by: INTERNAL MEDICINE

## 2022-10-03 PROCEDURE — 36415 COLL VENOUS BLD VENIPUNCTURE: CPT

## 2022-10-03 PROCEDURE — 97530 THERAPEUTIC ACTIVITIES: CPT

## 2022-10-03 PROCEDURE — 74011000250 HC RX REV CODE- 250: Performed by: HOSPITALIST

## 2022-10-03 RX ORDER — ACETAMINOPHEN 325 MG/1
650 TABLET ORAL
Status: DISCONTINUED | OUTPATIENT
Start: 2022-10-03 | End: 2022-10-06 | Stop reason: HOSPADM

## 2022-10-03 RX ORDER — DEXTROSE MONOHYDRATE AND SODIUM CHLORIDE 5; .45 G/100ML; G/100ML
50 INJECTION, SOLUTION INTRAVENOUS CONTINUOUS
Status: DISPENSED | OUTPATIENT
Start: 2022-10-03 | End: 2022-10-03

## 2022-10-03 RX ADMIN — SODIUM CHLORIDE, PRESERVATIVE FREE 10 ML: 5 INJECTION INTRAVENOUS at 22:00

## 2022-10-03 RX ADMIN — MEMANTINE HYDROCHLORIDE 10 MG: 5 TABLET ORAL at 08:46

## 2022-10-03 RX ADMIN — ENOXAPARIN SODIUM 40 MG: 100 INJECTION SUBCUTANEOUS at 22:04

## 2022-10-03 RX ADMIN — SODIUM CHLORIDE, PRESERVATIVE FREE 10 ML: 5 INJECTION INTRAVENOUS at 06:25

## 2022-10-03 RX ADMIN — ATORVASTATIN CALCIUM 10 MG: 10 TABLET, FILM COATED ORAL at 22:04

## 2022-10-03 RX ADMIN — DULOXETINE HYDROCHLORIDE 60 MG: 60 CAPSULE, DELAYED RELEASE PELLETS ORAL at 08:46

## 2022-10-03 RX ADMIN — DEXTROSE MONOHYDRATE AND SODIUM CHLORIDE 50 ML/HR: 5; .45 INJECTION, SOLUTION INTRAVENOUS at 10:44

## 2022-10-03 RX ADMIN — DIVALPROEX SODIUM 1000 MG: 500 TABLET, DELAYED RELEASE ORAL at 22:03

## 2022-10-03 RX ADMIN — SODIUM CHLORIDE, PRESERVATIVE FREE 10 ML: 5 INJECTION INTRAVENOUS at 14:19

## 2022-10-03 RX ADMIN — CARBIDOPA AND LEVODOPA 2 TABLET: 25; 100 TABLET ORAL at 08:46

## 2022-10-03 RX ADMIN — MEMANTINE HYDROCHLORIDE 10 MG: 5 TABLET ORAL at 22:04

## 2022-10-03 RX ADMIN — ROPINIROLE HYDROCHLORIDE 0.25 MG: 0.25 TABLET, FILM COATED ORAL at 22:03

## 2022-10-03 RX ADMIN — CEFTRIAXONE 1 G: 1 INJECTION, POWDER, FOR SOLUTION INTRAMUSCULAR; INTRAVENOUS at 22:05

## 2022-10-03 RX ADMIN — DIVALPROEX SODIUM 1000 MG: 500 TABLET, DELAYED RELEASE ORAL at 08:46

## 2022-10-03 RX ADMIN — ROPINIROLE HYDROCHLORIDE 0.25 MG: 0.25 TABLET, FILM COATED ORAL at 08:46

## 2022-10-03 RX ADMIN — PANTOPRAZOLE SODIUM 40 MG: 40 TABLET, DELAYED RELEASE ORAL at 08:46

## 2022-10-03 RX ADMIN — CARBIDOPA AND LEVODOPA 2 TABLET: 25; 100 TABLET ORAL at 16:14

## 2022-10-03 RX ADMIN — CARBIDOPA AND LEVODOPA 2 TABLET: 25; 100 TABLET ORAL at 22:20

## 2022-10-03 NOTE — ACP (ADVANCE CARE PLANNING)
Advance Care Planning   Advance Care Planning Inpatient Note  [facility] Department    Today's Date: 10/3/2022  Unit: [facility] CARDIAC/MEDICAL    Received request from rounding. Upon review of chart and communication with care team, patient's decision making abilities are not in question. Patient was/were present in the room during visit. Goals of ACP Conversation:  Discuss Advance Care planning documents    Health Care Decision Makers:      Primary Decision Maker: Stveen Jose - [facility] - [phone]    Summary:  Completed New Documents    Advance Care Planning Documents (Patient Wishes) on file:  Healthcare Power of /Advance Directive appointment of Health care agent  Living Will/ Advance Directive  Anatomical Gift/Organ donation     Assessment:    Patient allowed me to have the document signed the right way.     Interventions:  Provided education on documents for clarity and greater understanding    Care Preferences Communicated:  No    Outcomes/Plan:  ACP Discussion Completed    Chaplain Ester on 10/3/2022 at 3:54 PM

## 2022-10-03 NOTE — PROGRESS NOTES
AMD Reviewed    provided education on Advance Medical Directives and left a copy with the patient. Patient's  signed in the place where wife should have  wrote a new document and wife signed in the right place.  completed visit with patient and offered Pastoral care. Chaplains will continue to follow and will provide pastoral care as needed or requested.      Sister Awilda Rios, [place], [address] [phone]

## 2022-10-03 NOTE — PROGRESS NOTES
Speech Therapy Note: OT requested ST screen patient following OT session. Patient with lunch at bedside upon entry. Spouse/patient deny difficulties with swallowing. Spouse reports that patient requires food cut up and cues to eat at slow rate but does \"okay. \" ST encouraged spouse to raise patient's HOB >45 degrees and provided instruction on how to do so. No dysphagia needs identified based on patient/spouse report; however, ST available for consult if needs arise/ more thorough evaluation desired.

## 2022-10-03 NOTE — PROGRESS NOTES
Reason for Admission:  sleepiness                     RUR Score:    Low; 12%                 Plan for utilizing home health:   [facility] vs SNF       PCP: First and Last name:  Janet Pires MD     Name of Practice:    Are you a current patient: Yes/No:    Approximate date of last visit:    Can you participate in a virtual visit with your PCP:                     Current Advanced Directive/Advance Care Plan: Full Code      Healthcare Decision Maker:   Click here to complete [address] including selection of the Healthcare Decision Maker Relationship (ie \"Primary\")             Primary Decision Maker: Steven Jose - Spouse - [phone]                  Transition of Care Plan:      [facility] with personal care, family support and physician follow up vs SNF pending clinical progression                Chart reviewed. Per H&P Abdi Espinoza is a 68 y.o. female who presented with 2 days of increasing weakness and mild cough. She was so somnolent today that her  could get her up to do anything. He called her aid and then called EMS to take her in for evaluation. SHe does have mild cough but no fever or localizing sign  or sign of systemic illness     She has been taking gabapentin for a while for arthritis pain and diazapem at bedtime. \"    CM met with pt an her , Mell Kingston ([phone]), at bedside to discuss care transition. Pt has a hospital bed, wheel chair and a 3:1 commode in the home. Pt's  is pt's primary care giver. Pt's spouse does have a pvt duty CNA that will come and sit with the pt when he has to run errands. CM discussed transition of care options to include [facility] vs SNF. Pt/Family has indicated they have used [facility] in the past and would like to use this [facility] agency again. CMS has been notified to assist.  CM discussed SNF with pt/family. Pt's  is willing to consider but not to commit to SNF. CM provided a list of area facilities to refer to.   CM to continue to follow and assist.     Care Management Interventions  Mode of Transport at Discharge:  Other (see comment) (Family vs BLS)  Transition of Care Consult (CM Consult): Discharge Planning, [address], [facility]  [address]: Yes  Health Maintenance Reviewed: Yes  Physical Therapy Consult: Yes  Occupational Therapy Consult: Yes  Support Systems: Spouse/Significant Other, Caregiver/Home Care Staff  Confirm Follow Up Transport: Family  The Plan for Transition of Care is Related to the Following Treatment Goals : [facility] with personal care, family support and physician follow up vs SNF pending clinical progression  The Patient and/or Patient Representative was Provided with a Choice of Provider and Agrees with the Discharge Plan?: Yes  Name of the Patient Representative Who was Provided with a Choice of Provider and Agrees with the Discharge Plan: spouse  Freedom of Choice List was Provided with Basic Dialogue that Supports the Patient's Individualized Plan of Care/Goals, Treatment Preferences and Shares the Quality Data Associated with the Providers?: Yes  Discharge Location  Patient Expects to be Discharged to[de-identified] Home with home health (vs SNF)

## 2022-10-03 NOTE — PROGRESS NOTES
Hospitalist Progress Note    Patient: Dorie Greenberg MRN: [id number]  CSN: [id number]    YOB: 1945  Age: 68 y.o. Sex: female    DOA: 10/2/2022 LOS:  LOS: 1 day          Chief Complaint:    weakness      Assessment/Plan     Weakness  Fatigue  Leukocytosis-improved  Parkinsons  COPD w/o acute exacerbation  Acute bronchitis  HTN  Dementia    PT/OT  Gentle IV fluids for 6 hrs  Empiric abx-rocephin  Home meds sans sedating meds    D/c planning    DVT prophylaixs    Disposition :  Patient Active Problem List   Diagnosis Code    Parkinson's disease ([facility] G20    Psychiatric disorder F99    Hypertension I10    COPD (chronic obstructive pulmonary disease) ([facility] J44.9    Dementia (HCC) F03.90    Cough R05.9    Leukocytosis D72.829    Parkinson disease ([facility] G20    Weakness R53.1       Subjective:  She states she feels a little better  Hungry  Deneis pain anywhere  No SOB      Review of systems:    Constitutional: denies fevers, chills  Respiratory: denies SOB  Cardiovascular: denies chest pain  Gastrointestinal: denies nausea, vomiting      Vital signs/Intake and Output:  Visit Vitals  BP (!) 138/56   Pulse 84   Temp 97.7 °F (36.5 °C)   Resp 18   Ht 5' 4\" (1.626 m)   Wt 93.4 kg (206 lb)   SpO2 100%   BMI 35.36 kg/m²     Current Shift:  No intake/output data recorded. Last three shifts:  No intake/output data recorded.     Exam:    General: elderly WF NAD  awake  CVS:Regular rate and rhythm, no M/R/G, S1/S2 heard, no thrill  Lungs:Clear to auscultation bilaterally, no wheezes, rhonchi, or rales  Abdomen: Soft, Nontender, No distention, Normal Bowel sounds  Extremities: No C/C/E, pulses palpable 2+  Neuro:grossly normal , follows commands  Psych:appropriate                Labs: Results:       Chemistry Recent Labs     10/02/22  1148   *      K 3.9      CO2 30   BUN 12   CREA 0.67   CA 8.7   AGAP 5   BUCR 18   AP 80   TP 6.7   ALB 3.1*   GLOB 3.6   AGRAT 0.9      CBC w/Diff Recent Labs 10/03/22  0637 10/02/22  1148   WBC 13.0 15.1*   RBC 4.68 4.61   HGB 11.6* 11.5*   HCT 38.6 38.3    176   GRANS 74* 80*   LYMPH 11* 8*   EOS 1 0      Cardiac Enzymes No results for input(s): CPK, CKND1, BABITA in the last 72 hours. No lab exists for component: CKRMB, TROIP   Coagulation No results for input(s): PTP, INR, APTT, INREXT, INREXT in the last 72 hours. Lipid Panel No results found for: CHOL, CHOLPOCT, CHOLX, CHLST, CHOLV, 963067, HDL, HDLP, LDL, LDLC, DLDLP, 255207, VLDLC, VLDL, TGLX, TRIGL, TRIGP, TGLPOCT, CHHD, CHHDX   BNP No results for input(s): BNPP in the last 72 hours.    Liver Enzymes Recent Labs     10/02/22  1148   TP 6.7   ALB 3.1*   AP 80      Thyroid Studies No results found for: T4, T3U, TSH, TSHEXT, TSHEXT     Procedures/imaging: see electronic medical records for all procedures/Xrays and details which were not copied into this note but were reviewed prior to creation of Marichuy Yeung MD

## 2022-10-03 NOTE — PROGRESS NOTES
Problem: Mobility Impaired (Adult and Pediatric)  Goal: *Acute Goals and Plan of Care (Insert Text)  Description: Physical Therapy Goals   Initiated 10/3/2022 and to be accomplished within 7 day(s)  1. Patient will move from supine <> sit with moderate assist in prep for out of bed activity and change of position. 2.  Patient will perform sit<> stand with mod/max assist/RW in prep for transfers bed to chair. 3.  Patient will transfer from bed <> chair with mod/max assist/RW for time up in chair for completion of ADL activity. 4.  Patient will demonstrate participation with ROM/strengthening exercise program BLE's to facilitate above goals. Outcome: Progressing Towards Goal      PHYSICAL THERAPY EVALUATION    Patient: Warren Cannon (84 y.o. female)  Date: 10/3/2022  Primary Diagnosis: Weakness [R53.1]  Leukocytosis [D72.829]  Parkinson disease ([facility] [address] [G20]  Cough [R05.9]  Precautions:   Fall  PLOF: per pt spouse, pt now requires max/total assist bed<>w/c and commode. Pt non ambulatory, but has become weaker recently and was unable to be assisted off of commode w/o EMS being called, leading to current admission. Reports they do not have slide board, nor mechanical lift for use with transfers. Pt also has hospital bed and BSC, however neither is utilized at this time per spouse (pt states hospital bed not comfortable per spouse report). ASSESSMENT :  Based on the objective data described below, the patient is seen on medical unit following admission as stated above with above dx. Pt found supine in bed, awake, oriented to person, place, O2, IV and Lorita Robinsons in place; pt willing to participate with PT session. Pt presents with decr'd ROM/motor performance B LE's (grossly 3-/5). Pt with decr'd independence in overall functional mobility with regard to bed mobility, transfers, sitting balance and with decreased activity tolerance.   Patient reports back pain \"severe\" pre/post session, (though appears in NAD t/o session). Initially, pt O2 nc resting outside nostrils. O2 sat 92%. Nc put back place and O2 sat incr'd to 95% in <1 min. Pt rolls with moderate assist; supine>sit EOB with max assist.  Able to maintain static sitting, initially with L UE support initially, then progressed to unsupported and sat ~10 min total EOB. Pt able to perform seated hip flex EOB at this time (though min AROM). Pt soiled noted uring transition. Nurse assist called and Nurse Mgr Omid Quick in to assist with pt hygiene care after pt return to supine max/total assist.   Pt left back supine, scooted up with [place] mattress utilized, bed alarm engaged and left with all needs in reach. Nurse Mgr to reapply [address]. Spoke with pt spouse regarding PT POC and home equipment status. Pt may benefit from SNF for rehab at discharge in order to increase functional strength and mobility prior to return home with spouse and aide. Pt/ Family Education: Role of physical therapy in acute care setting, fall prevention and safety/technique during functional mobility tasks    Patient will benefit from skilled intervention to address the above impairments.   Patient's rehabilitation potential is considered to be Fair  Factors which may influence rehabilitation potential include:   []         None noted  [x]         Mental ability/status  [x]         Medical condition  []         Home/family situation and support systems  [x]         Safety awareness  [x]         Pain tolerance/management  []         Other:      PLAN :  Recommendations and Planned Interventions:   [x]           Bed Mobility Training             [x]    Neuromuscular Re-Education  [x]           Transfer Training                   []    Orthotic/Prosthetic Training  []           Gait Training                          []    Modalities  [x]           Therapeutic Exercises           []    Edema Management/Control  [x]           Therapeutic Activities            [x]    Family Training/Education  [x]           Patient Education  []           Other (comment):    Frequency/Duration: Patient will be followed by physical therapy 1-2 times per day/4-7 days per week to address goals. Discharge Recommendations: Skilled Nursing Facility  Further Equipment Recommendations for Discharge: N/A    AMPAC:  7/20    This AMPAC score should be considered in conjunction with interdisciplinary team recommendations to determine the most appropriate discharge setting. Patient's social support, diagnosis, medical stability, and prior level of function should also be taken into consideration. SUBJECTIVE:   Patient stated I'm better than I was yesterday.     OBJECTIVE DATA SUMMARY:     Past Medical History:   Diagnosis Date    Arthritis     Hypertension 15yrs    Ill-defined condition     h/o dizzy    Parkinson's disease ([facility] [address]     Psychiatric disorder     bipolar; short term memory lose    Psychiatric disorder     depressiom     Past Surgical History:   Procedure Laterality Date    HX CATARACT REMOVAL Left     HX HYSTERECTOMY       Barriers to Learning/Limitations: yes;  physical and altered mental status (i.e.Sedation, Confusion)  Compensate with: Visual Cues, Verbal Cues, and Tactile Cues  Home Situation:  Home Situation  Home Environment: Private residence  # Steps to Enter: 1 (threshold)  Wheelchair Ramp: No  One/Two Story Residence: One story  Living Alone: No  Support Systems: Spouse/Significant Other, Caregiver/Home Care Staff (Privated Duty aide 3days/wk; 4hr each)  Patient Expects to be Discharged to[de-identified] Unable to determine at this time  Current DME Used/Available at Home: Wheelchair, Walker, rolling, Shower chair, Safety frame [employer], Hospital bed, Grab bars, Commode, bedside (spouse reports hospital bed and BSC not used-pt c/o hospital bed not comfortable)  Tub or Shower Type: Shower  Critical Behavior:  Neurologic State: Alert  Orientation Level: Oriented to person;Oriented to place  Cognition: Follows commands;Memory loss;Poor safety awareness (bed alarm engaged)  Safety/Judgement: Fall prevention  Psychosocial  Patient Behaviors: Calm;Confused; Cooperative  Family  Behaviors: Calm; Cooperative;Supportive  Purposeful Interaction: Yes  Caritas Process: Establish trust  Caring Interventions: Reassure  Reassure: Therapeutic listening;Caring rounds  Therapeutic Modalities: Deep breathing  Skin Condition/Temp: Warm  Family  Behaviors: Calm; Cooperative;Supportive  Skin Integrity: Intact  Skin Integumentary  Skin Color: Appropriate for ethnicity  Skin Condition/Temp: Warm  Skin Integrity: Intact  Strength:    Strength: Generally decreased, functional  Tone & Sensation:   Tone: Normal  Sensation: Intact  Range Of Motion:  AROM: Generally decreased, functional  Functional Mobility:  Bed Mobility:  Rolling: Moderate assistance  Supine to Sit: Maximum assistance  Sit to Supine: Maximum assistance; Total assistance  Scooting: Total assistance;Bed Modified ([place] bed utilized)  Balance:   Sitting: Intact  Sitting - Static: Good (unsupported); Fair (occasional)  Sitting - Dynamic: Fair (occasional)  Standing:  (not tested)  Therapeutic Exercises:   Supine : HS x3, Hip abd/add x 3; Seated marching x 3  Pain:  Pain level pre-treatment: \"severe\"  Pain level post-treatment: 'severe\"  Pain Intervention(s) : Medication (see MAR); Rest, Ice, Repositioning  Response to intervention: Nurse notified, See doc flow  Activity Tolerance:   Fair   Please refer to the flowsheet for vital signs taken during this treatment. After treatment:   []         Patient left in no apparent distress sitting up in chair  [x]         Patient left in no apparent distress in bed  [x]         Call bell left within reach  [x]         Nursing notified  [x]         Caregiver present  [x]         Bed alarm activated  []         SCDs applied    COMMUNICATION/EDUCATION:   [x]         Role of Physical Therapy in the acute care setting.   [x]         Fall prevention education was provided and the patient/caregiver indicated understanding. [x]         Patient/family have participated as able in goal setting and plan of care. [x]         Patient/family agree to work toward stated goals and plan of care. []         Patient understands intent and goals of therapy, but is neutral about his/her participation. []         Patient is unable to participate in goal setting/plan of care: ongoing with therapy staff.  []         Other: Thank you for this referral.  Briana Gómez, PT   Time Calculation: 40 mins      Eval Complexity: History: HIGH Complexity :3+ comorbidities / personal factors will impact the outcome/ POC Exam:MEDIUM Complexity : 3 Standardized tests and measures addressing body structure, function, activity limitation and / or participation in recreation  Presentation: MEDIUM Complexity : Evolving with changing characteristics  Clinical Decision Making:Medium Complexity    Overall Complexity:MEDIUM    [facility] AM-PAC® Basic Mobility Inpatient Short Form (6-Clicks) Version 2    How much HELP from another person does the patient currently need    (If the patient hasn't done an activity recently, how much help from another person do you think he/she would need if he/she tried?)   Total (Total A or Dep)   A Lot  (Mod to Max A)   A Little (Sup or Min A)   None (Mod I to I)   Turning from your back to your side while in a flat bed without using bedrails? [] 1 [x] 2 [] 3 [] 4   2. Moving from lying on your back to sitting on the side of a flat bed without using bedrails? [] 1 [x] 2 [] 3 [] 4   3. Moving to and from a bed to a chair (including a wheelchair)? [x] 1 [] 2 [] 3 [] 4   4. Standing up from a chair using your arms (e.g., wheelchair, or bedside chair)? [x] 1 [] 2 [] 3 [] 4   5. Walking in hospital room? [x] 1 [] 2 [] 3 [] 4   6.  Climbing 3-5 steps with a railing?+   [] 1 [] 2 [] 3 [] 4   +If stair climbing cannot be assessed, skip item #6.  Sum responses from items 1-5.

## 2022-10-03 NOTE — PROGRESS NOTES
Problem: Falls - Risk of  Goal: *Absence of Falls  Description: Document Heather Long Fall Risk and appropriate interventions in the flowsheet.   Outcome: Progressing Towards Goal  Note: Fall Risk Interventions:

## 2022-10-03 NOTE — PROGRESS NOTES
Problem: Falls - Risk of  Goal: *Absence of Falls  Description: Document Sly Salazar Fall Risk and appropriate interventions in the flowsheet. Outcome: Progressing Towards Goal  Note: Fall Risk Interventions:       Mentation Interventions: Adequate sleep, hydration, pain control, More frequent rounding, Reorient patient    Medication Interventions: Bed/chair exit alarm    Elimination Interventions: Call light in reach, Bed/chair exit alarm    History of Falls Interventions: Door open when patient unattended         Problem: Pressure Injury - Risk of  Goal: *Prevention of pressure injury  Description: Document Vj Scale and appropriate interventions in the flowsheet.   Outcome: Progressing Towards Goal  Note: Pressure Injury Interventions:  Sensory Interventions: Assess changes in LOC, Minimize linen layers, Keep linens dry and wrinkle-free    Moisture Interventions: Absorbent underpads, Internal/External urinary devices, Minimize layers    Activity Interventions: Pressure redistribution bed/mattress(bed type)    Mobility Interventions: Pressure redistribution bed/mattress (bed type)    Nutrition Interventions: Document food/fluid/supplement intake    Friction and Shear Interventions: Minimize layers

## 2022-10-03 NOTE — PROGRESS NOTES
OCCUPATIONAL THERAPY EVALUATION    Problem: Self Care Deficits Care Plan (Adult)  Goal: *Acute Goals and Plan of Care (Insert Text)  Outcome: Progressing Towards Goal  Note:   FUNCTIONAL STATUS PRIOR TO ADMISSION: Patient was modified independent using a SB quad cane for functional mobility. HOME SUPPORT: The patient lived with  who was assisting in most ADL activiites . Initial Occupational Therapy Goals (10/3/2022) Within 7 day(s):  1. Patient will perform perform grooming seated EOB for 5 minutes with no LOB for increased independence in ADLs. 2. Patient will perform UB dressing with min A, thread arms through sleeves seated EOB for increased independence with ADLs. 3. Patient will perform LB dressing with mod A, lifting leg to assist with sock don/dfoo & A/E PRN for increased independence with ADLs. 4. Patient will perform all aspects of toileting with mod A, initating side leaning to perform wiping for increased independence with ADLs. 5. Patient will perform LE ADLs utilizing body mechanics & adaptive strategies with 1 verbal cue for increased safety in ADLs. 6. Patient will independently apply energy conservation techniques with less than 3 verbal cue(s) for increased independence with ADLs. Patient: Sandra Lynn (70 y.o. female)  Date: 10/3/2022  Primary Diagnosis: Weakness [R53.1]  Leukocytosis [D72.829]  Parkinson disease ([facility] [address] [G20]  Cough [R05.9]       Precautions:   Fall  PLOF: Pt reports she was ambulatory with cane, has gotten weaker, has needed help from  in all aspects of self care, dressing, bathing, grooming    ASSESSMENT :  Based on the objective data described below, the patient presents with decreased functional independence, decreased balance from episode of progressive weakness from leukocytosis and parkinson disease. Pt presents supine in bed, speaks in one word answers with increased time. Pt anxious about getting up, states how weak she has been.  Pt max A supine to sit EOB with heavy initation of LE. Poor sitting balance noted with mod A correction, leaning towards right side frequently but able to stabilize after 5 minutes of sitting EOB. Unable to initate leg extension or hip raise. Only able to bring arms to about 70 degrees of flexion and abduction, minimal  strength. Pt is able to wash face seated EOB with min to mod A for support. Unable to reach beyond knees to attempt at sock don/doff. Pt requests return to supine where max A is needed. All needs left in place. Pt would need SNF placement to maximize therapeutic function. Pt also would be candidate for [facility] program in outpatient therapy to maximize functional movement with PT and Speech therapy. Education: Pt educated on role of OT in acute setting. Patient will benefit from skilled intervention to address the above impairments.   Patient's rehabilitation potential is considered to be Fair  Factors which may influence rehabilitation potential include:   []             None noted  []             Mental ability/status  [x]             Medical condition  [x]             Home/family situation and support systems  []             Safety awareness  [x]             Pain tolerance/management  []             Other:      PLAN :  Recommendations and Planned Interventions:   [x]               Self Care Training                  [x]      Therapeutic Activities  [x]               Functional Mobility Training   []      Cognitive Retraining  [x]               Therapeutic Exercises           [x]      Endurance Activities  []               Balance Training                    [x]      Neuromuscular Re-Education  []               Visual/Perceptual Training     [x]      Home Safety Training  [x]               Patient Education                   [x]      Family Training/Education  []               Other (comment):    Frequency/Duration: Patient will be followed by occupational therapy daily to address goals.  Discharge Recommendations: [facility]  Further Equipment Recommendations for Discharge: Wheelchair? SUBJECTIVE:   Patient stated no, and yes.     OBJECTIVE DATA SUMMARY:     Past Medical History:   Diagnosis Date    Arthritis     Hypertension 15yrs    Ill-defined condition     h/o dizzy    Parkinson's disease ([facility] [address]     Psychiatric disorder     bipolar; short term memory lose    Psychiatric disorder     depressiom     Past Surgical History:   Procedure Laterality Date    HX CATARACT REMOVAL Left     HX HYSTERECTOMY       Barriers to Learning/Limitations: None  Compensate with: visual, verbal, tactile, kinesthetic cues/model    Home Situation:   Home Situation  Home Environment: Private residence  One/Two Story Residence: One story  Living Alone: No  Support Systems: Spouse/Significant Other  Current DME Used/Available at Home: Cane, straight, Grab bars, Safety frame toliet, Shower chair, Walker, rolling  Tub or Shower Type: Shower  [x]  Right hand dominant   []  Left hand dominant    Cognitive/Behavioral Status:  Neurologic State: Alert;Drowsy  Orientation Level: Oriented to place;Oriented to person  Cognition: Follows commands  Safety/Judgement: Fall prevention    Skin: intact  Edema: noted through B LE     Vision/Perceptual:    Tracking: Requires cues, head turns, or add eye shifts to track           Coordination: BUE  Coordination: Generally decreased, functional  Fine Motor Skills-Upper: Left Impaired;Right Impaired    Gross Motor Skills-Upper: Left Impaired;Right Impaired    Balance:  Sitting: Impaired  Sitting - Static: Fair (occasional)    Strength: BUE  Strength: Generally decreased, functional    Tone & Sensation: BUE  Tone: Normal  Sensation: Intact       Range of Motion: BUE  AROM: Generally decreased, functional      ADL Assessment:   Feeding: Minimum assistance    Oral Facial Hygiene/Grooming: Moderate assistance    Bathing: Moderate assistance    Upper Body Dressing:  Moderate assistance    Lower Body Dressing: Maximum assistance    Toileting: Maximum assistance       ADL Intervention:      Cognitive Retraining  Problem Solving: General alternative solution  Executive Functions: Managing time;Regulating behavior  Safety/Judgement: Fall prevention      Pain:  Pain level pre-treatment: 0/10   Pain level post-treatment: 0/10   Pain Intervention(s): Medication provided by Nursing (see MAR); Rest, Ice, Repositioning   Response to intervention: Nurse notified, See doc flow sheet    Activity Tolerance:   Very low, able to sit EOB with mod A support for 8-10 minutes    Please refer to the flowsheet for vital signs taken during this treatment. After treatment:   [] Patient left in no apparent distress sitting up in chair  [x] Patient left in no apparent distress in bed  [x] Call bell left within reach  [x] Nursing notified  [] Caregiver present  [x] Bed alarm activated    COMMUNICATION/EDUCATION:   [x] Role of Occupational Therapy in the acute care setting  [x] Home safety education was provided and the patient/caregiver indicated understanding. [x] Patient/family have participated as able in goal setting and plan of care. [x] Patient/family agree to work toward stated goals and plan of care. [] Patient understands intent and goals of therapy, but is neutral about his/her participation. [] Patient is unable to participate in goal setting and plan of care. Thank you for this referral.  Yanique Medina OTD, OTR/L   Time Calculation: 20 mins    Eval Complexity: History: MEDIUM Complexity : Expanded review of history including physical, cognitive and psychosocial  history ; Examination: MEDIUM Complexity : 3-5 performance deficits relating to physical, cognitive , or psychosocial skils that result in activity limitations and / or participation restrictions; Decision Making:MEDIUM Complexity : Patient may present with comorbidities that affect occupational performnce.  Miniml to moderate modification of tasks or assistance (eg, physical or verbal ) with assesment(s) is necessary to enable patient to complete evaluation

## 2022-10-04 PROCEDURE — 77010033678 HC OXYGEN DAILY

## 2022-10-04 PROCEDURE — 97530 THERAPEUTIC ACTIVITIES: CPT

## 2022-10-04 PROCEDURE — 97110 THERAPEUTIC EXERCISES: CPT

## 2022-10-04 PROCEDURE — 74011250636 HC RX REV CODE- 250/636: Performed by: INTERNAL MEDICINE

## 2022-10-04 PROCEDURE — 74011250637 HC RX REV CODE- 250/637: Performed by: INTERNAL MEDICINE

## 2022-10-04 PROCEDURE — 74011000250 HC RX REV CODE- 250: Performed by: INTERNAL MEDICINE

## 2022-10-04 PROCEDURE — 74011250637 HC RX REV CODE- 250/637: Performed by: HOSPITALIST

## 2022-10-04 PROCEDURE — 65270000029 HC RM PRIVATE

## 2022-10-04 RX ORDER — DOXYCYCLINE 100 MG/1
100 CAPSULE ORAL EVERY 12 HOURS
Status: DISCONTINUED | OUTPATIENT
Start: 2022-10-04 | End: 2022-10-06 | Stop reason: HOSPADM

## 2022-10-04 RX ADMIN — PANTOPRAZOLE SODIUM 40 MG: 40 TABLET, DELAYED RELEASE ORAL at 09:10

## 2022-10-04 RX ADMIN — SODIUM CHLORIDE, PRESERVATIVE FREE 10 ML: 5 INJECTION INTRAVENOUS at 14:26

## 2022-10-04 RX ADMIN — ROPINIROLE HYDROCHLORIDE 0.25 MG: 0.25 TABLET, FILM COATED ORAL at 21:48

## 2022-10-04 RX ADMIN — ROPINIROLE HYDROCHLORIDE 0.25 MG: 0.25 TABLET, FILM COATED ORAL at 09:10

## 2022-10-04 RX ADMIN — DIVALPROEX SODIUM 1000 MG: 500 TABLET, DELAYED RELEASE ORAL at 21:48

## 2022-10-04 RX ADMIN — SODIUM CHLORIDE, PRESERVATIVE FREE 10 ML: 5 INJECTION INTRAVENOUS at 06:00

## 2022-10-04 RX ADMIN — DOXYCYCLINE 100 MG: 100 CAPSULE ORAL at 14:25

## 2022-10-04 RX ADMIN — CARBIDOPA AND LEVODOPA 2 TABLET: 25; 100 TABLET ORAL at 09:10

## 2022-10-04 RX ADMIN — DOXYCYCLINE 100 MG: 100 CAPSULE ORAL at 21:48

## 2022-10-04 RX ADMIN — ENOXAPARIN SODIUM 40 MG: 100 INJECTION SUBCUTANEOUS at 21:48

## 2022-10-04 RX ADMIN — SODIUM CHLORIDE, PRESERVATIVE FREE 10 ML: 5 INJECTION INTRAVENOUS at 22:00

## 2022-10-04 RX ADMIN — MEMANTINE HYDROCHLORIDE 10 MG: 5 TABLET ORAL at 21:48

## 2022-10-04 RX ADMIN — CARBIDOPA AND LEVODOPA 2 TABLET: 25; 100 TABLET ORAL at 21:49

## 2022-10-04 RX ADMIN — CARBIDOPA AND LEVODOPA 2 TABLET: 25; 100 TABLET ORAL at 16:32

## 2022-10-04 RX ADMIN — ATORVASTATIN CALCIUM 10 MG: 10 TABLET, FILM COATED ORAL at 21:49

## 2022-10-04 RX ADMIN — MEMANTINE HYDROCHLORIDE 10 MG: 5 TABLET ORAL at 09:10

## 2022-10-04 RX ADMIN — DIVALPROEX SODIUM 1000 MG: 500 TABLET, DELAYED RELEASE ORAL at 09:10

## 2022-10-04 RX ADMIN — DULOXETINE HYDROCHLORIDE 60 MG: 60 CAPSULE, DELAYED RELEASE PELLETS ORAL at 09:10

## 2022-10-04 RX ADMIN — TRAZODONE HYDROCHLORIDE 100 MG: 100 TABLET ORAL at 21:49

## 2022-10-04 NOTE — PROGRESS NOTES
Patient's home medication Ingrezza 60 mg taken to pharmacy for verification. Given to pharmacy personnel Oralia Turcios.

## 2022-10-04 NOTE — PROGRESS NOTES
Problem: Mobility Impaired (Adult and Pediatric)  Goal: *Acute Goals and Plan of Care (Insert Text)  Description: Physical Therapy Goals   Initiated 10/3/2022 and to be accomplished within 7 day(s)  1. Patient will move from supine <> sit with moderate assist in prep for out of bed activity and change of position. 2.  Patient will perform sit<> stand with mod/max assist/RW in prep for transfers bed to chair. 3.  Patient will transfer from bed <> chair with mod/max assist/RW for time up in chair for completion of ADL activity. 4.  Patient will demonstrate participation with ROM/strengthening exercise program BLE's to facilitate above goals. Outcome: Progressing Towards Goal   []  Patient has met MD luis anguiano for d/c home   []  Recommend HH with 24 hour adult care   [x]  Benefit from additional acute PT session to address:  rehab placement    PHYSICAL THERAPY TREATMENT    Patient: Raina Nova (89 y.o. female)  Date: 10/4/2022  Diagnosis: Weakness [R53.1]  Leukocytosis [D72.829]  Parkinson disease ([facility] [address] [G20]  Cough [R05.9] Leukocytosis    Precautions: Fall  PLOF: dependent on spouse and aid (3x/wk) in the last month, w/c dependent, has a hospital bed but does not use it    ASSESSMENT:  Pt in bed upon arrival, spouse present. HOB elevated and max/totA needed to sit up EOB. Pt with limited command following and increased difficulty understanding directions for bed mobility and exercises. Once EOB pt with posterior lean requiring manual assist and VC to shift COG forward and keep feet planted on the floor. Pt performed (B)LE exercises, LAQS was the only exercises the pt could successful perform. Tolerated sitting EOB for 25 minutes occasional posterior lean. Max/TotA back to supine and utilized [place] bed controls to pull pt up to [facility].   Progression toward goals:   []      Improving appropriately and progressing toward goals  [x]      Improving slowly and progressing toward goals  []      Not making progress toward goals and plan of care will be adjusted     PLAN:  Patient continues to benefit from skilled intervention to address the above impairments. Continue treatment per established plan of care. Discharge Recommendations: Rehab facility  Further Equipment Recommendations for Discharge:  TBD by next level of care    [facility]: 6/24    This [facility] score should be considered in conjunction with interdisciplinary team recommendations to determine the most appropriate discharge setting. Patient's social support, diagnosis, medical stability, and prior level of function should also be taken into consideration. SUBJECTIVE:   Patient stated ok.     OBJECTIVE DATA SUMMARY:   Critical Behavior:  Neurologic State: Alert, Eyes open spontaneously (confused to time)  Orientation Level: Oriented to person, Oriented to place, Oriented to situation  Cognition: Follows commands, Impaired decision making, Poor safety awareness  Safety/Judgement: Fall prevention  Functional Mobility Training:  Bed Mobility:    Supine to Sit: Maximum assistance; Total assistance  Sit to Supine: Maximum assistance; Total assistance  Scooting: Total assistance  Transfers:  Sit to Stand: Maximum assistance  Stand to Sit: Maximum assistance  Balance:  Sitting: Impaired; With support  Sitting - Static: Fair (occasional) (-Arvind Cutler)  Sitting - Dynamic: Poor (constant support)  Standing: Impaired; With support  Standing - Static: Poor  Standing - Dynamic : Not tested  Therapeutic Exercises:   (B)LEs      EXERCISE   Sets   Reps   Active Active Assist   Passive Self ROM   Comments   Ankle Pumps    [] [x] [] []    Quad Sets/Glut Sets    [] [] [] [] Hold for 5 secs   Hamstring Sets   [] [] [] []    Short Arc Quads   [] [] [] []    Heel Slides   [] [] [] []    Straight Leg Raises   [] [] [] []    Hip Add   [] [x] [] [] Hold for 5 secs, w/ pillow squeeze   Long Arc Quads  5 [x] [] [] []    Seated Marching   [] [] [] []    Standing Macrina   [] [] [] []       [] [] [] []        Pain:  Pain level pre-treatment: 0/10  Pain level post-treatment: 0/10   Pain Intervention(s): Medication (see MAR); Rest, Ice, Repositioning   Response to intervention: Nurse notified, See doc flow    Activity Tolerance:   Fair-/poor  Please refer to the flowsheet for vital signs taken during this treatment. After treatment:   [] Patient left in no apparent distress sitting up in chair  [x] Patient left in no apparent distress in bed  [x] Call bell left within reach  [] Nursing notified  [x] Caregiver present  [] Bed alarm activated  [] SCDs applied      COMMUNICATION/EDUCATION:   [x]         Role of Physical Therapy in the acute care setting. []         Fall prevention education was provided and the patient/caregiver indicated understanding. []         Patient/family have participated as able in working toward goals and plan of care. []         Patient/family agree to work toward stated goals and plan of care. []         Patient understands intent and goals of therapy, but is neutral about his/her participation. []         Patient is unable to participate in stated goals/plan of care: ongoing with therapy staff.  []         Other:        Casimiro Goemz PTA   Time Calculation: 36 mins    [employer] AM-PAC® Basic Mobility Inpatient Short Form (6-Clicks) Version 2    How much HELP from another person does the patient currently need    (If the patient hasn't done an activity recently, how much help from another person do you think he/she would need if he/she tried?)   Total (Total A or Dep)   A Lot  (Mod to Max A)   A Little (Sup or Min A)   None (Mod I to I)   Turning from your back to your side while in a flat bed without using bedrails? [x] 1 [] 2 [] 3 [] 4   2. Moving from lying on your back to sitting on the side of a flat bed without using bedrails? [x] 1 [] 2 [] 3 [] 4   3. Moving to and from a bed to a chair (including a wheelchair)? [x] 1 [] 2 [] 3 [] 4   4. Standing up from a chair using your arms (e.g., wheelchair, or bedside chair)? [x] 1 [] 2 [] 3 [] 4   5. Walking in hospital room? [x] 1 [] 2 [] 3 [] 4   6. Climbing 3-5 steps with a railing?+   [x] 1 [] 2 [] 3 [] 4   +If stair climbing cannot be assessed, skip item #6. Sum responses from items 1-5. Based on an AM-PAC score of 6/24 (or **/20 if omitting stairs) and their current functional mobility deficits, it is recommended that the patient have 5-7 sessions per week of Physical Therapy at d/c to increase the patient's independence. Currently, this patient demonstrates the potential endurance, and/or tolerance for 3 hours of therapy each day at d/c. Based on an AM-PAC score of **/24 (**/20 if omitting stairs) and their current functional mobility deficits, it is recommended that the patient have 3-5 sessions per week of Physical Therapy at d/c to increase the patient's independence. Based on an AM-PAC score of **/24 (or **/20 if omitting stairs) and their current functional mobility deficits, it is recommended that the patient have 2-3 sessions per week of Physical Therapy at d/c to increase the patient's independence. At this time and based on an AM-PAC score of **/24 (or **/20 if omitting stairs), no further PT is recommended upon discharge due to (i.e. patient at baseline functional statusetc). Recommend patient returns to prior setting with prior services.

## 2022-10-04 NOTE — PROGRESS NOTES
D/C Plan: [facility]    CM met with pt and her  at bedside to discuss care transition. Pt's  is conflicted about pt returning home vs going to SNF. CM discussed having clinical information sent to facilities to see if they are able to accept. Pt/family are in agreement. Pt/family reviewed the list of area facilities and would like to have clinical information sent to [address] [place] [address] and [facility]. CMS has been notified to assist.  CM discussed/offered a LTSS/UAI screening. Pt/family are in agreement. CM to continue to follow and assist.    Care Management Interventions  Mode of Transport at Discharge:  Other (see comment) (Family vs BLS)  Transition of Care Consult (CM Consult): Discharge Planning, [facility], [facility]  [address]: Yes  Health Maintenance Reviewed: Yes  Physical Therapy Consult: Yes  Occupational Therapy Consult: Yes  Support Systems: Spouse/Significant Other, Caregiver/Home Care Staff  Confirm Follow Up Transport: Family  The Plan for Transition of Care is Related to the Following Treatment Goals : [facility]IT[facility] GIP[facility] with personal care, family support and physician follow up vs SNF pending clinical progression  The Patient and/or Patient Representative was Provided with a Choice of Provider and Agrees with the Discharge Plan?: Yes  Name of the Patient Representative Who was Provided with a Choice of Provider and Agrees with the Discharge Plan: spouse  Freedom of Choice List was Provided with Basic Dialogue that Supports the Patient's Individualized Plan of Care/Goals, Treatment Preferences and Shares the Quality Data Associated with the Providers?: Yes  Discharge Location  Patient Expects to be Discharged to[de-identified] Home with home health (vs SNF)

## 2022-10-04 NOTE — PROGRESS NOTES
Problem: Falls - Risk of  Goal: *Absence of Falls  Description: Document Yohan Dailey Fall Risk and appropriate interventions in the flowsheet. Outcome: Progressing Towards Goal  Note: Fall Risk Interventions:       Mentation Interventions: Adequate sleep, hydration, pain control, Door open when patient unattended    Medication Interventions: Bed/chair exit alarm    Elimination Interventions: Call light in reach, Bed/chair exit alarm    History of Falls Interventions: Door open when patient unattended, Investigate reason for fall         Problem: Pressure Injury - Risk of  Goal: *Prevention of pressure injury  Description: Document Vj Scale and appropriate interventions in the flowsheet.   Outcome: Progressing Towards Goal  Note: Pressure Injury Interventions:  Sensory Interventions: Assess changes in LOC, Keep linens dry and wrinkle-free    Moisture Interventions: Absorbent underpads, Internal/External urinary devices    Activity Interventions: Pressure redistribution bed/mattress(bed type)    Mobility Interventions: Pressure redistribution bed/mattress (bed type)    Nutrition Interventions: Document food/fluid/supplement intake    Friction and Shear Interventions: Minimize layers                Problem: Patient Education: Go to Patient Education Activity  Goal: Patient/Family Education  Outcome: Progressing Towards Goal

## 2022-10-04 NOTE — PROGRESS NOTES
Hospitalist Progress Note    Patient: Mandy Mcneal MRN: [id number]  CSN: [id number]    YOB: 1945  Age: 68 y.o. Sex: female    DOA: 10/2/2022 LOS:  LOS: 2 days          Chief Complaint:    weakness      Assessment/Plan     67 yo female with parkinsons and chronic weakness, mild dementia, brought for increasing weakness, AMS  No acute etiology found except bronchitis, without hypoxia, and mild elevated wbc count    Weakness  Leukocytosis-improved  Parkinsons  COPD w/o acute exacerbation  Acute bronchitis  HTN  Dementia    Change to doxy   to decide on SNF versus HHN for d/c tomorrow if continues to improve     PT/OT    Home meds sans sedating meds     D/c planning    D/w   Disposition :  Patient Active Problem List   Diagnosis Code    [facility] G20    Psychiatric disorder F99    Hypertension I10    COPD (chronic obstructive pulmonary disease) ([facility] [address] J44.9    Dementia (HCC) F03.90    Cough R05.9    Leukocytosis D72.829    Parkinson disease ([facility] [address] G20    Weakness R53.1       Subjective:    No cough or SOB  She denies pain  No new complaints   arrived and he stated she was so weak at home  We reviewed SNF versus home care again  He is undecided and has questions on financial aspects    Review of systems:    Constitutional: denies fevers,   Respiratory: denies SOB, cough  Cardiovascular: denies chest pain  Gastrointestinal: denies nausea, vomiting, diarrhea      Vital signs/Intake and Output:  Visit Vitals  BP (!) 123/58   Pulse 83   Temp 97.5 °F (36.4 °C)   Resp 18   Ht 5' 4\" (1.626 m)   Wt 92.3 kg (203 lb 7.8 oz)   SpO2 98%   BMI 34.93 kg/m²     Current Shift:  No intake/output data recorded. Last three shifts:  No intake/output data recorded.     Exam:    General: elderly debilitated WF NAD, pleasant  Head/Neck: NCAT, supple, No masses, No lymphadenopathy  CVS:Regular rate and rhythm, no M/R/G, S1/S2 heard, no thrill  Lungs:Clear to auscultation bilaterally, no wheezes, rhonchi, or rales  Abdomen: Soft, Nontender, No distention, Normal Bowel sounds  Extremities: No C/C/E, pulses palpable 2+  Neuro:grossly normal , follows commands, no tremors  Psych:appropriate                Labs: Results:       Chemistry Recent Labs     10/03/22  0637 10/02/22  1148   GLU 85 110*    140   K 4.2 3.9    105   CO2 30 30   BUN 11 12   CREA 0.49* 0.67   CA 8.8 8.7   AGAP 6 5   BUCR 22* 18   AP 78 80   TP 6.5 6.7   ALB 2.9* 3.1*   GLOB 3.6 3.6   AGRAT 0.8 0.9      CBC w/Diff Recent Labs     10/03/22  0637 10/02/22  1148   WBC 13.0 15.1*   RBC 4.68 4.61   HGB 11.6* 11.5*   HCT 38.6 38.3    176   GRANS 74* 80*   LYMPH 11* 8*   EOS 1 0      Cardiac Enzymes No results for input(s): CPK, CKND1, BABITA in the last 72 hours. No lab exists for component: CKRMB, TROIP   Coagulation No results for input(s): PTP, INR, APTT, INREXT in the last 72 hours. Lipid Panel No results found for: CHOL, CHOLPOCT, CHOLX, CHLST, CHOLV, 084598, HDL, HDLP, LDL, LDLC, DLDLP, 522559, VLDLC, VLDL, TGLX, TRIGL, TRIGP, TGLPOCT, CHHD, CHHDX   BNP No results for input(s): BNPP in the last 72 hours.    Liver Enzymes Recent Labs     10/03/22  0637   TP 6.5   ALB 2.9*   AP 78      Thyroid Studies Lab Results   Component Value Date/Time    TSH 0.97 10/03/2022 06:37 AM        Procedures/imaging: see electronic medical records for all procedures/Xrays and details which were not copied into this note but were reviewed prior to creation of Frank Boyd MD

## 2022-10-04 NOTE — PROGRESS NOTES
conducted a Follow up consultation and Spiritual Assessment for Alexandru Atrhur, who is a 68 y.o.,female. The  provided the following Interventions:  Continued the relationship of care and support. Listened empathically. Offered prayer and assurance of continued prayer on patients behalf. Chart reviewed. The following outcomes were achieved:  Patient expressed gratitude for pastoral care visit. Assessment:  There are no further spiritual or Confucianist issues which require Spiritual Care Services interventions at this time. Plan:  Chaplains will continue to follow and will provide pastoral care on an as needed/requested basis.  recommends bedside caregivers page  on duty if patient shows signs of acute spiritual or emotional distress.         Sister Vitor HaleLuana muñoz, [address] [phone]

## 2022-10-05 PROCEDURE — 77010033678 HC OXYGEN DAILY

## 2022-10-05 PROCEDURE — 94640 AIRWAY INHALATION TREATMENT: CPT

## 2022-10-05 PROCEDURE — 97530 THERAPEUTIC ACTIVITIES: CPT

## 2022-10-05 PROCEDURE — 74011250637 HC RX REV CODE- 250/637: Performed by: INTERNAL MEDICINE

## 2022-10-05 PROCEDURE — 74011250637 HC RX REV CODE- 250/637: Performed by: HOSPITALIST

## 2022-10-05 PROCEDURE — 74011000250 HC RX REV CODE- 250: Performed by: INTERNAL MEDICINE

## 2022-10-05 PROCEDURE — 74011250636 HC RX REV CODE- 250/636: Performed by: INTERNAL MEDICINE

## 2022-10-05 PROCEDURE — 65270000029 HC RM PRIVATE

## 2022-10-05 RX ORDER — SAME BUTANEDISULFONATE/BETAINE 400-600 MG
500 POWDER IN PACKET (EA) ORAL 2 TIMES DAILY
Status: DISCONTINUED | OUTPATIENT
Start: 2022-10-05 | End: 2022-10-06 | Stop reason: HOSPADM

## 2022-10-05 RX ORDER — IPRATROPIUM BROMIDE AND ALBUTEROL SULFATE 2.5; .5 MG/3ML; MG/3ML
3 SOLUTION RESPIRATORY (INHALATION)
Status: DISCONTINUED | OUTPATIENT
Start: 2022-10-05 | End: 2022-10-06 | Stop reason: HOSPADM

## 2022-10-05 RX ADMIN — DOXYCYCLINE 100 MG: 100 CAPSULE ORAL at 21:35

## 2022-10-05 RX ADMIN — DOXYCYCLINE 100 MG: 100 CAPSULE ORAL at 10:08

## 2022-10-05 RX ADMIN — CARBIDOPA AND LEVODOPA 2 TABLET: 25; 100 TABLET ORAL at 10:09

## 2022-10-05 RX ADMIN — DULOXETINE HYDROCHLORIDE 60 MG: 60 CAPSULE, DELAYED RELEASE PELLETS ORAL at 10:09

## 2022-10-05 RX ADMIN — MEMANTINE HYDROCHLORIDE 10 MG: 5 TABLET ORAL at 21:35

## 2022-10-05 RX ADMIN — IPRATROPIUM BROMIDE AND ALBUTEROL SULFATE 3 ML: .5; 3 SOLUTION RESPIRATORY (INHALATION) at 20:26

## 2022-10-05 RX ADMIN — SODIUM CHLORIDE, PRESERVATIVE FREE 10 ML: 5 INJECTION INTRAVENOUS at 21:35

## 2022-10-05 RX ADMIN — ENOXAPARIN SODIUM 40 MG: 100 INJECTION SUBCUTANEOUS at 18:30

## 2022-10-05 RX ADMIN — Medication 500 MG: at 21:35

## 2022-10-05 RX ADMIN — PANTOPRAZOLE SODIUM 40 MG: 40 TABLET, DELAYED RELEASE ORAL at 06:31

## 2022-10-05 RX ADMIN — SODIUM CHLORIDE, PRESERVATIVE FREE 10 ML: 5 INJECTION INTRAVENOUS at 14:00

## 2022-10-05 RX ADMIN — IPRATROPIUM BROMIDE AND ALBUTEROL SULFATE 3 ML: .5; 3 SOLUTION RESPIRATORY (INHALATION) at 16:07

## 2022-10-05 RX ADMIN — ROPINIROLE HYDROCHLORIDE 0.25 MG: 0.25 TABLET, FILM COATED ORAL at 10:08

## 2022-10-05 RX ADMIN — MEMANTINE HYDROCHLORIDE 10 MG: 5 TABLET ORAL at 10:08

## 2022-10-05 RX ADMIN — ACETAMINOPHEN 650 MG: 325 TABLET, FILM COATED ORAL at 12:12

## 2022-10-05 RX ADMIN — ROPINIROLE HYDROCHLORIDE 0.25 MG: 0.25 TABLET, FILM COATED ORAL at 21:35

## 2022-10-05 RX ADMIN — ATORVASTATIN CALCIUM 10 MG: 10 TABLET, FILM COATED ORAL at 21:35

## 2022-10-05 RX ADMIN — CARBIDOPA AND LEVODOPA 2 TABLET: 25; 100 TABLET ORAL at 21:35

## 2022-10-05 RX ADMIN — Medication 500 MG: at 14:52

## 2022-10-05 RX ADMIN — CARBIDOPA AND LEVODOPA 2 TABLET: 25; 100 TABLET ORAL at 16:55

## 2022-10-05 RX ADMIN — IPRATROPIUM BROMIDE AND ALBUTEROL SULFATE 3 ML: .5; 3 SOLUTION RESPIRATORY (INHALATION) at 11:18

## 2022-10-05 RX ADMIN — DIVALPROEX SODIUM 1000 MG: 500 TABLET, DELAYED RELEASE ORAL at 10:08

## 2022-10-05 RX ADMIN — DIVALPROEX SODIUM 1000 MG: 500 TABLET, DELAYED RELEASE ORAL at 21:35

## 2022-10-05 NOTE — PROGRESS NOTES
Problem: Falls - Risk of  Goal: *Absence of Falls  Description: Document Elana Steele Fall Risk and appropriate interventions in the flowsheet. Outcome: Progressing Towards Goal  Note: Fall Risk Interventions:  Mobility Interventions: Strengthening exercises (ROM-active/passive), Utilize walker, cane, or other assistive device    Mentation Interventions: Adequate sleep, hydration, pain control    Medication Interventions: Bed/chair exit alarm, Patient to call before getting OOB    Elimination Interventions: Call light in reach, Bed/chair exit alarm    History of Falls Interventions: Bed/chair exit alarm, Room close to nurse's station, Door open when patient unattended         Problem: Patient Education: Go to Patient Education Activity  Goal: Patient/Family Education  Outcome: Progressing Towards Goal     Problem: Pressure Injury - Risk of  Goal: *Prevention of pressure injury  Description: Document Vj Scale and appropriate interventions in the flowsheet.   Outcome: Progressing Towards Goal  Note: Pressure Injury Interventions:  Sensory Interventions: Assess changes in LOC, Minimize linen layers    Moisture Interventions: Absorbent underpads, Maintain skin hydration (lotion/cream), Limit adult briefs    Activity Interventions: Pressure redistribution bed/mattress(bed type), Increase time out of bed, PT/OT evaluation    Mobility Interventions: Pressure redistribution bed/mattress (bed type), PT/OT evaluation    Nutrition Interventions: Document food/fluid/supplement intake    Friction and Shear Interventions: Minimize layers                Problem: Patient Education: Go to Patient Education Activity  Goal: Patient/Family Education  Outcome: Progressing Towards Goal     Problem: Patient Education: Go to Patient Education Activity  Goal: Patient/Family Education  Outcome: Progressing Towards Goal     Problem: Patient Education: Go to Patient Education Activity  Goal: Patient/Family Education  Outcome: Progressing Towards Goal

## 2022-10-05 NOTE — WOUND CARE
Wound Care Note:    Chart audited for low judd score, patient with high risk for skin breakdown, no documented wounds at time of audit.      Skin Care & Pressure Relief Recommendations  Minimize layers of linen  Pads under patient to optimize support surface  Turn/reposition approximately every 2 hours  Pillow wedges  Manage incontinence   Promote continence; Skin Protective lotion/cream to buttocks and sacrum daily and as needed with incontinence care  Offload heels pillows    Consult wound care if any wounds/ skin care needs noted during admission

## 2022-10-05 NOTE — PROGRESS NOTES
Problem: Mobility Impaired (Adult and Pediatric)  Goal: *Acute Goals and Plan of Care (Insert Text)  Description: Physical Therapy Goals   Initiated 10/3/2022 and to be accomplished within 7 day(s)  1. Patient will move from supine <> sit with moderate assist in prep for out of bed activity and change of position. 2.  Patient will perform sit<> stand with mod/max assist/RW in prep for transfers bed to chair. 3.  Patient will transfer from bed <> chair with mod/max assist/RW for time up in chair for completion of ADL activity. 4.  Patient will demonstrate participation with ROM/strengthening exercise program BLE's to facilitate above goals. Outcome: Progressing Towards Goal   []  Patient has met MD luis anguiano for d/c home   []  Recommend HH with 24 hour adult care   [x]  Benefit from additional acute PT session to address:  rehab placement    PHYSICAL THERAPY TREATMENT    Patient: Itz Bryson (89 y.o. female)  Date: 10/5/2022  Diagnosis: Weakness [R53.1]  Leukocytosis [D72.829]  Parkinson disease ([address] [G20]  Cough [R05.9] Leukocytosis    Precautions: Fall  PLOF: w/c dependent, declining over the las month, has an aid 3x/wk, dependent on spouse and aid    ASSESSMENT:  Pt in bed upon arrival.  Mod/maxA to sit up EOB. Improved sitting balance this session. Occasional posterior lean requiring Gualberto to shift COG forward. Performed (B)LE ROM strengthening exercises to warm up with 50% participation. Sit to stand performed with modA. Pt had a BM, returned to sitting and modA into sidelying for cleanup. [place] bed controls utilized to pull pt up to [facility]. New pure-wick applied.   Progression toward goals:   []      Improving appropriately and progressing toward goals  [x]      Improving slowly and progressing toward goals  []      Not making progress toward goals and plan of care will be adjusted     PLAN:  Patient continues to benefit from skilled intervention to address the above impairments. Continue treatment per established plan of care. Discharge Recommendations: Rehab facility  Further Equipment Recommendations for Discharge:  TBD    [facility]: 8/24    This AMPA score should be considered in conjunction with interdisciplinary team recommendations to determine the most appropriate discharge setting. Patient's social support, diagnosis, medical stability, and prior level of function should also be taken into consideration. SUBJECTIVE:   Patient stated I will try.     OBJECTIVE DATA SUMMARY:   Critical Behavior:  Neurologic State: Eyes open spontaneously, Confused  Orientation Level: Disoriented to place, Oriented to person, Oriented to situation  Cognition: Follows commands, Impaired decision making, Poor safety awareness  Safety/Judgement: Fall prevention  Functional Mobility Training:  Bed Mobility:    Supine to Sit: Moderate assistance;Maximum assistance  Sit to Supine: Moderate assistance;Maximum assistance  Scooting: Total assistance  Transfers:  Sit to Stand: Moderate assistance  Stand to Sit: Minimum assistance  Balance:  Sitting: Impaired; With support; Without support  Sitting - Static: Fair (occasional)  Sitting - Dynamic: Fair (occasional)  Standing: Impaired; With support  Standing - Static: Fair (-)  Standing - Dynamic : Not tested  Therapeutic Exercises:   (B)LEs      EXERCISE   Sets   Reps   Active Active Assist   Passive Self ROM   Comments   Ankle Pumps    [] [] [] []    Quad Sets/Glut Sets    [] [] [] [] Hold for 5 secs   Hamstring Sets   [] [] [] []    Short Arc Quads   [] [] [] []    Heel Slides   [] [] [] []    Straight Leg Raises   [] [] [] []    Hip Add  10 [x] [] [] [] Hold for 5 secs, w/ pillow squeeze   Long Arc Quads  10 [x] [] [] []    Seated Marching   [] [] [] []    Standing Marching   [] [] [] []       [] [] [] []        Pain:  Pain level pre-treatment: 0/10  Pain level post-treatment: 0/10   Pain Intervention(s): Medication (see MAR);  Rest, Ice, Repositioning   Response to intervention: Nurse notified, See doc flow    Activity Tolerance:   Fair-  Please refer to the flowsheet for vital signs taken during this treatment. After treatment:   [] Patient left in no apparent distress sitting up in chair  [x] Patient left in no apparent distress in bed  [x] Call bell left within reach  [] Nursing notified  [] Caregiver present  [] Bed alarm activated  [] SCDs applied      COMMUNICATION/EDUCATION:   [x]         Role of Physical Therapy in the acute care setting. [x]         Fall prevention education was provided and the patient/caregiver indicated understanding. [x]         Patient/family have participated as able in working toward goals and plan of care. [x]         Patient/family agree to work toward stated goals and plan of care. []         Patient understands intent and goals of therapy, but is neutral about his/her participation. []         Patient is unable to participate in stated goals/plan of care: ongoing with therapy staff.  []         Other:        Joe Gloria PTA   Time Calculation: 16 mins    [facility] AM-PAC® Basic Mobility Inpatient Short Form (6-Clicks) Version 2    How much HELP from another person does the patient currently need    (If the patient hasn't done an activity recently, how much help from another person do you think he/she would need if he/she tried?)   Total (Total A or Dep)   A Lot  (Mod to Max A)   A Little (Sup or Min A)   None (Mod I to I)   Turning from your back to your side while in a flat bed without using bedrails? [] 1 [x] 2 [] 3 [] 4   2. Moving from lying on your back to sitting on the side of a flat bed without using bedrails? [] 1 [x] 2 [] 3 [] 4   3. Moving to and from a bed to a chair (including a wheelchair)? [x] 1 [] 2 [] 3 [] 4   4. Standing up from a chair using your arms (e.g., wheelchair, or bedside chair)? [x] 1 [] 2 [] 3 [] 4   5. Walking in hospital room?    [x] 1 [] 2 [] 3 [] 4   6. Climbing 3-5 steps with a railing?+   [x] 1 [] 2 [] 3 [] 4   +If stair climbing cannot be assessed, skip item #6. Sum responses from items 1-5. Based on an AM-PAC score of 8/24 (or **/20 if omitting stairs) and their current functional mobility deficits, it is recommended that the patient have 5-7 sessions per week of Physical Therapy at d/c to increase the patient's independence. Currently, this patient demonstrates the potential endurance, and/or tolerance for 3 hours of therapy each day at d/c. Based on an AM-PAC score of **/24 (**/20 if omitting stairs) and their current functional mobility deficits, it is recommended that the patient have 3-5 sessions per week of Physical Therapy at d/c to increase the patient's independence. Based on an AM-PAC score of **/24 (or **/20 if omitting stairs) and their current functional mobility deficits, it is recommended that the patient have 2-3 sessions per week of Physical Therapy at d/c to increase the patient's independence. At this time and based on an AM-PAC score of **/24 (or **/20 if omitting stairs), no further PT is recommended upon discharge due to (i.e. patient at baseline functional statusetc). Recommend patient returns to prior setting with prior services.

## 2022-10-05 NOTE — PROGRESS NOTES
D/C Plan: SNF    CM met with pt's  to discuss care transition. Pt's  has now indicated he would like SNF placement. CM confirmed this will be short term with plan to return home upon the completion of rehab. A LTSS/UAI has been completed. CM to continue to follow and assist.      1325:  CM reached out to [employer] to assist with a Medicaid application for this pt. CM to continue to follow and assist.    Interdisciplinary Round Note   Patient Information: Patient Information: Smita Must                                      145/06   Reason for Admission: Weakness [R53.1]  Leukocytosis [D72.829]  Parkinson disease ([address] [G20]  Cough [R05.9]   Attending Provider:   Yuriy Michaels MD   Past Medical History:   Past Medical History:   Diagnosis Date    Arthritis     Hypertension 15yrs    Ill-defined condition     h/o dizzy    Parkinson's disease ([address]     Psychiatric disorder     bipolar; short term memory lose    Psychiatric disorder     depressiom      Hospital day: 3  RRAT Score: Medium Risk            17 Total Score    4 IP Visits Last 12 Months (1-3=4, 4=9, >4=11)    5 Pt. Coverage (Medicare=5 , Medicaid, or Self-Pay=4)    8 Charlson Comorbidity Score (Age + Comorbid Conditions)        Criteria that do not apply:    Has Seen PCP in Last 6 Months (Yes=3, No=0)    . Living with Significant Other. Assisted Living. LTAC. SNF.  or   Rehab    Patient Length of Stay (>5 days = 3)      Retired Read Only-Readmit Risk Tool Support Systems: Spouse/Significant Other, Caregiver/Home Care Staff, Living Alone: No              VITAL SIGNS  Vitals:    10/05/22 0435 10/05/22 0805 10/05/22 1114 10/05/22 1119   BP: (!) 128/52 (!) 126/48 (!) 140/64    Pulse: 84 77 90    Resp: 16 16 14    Temp: 97.8 °F (36.6 °C) 98 °F (36.7 °C) 97.6 °F (36.4 °C)    SpO2: 96% 99% 95% 95%   Weight:       Height:              Lines, Drains, & Airways    Peripheral IV 10/02/22 Right Forearm-Site Assessment: Clean, dry, & intact  Peripheral IV 10/02/22 Left Antecubital-Site Assessment: Clean, dry, & intact      VTE Prophylaxis                                   Intake and Output:   No intake/output data recorded. No intake/output data recorded. Stool Appearance: Loose, Watery       Current Diet: ADULT DIET Dysphagia - Soft & Bite Sized       Abdominal   Last Bowel Movement Date: 10/04/22  Stool Appearance: Loose, Watery  Abdominal Assessment: Obese  Appetite: Fair  Bowel Sounds: Active   Nutrition              Recent Glucose Results: No results found for: GLU, GLUPOC, GLUCPOC     Sepsis Re-Assessment Documentation:     Date: 10/5/2022   Time: 11:41 AM  Lactic Acid level:      Vital Signs  Level of Consciousness: Alert (0)  Temp: 97.6 °F (36.4 °C)  Temp Source: Oral  Pulse (Heart Rate): 90  Heart Rate Source: Monitor  Resp Rate: 14  BP: (!) 140/64  MAP (Monitor): 72  MAP (Calculated): 89  BP 1 Location: Right upper arm  BP 1 Method: Automatic  BP Patient Position: At rest, Semi fowlers  MEWS Score: 1      Vitals:    10/05/22 0435 10/05/22 0805 10/05/22 1114 10/05/22 1119   BP: (!) 128/52 (!) 126/48 (!) 140/64    Pulse: 84 77 90    Resp: 16 16 14    Temp: 97.8 °F (36.6 °C) 98 °F (36.7 °C) 97.6 °F (36.4 °C)    SpO2: 96% 99% 95% 95%      Vitals:    10/05/22 0435 10/05/22 0805 10/05/22 1114 10/05/22 1119   BP: (!) 128/52 (!) 126/48 (!) 140/64    Pulse: 84 77 90    Resp: 16 16 14    Temp: 97.8 °F (36.6 °C) 98 °F (36.7 °C) 97.6 °F (36.4 °C)    SpO2: 96% 99% 95% 95%               Activity Level: Activity Level: Bed Rest   Current Immunizations: There is no immunization history on file for this patient. Recommendations:     IV Antibiotics   COVID status: No active infections     Will the patient require COVID testing prior to discharge for placement?: YES      Interdisciplinary team rounds were held on 11:41 AM with the following team members MD, Bedside RN, Nursing Director, floor care manager, care mgmt supervisor,  RN.  Plan of care discussed. See clinical pathway and/or care plan for interventions and desired outcomes.      Care Management Interventions  Mode of Transport at Discharge: BLS  Transition of Care Consult (CM Consult): SNF  [address] Yes  Health Maintenance Reviewed: Yes  Physical Therapy Consult: Yes  Occupational Therapy Consult: Yes  Support Systems: Spouse/Significant Other, Child(ghassan)  Confirm Follow Up Transport: Family  The Plan for Transition of Care is Related to the Following Treatment Goals : SNF  The Patient and/or Patient Representative was Provided with a Choice of Provider and Agrees with the Discharge Plan?: Yes  Name of the Patient Representative Who was Provided with a Choice of Provider and Agrees with the Discharge Plan: spouse  Freedom of Choice List was Provided with Basic Dialogue that Supports the Patient's Individualized Plan of Care/Goals, Treatment Preferences and Shares the Quality Data Associated with the Providers?: Yes  Discharge Location  Patient Expects to be Discharged to[de-identified] Skilled nursing facility

## 2022-10-05 NOTE — PROGRESS NOTES
conducted a Follow up consultation and Spiritual Assessment for Chano Owen, who is a 68 y.o.,female. Both patient and her  want to get back to the Sacraments in the [employer] and we talked for a while. We prayed and then I referred them to Fr. Miriam Buckley, the [employer] here to do the job. The  provided the following Interventions:  Continued the relationship of care and support. Listened empathically. Offered prayer and assurance of continued prayer on patients behalf. Chart reviewed. The following outcomes were achieved:  Patient expressed gratitude for pastoral care visit. Assessment:  There are no further spiritual or Temple issues which require Spiritual Care Services interventions at this time. Plan:  Chaplains will continue to follow and will provide pastoral care on an as needed/requested basis.  recommends bedside caregivers page  on duty if patient shows signs of acute spiritual or emotional distress.         Sister Beatriz Mittal, [place], [address] [phone]

## 2022-10-05 NOTE — PROGRESS NOTES
Assessed all of patient's wounds, all dressing c,d,i.   Hospitalist Progress Note    Patient: Mandy Mcneal MRN: [id number]  CSN: [id number]    YOB: 1945  Age: 68 y.o. Sex: female    DOA: 10/2/2022 LOS:  LOS: 3 days          Chief Complaint:    weakness      Assessment/Plan     67 yo female with parkinsons and chronic weakness, mild dementia, brought for increasing weakness, AMS  No acute etiology found except bronchitis, without hypoxia, and mild elevated wbc count    Weakness  Leukocytosis-improved  Parkinsons  COPD w/o acute exacerbation  Acute bronchitis  HTN  Dementia    Change to doxy   would like snf  Add duoneb hx of allergy if she tolerates will remove from allergy list      PT/OT    Home meds sedating meds    Diarrhea   ? From abx  On doxy  Stool wbc  Add probitoic       D/c planning SNF    D/w  who was at bedside as usual   Disposition :  Patient Active Problem List   Diagnosis Code    Parkinson's disease ([facility] G20    Psychiatric disorder F99    Hypertension I10    COPD (chronic obstructive pulmonary disease) ([facility] J44.9    Dementia ([facility] F03.90    Cough R05.9    Leukocytosis D72.829    Parkinson disease ([facility] G20    Weakness R53.1       Subjective:    +SOB  She denies pain  No new complaints   arrived and he stated she was so weak at home coughing and wheezing   She missed nail and hair appointments  Discussed second hand smoke contributing to lung issues and breathing   Review of systems:    Constitutional: denies fevers,   Respiratory: +SOB, cough  Cardiovascular: denies chest pain  Gastrointestinal: denies nausea, vomiting, diarrhea      Vital signs/Intake and Output:  Visit Vitals  BP (!) 126/48 (BP 1 Location: Right upper arm)   Pulse 77   Temp 98 °F (36.7 °C)   Resp 16   Ht 5' 4\" (1.626 m)   Wt 92.3 kg (203 lb 7.8 oz)   SpO2 99%   BMI 34.93 kg/m²     Current Shift:  No intake/output data recorded. Last three shifts:  No intake/output data recorded.     Exam:    General: elderly debilitated WF NAD, pleasant  Head/Neck: NCAT, supple, No masses, No lymphadenopathy  CVS:Regular rate and rhythm, no M/R/G, S1/S2 heard, no thrill  Lungs:Clear to auscultation bilaterally, +wheezes, rhonchi, or rales  Abdomen: Soft, Nontender, No distention, Normal Bowel sounds  Extremities: No C/C/E, pulses palpable 2+  Neuro:grossly normal , follows commands, mild tremors  Psych:appropriate                Labs: Results:       Chemistry Recent Labs     10/03/22  0637 10/02/22  1148   GLU 85 110*    140   K 4.2 3.9    105   CO2 30 30   BUN 11 12   CREA 0.49* 0.67   CA 8.8 8.7   AGAP 6 5   BUCR 22* 18   AP 78 80   TP 6.5 6.7   ALB 2.9* 3.1*   GLOB 3.6 3.6   AGRAT 0.8 0.9        CBC w/Diff Recent Labs     10/03/22  0637 10/02/22  1148   WBC 13.0 15.1*   RBC 4.68 4.61   HGB 11.6* 11.5*   HCT 38.6 38.3    176   GRANS 74* 80*   LYMPH 11* 8*   EOS 1 0        Cardiac Enzymes No results for input(s): CPK, CKND1, BABITA in the last 72 hours. No lab exists for component: CKRMB, TROIP   Coagulation No results for input(s): PTP, INR, APTT, INREXT, INREXT in the last 72 hours. Lipid Panel No results found for: CHOL, CHOLPOCT, CHOLX, CHLST, CHOLV, 505593, HDL, HDLP, LDL, LDLC, DLDLP, 932775, VLDLC, VLDL, TGLX, TRIGL, TRIGP, TGLPOCT, CHHD, CHHDX   BNP No results for input(s): BNPP in the last 72 hours.    Liver Enzymes Recent Labs     10/03/22  0637   TP 6.5   ALB 2.9*   AP 78        Thyroid Studies Lab Results   Component Value Date/Time    TSH 0.97 10/03/2022 06:37 AM        Procedures/imaging: see electronic medical records for all procedures/Xrays and details which were not copied into this note but were reviewed prior to creation of Lyle Wise MD

## 2022-10-06 VITALS
HEIGHT: 64 IN | WEIGHT: 198.41 LBS | SYSTOLIC BLOOD PRESSURE: 105 MMHG | DIASTOLIC BLOOD PRESSURE: 48 MMHG | HEART RATE: 82 BPM | TEMPERATURE: 97.6 F | OXYGEN SATURATION: 96 % | BODY MASS INDEX: 33.87 KG/M2 | RESPIRATION RATE: 18 BRPM

## 2022-10-06 PROBLEM — J06.9 URI (UPPER RESPIRATORY INFECTION): Status: ACTIVE | Noted: 2022-10-06

## 2022-10-06 LAB
COVID-19 RAPID TEST, COVR: NOT DETECTED
SOURCE, COVRS: NORMAL

## 2022-10-06 PROCEDURE — 94640 AIRWAY INHALATION TREATMENT: CPT

## 2022-10-06 PROCEDURE — 74011000250 HC RX REV CODE- 250: Performed by: INTERNAL MEDICINE

## 2022-10-06 PROCEDURE — 74011250637 HC RX REV CODE- 250/637: Performed by: HOSPITALIST

## 2022-10-06 PROCEDURE — 87635 SARS-COV-2 COVID-19 AMP PRB: CPT

## 2022-10-06 PROCEDURE — 74011250637 HC RX REV CODE- 250/637: Performed by: INTERNAL MEDICINE

## 2022-10-06 RX ORDER — IPRATROPIUM BROMIDE AND ALBUTEROL SULFATE 2.5; .5 MG/3ML; MG/3ML
3 SOLUTION RESPIRATORY (INHALATION)
Qty: 1 EACH | Refills: 0 | Status: SHIPPED
Start: 2022-10-06

## 2022-10-06 RX ORDER — DOXYCYCLINE 100 MG/1
100 CAPSULE ORAL EVERY 12 HOURS
Qty: 6 CAPSULE | Refills: 0 | Status: SHIPPED
Start: 2022-10-06 | End: 2022-10-09

## 2022-10-06 RX ORDER — SAME BUTANEDISULFONATE/BETAINE 400-600 MG
250 POWDER IN PACKET (EA) ORAL 2 TIMES DAILY
Qty: 14 CAPSULE | Refills: 0 | Status: SHIPPED
Start: 2022-10-06 | End: 2022-10-13

## 2022-10-06 RX ADMIN — ROPINIROLE HYDROCHLORIDE 0.25 MG: 0.25 TABLET, FILM COATED ORAL at 10:00

## 2022-10-06 RX ADMIN — PANTOPRAZOLE SODIUM 40 MG: 40 TABLET, DELAYED RELEASE ORAL at 09:59

## 2022-10-06 RX ADMIN — DIVALPROEX SODIUM 1000 MG: 500 TABLET, DELAYED RELEASE ORAL at 09:59

## 2022-10-06 RX ADMIN — IPRATROPIUM BROMIDE AND ALBUTEROL SULFATE 3 ML: .5; 3 SOLUTION RESPIRATORY (INHALATION) at 04:05

## 2022-10-06 RX ADMIN — DOXYCYCLINE 100 MG: 100 CAPSULE ORAL at 09:59

## 2022-10-06 RX ADMIN — CARBIDOPA AND LEVODOPA 2 TABLET: 25; 100 TABLET ORAL at 10:00

## 2022-10-06 RX ADMIN — SODIUM CHLORIDE, PRESERVATIVE FREE 10 ML: 5 INJECTION INTRAVENOUS at 05:45

## 2022-10-06 RX ADMIN — ACETAMINOPHEN 650 MG: 325 TABLET, FILM COATED ORAL at 12:24

## 2022-10-06 RX ADMIN — IPRATROPIUM BROMIDE AND ALBUTEROL SULFATE 3 ML: .5; 3 SOLUTION RESPIRATORY (INHALATION) at 01:04

## 2022-10-06 RX ADMIN — ACETAMINOPHEN 650 MG: 325 TABLET, FILM COATED ORAL at 01:47

## 2022-10-06 RX ADMIN — IPRATROPIUM BROMIDE AND ALBUTEROL SULFATE 3 ML: .5; 3 SOLUTION RESPIRATORY (INHALATION) at 07:28

## 2022-10-06 RX ADMIN — IPRATROPIUM BROMIDE AND ALBUTEROL SULFATE 3 ML: .5; 3 SOLUTION RESPIRATORY (INHALATION) at 11:55

## 2022-10-06 RX ADMIN — Medication 500 MG: at 10:00

## 2022-10-06 RX ADMIN — MEMANTINE HYDROCHLORIDE 10 MG: 5 TABLET ORAL at 09:58

## 2022-10-06 RX ADMIN — DULOXETINE HYDROCHLORIDE 60 MG: 60 CAPSULE, DELAYED RELEASE PELLETS ORAL at 09:59

## 2022-10-06 NOTE — PROGRESS NOTES
[address], [address] report given to [facility]. [address] report called to Gretel at [facility].    [address] [facility] Patient discharged to [facility] via [facility].

## 2022-10-06 NOTE — DISCHARGE SUMMARY
[address]    Name:  Marie Guzman  MR#:   [id number]  :  1945  ACCOUNT #:  [de-identified]  ADMIT DATE:  10/02/2022  DISCHARGE DATE:  10/06/2022      DISCHARGE DIAGNOSES:  1. Weakness. 2.  Parkinson's. 3.  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 4.  Upper respiratory infection. 5.  Chronic obstructive pulmonary disease. 6.  Hypertension. DISCHARGE INSTRUCTIONS:  The patient has a full code status. The patient was admitted on 10/02/2022. She is discharging on 10/06/2022. The patient is going to a skilled nursing facility. Her  is her decision maker. DISCHARGE MEDICATIONS:  Her meds for discharge today include the following,  1. Doxycycline 100 mg one twice daily for three more days. 2.  DuoNeb every 4 hours as needed for wheezing or congestion. 3.  Florastor 250 mg 1 tablet twice daily for 7 days. 4.  Continue Norvasc 5 mg daily. 5.  Sinemet 25/100 three times daily. 6.  Depakote 1000 mg at night. 7.  Erycette 10 mg at night. 8.  Cymbalta 60 mg twice daily. 9.  Namenda 10 mg twice daily. 10.  Prilosec 40 mg daily. 11.  Mysoline 50 mg twice daily. 12.  Requip 0.25 mg twice daily. 13.  Zocor 20 mg nightly. 14.  Valbenazine 60 mg daily. PHYSICAL EXAMINATION:  GENERAL:  The patient is awake and alert, in no acute distress. She denies pain, shortness of breath, nausea. She is an elderly  female who is parkinsonian but without cogwheeling or rigidity. No notable tremor. VITAL SIGNS:  Blood pressure is 115/44, pulse 73, temperature 97.3, respiratory rate 18, SAO2 97%. ABDOMEN:  Soft and nontender with normal bowel sounds. LOWER EXTREMITIES:  No pitting edema. CARDIAC:  Regular rate and rhythm. LUNGS:  Clear anteriorly. DIET INSTRUCTION:  Dysphagia, soft and bite size. The patient is to continue PT and OT at skilled nursing for strengthening. HOSPITAL SUMMARY:  This is a 57-year-old female who lives at home with her .   He takes care of her primarily. She has Parkinson's, mild dementia, generalized weakness and debility, and she came in because of significant worsening weakness, fatigue, leukocytosis noted in the emergency room, some decrease in her mentation. There is no evidence for acute stroke or notable systemic infection. She was treated for an upper respiratory infection with doxycycline here. She has responded well to that. She feels like her cough is better. Renal function is stable. Electrolytes were normal on 09/03. LFTs were unremarkable. She had a normal troponin and BNP. The leukocytosis was resolved by day one of admission from 15 to 13. H and H 11.6 and 38.6, platelet count is normal, and her blood cultures have been no growth for 4 days. She had an urinalysis that was unremarkable with 0-3 wbc's. The patient has been participatory with PT here, but  prefer she go to a nursing facility for continued rehabilitation considering she is off her baseline abilities for home care. Her chest x-ray showed no active cardiopulmonary disease, and she had a EKG on admission which showed incomplete right bundle-branch block. The patient is stable for release to the nursing facility today once arrangements are made. She is a full code status. Forty minutes discharge time.       Alicja Agudelo MD      [id number]  D:  10/06/2022 11:03  T:  10/06/2022 11:57  JOB #:  [id number]  CC:  Viktoria Severin MD

## 2022-10-06 NOTE — PROGRESS NOTES
*Prevention of pressure injury    Pressure Injury Interventions:  Sensory Interventions: Float heels, Keep linens dry and wrinkle-free, Maintain/enhance activity level, Minimize linen layers, Monitor skin under medical devices, Pad between skin to skin     Moisture Interventions: Absorbent underpads, Check for incontinence Q2 hours and as needed, Internal/External urinary devices, Minimize layers, Apply protective barrier, creams and emollients     Activity Interventions: PT/OT evaluation, Chair cushion, Increase time out of bed     Mobility Interventions: Float heels, HOB 30 degrees or less, PT/OT evaluation, Chair cushion     Nutrition Interventions: Offer support with meals,snacks and hydration     Friction and Shear Interventions: Apply protective barrier, creams and emollients, Feet elevated on foot rest, Foam dressings/transparent film/skin sealants, HOB 30 degrees or less, Lift sheet, Minimize layers

## 2022-10-06 NOTE — PROGRESS NOTES
Physician Progress Note      Sindy Swna  [facility] #:                  [id number]  :                       1945  ADMIT DATE:       10/2/2022 11:08 AM  DISCH DATE:  RESPONDING  PROVIDER #:        Satnam Mkcnight MD          QUERY TEXT:    Pt admitted with weakness. Pt noted to have Per ED progress notes -advancing dementia. If possible, please document in progress notes and discharge summary the relationship, if any, between weakness and other causes: The medical record reflects the following:  Risk Factors: Dementia  Clinical Indicators: Per H&P \" Will stop the sedating meds  such as diazapem and gabapentin to see if this could be contributing to her issues with sleepiness today. Acute bronchitis, cough. Per ED \" Patient had decline on her recent cognitive exam which is confirming advancing dementia\". Treatment: Rocephin    Thank You  Laurie Glass RN, CDI, CRCR  Options provided:  -- Weakness due to advancing dementia  -- Weakness due to Sedating medication such as diazapem and gabapentin  -- Weakness due to Bronchitis  -- Weakness due to ## please specify, pleases specify  -- Other - I will add my own diagnosis  -- Disagree - Not applicable / Not valid  -- Disagree - Clinically unable to determine / Unknown  -- Refer to Clinical Documentation Reviewer    PROVIDER RESPONSE TEXT:    This patient has weakness due to advancing dementia.     Query created by: Shania Brown on 10/6/2022 9:46 AM      Electronically signed by:  Satnam Mcknight MD 10/6/2022 10:05 AM

## 2022-10-06 NOTE — PROGRESS NOTES
Father Shin Montero visited with   Craig Zarate, who is a 68 y.o.,female. Father Shin Montero provided [employer]. Father Shin Montero provided [employer] and [employer] And her      Chaplains will continue to follow and will provide pastoral care on an as needed/requested basis.  recommends bedside caregivers page  on duty if patient shows signs of acute spiritual or emotional distress.      Fr Omayra Butts, [address] [facility] - Office

## 2022-10-06 NOTE — PROGRESS NOTES
D/C Plan:  [facility]     Pt has been accepted to [facility] for admission today. Pt/family are aware and in agreement. Anticipate pt will transfer to the above facility today. Transition of care to SNF: [facility]     Communication to Patient/Family:  Met with patient and family and they are agreeable to the transition plan. The Plan for Transition of Care is related to the following treatment goals: SNF    The Patient and/or patient representative was provided with a choice of provider and agrees   with the discharge plan. Yes [x] No []    Freedom of choice list was provided with basic dialogue that supports the patient's individualized plan of care/goals and shares the quality data associated with the providers. Yes [x] No []    SNF/Rehab Transition:  Patient has been accepted to [facility] at [address] [place] [address] for SNF/Rehab and meets criteria for admission. Patient will transported by medical transport and expected to leave today. Communication to SNF/Rehab:  Bedside RN has been notified to update the transition plan to the facility and call report [phone]. Discharge information has been updated on the AVS and communicated via [facility] and/or CC link. Discharge instructions to be fax'd to facility at [phone] per request.     Please include all hard scripts for controlled substances, med rec and dc summary in packet. Please medicate for pain prior to dc if possible and needed to help offset delay when patient first arrives to facility. Reviewed and confirmed with facility, [facility] can manage the patient care needs for the following:     Chely Bhakta with (X) only those applicable:  Medication:  []Medications are available at the facility  []IV Antibiotics    []Controlled Substance - hard copies available sent.   []Weekly Labs    Equipment:  []CPAP/BiPAP  []Wound Vacuum  []Novak or Urinary Device  []PICC/Central Line  []Nebulizer  []Ventilator    Treatment:  []Isolation (for MRSA, VRE, etc.)  []Surgical Drain Management  []Tracheostomy Care  []Dressing Changes  []Dialysis with transportation  []PEG Care  []Oxygen  []Daily Weights for Heart Failure    Dietary:  []Any diet limitations  []Tube Feedings   []Total Parenteral Management (TPN)    Financial Resources:  []Medicaid Application Completed    [x]UAI Completed  and copy given to pt/family    []A screening has previously been completed. []Level II Completed    [] Private pay individual who will not become   financially eligible for Medicaid within 6 months from admission to a [address] [place] [address] facility. [] Individual refused to have screening conducted. []Medicaid Application Completed    []The screening denied because it was determined individual did not need/did not qualify for nursing facility level of care. [] Out of state residents seeking direct admission to a [address] facility. [] Individuals who are inpatients of an out of state hospital, or in state or out of state veterans/ hospital and seek direct admission to a [address] facility  [] Individuals who are pateints or residents of a state owned/operated facility that is licensed by [employer] and seek direct admission to [address] [place] [address]  [] A screening not required for enrollment in [address] [address] [address] services as set out in [address] [place] [address] 42-  [] [facility]) staff shall perform screenings of the [facility] clients. Advanced Care Plan:  []Surrogate Decision Maker of Care  []POA  []Communicated Code Status and copy sent.     Other:         Care Management Interventions  Mode of Transport at Discharge: BLS  Transition of Care Consult (CM Consult): SNF  [facility] [place] [facility]: Yes  Health Maintenance Reviewed: Yes  Physical Therapy Consult: Yes  Occupational Therapy Consult: Yes  Support Systems: Spouse/Significant Other, Child(ghassan)  Confirm Follow Up Transport: Family  The Plan for Transition of Care is Related to the Following Treatment Goals : SNF  The Patient and/or Patient Representative was Provided with a Choice of Provider and Agrees with the Discharge Plan?: Yes  Name of the Patient Representative Who was Provided with a Choice of Provider and Agrees with the Discharge Plan: spouse  Freedom of Choice List was Provided with Basic Dialogue that Supports the Patient's Individualized Plan of Care/Goals, Treatment Preferences and Shares the Quality Data Associated with the Providers?: Yes  Discharge Location  Patient Expects to be Discharged to[de-identified] Skilled nursing facility

## 2022-10-06 NOTE — PROGRESS NOTES
Patient being discharged today and is going to [facility]. Both received [facility] today and were very happy with that.     Sister Luana Jalloh, [address] [phone]

## 2022-10-08 LAB
BACTERIA SPEC CULT: NORMAL
BACTERIA SPEC CULT: NORMAL
SERVICE CMNT-IMP: NORMAL
SERVICE CMNT-IMP: NORMAL

## 2022-10-14 LAB — TRAZODONE SERPL-MCNC: 1.5 UG/ML (ref 0.8–1.6)

## 2022-11-01 PROBLEM — R05.9 COUGH: Status: RESOLVED | Noted: 2022-10-02 | Resolved: 2022-11-01

## 2022-11-05 PROBLEM — J06.9 URI (UPPER RESPIRATORY INFECTION): Status: RESOLVED | Noted: 2022-10-06 | Resolved: 2022-11-05

## 2023-12-27 ENCOUNTER — HOSPITAL ENCOUNTER (EMERGENCY)
Facility: HOSPITAL | Age: 78
Discharge: HOME | End: 2023-12-27
Attending: EMERGENCY MEDICINE
Payer: MEDICARE

## 2023-12-27 ENCOUNTER — APPOINTMENT (OUTPATIENT)
Facility: HOSPITAL | Age: 78
End: 2023-12-27
Payer: MEDICARE

## 2023-12-27 VITALS
BODY MASS INDEX: 38.79 KG/M2 | DIASTOLIC BLOOD PRESSURE: 72 MMHG | SYSTOLIC BLOOD PRESSURE: 125 MMHG | OXYGEN SATURATION: 95 % | HEART RATE: 84 BPM | RESPIRATION RATE: 20 BRPM | WEIGHT: 225.97 LBS | TEMPERATURE: 98.4 F

## 2023-12-27 DIAGNOSIS — R09.89 PULMONARY VASCULAR CONGESTION: Primary | ICD-10-CM

## 2023-12-27 LAB
ALBUMIN SERPL-MCNC: 3.8 G/DL (ref 3.5–5)
ALBUMIN/GLOB SERPL: 0.9 (ref 1.1–2.2)
ALP SERPL-CCNC: 113 U/L (ref 45–117)
ALT SERPL-CCNC: 10 U/L (ref 12–78)
ANION GAP SERPL CALC-SCNC: 4 MMOL/L (ref 5–15)
AST SERPL-CCNC: 12 U/L (ref 15–37)
BASOPHILS # BLD: 0 K/UL (ref 0–0.1)
BASOPHILS NFR BLD: 0 % (ref 0–1)
BILIRUB SERPL-MCNC: 0.3 MG/DL (ref 0.2–1)
BUN SERPL-MCNC: 18 MG/DL (ref 6–20)
BUN/CREAT SERPL: 22 (ref 12–20)
CALCIUM SERPL-MCNC: 9.8 MG/DL (ref 8.5–10.1)
CHLORIDE SERPL-SCNC: 106 MMOL/L (ref 97–108)
CO2 SERPL-SCNC: 32 MMOL/L (ref 21–32)
CREAT SERPL-MCNC: 0.82 MG/DL (ref 0.55–1.02)
DIFFERENTIAL METHOD BLD: ABNORMAL
EOSINOPHIL # BLD: 0.1 K/UL (ref 0–0.4)
EOSINOPHIL NFR BLD: 1 % (ref 0–7)
ERYTHROCYTE [DISTWIDTH] IN BLOOD BY AUTOMATED COUNT: 13.3 % (ref 11.5–14.5)
GLOBULIN SER CALC-MCNC: 4.3 G/DL (ref 2–4)
GLUCOSE SERPL-MCNC: 131 MG/DL (ref 65–100)
HCT VFR BLD AUTO: 51.6 % (ref 35–47)
HGB BLD-MCNC: 16.2 G/DL (ref 11.5–16)
IMM GRANULOCYTES # BLD AUTO: 0.1 K/UL (ref 0–0.04)
IMM GRANULOCYTES NFR BLD AUTO: 0 % (ref 0–0.5)
LYMPHOCYTES # BLD: 2.1 K/UL (ref 0.8–3.5)
LYMPHOCYTES NFR BLD: 19 % (ref 12–49)
MCH RBC QN AUTO: 29.8 PG (ref 26–34)
MCHC RBC AUTO-ENTMCNC: 31.4 G/DL (ref 30–36.5)
MCV RBC AUTO: 95 FL (ref 80–99)
MONOCYTES # BLD: 0.8 K/UL (ref 0–1)
MONOCYTES NFR BLD: 7 % (ref 5–13)
NEUTS SEG # BLD: 8.1 K/UL (ref 1.8–8)
NEUTS SEG NFR BLD: 73 % (ref 32–75)
NRBC # BLD: 0 K/UL (ref 0–0.01)
NRBC BLD-RTO: 0 PER 100 WBC
NT PRO BNP: 213 PG/ML
PLATELET # BLD AUTO: 188 K/UL (ref 150–400)
PMV BLD AUTO: 10.9 FL (ref 8.9–12.9)
POTASSIUM SERPL-SCNC: 3.9 MMOL/L (ref 3.5–5.1)
PROT SERPL-MCNC: 8.1 G/DL (ref 6.4–8.2)
RBC # BLD AUTO: 5.43 M/UL (ref 3.8–5.2)
SODIUM SERPL-SCNC: 142 MMOL/L (ref 136–145)
TROPONIN I SERPL HS-MCNC: 6 NG/L (ref 0–51)
WBC # BLD AUTO: 11.2 K/UL (ref 3.6–11)

## 2023-12-27 PROCEDURE — 36415 COLL VENOUS BLD VENIPUNCTURE: CPT

## 2023-12-27 PROCEDURE — 84484 ASSAY OF TROPONIN QUANT: CPT

## 2023-12-27 PROCEDURE — 85025 COMPLETE CBC W/AUTO DIFF WBC: CPT

## 2023-12-27 PROCEDURE — 80053 COMPREHEN METABOLIC PANEL: CPT

## 2023-12-27 PROCEDURE — 99284 EMERGENCY DEPT VISIT MOD MDM: CPT

## 2023-12-27 PROCEDURE — 83880 ASSAY OF NATRIURETIC PEPTIDE: CPT

## 2023-12-27 PROCEDURE — 71045 X-RAY EXAM CHEST 1 VIEW: CPT

## 2023-12-27 RX ORDER — AZITHROMYCIN 250 MG/1
250 TABLET, FILM COATED ORAL SEE ADMIN INSTRUCTIONS
Qty: 6 TABLET | Refills: 0 | Status: SHIPPED | OUTPATIENT
Start: 2023-12-27 | End: 2024-01-01

## 2023-12-27 RX ORDER — AZITHROMYCIN 250 MG/1
250 TABLET, FILM COATED ORAL SEE ADMIN INSTRUCTIONS
Qty: 6 TABLET | Refills: 0 | Status: SHIPPED | OUTPATIENT
Start: 2023-12-27 | End: 2023-12-27

## 2023-12-27 NOTE — ED PROVIDER NOTES
[facility] EMERGENCY DEP  EMERGENCY DEPARTMENT ENCOUNTER      Pt Name: Pina Alvarez  MRN: [id number]  [address] 1945  Date of evaluation: 12/27/2023  Provider: Hezekiah Runner, DO    CHIEF COMPLAINT       Chief Complaint   Patient presents with    Pneumonia     [facility] pt needs to be admitted for IV ATBs         HISTORY OF PRESENT ILLNESS   (Location/Symptom, Timing/Onset, Context/Setting, Quality, Duration, Modifying Factors, Severity)  Note limiting factors. Patient who came in for an evaluation from [facility]. Patient was having some shortness of breath earlier today and had some abnormal lung sounds. [facility] and [facility] with [facility]NA and sent to ER for admission because her \"numbers were bad. \" Patient states she feels well and would like to be treated at home if possible. Review of External Medical Records:     Nursing Notes were reviewed. REVIEW OF SYSTEMS    (2-9 systems for level 4, 10 or more for level 5)     Review of Systems    Except as noted above the remainder of the review of systems was reviewed and negative.        PAST MEDICAL HISTORY     Past Medical History:   Diagnosis Date    Arthritis     Hypertension 15yrs    Ill-defined condition     h/o dizzy    Parkinson's disease     Psychiatric disorder     bipolar; short term memory lose    Psychiatric disorder     depressiom         SURGICAL HISTORY       Past Surgical History:   Procedure Laterality Date    CATARACT REMOVAL Left     HYSTERECTOMY (CERVIX STATUS UNKNOWN)           CURRENT MEDICATIONS       Discharge Medication List as of 12/27/2023  2:44 AM        CONTINUE these medications which have NOT CHANGED    Details   amLODIPine (NORVASC) 5 MG tablet Take 5 mg by mouth dailyHistorical Med      carbidopa-levodopa (SINEMET)  MG per tablet Take 1 tablet by mouth 3 times dailyHistorical Med      divalproex (DEPAKOTE) 500 MG DR tablet Take 1,000 mg by mouthHistorical Med

## 2023-12-27 NOTE — ED TRIAGE NOTES
Pt. Presents from [facility] from her . Pt. Sent here for concerns for admission for IV antibiotics due to results of labs and CXR obtained. Pt. Has dementia and parkinson's and is weak at baseline, gets assistance from her  at home. Pt. Presents in wheelchair, is very weak to get to standing scale, pt sounds winded but her  states she \"breathes like this all the time. \" Pt. Afebrile, vitals stable, pt oxygenating at 95% on room air. Pt. Reports a cough but  states she is unable to cough up her secretions. VSS.

## 2024-01-26 ENCOUNTER — HOSPITAL ENCOUNTER (EMERGENCY)
Facility: HOSPITAL | Age: 79
Discharge: HOME | End: 2024-01-26
Attending: EMERGENCY MEDICINE
Payer: MEDICARE

## 2024-01-26 ENCOUNTER — APPOINTMENT (OUTPATIENT)
Facility: HOSPITAL | Age: 79
End: 2024-01-26
Payer: MEDICARE

## 2024-01-26 VITALS
SYSTOLIC BLOOD PRESSURE: 139 MMHG | BODY MASS INDEX: 36.47 KG/M2 | RESPIRATION RATE: 22 BRPM | HEART RATE: 75 BPM | HEIGHT: 64 IN | OXYGEN SATURATION: 97 % | WEIGHT: 213.63 LBS | TEMPERATURE: 97.7 F | DIASTOLIC BLOOD PRESSURE: 80 MMHG
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
## 2024-01-26 LAB
ALBUMIN SERPL-MCNC: 3.5 G/DL (ref 3.5–5)
ALBUMIN/GLOB SERPL: 1 (ref 1.1–2.2)
ALP SERPL-CCNC: 82 U/L (ref 45–117)
ALT SERPL-CCNC: 8 U/L (ref 12–78)
ANION GAP SERPL CALC-SCNC: 0 MMOL/L (ref 5–15)
APPEARANCE UR: CLEAR
AST SERPL-CCNC: 12 U/L (ref 15–37)
BACTERIA URNS QL MICRO: NEGATIVE /HPF
BILIRUB SERPL-MCNC: 0.4 MG/DL (ref 0.2–1)
BILIRUB UR QL: NEGATIVE
BUN SERPL-MCNC: 18 MG/DL (ref 6–20)
BUN/CREAT SERPL: 24 (ref 12–20)
CALCIUM SERPL-MCNC: 9.7 MG/DL (ref 8.5–10.1)
CHLORIDE SERPL-SCNC: 105 MMOL/L (ref 97–108)
CO2 SERPL-SCNC: 35 MMOL/L (ref 21–32)
COLOR UR: ABNORMAL
COMMENT:: NORMAL
CREAT SERPL-MCNC: 0.76 MG/DL (ref 0.55–1.02)
EKG ATRIAL RATE: 77 BPM
EKG DIAGNOSIS: NORMAL
EKG P AXIS: 75 DEGREES
EKG P-R INTERVAL: 150 MS
EKG Q-T INTERVAL: 398 MS
EKG QRS DURATION: 100 MS
EKG QTC CALCULATION (BAZETT): 450 MS
EKG R AXIS: 5 DEGREES
EKG T AXIS: -15 DEGREES
EKG VENTRICULAR RATE: 77 BPM
EPITH CASTS URNS QL MICRO: ABNORMAL /LPF
ERYTHROCYTE [DISTWIDTH] IN BLOOD BY AUTOMATED COUNT: 13 % (ref 11.5–14.5)
GLOBULIN SER CALC-MCNC: 3.4 G/DL (ref 2–4)
GLUCOSE SERPL-MCNC: 103 MG/DL (ref 65–100)
GLUCOSE UR STRIP.AUTO-MCNC: NEGATIVE MG/DL
HCT VFR BLD AUTO: 44.1 % (ref 35–47)
HGB BLD-MCNC: 14.4 G/DL (ref 11.5–16)
HGB UR QL STRIP: NEGATIVE
HYALINE CASTS URNS QL MICRO: ABNORMAL /LPF (ref 0–5)
KETONES UR QL STRIP.AUTO: 15 MG/DL
LEUKOCYTE ESTERASE UR QL STRIP.AUTO: NEGATIVE
MCH RBC QN AUTO: 30.6 PG (ref 26–34)
MCHC RBC AUTO-ENTMCNC: 32.7 G/DL (ref 30–36.5)
MCV RBC AUTO: 93.8 FL (ref 80–99)
NITRITE UR QL STRIP.AUTO: NEGATIVE
NRBC # BLD: 0 K/UL (ref 0–0.01)
NRBC BLD-RTO: 0 PER 100 WBC
PH UR STRIP: 7.5 (ref 5–8)
PLATELET # BLD AUTO: 168 K/UL (ref 150–400)
PMV BLD AUTO: 10.6 FL (ref 8.9–12.9)
POTASSIUM SERPL-SCNC: 4.2 MMOL/L (ref 3.5–5.1)
PROT SERPL-MCNC: 6.9 G/DL (ref 6.4–8.2)
PROT UR STRIP-MCNC: NEGATIVE MG/DL
RBC # BLD AUTO: 4.7 M/UL (ref 3.8–5.2)
RBC #/AREA URNS HPF: ABNORMAL /HPF (ref 0–5)
SODIUM SERPL-SCNC: 140 MMOL/L (ref 136–145)
SP GR UR REFRACTOMETRY: 1.02 (ref 1–1.03)
SPECIMEN HOLD: NORMAL
TROPONIN I SERPL HS-MCNC: 4 NG/L (ref 0–51)
UROBILINOGEN UR QL STRIP.AUTO: 0.2 EU/DL (ref 0.2–1)
WBC # BLD AUTO: 12.2 K/UL (ref 3.6–11)
WBC URNS QL MICRO: ABNORMAL /HPF (ref 0–4)

## 2024-01-26 PROCEDURE — 36415 COLL VENOUS BLD VENIPUNCTURE: CPT

## 2024-01-26 PROCEDURE — 93010 ELECTROCARDIOGRAM REPORT: CPT | Performed by: INTERNAL MEDICINE

## 2024-01-26 PROCEDURE — 81001 URINALYSIS AUTO W/SCOPE: CPT

## 2024-01-26 PROCEDURE — 80053 COMPREHEN METABOLIC PANEL: CPT

## 2024-01-26 PROCEDURE — 72170 X-RAY EXAM OF PELVIS: CPT

## 2024-01-26 PROCEDURE — 84484 ASSAY OF TROPONIN QUANT: CPT

## 2024-01-26 PROCEDURE — 99285 EMERGENCY DEPT VISIT HI MDM: CPT

## 2024-01-26 PROCEDURE — 2580000003 HC RX 258: Performed by: EMERGENCY MEDICINE

## 2024-01-26 PROCEDURE — 93005 ELECTROCARDIOGRAM TRACING: CPT | Performed by: EMERGENCY MEDICINE

## 2024-01-26 PROCEDURE — 85027 COMPLETE CBC AUTOMATED: CPT

## 2024-01-26 PROCEDURE — 71045 X-RAY EXAM CHEST 1 VIEW: CPT

## 2024-01-26 RX ORDER — 0.9 % SODIUM CHLORIDE 0.9 %
1000 INTRAVENOUS SOLUTION INTRAVENOUS ONCE
Status: COMPLETED | OUTPATIENT
Start: 2024-01-26 | End: 2024-01-26

## 2024-01-26 RX ADMIN — SODIUM CHLORIDE 1000 ML: 9 INJECTION, SOLUTION INTRAVENOUS at 14:24

## 2024-01-26 ASSESSMENT — LIFESTYLE VARIABLES
HOW MANY STANDARD DRINKS CONTAINING ALCOHOL DO YOU HAVE ON A TYPICAL DAY: PATIENT DOES NOT DRINK
HOW OFTEN DO YOU HAVE A DRINK CONTAINING ALCOHOL: NEVER

## 2024-01-26 ASSESSMENT — PAIN - FUNCTIONAL ASSESSMENT: PAIN_FUNCTIONAL_ASSESSMENT: NONE - DENIES PAIN

## 2024-01-26 NOTE — ED PROVIDER NOTES
[facility] EMERGENCY DEP  EMERGENCY DEPARTMENT ENCOUNTER      Pt Name: Savana Vogel  MRN: [id number]  Birthdate 1945  Date of evaluation: 1/26/2024  Provider: Alvaro Ward MD      HISTORY OF PRESENT ILLNESS      HPI        Nursing Notes were reviewed.    REVIEW OF SYSTEMS         Review of Systems        PAST MEDICAL HISTORY     Past Medical History:   Diagnosis Date    Arthritis     Hypertension 15yrs    Ill-defined condition     h/o dizzy    Parkinson's disease     Psychiatric disorder     bipolar; short term memory lose    Psychiatric disorder     depressiom         SURGICAL HISTORY       Past Surgical History:   Procedure Laterality Date    CATARACT REMOVAL Left     HYSTERECTOMY (CERVIX STATUS UNKNOWN)           CURRENT MEDICATIONS       Previous Medications    AMLODIPINE (NORVASC) 5 MG TABLET    Take 5 mg by mouth daily    CARBIDOPA-LEVODOPA (SINEMET)  MG PER TABLET    Take 1 tablet by mouth 3 times daily    DIVALPROEX (DEPAKOTE) 500 MG DR TABLET    Take 1,000 mg by mouth    DONEPEZIL (ARICEPT) 10 MG TABLET    Take 10 mg by mouth    DULOXETINE (CYMBALTA) 60 MG EXTENDED RELEASE CAPSULE    Take 60 mg by mouth 2 times daily    IPRATROPIUM-ALBUTEROL (DUONEB) 0.5-2.5 (3) MG/3ML SOLN NEBULIZER SOLUTION    Inhale 3 mLs into the lungs every 4 hours as needed    MEMANTINE (NAMENDA) 10 MG TABLET    Take 10 mg by mouth daily    OMEPRAZOLE (PRILOSEC) 40 MG DELAYED RELEASE CAPSULE    Take 40 mg by mouth daily    PRIMIDONE (MYSOLINE) 50 MG TABLET    Take 50 mg by mouth 2 times daily as needed    ROPINIROLE (REQUIP) 0.25 MG TABLET    Take 0.25 mg by mouth 2 times daily    SIMVASTATIN (ZOCOR) 20 MG TABLET    Take 20 mg by mouth    VALBENAZINE TOSYLATE 60 MG CAPS    Take 60 mg by mouth daily       ALLERGIES     Ipratropium-albuterol, Ibuprofen, and Adhesive tape    FAMILY HISTORY       Family History   Problem Relation Age of Onset    Cancer Mother     Heart Disease Mother     Cancer Father     Heart Disease Father

## 2024-01-26 NOTE — DISCHARGE INSTRUCTIONS
Return with any new or worsening symptoms you find concerning.  In the meantime please follow-up with your regular doctor as planned

## 2024-01-26 NOTE — ED TRIAGE NOTES
Patient is coming in from home with dizziness in morning for the past 6 months. Patient has history of falls. Low oxygen levels and low blood pressure. Possible orthostatics. Patient has history of Parkinson's.

## 2025-02-23 ENCOUNTER — HOSPITAL ENCOUNTER (INPATIENT)
Facility: HOSPITAL | Age: 80
LOS: 10 days | Discharge: NURSING, REHAB OR LONG-TERM CARE | DRG: 871 | End: 2025-03-06
Attending: EMERGENCY MEDICINE | Admitting: INTERNAL MEDICINE
Payer: MEDICARE

## 2025-02-23 DIAGNOSIS — M79.89 LEG SWELLING: ICD-10-CM

## 2025-02-23 DIAGNOSIS — R06.02 SHORTNESS OF BREATH: ICD-10-CM

## 2025-02-23 DIAGNOSIS — R09.02 HYPOXIA: ICD-10-CM

## 2025-02-23 DIAGNOSIS — J69.0 ASPIRATION PNEUMONIA OF LEFT LOWER LOBE, UNSPECIFIED ASPIRATION PNEUMONIA TYPE (HCC): Primary | ICD-10-CM

## 2025-02-23 PROCEDURE — 99285 EMERGENCY DEPT VISIT HI MDM: CPT

## 2025-02-23 PROCEDURE — 94762 N-INVAS EAR/PLS OXIMTRY CONT: CPT

## 2025-02-23 ASSESSMENT — PAIN - FUNCTIONAL ASSESSMENT: PAIN_FUNCTIONAL_ASSESSMENT: 0-10

## 2025-02-23 ASSESSMENT — PAIN SCALES - GENERAL: PAINLEVEL_OUTOF10: 0

## 2025-02-24 ENCOUNTER — APPOINTMENT (OUTPATIENT)
Facility: HOSPITAL | Age: 80
DRG: 871 | End: 2025-02-24
Attending: INTERNAL MEDICINE
Payer: MEDICARE

## 2025-02-24 ENCOUNTER — APPOINTMENT (OUTPATIENT)
Facility: HOSPITAL | Age: 80
DRG: 871 | End: 2025-02-24
Payer: MEDICARE

## 2025-02-24 PROBLEM — J96.01 ACUTE RESPIRATORY FAILURE WITH HYPOXIA (HCC): Status: ACTIVE | Noted: 2025-02-24

## 2025-02-24 LAB
ACB COMPLEX DNA BLD POS QL NAA+NON-PROBE: NOT DETECTED
ACCESSION NUMBER, LLC1M: ABNORMAL
ALBUMIN SERPL-MCNC: 3.1 G/DL (ref 3.5–5)
ALBUMIN SERPL-MCNC: 3.3 G/DL (ref 3.5–5)
ALBUMIN/GLOB SERPL: 0.8 (ref 1.1–2.2)
ALBUMIN/GLOB SERPL: 0.8 (ref 1.1–2.2)
ALP SERPL-CCNC: 107 U/L (ref 45–117)
ALP SERPL-CCNC: 95 U/L (ref 45–117)
ALT SERPL-CCNC: 12 U/L (ref 12–78)
ALT SERPL-CCNC: 22 U/L (ref 12–78)
ANION GAP SERPL CALC-SCNC: 4 MMOL/L (ref 2–12)
ANION GAP SERPL CALC-SCNC: 6 MMOL/L (ref 2–12)
APPEARANCE UR: CLEAR
AST SERPL-CCNC: 30 U/L (ref 15–37)
AST SERPL-CCNC: 43 U/L (ref 15–37)
B FRAGILIS DNA BLD POS QL NAA+NON-PROBE: NOT DETECTED
BACTERIA URNS QL MICRO: NEGATIVE /HPF
BASOPHILS # BLD: 0.02 K/UL (ref 0–0.1)
BASOPHILS # BLD: 0.02 K/UL (ref 0–0.1)
BASOPHILS NFR BLD: 0.1 % (ref 0–1)
BASOPHILS NFR BLD: 0.2 % (ref 0–1)
BILIRUB SERPL-MCNC: 0.2 MG/DL (ref 0.2–1)
BILIRUB SERPL-MCNC: 0.2 MG/DL (ref 0.2–1)
BILIRUB UR QL: NEGATIVE
BIOFIRE TEST COMMENT: ABNORMAL
BUN SERPL-MCNC: 17 MG/DL (ref 6–20)
BUN SERPL-MCNC: 19 MG/DL (ref 6–20)
BUN/CREAT SERPL: 20 (ref 12–20)
BUN/CREAT SERPL: 24 (ref 12–20)
C ALBICANS DNA BLD POS QL NAA+NON-PROBE: NOT DETECTED
C AURIS DNA BLD POS QL NAA+NON-PROBE: NOT DETECTED
C GATTII+NEOFOR DNA BLD POS QL NAA+N-PRB: NOT DETECTED
C GLABRATA DNA BLD POS QL NAA+NON-PROBE: NOT DETECTED
C KRUSEI DNA BLD POS QL NAA+NON-PROBE: NOT DETECTED
C PARAP DNA BLD POS QL NAA+NON-PROBE: NOT DETECTED
C TROPICLS DNA BLD POS QL NAA+NON-PROBE: NOT DETECTED
CALCIUM SERPL-MCNC: 9.6 MG/DL (ref 8.5–10.1)
CALCIUM SERPL-MCNC: 9.9 MG/DL (ref 8.5–10.1)
CHLORIDE SERPL-SCNC: 103 MMOL/L (ref 97–108)
CHLORIDE SERPL-SCNC: 104 MMOL/L (ref 97–108)
CO2 SERPL-SCNC: 30 MMOL/L (ref 21–32)
CO2 SERPL-SCNC: 31 MMOL/L (ref 21–32)
COLOR UR: ABNORMAL
COMMENT:: NORMAL
CREAT SERPL-MCNC: 0.79 MG/DL (ref 0.55–1.02)
CREAT SERPL-MCNC: 0.84 MG/DL (ref 0.55–1.02)
CRP SERPL-MCNC: 3.85 MG/DL (ref 0–0.3)
DIFFERENTIAL METHOD BLD: ABNORMAL
DIFFERENTIAL METHOD BLD: ABNORMAL
E CLOAC COMP DNA BLD POS NAA+NON-PROBE: NOT DETECTED
E COLI DNA BLD POS QL NAA+NON-PROBE: NOT DETECTED
E FAECALIS DNA BLD POS QL NAA+NON-PROBE: NOT DETECTED
E FAECIUM DNA BLD POS QL NAA+NON-PROBE: NOT DETECTED
EKG ATRIAL RATE: 97 BPM
EKG DIAGNOSIS: NORMAL
EKG P AXIS: 76 DEGREES
EKG P-R INTERVAL: 152 MS
EKG Q-T INTERVAL: 386 MS
EKG QRS DURATION: 104 MS
EKG QTC CALCULATION (BAZETT): 490 MS
EKG R AXIS: 13 DEGREES
EKG T AXIS: 30 DEGREES
EKG VENTRICULAR RATE: 97 BPM
ENTEROBACTERALES DNA BLD POS NAA+N-PRB: NOT DETECTED
EOSINOPHIL # BLD: 0 K/UL (ref 0–0.4)
EOSINOPHIL # BLD: 0 K/UL (ref 0–0.4)
EOSINOPHIL NFR BLD: 0 % (ref 0–7)
EOSINOPHIL NFR BLD: 0 % (ref 0–7)
EPITH CASTS URNS QL MICRO: ABNORMAL /LPF
ERYTHROCYTE [DISTWIDTH] IN BLOOD BY AUTOMATED COUNT: 13.8 % (ref 11.5–14.5)
ERYTHROCYTE [DISTWIDTH] IN BLOOD BY AUTOMATED COUNT: 13.9 % (ref 11.5–14.5)
EST. AVERAGE GLUCOSE BLD GHB EST-MCNC: 97 MG/DL
FOLATE SERPL-MCNC: 39 NG/ML (ref 5–21)
GLOBULIN SER CALC-MCNC: 4.1 G/DL (ref 2–4)
GLOBULIN SER CALC-MCNC: 4.1 G/DL (ref 2–4)
GLUCOSE BLD STRIP.AUTO-MCNC: 137 MG/DL (ref 65–117)
GLUCOSE SERPL-MCNC: 132 MG/DL (ref 65–100)
GLUCOSE SERPL-MCNC: 98 MG/DL (ref 65–100)
GLUCOSE UR STRIP.AUTO-MCNC: NEGATIVE MG/DL
GP B STREP DNA BLD POS QL NAA+NON-PROBE: NOT DETECTED
HAEM INFLU DNA BLD POS QL NAA+NON-PROBE: NOT DETECTED
HBA1C MFR BLD: 5 % (ref 4–5.6)
HCT VFR BLD AUTO: 42.3 % (ref 35–47)
HCT VFR BLD AUTO: 44.5 % (ref 35–47)
HGB BLD-MCNC: 12.9 G/DL (ref 11.5–16)
HGB BLD-MCNC: 13.7 G/DL (ref 11.5–16)
HGB UR QL STRIP: NEGATIVE
IMM GRANULOCYTES # BLD AUTO: 0.09 K/UL (ref 0–0.04)
IMM GRANULOCYTES # BLD AUTO: 0.13 K/UL (ref 0–0.04)
IMM GRANULOCYTES NFR BLD AUTO: 0.6 % (ref 0–0.5)
IMM GRANULOCYTES NFR BLD AUTO: 1 % (ref 0–0.5)
INR PPP: 1 (ref 0.9–1.1)
K OXYTOCA DNA BLD POS QL NAA+NON-PROBE: NOT DETECTED
KETONES UR QL STRIP.AUTO: ABNORMAL MG/DL
KLEBSIELLA SP DNA BLD POS QL NAA+NON-PRB: NOT DETECTED
KLEBSIELLA SP DNA BLD POS QL NAA+NON-PRB: NOT DETECTED
L MONOCYTOG DNA BLD POS QL NAA+NON-PROBE: NOT DETECTED
LEUKOCYTE ESTERASE UR QL STRIP.AUTO: NEGATIVE
LYMPHOCYTES # BLD: 0.88 K/UL (ref 0.8–3.5)
LYMPHOCYTES # BLD: 0.9 K/UL (ref 0.8–3.5)
LYMPHOCYTES NFR BLD: 5.7 % (ref 12–49)
LYMPHOCYTES NFR BLD: 6.7 % (ref 12–49)
MAGNESIUM SERPL-MCNC: 2.2 MG/DL (ref 1.6–2.4)
MAGNESIUM SERPL-MCNC: 2.3 MG/DL (ref 1.6–2.4)
MCH RBC QN AUTO: 29 PG (ref 26–34)
MCH RBC QN AUTO: 29.4 PG (ref 26–34)
MCHC RBC AUTO-ENTMCNC: 30.5 G/DL (ref 30–36.5)
MCHC RBC AUTO-ENTMCNC: 30.8 G/DL (ref 30–36.5)
MCV RBC AUTO: 94.3 FL (ref 80–99)
MCV RBC AUTO: 96.4 FL (ref 80–99)
MECA+MECC ISLT/SPM QL: DETECTED
MONOCYTES # BLD: 0.43 K/UL (ref 0–1)
MONOCYTES # BLD: 1.06 K/UL (ref 0–1)
MONOCYTES NFR BLD: 3.3 % (ref 5–13)
MONOCYTES NFR BLD: 6.7 % (ref 5–13)
N MEN DNA BLD POS QL NAA+NON-PROBE: NOT DETECTED
NEUTS SEG # BLD: 11.77 K/UL (ref 1.8–8)
NEUTS SEG # BLD: 13.64 K/UL (ref 1.8–8)
NEUTS SEG NFR BLD: 86.9 % (ref 32–75)
NEUTS SEG NFR BLD: 88.8 % (ref 32–75)
NITRITE UR QL STRIP.AUTO: NEGATIVE
NRBC # BLD: 0 K/UL (ref 0–0.01)
NRBC # BLD: 0 K/UL (ref 0–0.01)
NRBC BLD-RTO: 0 PER 100 WBC
NRBC BLD-RTO: 0 PER 100 WBC
NT PRO BNP: 204 PG/ML
P AERUGINOSA DNA BLD POS NAA+NON-PROBE: NOT DETECTED
PH UR STRIP: 7.5 (ref 5–8)
PHOSPHATE SERPL-MCNC: 3.7 MG/DL (ref 2.6–4.7)
PLATELET # BLD AUTO: 143 K/UL (ref 150–400)
PLATELET # BLD AUTO: 145 K/UL (ref 150–400)
PMV BLD AUTO: 11.5 FL (ref 8.9–12.9)
PMV BLD AUTO: 12.1 FL (ref 8.9–12.9)
POTASSIUM SERPL-SCNC: 5 MMOL/L (ref 3.5–5.1)
POTASSIUM SERPL-SCNC: 5.7 MMOL/L (ref 3.5–5.1)
PROT SERPL-MCNC: 7.2 G/DL (ref 6.4–8.2)
PROT SERPL-MCNC: 7.4 G/DL (ref 6.4–8.2)
PROT UR STRIP-MCNC: NEGATIVE MG/DL
PROTEUS SP DNA BLD POS QL NAA+NON-PROBE: NOT DETECTED
PROTHROMBIN TIME: 10.2 SEC (ref 9.2–11.2)
RBC # BLD AUTO: 4.39 M/UL (ref 3.8–5.2)
RBC # BLD AUTO: 4.72 M/UL (ref 3.8–5.2)
RBC #/AREA URNS HPF: ABNORMAL /HPF (ref 0–5)
RESISTANT GENE TARGETS: ABNORMAL
S AUREUS DNA BLD POS QL NAA+NON-PROBE: NOT DETECTED
S AUREUS+CONS DNA BLD POS NAA+NON-PROBE: DETECTED
S EPIDERMIDIS DNA BLD POS QL NAA+NON-PRB: DETECTED
S LUGDUNENSIS DNA BLD POS QL NAA+NON-PRB: NOT DETECTED
S MALTOPHILIA DNA BLD POS QL NAA+NON-PRB: NOT DETECTED
S MARCESCENS DNA BLD POS NAA+NON-PROBE: NOT DETECTED
S PNEUM DNA BLD POS QL NAA+NON-PROBE: NOT DETECTED
S PYO DNA BLD POS QL NAA+NON-PROBE: NOT DETECTED
SALMONELLA DNA BLD POS QL NAA+NON-PROBE: NOT DETECTED
SERVICE CMNT-IMP: ABNORMAL
SODIUM SERPL-SCNC: 138 MMOL/L (ref 136–145)
SODIUM SERPL-SCNC: 140 MMOL/L (ref 136–145)
SP GR UR REFRACTOMETRY: 1.01
SPECIMEN HOLD: NORMAL
STREPTOCOCCUS DNA BLD POS NAA+NON-PROBE: NOT DETECTED
TRI-PHOS CRY URNS QL MICRO: ABNORMAL
TROPONIN I SERPL HS-MCNC: 11 NG/L (ref 0–51)
TROPONIN I SERPL HS-MCNC: 6 NG/L (ref 0–51)
TROPONIN I SERPL HS-MCNC: 8 NG/L (ref 0–51)
TROPONIN I SERPL HS-MCNC: 9 NG/L (ref 0–51)
TSH SERPL DL<=0.05 MIU/L-ACNC: 2 UIU/ML (ref 0.36–3.74)
URINE CULTURE IF INDICATED: ABNORMAL
UROBILINOGEN UR QL STRIP.AUTO: 0.2 EU/DL (ref 0.2–1)
VALPROATE SERPL-MCNC: 45 UG/ML (ref 50–100)
VIT B12 SERPL-MCNC: 783 PG/ML (ref 193–986)
WBC # BLD AUTO: 13.2 K/UL (ref 3.6–11)
WBC # BLD AUTO: 15.7 K/UL (ref 3.6–11)
WBC URNS QL MICRO: ABNORMAL /HPF (ref 0–4)

## 2025-02-24 PROCEDURE — 6360000002 HC RX W HCPCS: Performed by: EMERGENCY MEDICINE

## 2025-02-24 PROCEDURE — 85610 PROTHROMBIN TIME: CPT

## 2025-02-24 PROCEDURE — 36415 COLL VENOUS BLD VENIPUNCTURE: CPT

## 2025-02-24 PROCEDURE — 83036 HEMOGLOBIN GLYCOSYLATED A1C: CPT

## 2025-02-24 PROCEDURE — 83735 ASSAY OF MAGNESIUM: CPT

## 2025-02-24 PROCEDURE — 81001 URINALYSIS AUTO W/SCOPE: CPT

## 2025-02-24 PROCEDURE — 84484 ASSAY OF TROPONIN QUANT: CPT

## 2025-02-24 PROCEDURE — 83880 ASSAY OF NATRIURETIC PEPTIDE: CPT

## 2025-02-24 PROCEDURE — 96375 TX/PRO/DX INJ NEW DRUG ADDON: CPT

## 2025-02-24 PROCEDURE — 6370000000 HC RX 637 (ALT 250 FOR IP): Performed by: INTERNAL MEDICINE

## 2025-02-24 PROCEDURE — 82746 ASSAY OF FOLIC ACID SERUM: CPT

## 2025-02-24 PROCEDURE — 80053 COMPREHEN METABOLIC PANEL: CPT

## 2025-02-24 PROCEDURE — 2060000000 HC ICU INTERMEDIATE R&B

## 2025-02-24 PROCEDURE — 86140 C-REACTIVE PROTEIN: CPT

## 2025-02-24 PROCEDURE — 80164 ASSAY DIPROPYLACETIC ACD TOT: CPT

## 2025-02-24 PROCEDURE — 84443 ASSAY THYROID STIM HORMONE: CPT

## 2025-02-24 PROCEDURE — 2500000003 HC RX 250 WO HCPCS: Performed by: EMERGENCY MEDICINE

## 2025-02-24 PROCEDURE — 82607 VITAMIN B-12: CPT

## 2025-02-24 PROCEDURE — 87154 CUL TYP ID BLD PTHGN 6+ TRGT: CPT

## 2025-02-24 PROCEDURE — 71045 X-RAY EXAM CHEST 1 VIEW: CPT

## 2025-02-24 PROCEDURE — 71275 CT ANGIOGRAPHY CHEST: CPT

## 2025-02-24 PROCEDURE — 96365 THER/PROPH/DIAG IV INF INIT: CPT

## 2025-02-24 PROCEDURE — 6360000002 HC RX W HCPCS: Performed by: INTERNAL MEDICINE

## 2025-02-24 PROCEDURE — 93005 ELECTROCARDIOGRAM TRACING: CPT | Performed by: EMERGENCY MEDICINE

## 2025-02-24 PROCEDURE — 2580000003 HC RX 258: Performed by: INTERNAL MEDICINE

## 2025-02-24 PROCEDURE — 6360000004 HC RX CONTRAST MEDICATION: Performed by: RADIOLOGY

## 2025-02-24 PROCEDURE — 93970 EXTREMITY STUDY: CPT

## 2025-02-24 PROCEDURE — 87040 BLOOD CULTURE FOR BACTERIA: CPT

## 2025-02-24 PROCEDURE — 2500000003 HC RX 250 WO HCPCS: Performed by: INTERNAL MEDICINE

## 2025-02-24 PROCEDURE — 6370000000 HC RX 637 (ALT 250 FOR IP): Performed by: FAMILY MEDICINE

## 2025-02-24 PROCEDURE — 84100 ASSAY OF PHOSPHORUS: CPT

## 2025-02-24 PROCEDURE — 82962 GLUCOSE BLOOD TEST: CPT

## 2025-02-24 PROCEDURE — 85025 COMPLETE CBC W/AUTO DIFF WBC: CPT

## 2025-02-24 RX ORDER — MAGNESIUM SULFATE IN WATER 40 MG/ML
2000 INJECTION, SOLUTION INTRAVENOUS PRN
Status: DISCONTINUED | OUTPATIENT
Start: 2025-02-24 | End: 2025-03-06 | Stop reason: HOSPADM

## 2025-02-24 RX ORDER — METRONIDAZOLE 500 MG/100ML
500 INJECTION, SOLUTION INTRAVENOUS EVERY 8 HOURS
Status: DISCONTINUED | OUTPATIENT
Start: 2025-02-24 | End: 2025-02-26 | Stop reason: ALTCHOICE

## 2025-02-24 RX ORDER — POTASSIUM CHLORIDE 750 MG/1
40 TABLET, EXTENDED RELEASE ORAL PRN
Status: DISCONTINUED | OUTPATIENT
Start: 2025-02-24 | End: 2025-03-06 | Stop reason: HOSPADM

## 2025-02-24 RX ORDER — DIVALPROEX SODIUM 125 MG/1
375 CAPSULE, COATED PELLETS ORAL 2 TIMES DAILY
Status: DISCONTINUED | OUTPATIENT
Start: 2025-02-24 | End: 2025-03-06 | Stop reason: HOSPADM

## 2025-02-24 RX ORDER — MEMANTINE HYDROCHLORIDE 5 MG/1
10 TABLET ORAL DAILY
Status: DISCONTINUED | OUTPATIENT
Start: 2025-02-24 | End: 2025-02-25

## 2025-02-24 RX ORDER — ONDANSETRON 2 MG/ML
4 INJECTION INTRAMUSCULAR; INTRAVENOUS EVERY 6 HOURS PRN
Status: DISCONTINUED | OUTPATIENT
Start: 2025-02-24 | End: 2025-03-06 | Stop reason: HOSPADM

## 2025-02-24 RX ORDER — DIVALPROEX SODIUM 125 MG/1
375 CAPSULE, COATED PELLETS ORAL 2 TIMES DAILY
COMMUNITY

## 2025-02-24 RX ORDER — SODIUM CHLORIDE 9 MG/ML
INJECTION, SOLUTION INTRAVENOUS PRN
Status: DISCONTINUED | OUTPATIENT
Start: 2025-02-24 | End: 2025-03-06 | Stop reason: HOSPADM

## 2025-02-24 RX ORDER — ATORVASTATIN CALCIUM 10 MG/1
10 TABLET, FILM COATED ORAL DAILY
Status: DISCONTINUED | OUTPATIENT
Start: 2025-02-24 | End: 2025-03-06 | Stop reason: HOSPADM

## 2025-02-24 RX ORDER — IOPAMIDOL 755 MG/ML
100 INJECTION, SOLUTION INTRAVASCULAR
Status: COMPLETED | OUTPATIENT
Start: 2025-02-24 | End: 2025-02-24

## 2025-02-24 RX ORDER — TRAZODONE HYDROCHLORIDE 100 MG/1
200 TABLET ORAL NIGHTLY
COMMUNITY

## 2025-02-24 RX ORDER — POTASSIUM CHLORIDE 7.45 MG/ML
10 INJECTION INTRAVENOUS PRN
Status: DISCONTINUED | OUTPATIENT
Start: 2025-02-24 | End: 2025-03-06 | Stop reason: HOSPADM

## 2025-02-24 RX ORDER — ENOXAPARIN SODIUM 100 MG/ML
30 INJECTION SUBCUTANEOUS 2 TIMES DAILY
Status: DISCONTINUED | OUTPATIENT
Start: 2025-02-24 | End: 2025-03-06 | Stop reason: HOSPADM

## 2025-02-24 RX ORDER — DIAZEPAM 5 MG/1
5 TABLET ORAL NIGHTLY
COMMUNITY

## 2025-02-24 RX ORDER — IPRATROPIUM BROMIDE AND ALBUTEROL SULFATE 2.5; .5 MG/3ML; MG/3ML
1 SOLUTION RESPIRATORY (INHALATION) EVERY 4 HOURS PRN
Status: DISCONTINUED | OUTPATIENT
Start: 2025-02-24 | End: 2025-03-06 | Stop reason: HOSPADM

## 2025-02-24 RX ORDER — PANTOPRAZOLE SODIUM 40 MG/1
40 TABLET, DELAYED RELEASE ORAL
Status: DISCONTINUED | OUTPATIENT
Start: 2025-02-24 | End: 2025-02-26 | Stop reason: SDUPTHER

## 2025-02-24 RX ORDER — POLYETHYLENE GLYCOL 3350 17 G/17G
17 POWDER, FOR SOLUTION ORAL DAILY PRN
Status: DISCONTINUED | OUTPATIENT
Start: 2025-02-24 | End: 2025-03-06 | Stop reason: HOSPADM

## 2025-02-24 RX ORDER — ACETAMINOPHEN 650 MG/1
650 SUPPOSITORY RECTAL EVERY 6 HOURS PRN
Status: DISCONTINUED | OUTPATIENT
Start: 2025-02-24 | End: 2025-03-06 | Stop reason: HOSPADM

## 2025-02-24 RX ORDER — GABAPENTIN 100 MG/1
200 CAPSULE ORAL NIGHTLY
Status: DISCONTINUED | OUTPATIENT
Start: 2025-02-24 | End: 2025-03-06 | Stop reason: HOSPADM

## 2025-02-24 RX ORDER — DULOXETIN HYDROCHLORIDE 30 MG/1
60 CAPSULE, DELAYED RELEASE ORAL DAILY
Status: DISCONTINUED | OUTPATIENT
Start: 2025-02-24 | End: 2025-03-06 | Stop reason: HOSPADM

## 2025-02-24 RX ORDER — OXYCODONE HYDROCHLORIDE 5 MG/1
5 TABLET ORAL EVERY 6 HOURS PRN
Status: DISCONTINUED | OUTPATIENT
Start: 2025-02-24 | End: 2025-03-06 | Stop reason: HOSPADM

## 2025-02-24 RX ORDER — DONEPEZIL HYDROCHLORIDE 10 MG/1
10 TABLET, FILM COATED ORAL NIGHTLY
Status: DISCONTINUED | OUTPATIENT
Start: 2025-02-24 | End: 2025-03-06 | Stop reason: HOSPADM

## 2025-02-24 RX ORDER — ONDANSETRON 4 MG/1
4 TABLET, ORALLY DISINTEGRATING ORAL EVERY 8 HOURS PRN
Status: DISCONTINUED | OUTPATIENT
Start: 2025-02-24 | End: 2025-03-06 | Stop reason: HOSPADM

## 2025-02-24 RX ORDER — SODIUM CHLORIDE 0.9 % (FLUSH) 0.9 %
5-40 SYRINGE (ML) INJECTION PRN
Status: DISCONTINUED | OUTPATIENT
Start: 2025-02-24 | End: 2025-03-06 | Stop reason: HOSPADM

## 2025-02-24 RX ORDER — GABAPENTIN 100 MG/1
200 CAPSULE ORAL NIGHTLY
COMMUNITY

## 2025-02-24 RX ORDER — DIAZEPAM 10 MG/1
5 TABLET ORAL NIGHTLY
Status: DISCONTINUED | OUTPATIENT
Start: 2025-02-24 | End: 2025-03-06 | Stop reason: HOSPADM

## 2025-02-24 RX ORDER — ACETAMINOPHEN 325 MG/1
650 TABLET ORAL EVERY 6 HOURS PRN
Status: DISCONTINUED | OUTPATIENT
Start: 2025-02-24 | End: 2025-03-06 | Stop reason: HOSPADM

## 2025-02-24 RX ORDER — TRAZODONE HYDROCHLORIDE 50 MG/1
200 TABLET ORAL NIGHTLY
Status: DISCONTINUED | OUTPATIENT
Start: 2025-02-24 | End: 2025-03-06 | Stop reason: HOSPADM

## 2025-02-24 RX ORDER — MORPHINE SULFATE 2 MG/ML
2 INJECTION, SOLUTION INTRAMUSCULAR; INTRAVENOUS EVERY 4 HOURS PRN
Status: DISCONTINUED | OUTPATIENT
Start: 2025-02-24 | End: 2025-03-06 | Stop reason: HOSPADM

## 2025-02-24 RX ORDER — SODIUM CHLORIDE 0.9 % (FLUSH) 0.9 %
5-40 SYRINGE (ML) INJECTION EVERY 12 HOURS SCHEDULED
Status: DISCONTINUED | OUTPATIENT
Start: 2025-02-24 | End: 2025-03-06 | Stop reason: HOSPADM

## 2025-02-24 RX ORDER — CARBIDOPA AND LEVODOPA 25; 100 MG/1; MG/1
1 TABLET ORAL 3 TIMES DAILY
Status: DISCONTINUED | OUTPATIENT
Start: 2025-02-24 | End: 2025-03-06 | Stop reason: HOSPADM

## 2025-02-24 RX ORDER — AMLODIPINE BESYLATE 5 MG/1
5 TABLET ORAL DAILY
Status: DISCONTINUED | OUTPATIENT
Start: 2025-02-24 | End: 2025-03-06 | Stop reason: HOSPADM

## 2025-02-24 RX ADMIN — METRONIDAZOLE 500 MG: 5 INJECTION, SOLUTION INTRAVENOUS at 20:18

## 2025-02-24 RX ADMIN — DONEPEZIL HYDROCHLORIDE 10 MG: 10 TABLET, FILM COATED ORAL at 22:23

## 2025-02-24 RX ADMIN — DOXYCYCLINE 100 MG: 100 INJECTION, POWDER, LYOPHILIZED, FOR SOLUTION INTRAVENOUS at 20:15

## 2025-02-24 RX ADMIN — MEMANTINE 10 MG: 5 TABLET ORAL at 09:49

## 2025-02-24 RX ADMIN — ATORVASTATIN CALCIUM 10 MG: 20 TABLET, FILM COATED ORAL at 09:48

## 2025-02-24 RX ADMIN — DOXYCYCLINE 100 MG: 100 INJECTION, POWDER, LYOPHILIZED, FOR SOLUTION INTRAVENOUS at 06:40

## 2025-02-24 RX ADMIN — CARBIDOPA AND LEVODOPA 1 TABLET: 25; 100 TABLET ORAL at 14:23

## 2025-02-24 RX ADMIN — WATER 2000 MG: 1 INJECTION INTRAMUSCULAR; INTRAVENOUS; SUBCUTANEOUS at 03:46

## 2025-02-24 RX ADMIN — ENOXAPARIN SODIUM 30 MG: 100 INJECTION SUBCUTANEOUS at 22:22

## 2025-02-24 RX ADMIN — DIVALPROEX SODIUM 375 MG: 125 CAPSULE, COATED PELLETS ORAL at 11:42

## 2025-02-24 RX ADMIN — GABAPENTIN 200 MG: 100 CAPSULE ORAL at 22:23

## 2025-02-24 RX ADMIN — PANTOPRAZOLE SODIUM 40 MG: 40 TABLET, DELAYED RELEASE ORAL at 06:41

## 2025-02-24 RX ADMIN — TRAZODONE HYDROCHLORIDE 200 MG: 50 TABLET ORAL at 22:23

## 2025-02-24 RX ADMIN — AMLODIPINE BESYLATE 5 MG: 5 TABLET ORAL at 09:49

## 2025-02-24 RX ADMIN — CARBIDOPA AND LEVODOPA 1 TABLET: 25; 100 TABLET ORAL at 09:49

## 2025-02-24 RX ADMIN — DIVALPROEX SODIUM 375 MG: 125 CAPSULE, COATED PELLETS ORAL at 22:23

## 2025-02-24 RX ADMIN — DIAZEPAM 5 MG: 10 TABLET ORAL at 22:22

## 2025-02-24 RX ADMIN — METRONIDAZOLE 500 MG: 5 INJECTION, SOLUTION INTRAVENOUS at 12:14

## 2025-02-24 RX ADMIN — SODIUM CHLORIDE, PRESERVATIVE FREE 10 ML: 5 INJECTION INTRAVENOUS at 09:05

## 2025-02-24 RX ADMIN — ENOXAPARIN SODIUM 30 MG: 100 INJECTION SUBCUTANEOUS at 09:54

## 2025-02-24 RX ADMIN — IOPAMIDOL 100 ML: 755 INJECTION, SOLUTION INTRAVENOUS at 01:25

## 2025-02-24 RX ADMIN — CARBIDOPA AND LEVODOPA 1 TABLET: 25; 100 TABLET ORAL at 22:23

## 2025-02-24 RX ADMIN — METRONIDAZOLE 500 MG: 5 INJECTION, SOLUTION INTRAVENOUS at 03:51

## 2025-02-24 RX ADMIN — SODIUM CHLORIDE, PRESERVATIVE FREE 10 ML: 5 INJECTION INTRAVENOUS at 22:24

## 2025-02-24 RX ADMIN — DULOXETINE HYDROCHLORIDE 60 MG: 60 CAPSULE, DELAYED RELEASE ORAL at 11:42

## 2025-02-24 ASSESSMENT — PAIN SCALES - GENERAL
PAINLEVEL_OUTOF10: 0

## 2025-02-24 ASSESSMENT — PAIN - FUNCTIONAL ASSESSMENT
PAIN_FUNCTIONAL_ASSESSMENT: NONE - DENIES PAIN
PAIN_FUNCTIONAL_ASSESSMENT: 0-10
PAIN_FUNCTIONAL_ASSESSMENT: 0-10

## 2025-02-24 NOTE — H&P
History and Physical    Date of Service:  2/24/2025  Primary Care Provider: Weiland, Gustave, MD  Source of information: The patient, patient's spouse at bedside and review of EMR    Chief Complaint: Shortness of Breath      History of Presenting Illness:   Savana Vogel is a 79 y.o. female with past medical history significant for Parkinson disease, dementia, dyslipidemia, hypertension presented at the emergency room with shortness of breath.  Patient developed a choking episode while drinking cappuccino.  She was taken to urgent care center where the patient underwent chest x-ray which was negative and was treated with albuterol and discharged home.  Patient shortness of breath got worse at home especially with ambulation and spouse called the EMS and was brought to the emergency room for evaluation.  Patient arrived at the emergency room hypoxic with increased work of breathing and required being placed on 4 L of oxygen.  Patient is not on oxygen at home.  Patient unable to provide a good history because of her underlying dementia and the acuity of her condition.  She was last admitted to the hospital in January 2022, patient was admitted and treated for respiratory failure attributed to COPD exacerbation as well as possible aspiration.  CTA of the chest done on arrival at the emergency room negative for pulmonary embolism but did show patchy left lower lobe airspace disease.  Patient was started on antibiotics and referred to the hospitalist service for admission.       REVIEW OF SYSTEMS:  Review of systems not obtained due to patient factors.     Past Medical History:   Diagnosis Date    Arthritis     Hypertension 15yrs    Ill-defined condition     h/o dizzy    Parkinson's disease     Psychiatric disorder     bipolar; short term memory lose    Psychiatric disorder     depressiom      Past Surgical History:   Procedure Laterality Date    CATARACT REMOVAL Left     HYSTERECTOMY (CERVIX STATUS UNKNOWN)

## 2025-02-24 NOTE — ED TRIAGE NOTES
BIBA from home, pt presents to ed co [facility]. Pt went to urgent care where she was given medications. At home, pt began having more SOB after lying flat to go to sleep, and was noted to be satting 85-90% on RA. Pt suctioned with EMS and now on 4L NC satting 94%.     PMH: Parkinson's Disease  Pt wheelchair/bed bound

## 2025-02-24 NOTE — ED PROVIDER NOTES
provider specified.    DISCHARGE MEDICATIONS:  New Prescriptions    No medications on file         (Please note that portions of this note were completed with a transcription program.  Efforts were made to edit the dictations but occasionally words are mis-transcribed.)    Guanakito Parks MD (electronically signed)  Emergency Attending Physician           Guanakito Parks MD  02/24/25 0796

## 2025-02-24 NOTE — ED NOTES
Bedside shift change report given to Kristina RN (oncoming nurse) by Casimiro RN (offgoing nurse). Report included the following information Nurse Handoff Report, Index, ED Encounter Summary, ED SBAR, Adult Overview, Intake/Output, and MAR.    
Introduced self to patient and relative. Hooked the patient to monitor and o2 via nc at 2-3 lpm/ Informed regarding blood test and EKG to be done. Patient was placed to high fowlers position. Fall risks activated by placing arm band, anti slippery socks, lowest bed position and side rails. Call bell within reach  
Merary ROSARIO at the bedside  
Patient placed mitch Room 1 this time  
Purewick placed and informed regarding importance of it. NO complaints of pain this time.   
TRANSFER - OUT REPORT:    Verbal report given to SONG Perez on Savana Vogel  being transferred to  for routine progression of patient care       Report consisted of patient's Situation, Background, Assessment and   Recommendations(SBAR).     Information from the following report(s) Nurse Handoff Report, Index, ED Encounter Summary, ED SBAR, Adult Overview, MAR, and Recent Results was reviewed with the receiving nurse.    Truro Fall Assessment:    Presents to emergency department  because of falls (Syncope, seizure, or loss of consciousness): No  Age > 70: Yes  Altered Mental Status, Intoxication with alcohol or substance confusion (Disorientation, impaired judgment, poor safety awaremess, or inability to follow instructions): Yes  Impaired Mobility: Ambulates or transfers with assistive devices or assistance; Unable to ambulate or transer.: Yes  Nursing Judgement: Yes          Lines:   Peripheral IV 02/24/25 Left;Proximal;Anterior Forearm (Active)   Site Assessment Clean, dry & intact 02/24/25 0025   Line Status Blood return noted 02/24/25 0025   Line Care Cap changed 02/24/25 0025   Phlebitis Assessment No symptoms 02/24/25 0025   Infiltration Assessment 0 02/24/25 0025   Alcohol Cap Used Yes 02/24/25 0025   Dressing Status New dressing applied 02/24/25 0025        Opportunity for questions and clarification was provided.      Patient transported with:  Monitor, O2 @ 3lpm, and Registered Nurse       
RN at [phone]  Electronically signed by: Electronically signed by Nancy Schaefer RN on 2/24/2025 at 4:58 PM

## 2025-02-25 ENCOUNTER — APPOINTMENT (OUTPATIENT)
Facility: HOSPITAL | Age: 80
DRG: 871 | End: 2025-02-25
Payer: MEDICARE

## 2025-02-25 LAB
ANION GAP SERPL CALC-SCNC: 3 MMOL/L (ref 2–12)
BUN SERPL-MCNC: 17 MG/DL (ref 6–20)
BUN/CREAT SERPL: 24 (ref 12–20)
CALCIUM SERPL-MCNC: 10 MG/DL (ref 8.5–10.1)
CHLORIDE SERPL-SCNC: 103 MMOL/L (ref 97–108)
CO2 SERPL-SCNC: 33 MMOL/L (ref 21–32)
COMMENT:: NORMAL
CREAT SERPL-MCNC: 0.72 MG/DL (ref 0.55–1.02)
GLUCOSE SERPL-MCNC: 74 MG/DL (ref 65–100)
POTASSIUM SERPL-SCNC: 4.3 MMOL/L (ref 3.5–5.1)
SODIUM SERPL-SCNC: 139 MMOL/L (ref 136–145)
SPECIMEN HOLD: NORMAL

## 2025-02-25 PROCEDURE — 2700000000 HC OXYGEN THERAPY PER DAY

## 2025-02-25 PROCEDURE — 87040 BLOOD CULTURE FOR BACTERIA: CPT

## 2025-02-25 PROCEDURE — 94760 N-INVAS EAR/PLS OXIMETRY 1: CPT

## 2025-02-25 PROCEDURE — 2580000003 HC RX 258: Performed by: FAMILY MEDICINE

## 2025-02-25 PROCEDURE — 97161 PT EVAL LOW COMPLEX 20 MIN: CPT

## 2025-02-25 PROCEDURE — 6370000000 HC RX 637 (ALT 250 FOR IP): Performed by: FAMILY MEDICINE

## 2025-02-25 PROCEDURE — 36415 COLL VENOUS BLD VENIPUNCTURE: CPT

## 2025-02-25 PROCEDURE — 6370000000 HC RX 637 (ALT 250 FOR IP): Performed by: INTERNAL MEDICINE

## 2025-02-25 PROCEDURE — 71045 X-RAY EXAM CHEST 1 VIEW: CPT

## 2025-02-25 PROCEDURE — 97530 THERAPEUTIC ACTIVITIES: CPT

## 2025-02-25 PROCEDURE — 2580000003 HC RX 258: Performed by: INTERNAL MEDICINE

## 2025-02-25 PROCEDURE — 2500000003 HC RX 250 WO HCPCS: Performed by: INTERNAL MEDICINE

## 2025-02-25 PROCEDURE — 2500000003 HC RX 250 WO HCPCS: Performed by: FAMILY MEDICINE

## 2025-02-25 PROCEDURE — 6360000002 HC RX W HCPCS: Performed by: FAMILY MEDICINE

## 2025-02-25 PROCEDURE — 2500000003 HC RX 250 WO HCPCS: Performed by: NURSE PRACTITIONER

## 2025-02-25 PROCEDURE — 6360000002 HC RX W HCPCS: Performed by: INTERNAL MEDICINE

## 2025-02-25 PROCEDURE — 94640 AIRWAY INHALATION TREATMENT: CPT

## 2025-02-25 PROCEDURE — 80048 BASIC METABOLIC PNL TOTAL CA: CPT

## 2025-02-25 PROCEDURE — 92610 EVALUATE SWALLOWING FUNCTION: CPT

## 2025-02-25 PROCEDURE — 1100000000 HC RM PRIVATE

## 2025-02-25 PROCEDURE — 51798 US URINE CAPACITY MEASURE: CPT

## 2025-02-25 PROCEDURE — 6360000002 HC RX W HCPCS: Performed by: EMERGENCY MEDICINE

## 2025-02-25 RX ORDER — FERROUS SULFATE 325(65) MG
325 TABLET, DELAYED RELEASE (ENTERIC COATED) ORAL
COMMUNITY

## 2025-02-25 RX ORDER — MEMANTINE HYDROCHLORIDE 5 MG/1
10 TABLET ORAL 2 TIMES DAILY
Status: DISCONTINUED | OUTPATIENT
Start: 2025-02-25 | End: 2025-03-06 | Stop reason: HOSPADM

## 2025-02-25 RX ORDER — DOXEPIN HYDROCHLORIDE 50 MG/1
50 CAPSULE ORAL NIGHTLY
COMMUNITY

## 2025-02-25 RX ORDER — FUROSEMIDE 10 MG/ML
40 INJECTION INTRAMUSCULAR; INTRAVENOUS ONCE
Status: COMPLETED | OUTPATIENT
Start: 2025-02-25 | End: 2025-02-25

## 2025-02-25 RX ORDER — MULTIVITAMIN WITH IRON
1 TABLET ORAL DAILY
COMMUNITY

## 2025-02-25 RX ADMIN — DONEPEZIL HYDROCHLORIDE 10 MG: 10 TABLET, FILM COATED ORAL at 20:39

## 2025-02-25 RX ADMIN — DIAZEPAM 5 MG: 10 TABLET ORAL at 20:36

## 2025-02-25 RX ADMIN — DOXYCYCLINE 100 MG: 100 INJECTION, POWDER, LYOPHILIZED, FOR SOLUTION INTRAVENOUS at 05:29

## 2025-02-25 RX ADMIN — PANTOPRAZOLE SODIUM 40 MG: 40 TABLET, DELAYED RELEASE ORAL at 05:30

## 2025-02-25 RX ADMIN — CARBIDOPA AND LEVODOPA 1 TABLET: 25; 100 TABLET ORAL at 18:48

## 2025-02-25 RX ADMIN — CARBIDOPA AND LEVODOPA 1 TABLET: 25; 100 TABLET ORAL at 20:40

## 2025-02-25 RX ADMIN — DOXYCYCLINE 100 MG: 100 INJECTION, POWDER, LYOPHILIZED, FOR SOLUTION INTRAVENOUS at 18:48

## 2025-02-25 RX ADMIN — METRONIDAZOLE 500 MG: 5 INJECTION, SOLUTION INTRAVENOUS at 03:06

## 2025-02-25 RX ADMIN — DULOXETINE HYDROCHLORIDE 60 MG: 60 CAPSULE, DELAYED RELEASE ORAL at 10:25

## 2025-02-25 RX ADMIN — SODIUM CHLORIDE, PRESERVATIVE FREE 10 ML: 5 INJECTION INTRAVENOUS at 20:42

## 2025-02-25 RX ADMIN — MEMANTINE 10 MG: 5 TABLET ORAL at 20:41

## 2025-02-25 RX ADMIN — MICONAZOLE NITRATE: 2 POWDER TOPICAL at 05:30

## 2025-02-25 RX ADMIN — IPRATROPIUM BROMIDE AND ALBUTEROL SULFATE 1 DOSE: .5; 3 SOLUTION RESPIRATORY (INHALATION) at 15:14

## 2025-02-25 RX ADMIN — TRAZODONE HYDROCHLORIDE 200 MG: 50 TABLET ORAL at 20:41

## 2025-02-25 RX ADMIN — FUROSEMIDE 40 MG: 10 INJECTION, SOLUTION INTRAMUSCULAR; INTRAVENOUS at 15:20

## 2025-02-25 RX ADMIN — CARBIDOPA AND LEVODOPA 1 TABLET: 25; 100 TABLET ORAL at 10:26

## 2025-02-25 RX ADMIN — IPRATROPIUM BROMIDE AND ALBUTEROL SULFATE 1 DOSE: .5; 3 SOLUTION RESPIRATORY (INHALATION) at 01:01

## 2025-02-25 RX ADMIN — WATER 1000 MG: 1 INJECTION INTRAMUSCULAR; INTRAVENOUS; SUBCUTANEOUS at 03:04

## 2025-02-25 RX ADMIN — MICONAZOLE NITRATE: 2 POWDER TOPICAL at 10:31

## 2025-02-25 RX ADMIN — METRONIDAZOLE 500 MG: 5 INJECTION, SOLUTION INTRAVENOUS at 20:35

## 2025-02-25 RX ADMIN — GABAPENTIN 200 MG: 100 CAPSULE ORAL at 20:39

## 2025-02-25 RX ADMIN — AMLODIPINE BESYLATE 5 MG: 5 TABLET ORAL at 10:26

## 2025-02-25 RX ADMIN — DIVALPROEX SODIUM 375 MG: 125 CAPSULE, COATED PELLETS ORAL at 20:37

## 2025-02-25 RX ADMIN — ATORVASTATIN CALCIUM 10 MG: 20 TABLET, FILM COATED ORAL at 10:26

## 2025-02-25 RX ADMIN — SODIUM CHLORIDE, PRESERVATIVE FREE 10 ML: 5 INJECTION INTRAVENOUS at 10:30

## 2025-02-25 RX ADMIN — ENOXAPARIN SODIUM 30 MG: 100 INJECTION SUBCUTANEOUS at 10:25

## 2025-02-25 RX ADMIN — MEMANTINE 10 MG: 5 TABLET ORAL at 10:25

## 2025-02-25 RX ADMIN — DIVALPROEX SODIUM 375 MG: 125 CAPSULE, COATED PELLETS ORAL at 10:25

## 2025-02-25 RX ADMIN — ENOXAPARIN SODIUM 30 MG: 100 INJECTION SUBCUTANEOUS at 20:42

## 2025-02-25 RX ADMIN — METRONIDAZOLE 500 MG: 5 INJECTION, SOLUTION INTRAVENOUS at 12:36

## 2025-02-25 RX ADMIN — MICONAZOLE NITRATE: 2 POWDER TOPICAL at 20:50

## 2025-02-25 ASSESSMENT — PAIN SCALES - GENERAL: PAINLEVEL_OUTOF10: 0

## 2025-02-26 ENCOUNTER — APPOINTMENT (OUTPATIENT)
Facility: HOSPITAL | Age: 80
DRG: 871 | End: 2025-02-26
Attending: FAMILY MEDICINE
Payer: MEDICARE

## 2025-02-26 LAB
ALBUMIN SERPL-MCNC: 2.9 G/DL (ref 3.5–5)
ALBUMIN/GLOB SERPL: 0.8 (ref 1.1–2.2)
ALP SERPL-CCNC: 85 U/L (ref 45–117)
ALT SERPL-CCNC: 9 U/L (ref 12–78)
ANION GAP SERPL CALC-SCNC: 5 MMOL/L (ref 2–12)
AST SERPL-CCNC: 19 U/L (ref 15–37)
BACTERIA SPEC CULT: ABNORMAL
BASOPHILS # BLD: 0.03 K/UL (ref 0–0.1)
BASOPHILS NFR BLD: 0.4 % (ref 0–1)
BILIRUB SERPL-MCNC: 0.4 MG/DL (ref 0.2–1)
BUN SERPL-MCNC: 19 MG/DL (ref 6–20)
BUN/CREAT SERPL: 25 (ref 12–20)
CALCIUM SERPL-MCNC: 9.3 MG/DL (ref 8.5–10.1)
CHLORIDE SERPL-SCNC: 102 MMOL/L (ref 97–108)
CO2 SERPL-SCNC: 33 MMOL/L (ref 21–32)
CREAT SERPL-MCNC: 0.76 MG/DL (ref 0.55–1.02)
DIFFERENTIAL METHOD BLD: ABNORMAL
ECHO AO ROOT DIAM: 2.5 CM
ECHO AO ROOT INDEX: 1.17 CM/M2
ECHO AV AREA PEAK VELOCITY: 2.3 CM2
ECHO AV AREA VTI: 2.6 CM2
ECHO AV AREA/BSA PEAK VELOCITY: 1.1 CM2/M2
ECHO AV AREA/BSA VTI: 1.2 CM2/M2
ECHO AV MEAN GRADIENT: 5 MMHG
ECHO AV MEAN VELOCITY: 1.1 M/S
ECHO AV PEAK GRADIENT: 10 MMHG
ECHO AV PEAK VELOCITY: 1.6 M/S
ECHO AV VELOCITY RATIO: 0.81
ECHO AV VTI: 27.7 CM
ECHO BSA: 2.28 M2
ECHO LA DIAMETER INDEX: 1.54 CM/M2
ECHO LA DIAMETER: 3.3 CM
ECHO LA TO AORTIC ROOT RATIO: 1.32
ECHO LV E' LATERAL VELOCITY: 7.38 CM/S
ECHO LV E' SEPTAL VELOCITY: 7 CM/S
ECHO LV EF PHYSICIAN: 65 %
ECHO LV FRACTIONAL SHORTENING: 35 % (ref 28–44)
ECHO LV INTERNAL DIMENSION DIASTOLE INDEX: 2.15 CM/M2
ECHO LV INTERNAL DIMENSION DIASTOLIC: 4.6 CM (ref 3.9–5.3)
ECHO LV INTERNAL DIMENSION SYSTOLIC INDEX: 1.4 CM/M2
ECHO LV INTERNAL DIMENSION SYSTOLIC: 3 CM
ECHO LV IVSD: 1 CM (ref 0.6–0.9)
ECHO LV MASS 2D: 158.8 G (ref 67–162)
ECHO LV MASS INDEX 2D: 74.2 G/M2 (ref 43–95)
ECHO LV POSTERIOR WALL DIASTOLIC: 1 CM (ref 0.6–0.9)
ECHO LV RELATIVE WALL THICKNESS RATIO: 0.43
ECHO LVOT AREA: 2.8 CM2
ECHO LVOT AV VTI INDEX: 0.93
ECHO LVOT DIAM: 1.9 CM
ECHO LVOT MEAN GRADIENT: 3 MMHG
ECHO LVOT PEAK GRADIENT: 6 MMHG
ECHO LVOT PEAK VELOCITY: 1.3 M/S
ECHO LVOT STROKE VOLUME INDEX: 34.2 ML/M2
ECHO LVOT SV: 73.1 ML
ECHO LVOT VTI: 25.8 CM
ECHO MV A VELOCITY: 0.97 M/S
ECHO MV AREA PHT: 4.5 CM2
ECHO MV AREA VTI: 2.5 CM2
ECHO MV E DECELERATION TIME (DT): 169.6 MS
ECHO MV E VELOCITY: 1.04 M/S
ECHO MV E/A RATIO: 1.07
ECHO MV E/E' LATERAL: 14.09
ECHO MV E/E' RATIO (AVERAGED): 14.47
ECHO MV E/E' SEPTAL: 14.86
ECHO MV LVOT VTI INDEX: 1.12
ECHO MV MAX VELOCITY: 1.3 M/S
ECHO MV MEAN GRADIENT: 3 MMHG
ECHO MV MEAN VELOCITY: 0.9 M/S
ECHO MV PEAK GRADIENT: 6 MMHG
ECHO MV PRESSURE HALF TIME (PHT): 49.2 MS
ECHO MV VTI: 28.8 CM
ECHO PULMONARY ARTERY END DIASTOLIC PRESSURE: 6 MMHG
ECHO PV REGURGITANT MAX VELOCITY: 1.3 M/S
ECHO RV TAPSE: 1.9 CM (ref 1.7–?)
ECHO TV REGURGITANT MAX VELOCITY: 2.29 M/S
ECHO TV REGURGITANT PEAK GRADIENT: 21 MMHG
EOSINOPHIL # BLD: 0.17 K/UL (ref 0–0.4)
EOSINOPHIL NFR BLD: 2.1 % (ref 0–7)
ERYTHROCYTE [DISTWIDTH] IN BLOOD BY AUTOMATED COUNT: 14.5 % (ref 11.5–14.5)
GLOBULIN SER CALC-MCNC: 3.5 G/DL (ref 2–4)
GLUCOSE SERPL-MCNC: 82 MG/DL (ref 65–100)
HCT VFR BLD AUTO: 39.4 % (ref 35–47)
HGB BLD-MCNC: 12.4 G/DL (ref 11.5–16)
IMM GRANULOCYTES # BLD AUTO: 0.03 K/UL (ref 0–0.04)
IMM GRANULOCYTES NFR BLD AUTO: 0.4 % (ref 0–0.5)
LYMPHOCYTES # BLD: 1.63 K/UL (ref 0.8–3.5)
LYMPHOCYTES NFR BLD: 20.5 % (ref 12–49)
MAGNESIUM SERPL-MCNC: 2 MG/DL (ref 1.6–2.4)
MCH RBC QN AUTO: 29.7 PG (ref 26–34)
MCHC RBC AUTO-ENTMCNC: 31.5 G/DL (ref 30–36.5)
MCV RBC AUTO: 94.3 FL (ref 80–99)
MONOCYTES # BLD: 1.26 K/UL (ref 0–1)
MONOCYTES NFR BLD: 15.8 % (ref 5–13)
NEUTS SEG # BLD: 4.84 K/UL (ref 1.8–8)
NEUTS SEG NFR BLD: 60.8 % (ref 32–75)
NRBC # BLD: 0 K/UL (ref 0–0.01)
NRBC BLD-RTO: 0 PER 100 WBC
PLATELET # BLD AUTO: 129 K/UL (ref 150–400)
PMV BLD AUTO: 11 FL (ref 8.9–12.9)
POTASSIUM SERPL-SCNC: 3.8 MMOL/L (ref 3.5–5.1)
PROT SERPL-MCNC: 6.4 G/DL (ref 6.4–8.2)
RBC # BLD AUTO: 4.18 M/UL (ref 3.8–5.2)
SERVICE CMNT-IMP: ABNORMAL
SERVICE CMNT-IMP: ABNORMAL
SODIUM SERPL-SCNC: 140 MMOL/L (ref 136–145)
WBC # BLD AUTO: 8 K/UL (ref 3.6–11)

## 2025-02-26 PROCEDURE — 6360000004 HC RX CONTRAST MEDICATION: Performed by: FAMILY MEDICINE

## 2025-02-26 PROCEDURE — 6370000000 HC RX 637 (ALT 250 FOR IP): Performed by: INTERNAL MEDICINE

## 2025-02-26 PROCEDURE — 2500000003 HC RX 250 WO HCPCS: Performed by: INTERNAL MEDICINE

## 2025-02-26 PROCEDURE — 2500000003 HC RX 250 WO HCPCS: Performed by: FAMILY MEDICINE

## 2025-02-26 PROCEDURE — 6360000002 HC RX W HCPCS: Performed by: INTERNAL MEDICINE

## 2025-02-26 PROCEDURE — 94760 N-INVAS EAR/PLS OXIMETRY 1: CPT

## 2025-02-26 PROCEDURE — 6360000002 HC RX W HCPCS: Performed by: EMERGENCY MEDICINE

## 2025-02-26 PROCEDURE — 92526 ORAL FUNCTION THERAPY: CPT

## 2025-02-26 PROCEDURE — 83735 ASSAY OF MAGNESIUM: CPT

## 2025-02-26 PROCEDURE — 92612 ENDOSCOPY SWALLOW (FEES) VID: CPT

## 2025-02-26 PROCEDURE — 6370000000 HC RX 637 (ALT 250 FOR IP): Performed by: FAMILY MEDICINE

## 2025-02-26 PROCEDURE — 2700000000 HC OXYGEN THERAPY PER DAY

## 2025-02-26 PROCEDURE — C8929 TTE W OR WO FOL WCON,DOPPLER: HCPCS

## 2025-02-26 PROCEDURE — 80053 COMPREHEN METABOLIC PANEL: CPT

## 2025-02-26 PROCEDURE — 85025 COMPLETE CBC W/AUTO DIFF WBC: CPT

## 2025-02-26 PROCEDURE — 97535 SELF CARE MNGMENT TRAINING: CPT

## 2025-02-26 PROCEDURE — 97165 OT EVAL LOW COMPLEX 30 MIN: CPT

## 2025-02-26 PROCEDURE — 1100000000 HC RM PRIVATE

## 2025-02-26 PROCEDURE — 2580000003 HC RX 258: Performed by: FAMILY MEDICINE

## 2025-02-26 RX ORDER — PANTOPRAZOLE SODIUM 40 MG/1
40 TABLET, DELAYED RELEASE ORAL 2 TIMES DAILY
Status: DISCONTINUED | OUTPATIENT
Start: 2025-02-26 | End: 2025-03-06 | Stop reason: HOSPADM

## 2025-02-26 RX ADMIN — MEMANTINE 10 MG: 5 TABLET ORAL at 21:27

## 2025-02-26 RX ADMIN — SODIUM CHLORIDE, PRESERVATIVE FREE 10 ML: 5 INJECTION INTRAVENOUS at 12:24

## 2025-02-26 RX ADMIN — CARBIDOPA AND LEVODOPA 1 TABLET: 25; 100 TABLET ORAL at 18:41

## 2025-02-26 RX ADMIN — SODIUM CHLORIDE, PRESERVATIVE FREE 10 ML: 5 INJECTION INTRAVENOUS at 21:23

## 2025-02-26 RX ADMIN — DIVALPROEX SODIUM 375 MG: 125 CAPSULE, COATED PELLETS ORAL at 12:24

## 2025-02-26 RX ADMIN — DIAZEPAM 5 MG: 10 TABLET ORAL at 21:26

## 2025-02-26 RX ADMIN — MICONAZOLE NITRATE: 2 POWDER TOPICAL at 21:27

## 2025-02-26 RX ADMIN — MEMANTINE 10 MG: 5 TABLET ORAL at 12:23

## 2025-02-26 RX ADMIN — SULFUR HEXAFLUORIDE 2 ML: KIT at 15:28

## 2025-02-26 RX ADMIN — DULOXETINE HYDROCHLORIDE 60 MG: 60 CAPSULE, DELAYED RELEASE ORAL at 12:23

## 2025-02-26 RX ADMIN — PANTOPRAZOLE SODIUM 40 MG: 40 TABLET, DELAYED RELEASE ORAL at 21:30

## 2025-02-26 RX ADMIN — MICONAZOLE NITRATE: 2 POWDER TOPICAL at 12:24

## 2025-02-26 RX ADMIN — ATORVASTATIN CALCIUM 10 MG: 20 TABLET, FILM COATED ORAL at 12:23

## 2025-02-26 RX ADMIN — DONEPEZIL HYDROCHLORIDE 10 MG: 10 TABLET, FILM COATED ORAL at 21:27

## 2025-02-26 RX ADMIN — GABAPENTIN 200 MG: 100 CAPSULE ORAL at 21:25

## 2025-02-26 RX ADMIN — CARBIDOPA AND LEVODOPA 1 TABLET: 25; 100 TABLET ORAL at 12:23

## 2025-02-26 RX ADMIN — DIVALPROEX SODIUM 375 MG: 125 CAPSULE, COATED PELLETS ORAL at 21:25

## 2025-02-26 RX ADMIN — PANTOPRAZOLE SODIUM 40 MG: 40 TABLET, DELAYED RELEASE ORAL at 06:34

## 2025-02-26 RX ADMIN — CARBIDOPA AND LEVODOPA 1 TABLET: 25; 100 TABLET ORAL at 21:26

## 2025-02-26 RX ADMIN — DOXYCYCLINE 100 MG: 100 INJECTION, POWDER, LYOPHILIZED, FOR SOLUTION INTRAVENOUS at 19:16

## 2025-02-26 RX ADMIN — AMLODIPINE BESYLATE 5 MG: 5 TABLET ORAL at 12:24

## 2025-02-26 RX ADMIN — ENOXAPARIN SODIUM 30 MG: 100 INJECTION SUBCUTANEOUS at 12:24

## 2025-02-26 RX ADMIN — PANTOPRAZOLE SODIUM 40 MG: 40 TABLET, DELAYED RELEASE ORAL at 12:24

## 2025-02-26 RX ADMIN — ENOXAPARIN SODIUM 30 MG: 100 INJECTION SUBCUTANEOUS at 21:23

## 2025-02-26 RX ADMIN — METRONIDAZOLE 500 MG: 5 INJECTION, SOLUTION INTRAVENOUS at 03:42

## 2025-02-26 RX ADMIN — DOXYCYCLINE 100 MG: 100 INJECTION, POWDER, LYOPHILIZED, FOR SOLUTION INTRAVENOUS at 06:34

## 2025-02-26 RX ADMIN — WATER 1000 MG: 1 INJECTION INTRAMUSCULAR; INTRAVENOUS; SUBCUTANEOUS at 03:35

## 2025-02-26 RX ADMIN — TRAZODONE HYDROCHLORIDE 200 MG: 50 TABLET ORAL at 21:27

## 2025-02-27 LAB
ALBUMIN SERPL-MCNC: 2.9 G/DL (ref 3.5–5)
ALBUMIN/GLOB SERPL: 0.9 (ref 1.1–2.2)
ALP SERPL-CCNC: 86 U/L (ref 45–117)
ALT SERPL-CCNC: 10 U/L (ref 12–78)
ANION GAP SERPL CALC-SCNC: 3 MMOL/L (ref 2–12)
AST SERPL-CCNC: 28 U/L (ref 15–37)
BASOPHILS # BLD: 0.04 K/UL (ref 0–0.1)
BASOPHILS NFR BLD: 0.6 % (ref 0–1)
BILIRUB SERPL-MCNC: 0.4 MG/DL (ref 0.2–1)
BUN SERPL-MCNC: 21 MG/DL (ref 6–20)
BUN/CREAT SERPL: 30 (ref 12–20)
CALCIUM SERPL-MCNC: 9.1 MG/DL (ref 8.5–10.1)
CHLORIDE SERPL-SCNC: 104 MMOL/L (ref 97–108)
CO2 SERPL-SCNC: 31 MMOL/L (ref 21–32)
COMMENT:: NORMAL
CREAT SERPL-MCNC: 0.7 MG/DL (ref 0.55–1.02)
DIFFERENTIAL METHOD BLD: ABNORMAL
ECHO BSA: 2.28 M2
EOSINOPHIL # BLD: 0.21 K/UL (ref 0–0.4)
EOSINOPHIL NFR BLD: 3.2 % (ref 0–7)
ERYTHROCYTE [DISTWIDTH] IN BLOOD BY AUTOMATED COUNT: 14.2 % (ref 11.5–14.5)
GLOBULIN SER CALC-MCNC: 3.3 G/DL (ref 2–4)
GLUCOSE SERPL-MCNC: 95 MG/DL (ref 65–100)
HCT VFR BLD AUTO: 41.3 % (ref 35–47)
HGB BLD-MCNC: 12.7 G/DL (ref 11.5–16)
IMM GRANULOCYTES # BLD AUTO: 0.04 K/UL (ref 0–0.04)
IMM GRANULOCYTES NFR BLD AUTO: 0.6 % (ref 0–0.5)
LYMPHOCYTES # BLD: 1.54 K/UL (ref 0.8–3.5)
LYMPHOCYTES NFR BLD: 23.5 % (ref 12–49)
MAGNESIUM SERPL-MCNC: 2.1 MG/DL (ref 1.6–2.4)
MCH RBC QN AUTO: 29.5 PG (ref 26–34)
MCHC RBC AUTO-ENTMCNC: 30.8 G/DL (ref 30–36.5)
MCV RBC AUTO: 95.8 FL (ref 80–99)
MONOCYTES # BLD: 0.85 K/UL (ref 0–1)
MONOCYTES NFR BLD: 13 % (ref 5–13)
NEUTS SEG # BLD: 3.87 K/UL (ref 1.8–8)
NEUTS SEG NFR BLD: 59.1 % (ref 32–75)
NRBC # BLD: 0 K/UL (ref 0–0.01)
NRBC BLD-RTO: 0 PER 100 WBC
PLATELET # BLD AUTO: 138 K/UL (ref 150–400)
PMV BLD AUTO: 10.9 FL (ref 8.9–12.9)
POTASSIUM SERPL-SCNC: 4.1 MMOL/L (ref 3.5–5.1)
PROT SERPL-MCNC: 6.2 G/DL (ref 6.4–8.2)
RBC # BLD AUTO: 4.31 M/UL (ref 3.8–5.2)
SODIUM SERPL-SCNC: 138 MMOL/L (ref 136–145)
SPECIMEN HOLD: NORMAL
WBC # BLD AUTO: 6.6 K/UL (ref 3.6–11)

## 2025-02-27 PROCEDURE — 2500000003 HC RX 250 WO HCPCS: Performed by: FAMILY MEDICINE

## 2025-02-27 PROCEDURE — 80053 COMPREHEN METABOLIC PANEL: CPT

## 2025-02-27 PROCEDURE — 6370000000 HC RX 637 (ALT 250 FOR IP): Performed by: HOSPITALIST

## 2025-02-27 PROCEDURE — 6360000002 HC RX W HCPCS: Performed by: INTERNAL MEDICINE

## 2025-02-27 PROCEDURE — 6360000002 HC RX W HCPCS: Performed by: HOSPITALIST

## 2025-02-27 PROCEDURE — 1100000000 HC RM PRIVATE

## 2025-02-27 PROCEDURE — 2500000003 HC RX 250 WO HCPCS: Performed by: HOSPITALIST

## 2025-02-27 PROCEDURE — 85025 COMPLETE CBC W/AUTO DIFF WBC: CPT

## 2025-02-27 PROCEDURE — 2500000003 HC RX 250 WO HCPCS: Performed by: INTERNAL MEDICINE

## 2025-02-27 PROCEDURE — 6370000000 HC RX 637 (ALT 250 FOR IP): Performed by: FAMILY MEDICINE

## 2025-02-27 PROCEDURE — 36415 COLL VENOUS BLD VENIPUNCTURE: CPT

## 2025-02-27 PROCEDURE — 97530 THERAPEUTIC ACTIVITIES: CPT

## 2025-02-27 PROCEDURE — 2580000003 HC RX 258: Performed by: FAMILY MEDICINE

## 2025-02-27 PROCEDURE — 97530 THERAPEUTIC ACTIVITIES: CPT | Performed by: OCCUPATIONAL THERAPIST

## 2025-02-27 PROCEDURE — 83735 ASSAY OF MAGNESIUM: CPT

## 2025-02-27 PROCEDURE — 97535 SELF CARE MNGMENT TRAINING: CPT | Performed by: OCCUPATIONAL THERAPIST

## 2025-02-27 PROCEDURE — 6370000000 HC RX 637 (ALT 250 FOR IP): Performed by: INTERNAL MEDICINE

## 2025-02-27 RX ORDER — FUROSEMIDE 10 MG/ML
40 INJECTION INTRAMUSCULAR; INTRAVENOUS ONCE
Status: COMPLETED | OUTPATIENT
Start: 2025-02-27 | End: 2025-02-27

## 2025-02-27 RX ORDER — DOXYCYCLINE HYCLATE 100 MG
100 TABLET ORAL EVERY 12 HOURS SCHEDULED
Status: COMPLETED | OUTPATIENT
Start: 2025-02-27 | End: 2025-02-28

## 2025-02-27 RX ORDER — GUAIFENESIN 200 MG/10ML
200 LIQUID ORAL EVERY 6 HOURS
Status: DISCONTINUED | OUTPATIENT
Start: 2025-02-27 | End: 2025-03-06 | Stop reason: HOSPADM

## 2025-02-27 RX ADMIN — ATORVASTATIN CALCIUM 10 MG: 20 TABLET, FILM COATED ORAL at 09:31

## 2025-02-27 RX ADMIN — SODIUM CHLORIDE, PRESERVATIVE FREE 10 ML: 5 INJECTION INTRAVENOUS at 09:31

## 2025-02-27 RX ADMIN — ENOXAPARIN SODIUM 30 MG: 100 INJECTION SUBCUTANEOUS at 20:19

## 2025-02-27 RX ADMIN — GUAIFENESIN 200 MG: 200 SOLUTION ORAL at 22:39

## 2025-02-27 RX ADMIN — DONEPEZIL HYDROCHLORIDE 10 MG: 10 TABLET, FILM COATED ORAL at 20:18

## 2025-02-27 RX ADMIN — MICONAZOLE NITRATE: 2 POWDER TOPICAL at 20:19

## 2025-02-27 RX ADMIN — MEMANTINE 10 MG: 5 TABLET ORAL at 20:19

## 2025-02-27 RX ADMIN — TRAZODONE HYDROCHLORIDE 200 MG: 50 TABLET ORAL at 20:19

## 2025-02-27 RX ADMIN — PANTOPRAZOLE SODIUM 40 MG: 40 TABLET, DELAYED RELEASE ORAL at 20:18

## 2025-02-27 RX ADMIN — SODIUM CHLORIDE, PRESERVATIVE FREE 10 ML: 5 INJECTION INTRAVENOUS at 20:20

## 2025-02-27 RX ADMIN — GABAPENTIN 200 MG: 100 CAPSULE ORAL at 20:18

## 2025-02-27 RX ADMIN — WATER 1000 MG: 1 INJECTION INTRAMUSCULAR; INTRAVENOUS; SUBCUTANEOUS at 03:04

## 2025-02-27 RX ADMIN — PANTOPRAZOLE SODIUM 40 MG: 40 TABLET, DELAYED RELEASE ORAL at 09:30

## 2025-02-27 RX ADMIN — DIAZEPAM 5 MG: 10 TABLET ORAL at 20:19

## 2025-02-27 RX ADMIN — DULOXETINE HYDROCHLORIDE 60 MG: 60 CAPSULE, DELAYED RELEASE ORAL at 09:30

## 2025-02-27 RX ADMIN — CARBIDOPA AND LEVODOPA 1 TABLET: 25; 100 TABLET ORAL at 09:31

## 2025-02-27 RX ADMIN — DOXYCYCLINE 100 MG: 100 INJECTION, POWDER, LYOPHILIZED, FOR SOLUTION INTRAVENOUS at 05:48

## 2025-02-27 RX ADMIN — ENOXAPARIN SODIUM 30 MG: 100 INJECTION SUBCUTANEOUS at 09:30

## 2025-02-27 RX ADMIN — DIVALPROEX SODIUM 375 MG: 125 CAPSULE, COATED PELLETS ORAL at 20:18

## 2025-02-27 RX ADMIN — GUAIFENESIN 200 MG: 200 SOLUTION ORAL at 16:29

## 2025-02-27 RX ADMIN — CARBIDOPA AND LEVODOPA 1 TABLET: 25; 100 TABLET ORAL at 20:19

## 2025-02-27 RX ADMIN — MICONAZOLE NITRATE: 2 POWDER TOPICAL at 09:31

## 2025-02-27 RX ADMIN — FUROSEMIDE 40 MG: 10 INJECTION, SOLUTION INTRAMUSCULAR; INTRAVENOUS at 16:30

## 2025-02-27 RX ADMIN — DOXYCYCLINE HYCLATE 100 MG: 100 TABLET, COATED ORAL at 20:19

## 2025-02-27 RX ADMIN — AMLODIPINE BESYLATE 5 MG: 5 TABLET ORAL at 09:31

## 2025-02-27 RX ADMIN — MEMANTINE 10 MG: 5 TABLET ORAL at 09:30

## 2025-02-27 RX ADMIN — CARBIDOPA AND LEVODOPA 1 TABLET: 25; 100 TABLET ORAL at 13:45

## 2025-02-27 RX ADMIN — DIVALPROEX SODIUM 375 MG: 125 CAPSULE, COATED PELLETS ORAL at 09:30

## 2025-02-27 ASSESSMENT — PAIN SCALES - GENERAL: PAINLEVEL_OUTOF10: 0

## 2025-02-28 ENCOUNTER — APPOINTMENT (OUTPATIENT)
Facility: HOSPITAL | Age: 80
DRG: 871 | End: 2025-02-28
Attending: HOSPITALIST
Payer: MEDICARE

## 2025-02-28 LAB
ALBUMIN SERPL-MCNC: 2.8 G/DL (ref 3.5–5)
ALBUMIN/GLOB SERPL: 0.8 (ref 1.1–2.2)
ALP SERPL-CCNC: 79 U/L (ref 45–117)
ALT SERPL-CCNC: 10 U/L (ref 12–78)
ANION GAP SERPL CALC-SCNC: 5 MMOL/L (ref 2–12)
AST SERPL-CCNC: 22 U/L (ref 15–37)
BASOPHILS # BLD: 0.03 K/UL (ref 0–0.1)
BASOPHILS NFR BLD: 0.4 % (ref 0–1)
BILIRUB SERPL-MCNC: 0.4 MG/DL (ref 0.2–1)
BUN SERPL-MCNC: 21 MG/DL (ref 6–20)
BUN/CREAT SERPL: 26 (ref 12–20)
CALCIUM SERPL-MCNC: 9.3 MG/DL (ref 8.5–10.1)
CHLORIDE SERPL-SCNC: 102 MMOL/L (ref 97–108)
CO2 SERPL-SCNC: 33 MMOL/L (ref 21–32)
CREAT SERPL-MCNC: 0.8 MG/DL (ref 0.55–1.02)
DIFFERENTIAL METHOD BLD: ABNORMAL
ECHO BSA: 2.28 M2
EOSINOPHIL # BLD: 0.15 K/UL (ref 0–0.4)
EOSINOPHIL NFR BLD: 2 % (ref 0–7)
ERYTHROCYTE [DISTWIDTH] IN BLOOD BY AUTOMATED COUNT: 13.9 % (ref 11.5–14.5)
GLOBULIN SER CALC-MCNC: 3.6 G/DL (ref 2–4)
GLUCOSE SERPL-MCNC: 75 MG/DL (ref 65–100)
HCT VFR BLD AUTO: 38.8 % (ref 35–47)
HGB BLD-MCNC: 12.6 G/DL (ref 11.5–16)
IMM GRANULOCYTES # BLD AUTO: 0.06 K/UL (ref 0–0.04)
IMM GRANULOCYTES NFR BLD AUTO: 0.8 % (ref 0–0.5)
LYMPHOCYTES # BLD: 1.8 K/UL (ref 0.8–3.5)
LYMPHOCYTES NFR BLD: 24 % (ref 12–49)
MCH RBC QN AUTO: 30.1 PG (ref 26–34)
MCHC RBC AUTO-ENTMCNC: 32.5 G/DL (ref 30–36.5)
MCV RBC AUTO: 92.6 FL (ref 80–99)
MONOCYTES # BLD: 1.05 K/UL (ref 0–1)
MONOCYTES NFR BLD: 14 % (ref 5–13)
NEUTS SEG # BLD: 4.4 K/UL (ref 1.8–8)
NEUTS SEG NFR BLD: 58.8 % (ref 32–75)
NRBC # BLD: 0 K/UL (ref 0–0.01)
NRBC BLD-RTO: 0 PER 100 WBC
PLATELET # BLD AUTO: 165 K/UL (ref 150–400)
PMV BLD AUTO: 10.9 FL (ref 8.9–12.9)
POTASSIUM SERPL-SCNC: 3.6 MMOL/L (ref 3.5–5.1)
PROT SERPL-MCNC: 6.4 G/DL (ref 6.4–8.2)
RBC # BLD AUTO: 4.19 M/UL (ref 3.8–5.2)
SODIUM SERPL-SCNC: 140 MMOL/L (ref 136–145)
WBC # BLD AUTO: 7.5 K/UL (ref 3.6–11)

## 2025-02-28 PROCEDURE — 6360000002 HC RX W HCPCS: Performed by: HOSPITALIST

## 2025-02-28 PROCEDURE — 97530 THERAPEUTIC ACTIVITIES: CPT

## 2025-02-28 PROCEDURE — 97110 THERAPEUTIC EXERCISES: CPT

## 2025-02-28 PROCEDURE — 2500000003 HC RX 250 WO HCPCS: Performed by: HOSPITALIST

## 2025-02-28 PROCEDURE — 6370000000 HC RX 637 (ALT 250 FOR IP): Performed by: HOSPITALIST

## 2025-02-28 PROCEDURE — 85025 COMPLETE CBC W/AUTO DIFF WBC: CPT

## 2025-02-28 PROCEDURE — 93970 EXTREMITY STUDY: CPT

## 2025-02-28 PROCEDURE — 97535 SELF CARE MNGMENT TRAINING: CPT

## 2025-02-28 PROCEDURE — 6370000000 HC RX 637 (ALT 250 FOR IP): Performed by: FAMILY MEDICINE

## 2025-02-28 PROCEDURE — 80053 COMPREHEN METABOLIC PANEL: CPT

## 2025-02-28 PROCEDURE — 6360000002 HC RX W HCPCS: Performed by: INTERNAL MEDICINE

## 2025-02-28 PROCEDURE — 2500000003 HC RX 250 WO HCPCS: Performed by: INTERNAL MEDICINE

## 2025-02-28 PROCEDURE — 1100000000 HC RM PRIVATE

## 2025-02-28 PROCEDURE — 6370000000 HC RX 637 (ALT 250 FOR IP): Performed by: INTERNAL MEDICINE

## 2025-02-28 RX ORDER — FUROSEMIDE 10 MG/ML
40 INJECTION INTRAMUSCULAR; INTRAVENOUS DAILY
Status: DISCONTINUED | OUTPATIENT
Start: 2025-02-28 | End: 2025-03-02

## 2025-02-28 RX ADMIN — GUAIFENESIN 200 MG: 200 SOLUTION ORAL at 16:47

## 2025-02-28 RX ADMIN — ENOXAPARIN SODIUM 30 MG: 100 INJECTION SUBCUTANEOUS at 10:35

## 2025-02-28 RX ADMIN — CARBIDOPA AND LEVODOPA 1 TABLET: 25; 100 TABLET ORAL at 22:27

## 2025-02-28 RX ADMIN — DULOXETINE HYDROCHLORIDE 60 MG: 60 CAPSULE, DELAYED RELEASE ORAL at 10:35

## 2025-02-28 RX ADMIN — DIVALPROEX SODIUM 375 MG: 125 CAPSULE, COATED PELLETS ORAL at 10:35

## 2025-02-28 RX ADMIN — PANTOPRAZOLE SODIUM 40 MG: 40 TABLET, DELAYED RELEASE ORAL at 22:26

## 2025-02-28 RX ADMIN — CARBIDOPA AND LEVODOPA 1 TABLET: 25; 100 TABLET ORAL at 10:35

## 2025-02-28 RX ADMIN — CARBIDOPA AND LEVODOPA 1 TABLET: 25; 100 TABLET ORAL at 15:01

## 2025-02-28 RX ADMIN — TRAZODONE HYDROCHLORIDE 200 MG: 50 TABLET ORAL at 22:25

## 2025-02-28 RX ADMIN — GUAIFENESIN 200 MG: 200 SOLUTION ORAL at 22:28

## 2025-02-28 RX ADMIN — DOXYCYCLINE HYCLATE 100 MG: 100 TABLET, COATED ORAL at 22:27

## 2025-02-28 RX ADMIN — DIVALPROEX SODIUM 375 MG: 125 CAPSULE, COATED PELLETS ORAL at 22:26

## 2025-02-28 RX ADMIN — ENOXAPARIN SODIUM 30 MG: 100 INJECTION SUBCUTANEOUS at 22:28

## 2025-02-28 RX ADMIN — ATORVASTATIN CALCIUM 10 MG: 20 TABLET, FILM COATED ORAL at 10:35

## 2025-02-28 RX ADMIN — GABAPENTIN 200 MG: 100 CAPSULE ORAL at 22:25

## 2025-02-28 RX ADMIN — SODIUM CHLORIDE, PRESERVATIVE FREE 10 ML: 5 INJECTION INTRAVENOUS at 10:36

## 2025-02-28 RX ADMIN — WATER 1000 MG: 1 INJECTION INTRAMUSCULAR; INTRAVENOUS; SUBCUTANEOUS at 03:47

## 2025-02-28 RX ADMIN — PANTOPRAZOLE SODIUM 40 MG: 40 TABLET, DELAYED RELEASE ORAL at 10:34

## 2025-02-28 RX ADMIN — MEMANTINE 10 MG: 5 TABLET ORAL at 22:27

## 2025-02-28 RX ADMIN — AMLODIPINE BESYLATE 5 MG: 5 TABLET ORAL at 10:34

## 2025-02-28 RX ADMIN — MEMANTINE 10 MG: 5 TABLET ORAL at 10:35

## 2025-02-28 RX ADMIN — GUAIFENESIN 200 MG: 200 SOLUTION ORAL at 03:47

## 2025-02-28 RX ADMIN — MICONAZOLE NITRATE: 2 POWDER TOPICAL at 10:35

## 2025-02-28 RX ADMIN — DIAZEPAM 5 MG: 10 TABLET ORAL at 22:27

## 2025-02-28 RX ADMIN — DOXYCYCLINE HYCLATE 100 MG: 100 TABLET, COATED ORAL at 10:35

## 2025-02-28 RX ADMIN — FUROSEMIDE 40 MG: 10 INJECTION, SOLUTION INTRAMUSCULAR; INTRAVENOUS at 16:47

## 2025-02-28 RX ADMIN — GUAIFENESIN 200 MG: 200 SOLUTION ORAL at 10:35

## 2025-02-28 RX ADMIN — DONEPEZIL HYDROCHLORIDE 10 MG: 10 TABLET, FILM COATED ORAL at 22:27

## 2025-02-28 ASSESSMENT — PAIN SCALES - GENERAL: PAINLEVEL_OUTOF10: 0

## 2025-03-01 PROCEDURE — 6360000002 HC RX W HCPCS: Performed by: HOSPITALIST

## 2025-03-01 PROCEDURE — 6360000002 HC RX W HCPCS: Performed by: INTERNAL MEDICINE

## 2025-03-01 PROCEDURE — 6370000000 HC RX 637 (ALT 250 FOR IP): Performed by: INTERNAL MEDICINE

## 2025-03-01 PROCEDURE — 6370000000 HC RX 637 (ALT 250 FOR IP): Performed by: HOSPITALIST

## 2025-03-01 PROCEDURE — 6370000000 HC RX 637 (ALT 250 FOR IP): Performed by: FAMILY MEDICINE

## 2025-03-01 PROCEDURE — 2500000003 HC RX 250 WO HCPCS: Performed by: INTERNAL MEDICINE

## 2025-03-01 PROCEDURE — 2500000003 HC RX 250 WO HCPCS: Performed by: HOSPITALIST

## 2025-03-01 PROCEDURE — 1100000000 HC RM PRIVATE

## 2025-03-01 RX ADMIN — ATORVASTATIN CALCIUM 10 MG: 20 TABLET, FILM COATED ORAL at 08:53

## 2025-03-01 RX ADMIN — POTASSIUM BICARBONATE 20 MEQ: 782 TABLET, EFFERVESCENT ORAL at 15:04

## 2025-03-01 RX ADMIN — ENOXAPARIN SODIUM 30 MG: 100 INJECTION SUBCUTANEOUS at 21:00

## 2025-03-01 RX ADMIN — DIVALPROEX SODIUM 375 MG: 125 CAPSULE, COATED PELLETS ORAL at 21:00

## 2025-03-01 RX ADMIN — PANTOPRAZOLE SODIUM 40 MG: 40 TABLET, DELAYED RELEASE ORAL at 22:10

## 2025-03-01 RX ADMIN — CARBIDOPA AND LEVODOPA 1 TABLET: 25; 100 TABLET ORAL at 21:00

## 2025-03-01 RX ADMIN — SODIUM CHLORIDE, PRESERVATIVE FREE 10 ML: 5 INJECTION INTRAVENOUS at 21:41

## 2025-03-01 RX ADMIN — DULOXETINE HYDROCHLORIDE 60 MG: 60 CAPSULE, DELAYED RELEASE ORAL at 08:52

## 2025-03-01 RX ADMIN — WATER 1000 MG: 1 INJECTION INTRAMUSCULAR; INTRAVENOUS; SUBCUTANEOUS at 05:26

## 2025-03-01 RX ADMIN — DIAZEPAM 5 MG: 10 TABLET ORAL at 21:37

## 2025-03-01 RX ADMIN — GABAPENTIN 200 MG: 100 CAPSULE ORAL at 21:00

## 2025-03-01 RX ADMIN — MEMANTINE 10 MG: 5 TABLET ORAL at 08:53

## 2025-03-01 RX ADMIN — ENOXAPARIN SODIUM 30 MG: 100 INJECTION SUBCUTANEOUS at 08:51

## 2025-03-01 RX ADMIN — MICONAZOLE NITRATE: 2 POWDER TOPICAL at 08:58

## 2025-03-01 RX ADMIN — TRAZODONE HYDROCHLORIDE 200 MG: 50 TABLET ORAL at 22:10

## 2025-03-01 RX ADMIN — MEMANTINE 10 MG: 5 TABLET ORAL at 21:37

## 2025-03-01 RX ADMIN — AMLODIPINE BESYLATE 5 MG: 5 TABLET ORAL at 08:52

## 2025-03-01 RX ADMIN — DIVALPROEX SODIUM 375 MG: 125 CAPSULE, COATED PELLETS ORAL at 08:52

## 2025-03-01 RX ADMIN — GUAIFENESIN 200 MG: 200 SOLUTION ORAL at 05:26

## 2025-03-01 RX ADMIN — CARBIDOPA AND LEVODOPA 1 TABLET: 25; 100 TABLET ORAL at 08:53

## 2025-03-01 RX ADMIN — GUAIFENESIN 200 MG: 200 SOLUTION ORAL at 11:12

## 2025-03-01 RX ADMIN — DONEPEZIL HYDROCHLORIDE 10 MG: 10 TABLET, FILM COATED ORAL at 21:38

## 2025-03-01 RX ADMIN — SODIUM CHLORIDE, PRESERVATIVE FREE 10 ML: 5 INJECTION INTRAVENOUS at 08:58

## 2025-03-01 RX ADMIN — MICONAZOLE NITRATE: 2 POWDER TOPICAL at 21:40

## 2025-03-01 RX ADMIN — CARBIDOPA AND LEVODOPA 1 TABLET: 25; 100 TABLET ORAL at 14:56

## 2025-03-01 RX ADMIN — FUROSEMIDE 40 MG: 10 INJECTION, SOLUTION INTRAMUSCULAR; INTRAVENOUS at 08:58

## 2025-03-02 LAB
BACTERIA SPEC CULT: NORMAL
BACTERIA SPEC CULT: NORMAL
GLUCOSE BLD STRIP.AUTO-MCNC: 99 MG/DL (ref 65–117)
MAGNESIUM SERPL-MCNC: 2.2 MG/DL (ref 1.6–2.4)
SERVICE CMNT-IMP: NORMAL

## 2025-03-02 PROCEDURE — 2500000003 HC RX 250 WO HCPCS: Performed by: HOSPITALIST

## 2025-03-02 PROCEDURE — 6370000000 HC RX 637 (ALT 250 FOR IP): Performed by: HOSPITALIST

## 2025-03-02 PROCEDURE — 6360000002 HC RX W HCPCS: Performed by: HOSPITALIST

## 2025-03-02 PROCEDURE — 83735 ASSAY OF MAGNESIUM: CPT

## 2025-03-02 PROCEDURE — 2500000003 HC RX 250 WO HCPCS: Performed by: INTERNAL MEDICINE

## 2025-03-02 PROCEDURE — 1100000000 HC RM PRIVATE

## 2025-03-02 PROCEDURE — 6370000000 HC RX 637 (ALT 250 FOR IP): Performed by: INTERNAL MEDICINE

## 2025-03-02 PROCEDURE — 6360000002 HC RX W HCPCS: Performed by: INTERNAL MEDICINE

## 2025-03-02 PROCEDURE — 6370000000 HC RX 637 (ALT 250 FOR IP): Performed by: FAMILY MEDICINE

## 2025-03-02 PROCEDURE — 82962 GLUCOSE BLOOD TEST: CPT

## 2025-03-02 RX ORDER — FUROSEMIDE 40 MG/1
40 TABLET ORAL DAILY
Status: DISCONTINUED | OUTPATIENT
Start: 2025-03-03 | End: 2025-03-06 | Stop reason: HOSPADM

## 2025-03-02 RX ADMIN — SODIUM CHLORIDE, PRESERVATIVE FREE 10 ML: 5 INJECTION INTRAVENOUS at 09:36

## 2025-03-02 RX ADMIN — TRAZODONE HYDROCHLORIDE 200 MG: 50 TABLET ORAL at 21:19

## 2025-03-02 RX ADMIN — GUAIFENESIN 200 MG: 200 SOLUTION ORAL at 18:57

## 2025-03-02 RX ADMIN — GABAPENTIN 200 MG: 100 CAPSULE ORAL at 21:19

## 2025-03-02 RX ADMIN — ATORVASTATIN CALCIUM 10 MG: 20 TABLET, FILM COATED ORAL at 09:28

## 2025-03-02 RX ADMIN — GUAIFENESIN 200 MG: 200 SOLUTION ORAL at 21:18

## 2025-03-02 RX ADMIN — DULOXETINE HYDROCHLORIDE 60 MG: 60 CAPSULE, DELAYED RELEASE ORAL at 09:27

## 2025-03-02 RX ADMIN — ENOXAPARIN SODIUM 30 MG: 100 INJECTION SUBCUTANEOUS at 09:26

## 2025-03-02 RX ADMIN — POTASSIUM BICARBONATE 20 MEQ: 782 TABLET, EFFERVESCENT ORAL at 09:27

## 2025-03-02 RX ADMIN — AMLODIPINE BESYLATE 5 MG: 5 TABLET ORAL at 09:27

## 2025-03-02 RX ADMIN — DIAZEPAM 5 MG: 10 TABLET ORAL at 21:19

## 2025-03-02 RX ADMIN — DIVALPROEX SODIUM 375 MG: 125 CAPSULE, COATED PELLETS ORAL at 21:19

## 2025-03-02 RX ADMIN — FUROSEMIDE 40 MG: 10 INJECTION, SOLUTION INTRAMUSCULAR; INTRAVENOUS at 09:35

## 2025-03-02 RX ADMIN — WATER 1000 MG: 1 INJECTION INTRAMUSCULAR; INTRAVENOUS; SUBCUTANEOUS at 03:25

## 2025-03-02 RX ADMIN — CARBIDOPA AND LEVODOPA 1 TABLET: 25; 100 TABLET ORAL at 14:44

## 2025-03-02 RX ADMIN — MICONAZOLE NITRATE: 2 POWDER TOPICAL at 21:26

## 2025-03-02 RX ADMIN — MICONAZOLE NITRATE: 2 POWDER TOPICAL at 09:36

## 2025-03-02 RX ADMIN — CARBIDOPA AND LEVODOPA 1 TABLET: 25; 100 TABLET ORAL at 09:28

## 2025-03-02 RX ADMIN — ENOXAPARIN SODIUM 30 MG: 100 INJECTION SUBCUTANEOUS at 21:19

## 2025-03-02 RX ADMIN — DONEPEZIL HYDROCHLORIDE 10 MG: 10 TABLET, FILM COATED ORAL at 21:19

## 2025-03-02 RX ADMIN — DIVALPROEX SODIUM 375 MG: 125 CAPSULE, COATED PELLETS ORAL at 09:26

## 2025-03-02 RX ADMIN — MEMANTINE 10 MG: 5 TABLET ORAL at 09:27

## 2025-03-02 RX ADMIN — MEMANTINE 10 MG: 5 TABLET ORAL at 21:25

## 2025-03-02 RX ADMIN — PANTOPRAZOLE SODIUM 40 MG: 40 TABLET, DELAYED RELEASE ORAL at 21:25

## 2025-03-02 RX ADMIN — SODIUM CHLORIDE, PRESERVATIVE FREE 10 ML: 5 INJECTION INTRAVENOUS at 21:27

## 2025-03-02 RX ADMIN — CARBIDOPA AND LEVODOPA 1 TABLET: 25; 100 TABLET ORAL at 21:19

## 2025-03-03 PROCEDURE — 1100000000 HC RM PRIVATE

## 2025-03-03 PROCEDURE — 2500000003 HC RX 250 WO HCPCS: Performed by: HOSPITALIST

## 2025-03-03 PROCEDURE — 6370000000 HC RX 637 (ALT 250 FOR IP): Performed by: HOSPITALIST

## 2025-03-03 PROCEDURE — 6360000002 HC RX W HCPCS: Performed by: HOSPITALIST

## 2025-03-03 PROCEDURE — 6360000002 HC RX W HCPCS: Performed by: INTERNAL MEDICINE

## 2025-03-03 PROCEDURE — 2500000003 HC RX 250 WO HCPCS: Performed by: INTERNAL MEDICINE

## 2025-03-03 PROCEDURE — 6370000000 HC RX 637 (ALT 250 FOR IP): Performed by: FAMILY MEDICINE

## 2025-03-03 PROCEDURE — 6370000000 HC RX 637 (ALT 250 FOR IP): Performed by: INTERNAL MEDICINE

## 2025-03-03 RX ADMIN — WATER 1000 MG: 1 INJECTION INTRAMUSCULAR; INTRAVENOUS; SUBCUTANEOUS at 03:21

## 2025-03-03 RX ADMIN — MEMANTINE 10 MG: 5 TABLET ORAL at 09:42

## 2025-03-03 RX ADMIN — POTASSIUM BICARBONATE 20 MEQ: 782 TABLET, EFFERVESCENT ORAL at 13:20

## 2025-03-03 RX ADMIN — TRAZODONE HYDROCHLORIDE 200 MG: 50 TABLET ORAL at 21:51

## 2025-03-03 RX ADMIN — SODIUM CHLORIDE, PRESERVATIVE FREE 10 ML: 5 INJECTION INTRAVENOUS at 09:51

## 2025-03-03 RX ADMIN — CARBIDOPA AND LEVODOPA 1 TABLET: 25; 100 TABLET ORAL at 21:00

## 2025-03-03 RX ADMIN — MICONAZOLE NITRATE: 2 POWDER TOPICAL at 21:00

## 2025-03-03 RX ADMIN — ATORVASTATIN CALCIUM 10 MG: 20 TABLET, FILM COATED ORAL at 09:43

## 2025-03-03 RX ADMIN — ENOXAPARIN SODIUM 30 MG: 100 INJECTION SUBCUTANEOUS at 09:42

## 2025-03-03 RX ADMIN — GUAIFENESIN 200 MG: 200 SOLUTION ORAL at 19:02

## 2025-03-03 RX ADMIN — PANTOPRAZOLE SODIUM 40 MG: 40 TABLET, DELAYED RELEASE ORAL at 22:22

## 2025-03-03 RX ADMIN — DULOXETINE HYDROCHLORIDE 60 MG: 60 CAPSULE, DELAYED RELEASE ORAL at 09:43

## 2025-03-03 RX ADMIN — GABAPENTIN 200 MG: 100 CAPSULE ORAL at 21:00

## 2025-03-03 RX ADMIN — DONEPEZIL HYDROCHLORIDE 10 MG: 10 TABLET, FILM COATED ORAL at 21:52

## 2025-03-03 RX ADMIN — DIAZEPAM 5 MG: 10 TABLET ORAL at 21:52

## 2025-03-03 RX ADMIN — MEMANTINE 10 MG: 5 TABLET ORAL at 21:52

## 2025-03-03 RX ADMIN — GUAIFENESIN 200 MG: 200 SOLUTION ORAL at 05:16

## 2025-03-03 RX ADMIN — ENOXAPARIN SODIUM 30 MG: 100 INJECTION SUBCUTANEOUS at 21:00

## 2025-03-03 RX ADMIN — SODIUM CHLORIDE, PRESERVATIVE FREE 10 ML: 5 INJECTION INTRAVENOUS at 21:55

## 2025-03-03 RX ADMIN — MICONAZOLE NITRATE: 2 POWDER TOPICAL at 09:50

## 2025-03-03 RX ADMIN — CARBIDOPA AND LEVODOPA 1 TABLET: 25; 100 TABLET ORAL at 09:42

## 2025-03-03 RX ADMIN — MICONAZOLE NITRATE: 2 POWDER TOPICAL at 21:54

## 2025-03-03 RX ADMIN — GUAIFENESIN 200 MG: 200 SOLUTION ORAL at 13:21

## 2025-03-03 RX ADMIN — CARBIDOPA AND LEVODOPA 1 TABLET: 25; 100 TABLET ORAL at 19:02

## 2025-03-03 RX ADMIN — AMLODIPINE BESYLATE 5 MG: 5 TABLET ORAL at 09:42

## 2025-03-03 RX ADMIN — DIVALPROEX SODIUM 375 MG: 125 CAPSULE, COATED PELLETS ORAL at 09:42

## 2025-03-03 RX ADMIN — FUROSEMIDE 40 MG: 40 TABLET ORAL at 09:43

## 2025-03-03 RX ADMIN — DIVALPROEX SODIUM 375 MG: 125 CAPSULE, COATED PELLETS ORAL at 21:00

## 2025-03-03 RX ADMIN — GUAIFENESIN 200 MG: 200 SOLUTION ORAL at 21:53

## 2025-03-04 LAB
ALBUMIN SERPL-MCNC: 3.1 G/DL (ref 3.5–5)
ALBUMIN/GLOB SERPL: 1 (ref 1.1–2.2)
ALP SERPL-CCNC: 84 U/L (ref 45–117)
ALT SERPL-CCNC: 12 U/L (ref 12–78)
ANION GAP SERPL CALC-SCNC: 6 MMOL/L (ref 2–12)
AST SERPL-CCNC: 27 U/L (ref 15–37)
BASOPHILS # BLD: 0.04 K/UL (ref 0–0.1)
BASOPHILS NFR BLD: 0.5 % (ref 0–1)
BILIRUB SERPL-MCNC: 0.4 MG/DL (ref 0.2–1)
BUN SERPL-MCNC: 23 MG/DL (ref 6–20)
BUN/CREAT SERPL: 26 (ref 12–20)
CALCIUM SERPL-MCNC: 9.3 MG/DL (ref 8.5–10.1)
CHLORIDE SERPL-SCNC: 100 MMOL/L (ref 97–108)
CO2 SERPL-SCNC: 35 MMOL/L (ref 21–32)
CREAT SERPL-MCNC: 0.88 MG/DL (ref 0.55–1.02)
DIFFERENTIAL METHOD BLD: ABNORMAL
EOSINOPHIL # BLD: 0.21 K/UL (ref 0–0.4)
EOSINOPHIL NFR BLD: 2.7 % (ref 0–7)
ERYTHROCYTE [DISTWIDTH] IN BLOOD BY AUTOMATED COUNT: 14 % (ref 11.5–14.5)
GLOBULIN SER CALC-MCNC: 3.2 G/DL (ref 2–4)
GLUCOSE SERPL-MCNC: 85 MG/DL (ref 65–100)
HCT VFR BLD AUTO: 43.4 % (ref 35–47)
HGB BLD-MCNC: 13.6 G/DL (ref 11.5–16)
IMM GRANULOCYTES # BLD AUTO: 0.1 K/UL (ref 0–0.04)
IMM GRANULOCYTES NFR BLD AUTO: 1.3 % (ref 0–0.5)
LYMPHOCYTES # BLD: 2.35 K/UL (ref 0.8–3.5)
LYMPHOCYTES NFR BLD: 29.8 % (ref 12–49)
MCH RBC QN AUTO: 29.6 PG (ref 26–34)
MCHC RBC AUTO-ENTMCNC: 31.3 G/DL (ref 30–36.5)
MCV RBC AUTO: 94.6 FL (ref 80–99)
MONOCYTES # BLD: 1.21 K/UL (ref 0–1)
MONOCYTES NFR BLD: 15.3 % (ref 5–13)
NEUTS SEG # BLD: 3.98 K/UL (ref 1.8–8)
NEUTS SEG NFR BLD: 50.4 % (ref 32–75)
NRBC # BLD: 0 K/UL (ref 0–0.01)
NRBC BLD-RTO: 0 PER 100 WBC
PLATELET # BLD AUTO: 203 K/UL (ref 150–400)
PMV BLD AUTO: 10.8 FL (ref 8.9–12.9)
POTASSIUM SERPL-SCNC: 3.8 MMOL/L (ref 3.5–5.1)
PROT SERPL-MCNC: 6.3 G/DL (ref 6.4–8.2)
RBC # BLD AUTO: 4.59 M/UL (ref 3.8–5.2)
SODIUM SERPL-SCNC: 141 MMOL/L (ref 136–145)
WBC # BLD AUTO: 7.9 K/UL (ref 3.6–11)

## 2025-03-04 PROCEDURE — 6370000000 HC RX 637 (ALT 250 FOR IP): Performed by: INTERNAL MEDICINE

## 2025-03-04 PROCEDURE — 6370000000 HC RX 637 (ALT 250 FOR IP): Performed by: FAMILY MEDICINE

## 2025-03-04 PROCEDURE — 2500000003 HC RX 250 WO HCPCS: Performed by: INTERNAL MEDICINE

## 2025-03-04 PROCEDURE — 2500000003 HC RX 250 WO HCPCS: Performed by: HOSPITALIST

## 2025-03-04 PROCEDURE — 6370000000 HC RX 637 (ALT 250 FOR IP): Performed by: HOSPITALIST

## 2025-03-04 PROCEDURE — 1100000000 HC RM PRIVATE

## 2025-03-04 PROCEDURE — 85025 COMPLETE CBC W/AUTO DIFF WBC: CPT

## 2025-03-04 PROCEDURE — 2700000000 HC OXYGEN THERAPY PER DAY

## 2025-03-04 PROCEDURE — 94760 N-INVAS EAR/PLS OXIMETRY 1: CPT

## 2025-03-04 PROCEDURE — 80053 COMPREHEN METABOLIC PANEL: CPT

## 2025-03-04 PROCEDURE — 6360000002 HC RX W HCPCS: Performed by: INTERNAL MEDICINE

## 2025-03-04 RX ADMIN — GUAIFENESIN 200 MG: 200 SOLUTION ORAL at 04:50

## 2025-03-04 RX ADMIN — GUAIFENESIN 200 MG: 200 SOLUTION ORAL at 15:19

## 2025-03-04 RX ADMIN — ATORVASTATIN CALCIUM 10 MG: 20 TABLET, FILM COATED ORAL at 08:44

## 2025-03-04 RX ADMIN — AMLODIPINE BESYLATE 5 MG: 5 TABLET ORAL at 08:44

## 2025-03-04 RX ADMIN — POLYETHYLENE GLYCOL 3350 17 G: 17 POWDER, FOR SOLUTION ORAL at 15:16

## 2025-03-04 RX ADMIN — ENOXAPARIN SODIUM 30 MG: 100 INJECTION SUBCUTANEOUS at 08:43

## 2025-03-04 RX ADMIN — DIVALPROEX SODIUM 375 MG: 125 CAPSULE, COATED PELLETS ORAL at 22:17

## 2025-03-04 RX ADMIN — CARBIDOPA AND LEVODOPA 1 TABLET: 25; 100 TABLET ORAL at 08:44

## 2025-03-04 RX ADMIN — POTASSIUM BICARBONATE 20 MEQ: 782 TABLET, EFFERVESCENT ORAL at 08:44

## 2025-03-04 RX ADMIN — CARBIDOPA AND LEVODOPA 1 TABLET: 25; 100 TABLET ORAL at 15:20

## 2025-03-04 RX ADMIN — MEMANTINE 10 MG: 5 TABLET ORAL at 22:17

## 2025-03-04 RX ADMIN — CARBIDOPA AND LEVODOPA 1 TABLET: 25; 100 TABLET ORAL at 22:16

## 2025-03-04 RX ADMIN — GABAPENTIN 200 MG: 100 CAPSULE ORAL at 22:15

## 2025-03-04 RX ADMIN — ENOXAPARIN SODIUM 30 MG: 100 INJECTION SUBCUTANEOUS at 22:14

## 2025-03-04 RX ADMIN — DULOXETINE HYDROCHLORIDE 60 MG: 60 CAPSULE, DELAYED RELEASE ORAL at 08:45

## 2025-03-04 RX ADMIN — DONEPEZIL HYDROCHLORIDE 10 MG: 10 TABLET, FILM COATED ORAL at 22:16

## 2025-03-04 RX ADMIN — SODIUM CHLORIDE, PRESERVATIVE FREE 10 ML: 5 INJECTION INTRAVENOUS at 22:19

## 2025-03-04 RX ADMIN — GUAIFENESIN 200 MG: 200 SOLUTION ORAL at 19:30

## 2025-03-04 RX ADMIN — FUROSEMIDE 40 MG: 40 TABLET ORAL at 08:45

## 2025-03-04 RX ADMIN — DIAZEPAM 5 MG: 10 TABLET ORAL at 22:15

## 2025-03-04 RX ADMIN — MICONAZOLE NITRATE: 2 POWDER TOPICAL at 22:20

## 2025-03-04 RX ADMIN — DIVALPROEX SODIUM 375 MG: 125 CAPSULE, COATED PELLETS ORAL at 08:44

## 2025-03-04 RX ADMIN — MICONAZOLE NITRATE: 2 POWDER TOPICAL at 08:52

## 2025-03-04 RX ADMIN — MEMANTINE 10 MG: 5 TABLET ORAL at 09:00

## 2025-03-04 RX ADMIN — TRAZODONE HYDROCHLORIDE 200 MG: 50 TABLET ORAL at 22:17

## 2025-03-04 RX ADMIN — SODIUM CHLORIDE, PRESERVATIVE FREE 10 ML: 5 INJECTION INTRAVENOUS at 08:52

## 2025-03-05 PROCEDURE — 97535 SELF CARE MNGMENT TRAINING: CPT

## 2025-03-05 PROCEDURE — 2500000003 HC RX 250 WO HCPCS: Performed by: HOSPITALIST

## 2025-03-05 PROCEDURE — 6370000000 HC RX 637 (ALT 250 FOR IP): Performed by: HOSPITALIST

## 2025-03-05 PROCEDURE — 1100000000 HC RM PRIVATE

## 2025-03-05 PROCEDURE — 6370000000 HC RX 637 (ALT 250 FOR IP): Performed by: FAMILY MEDICINE

## 2025-03-05 PROCEDURE — 2500000003 HC RX 250 WO HCPCS: Performed by: INTERNAL MEDICINE

## 2025-03-05 PROCEDURE — 6370000000 HC RX 637 (ALT 250 FOR IP): Performed by: INTERNAL MEDICINE

## 2025-03-05 PROCEDURE — 97530 THERAPEUTIC ACTIVITIES: CPT

## 2025-03-05 PROCEDURE — 6360000002 HC RX W HCPCS: Performed by: INTERNAL MEDICINE

## 2025-03-05 RX ADMIN — PANTOPRAZOLE SODIUM 40 MG: 40 TABLET, DELAYED RELEASE ORAL at 08:44

## 2025-03-05 RX ADMIN — MICONAZOLE NITRATE: 2 POWDER TOPICAL at 08:54

## 2025-03-05 RX ADMIN — MICONAZOLE NITRATE: 2 POWDER TOPICAL at 21:02

## 2025-03-05 RX ADMIN — CARBIDOPA AND LEVODOPA 1 TABLET: 25; 100 TABLET ORAL at 13:44

## 2025-03-05 RX ADMIN — ENOXAPARIN SODIUM 30 MG: 100 INJECTION SUBCUTANEOUS at 08:38

## 2025-03-05 RX ADMIN — CARBIDOPA AND LEVODOPA 1 TABLET: 25; 100 TABLET ORAL at 21:02

## 2025-03-05 RX ADMIN — SODIUM CHLORIDE, PRESERVATIVE FREE 10 ML: 5 INJECTION INTRAVENOUS at 21:02

## 2025-03-05 RX ADMIN — DIVALPROEX SODIUM 375 MG: 125 CAPSULE, COATED PELLETS ORAL at 08:44

## 2025-03-05 RX ADMIN — AMLODIPINE BESYLATE 5 MG: 5 TABLET ORAL at 08:44

## 2025-03-05 RX ADMIN — DULOXETINE HYDROCHLORIDE 60 MG: 60 CAPSULE, DELAYED RELEASE ORAL at 08:44

## 2025-03-05 RX ADMIN — DONEPEZIL HYDROCHLORIDE 10 MG: 10 TABLET, FILM COATED ORAL at 21:02

## 2025-03-05 RX ADMIN — MEMANTINE 10 MG: 5 TABLET ORAL at 08:59

## 2025-03-05 RX ADMIN — CARBIDOPA AND LEVODOPA 1 TABLET: 25; 100 TABLET ORAL at 08:44

## 2025-03-05 RX ADMIN — GABAPENTIN 200 MG: 100 CAPSULE ORAL at 21:02

## 2025-03-05 RX ADMIN — GUAIFENESIN 200 MG: 200 SOLUTION ORAL at 22:20

## 2025-03-05 RX ADMIN — PANTOPRAZOLE SODIUM 40 MG: 40 TABLET, DELAYED RELEASE ORAL at 21:02

## 2025-03-05 RX ADMIN — FUROSEMIDE 40 MG: 40 TABLET ORAL at 08:45

## 2025-03-05 RX ADMIN — ENOXAPARIN SODIUM 30 MG: 100 INJECTION SUBCUTANEOUS at 21:01

## 2025-03-05 RX ADMIN — TRAZODONE HYDROCHLORIDE 200 MG: 50 TABLET ORAL at 21:01

## 2025-03-05 RX ADMIN — ATORVASTATIN CALCIUM 10 MG: 20 TABLET, FILM COATED ORAL at 08:44

## 2025-03-05 RX ADMIN — SODIUM CHLORIDE, PRESERVATIVE FREE 10 ML: 5 INJECTION INTRAVENOUS at 08:45

## 2025-03-05 RX ADMIN — POTASSIUM BICARBONATE 20 MEQ: 782 TABLET, EFFERVESCENT ORAL at 08:44

## 2025-03-05 RX ADMIN — DIAZEPAM 5 MG: 10 TABLET ORAL at 21:02

## 2025-03-05 RX ADMIN — MEMANTINE 10 MG: 5 TABLET ORAL at 21:02

## 2025-03-05 RX ADMIN — DIVALPROEX SODIUM 375 MG: 125 CAPSULE, COATED PELLETS ORAL at 21:02

## 2025-03-05 ASSESSMENT — PAIN SCALES - GENERAL: PAINLEVEL_OUTOF10: 0

## 2025-03-06 VITALS
RESPIRATION RATE: 18 BRPM | TEMPERATURE: 97.4 F | DIASTOLIC BLOOD PRESSURE: 73 MMHG | BODY MASS INDEX: 43.15 KG/M2 | WEIGHT: 234.5 LBS | SYSTOLIC BLOOD PRESSURE: 127 MMHG | HEART RATE: 74 BPM | OXYGEN SATURATION: 97 % | HEIGHT: 62 IN

## 2025-03-06 PROCEDURE — 6370000000 HC RX 637 (ALT 250 FOR IP): Performed by: HOSPITALIST

## 2025-03-06 PROCEDURE — 6370000000 HC RX 637 (ALT 250 FOR IP): Performed by: FAMILY MEDICINE

## 2025-03-06 PROCEDURE — 6370000000 HC RX 637 (ALT 250 FOR IP): Performed by: INTERNAL MEDICINE

## 2025-03-06 PROCEDURE — 2500000003 HC RX 250 WO HCPCS: Performed by: INTERNAL MEDICINE

## 2025-03-06 PROCEDURE — 94760 N-INVAS EAR/PLS OXIMETRY 1: CPT

## 2025-03-06 PROCEDURE — 97530 THERAPEUTIC ACTIVITIES: CPT

## 2025-03-06 PROCEDURE — 2500000003 HC RX 250 WO HCPCS: Performed by: HOSPITALIST

## 2025-03-06 PROCEDURE — 6360000002 HC RX W HCPCS: Performed by: INTERNAL MEDICINE

## 2025-03-06 PROCEDURE — 2700000000 HC OXYGEN THERAPY PER DAY

## 2025-03-06 RX ORDER — GUAIFENESIN 200 MG/10ML
200 LIQUID ORAL EVERY 6 HOURS
Status: SHIPPED | COMMUNITY
Start: 2025-03-06

## 2025-03-06 RX ORDER — AMLODIPINE BESYLATE 5 MG/1
5 TABLET ORAL DAILY
Qty: 30 TABLET | Refills: 3 | Status: SHIPPED | OUTPATIENT
Start: 2025-03-06

## 2025-03-06 RX ORDER — FUROSEMIDE 40 MG/1
40 TABLET ORAL DAILY
Qty: 60 TABLET | Refills: 1 | Status: SHIPPED | OUTPATIENT
Start: 2025-03-06

## 2025-03-06 RX ADMIN — SODIUM CHLORIDE, PRESERVATIVE FREE 10 ML: 5 INJECTION INTRAVENOUS at 08:58

## 2025-03-06 RX ADMIN — DULOXETINE HYDROCHLORIDE 60 MG: 60 CAPSULE, DELAYED RELEASE ORAL at 08:57

## 2025-03-06 RX ADMIN — ENOXAPARIN SODIUM 30 MG: 100 INJECTION SUBCUTANEOUS at 08:58

## 2025-03-06 RX ADMIN — FUROSEMIDE 40 MG: 40 TABLET ORAL at 08:56

## 2025-03-06 RX ADMIN — DIVALPROEX SODIUM 375 MG: 125 CAPSULE, COATED PELLETS ORAL at 08:56

## 2025-03-06 RX ADMIN — ATORVASTATIN CALCIUM 10 MG: 20 TABLET, FILM COATED ORAL at 08:57

## 2025-03-06 RX ADMIN — CARBIDOPA AND LEVODOPA 1 TABLET: 25; 100 TABLET ORAL at 08:57

## 2025-03-06 RX ADMIN — AMLODIPINE BESYLATE 5 MG: 5 TABLET ORAL at 08:57

## 2025-03-06 RX ADMIN — MICONAZOLE NITRATE: 2 POWDER TOPICAL at 08:58

## 2025-03-06 RX ADMIN — CARBIDOPA AND LEVODOPA 1 TABLET: 25; 100 TABLET ORAL at 14:46

## 2025-03-06 RX ADMIN — GUAIFENESIN 200 MG: 200 SOLUTION ORAL at 05:58

## 2025-03-06 RX ADMIN — MEMANTINE 10 MG: 5 TABLET ORAL at 08:57

## 2025-03-06 RX ADMIN — POTASSIUM BICARBONATE 20 MEQ: 782 TABLET, EFFERVESCENT ORAL at 08:56

## 2025-03-06 ASSESSMENT — PAIN SCALES - GENERAL: PAINLEVEL_OUTOF10: 0

## 2025-03-06 NOTE — PROGRESS NOTES
[facility] Adult  Hospitalist Group                                                                                          Hospitalist Progress Note  Cecil Michael MD  Office Phone: [phone]        Date of Service:  3/2/2025  NAME:  Savana Vogel  :  1945  MRN:  [id number]       Admission Summary:   Savana Vogel is a 79 y.o. female with past medical history significant for Parkinson disease, dementia, dyslipidemia, hypertension presented at the emergency room with shortness of breath.  Patient developed a choking episode while drinking cappuccino.  She was taken to urgent care center where the patient underwent chest x-ray which was negative and was treated with albuterol and discharged home.  Patient shortness of breath got worse at home especially with ambulation and spouse called the EMS and was brought to the emergency room for evaluation.  Patient arrived at the emergency room hypoxic with increased work of breathing and required being placed on 4 L of oxygen.  Patient is not on oxygen at home.  Patient unable to provide a good history because of her underlying dementia and the acuity of her condition.  She was last admitted to the hospital in 2022, patient was admitted and treated for respiratory failure attributed to COPD exacerbation as well as possible aspiration.  CTA of the chest done on arrival at the emergency room negative for pulmonary embolism but did show patchy left lower lobe airspace disease.  Patient was started on antibiotics and referred to the hospitalist service for admission.       Interval history / Subjective:      Patient seen and examined this afternoon.  Her  present in the room.  She was mildly hypothermic today however stable.  Hypothermia likely environmental, temperature improving with blankets.  Patient and  wanted to return home on discharge.  She uses wheelchair for mobility but helps transfer and takes few steps 
        [facility] Adult  Hospitalist Group                                                                                          Hospitalist Progress Note  Edvin Thomas MD  Office Phone: [phone]        Date of Service:  2025  NAME:  Savana Vogel  :  1945  MRN:  [id number]       Admission Summary:   Savana Vogel is a 79 y.o. female with past medical history significant for Parkinson disease, dementia, dyslipidemia, hypertension presented at the emergency room with shortness of breath.  Patient developed a choking episode while drinking cappuccino.  She was taken to urgent care center where the patient underwent chest x-ray which was negative and was treated with albuterol and discharged home.  Patient shortness of breath got worse at home especially with ambulation and spouse called the EMS and was brought to the emergency room for evaluation.  Patient arrived at the emergency room hypoxic with increased work of breathing and required being placed on 4 L of oxygen.  Patient is not on oxygen at home.  Patient unable to provide a good history because of her underlying dementia and the acuity of her condition.  She was last admitted to the hospital in 2022, patient was admitted and treated for respiratory failure attributed to COPD exacerbation as well as possible aspiration.  CTA of the chest done on arrival at the emergency room negative for pulmonary embolism but did show patchy left lower lobe airspace disease.  Patient was started on antibiotics and referred to the hospitalist service for admission.       Interval history / Subjective:      Difficulty swallowing, high risk aspiration, discussed with RN, will consult to speech, continue antibiotic  Assessment & Plan:     1.  Acute respiratory failure with hypoxia POA: We will admit the patient for further evaluation and treatment.  Will identify and treat underlying etiological factors including bacterial pneumonia.  
        [facility] Adult  Hospitalist Group                                                                                          Hospitalist Progress Note  Thais Hedrick MD  Office Phone: [phone]        Date of Service:  3/3/2025  NAME:  Savana Vogel  :  1945  MRN:  [id number]       Admission Summary:   Savana Vogle is a 79 y.o. female with past medical history significant for Parkinson disease, dementia, dyslipidemia, hypertension presented at the emergency room with shortness of breath.  Patient developed a choking episode while drinking cappuccino.  She was taken to urgent care center where the patient underwent chest x-ray which was negative and was treated with albuterol and discharged home.  Patient shortness of breath got worse at home especially with ambulation and spouse called the EMS and was brought to the emergency room for evaluation.  Patient arrived at the emergency room hypoxic with increased work of breathing and required being placed on 4 L of oxygen.  Patient is not on oxygen at home.  Patient unable to provide a good history because of her underlying dementia and the acuity of her condition.  She was last admitted to the hospital in 2022, patient was admitted and treated for respiratory failure attributed to COPD exacerbation as well as possible aspiration.  CTA of the chest done on arrival at the emergency room negative for pulmonary embolism but did show patchy left lower lobe airspace disease.  Patient was started on antibiotics and referred to the hospitalist service for admission.       Interval history / Subjective:      Patient seen and examined Her  present in the room.  Reported hypoxia at night, so back on O2.  No HOME o2.  Still declined SNF       Assessment & Plan:     Acute respiratory failure with hypoxia POA predominantly due to pneumonia  -CT of the chest showed patchy left lower lobe airspace disease  -Weaned off of oxygen  - Complete 
        [facility] Adult  Hospitalist Group                                                                                          Hospitalist Progress Note  Edvin Thomas MD  Office Phone: [phone]        Date of Service:  2025  NAME:  Savana Vogel  :  1945  MRN:  [id number]       Admission Summary:   Savana Vogel is a 79 y.o. female with past medical history significant for Parkinson disease, dementia, dyslipidemia, hypertension presented at the emergency room with shortness of breath.  Patient developed a choking episode while drinking cappuccino.  She was taken to urgent care center where the patient underwent chest x-ray which was negative and was treated with albuterol and discharged home.  Patient shortness of breath got worse at home especially with ambulation and spouse called the EMS and was brought to the emergency room for evaluation.  Patient arrived at the emergency room hypoxic with increased work of breathing and required being placed on 4 L of oxygen.  Patient is not on oxygen at home.  Patient unable to provide a good history because of her underlying dementia and the acuity of her condition.  She was last admitted to the hospital in 2022, patient was admitted and treated for respiratory failure attributed to COPD exacerbation as well as possible aspiration.  CTA of the chest done on arrival at the emergency room negative for pulmonary embolism but did show patchy left lower lobe airspace disease.  Patient was started on antibiotics and referred to the hospitalist service for admission.       Interval history / Subjective:      Discussed with pharmacist,  at bedside patient case management, blood culture, continue antibiotic,  reports patient had defied barium swallow, was advised chopped diet she had an episode of cough at home with liquid  Assessment & Plan:     1.  Acute respiratory failure with hypoxia POA: We will admit the patient for 
        [facility] Adult  Hospitalist Group                                                                                          Hospitalist Progress Note  Thais Hedrick MD  Office Phone: [phone]        Date of Service:  2025   NAME:  Savana Vogel  :  1945  MRN:  [id number]       Admission Summary:   Savana Vogel is a 79 y.o. female with past medical history significant for Parkinson disease, dementia, dyslipidemia, hypertension presented at the emergency room with shortness of breath.  Patient developed a choking episode while drinking cappuccino.  She was taken to urgent care center where the patient underwent chest x-ray which was negative and was treated with albuterol and discharged home.  Patient shortness of breath got worse at home especially with ambulation and spouse called the EMS and was brought to the emergency room for evaluation.  Patient arrived at the emergency room hypoxic with increased work of breathing and required being placed on 4 L of oxygen.  Patient is not on oxygen at home.  Patient unable to provide a good history because of her underlying dementia and the acuity of her condition.  She was last admitted to the hospital in 2022, patient was admitted and treated for respiratory failure attributed to COPD exacerbation as well as possible aspiration.  CTA of the chest done on arrival at the emergency room negative for pulmonary embolism but did show patchy left lower lobe airspace disease.  Patient was started on antibiotics and referred to the hospitalist service for admission.       Interval history / Subjective:       Case discussed with speech therapist, continue diet, PPI,  updated, status post flexible endoscopic evaluation of swallow, no aspiration, no significant penetration, proximal escape consistent with her reflux, PPI twice daily  : coughing ,more weak than baseline, discussed with PT   Assessment & Plan:     1.  Acute 
        [facility] Adult  Hospitalist Group                                                                                          Hospitalist Progress Note  Edvin Thomas MD  Office Phone: [phone]        Date of Service:  2025  NAME:  Savana Vogel  :  1945  MRN:  [id number]       Admission Summary:   Savana Vogel is a 79 y.o. female with past medical history significant for Parkinson disease, dementia, dyslipidemia, hypertension presented at the emergency room with shortness of breath.  Patient developed a choking episode while drinking cappuccino.  She was taken to urgent care center where the patient underwent chest x-ray which was negative and was treated with albuterol and discharged home.  Patient shortness of breath got worse at home especially with ambulation and spouse called the EMS and was brought to the emergency room for evaluation.  Patient arrived at the emergency room hypoxic with increased work of breathing and required being placed on 4 L of oxygen.  Patient is not on oxygen at home.  Patient unable to provide a good history because of her underlying dementia and the acuity of her condition.  She was last admitted to the hospital in 2022, patient was admitted and treated for respiratory failure attributed to COPD exacerbation as well as possible aspiration.  CTA of the chest done on arrival at the emergency room negative for pulmonary embolism but did show patchy left lower lobe airspace disease.  Patient was started on antibiotics and referred to the hospitalist service for admission.       Interval history / Subjective:       Case discussed with speech therapist, continue diet, PPI,  updated, status post flexible endoscopic evaluation of swallow, no aspiration, no significant penetration, proximal escape consistent with her reflux, PPI twice daily  : coughing ,more weak than baseline, discussed with PT   Assessment & Plan:     1.  Acute 
        [facility] Adult  Hospitalist Group                                                                                          Hospitalist Progress Note  Thais Hedrick MD  Office Phone: [phone]        Date of Service:  2025  NAME:  Savana Vogel  :  1945  MRN:  [id number]       Admission Summary:   Savana Vogel is a 79 y.o. female with past medical history significant for Parkinson disease, dementia, dyslipidemia, hypertension presented at the emergency room with shortness of breath.  Patient developed a choking episode while drinking cappuccino.  She was taken to urgent care center where the patient underwent chest x-ray which was negative and was treated with albuterol and discharged home.  Patient shortness of breath got worse at home especially with ambulation and spouse called the EMS and was brought to the emergency room for evaluation.  Patient arrived at the emergency room hypoxic with increased work of breathing and required being placed on 4 L of oxygen.  Patient is not on oxygen at home.  Patient unable to provide a good history because of her underlying dementia and the acuity of her condition.  She was last admitted to the hospital in 2022, patient was admitted and treated for respiratory failure attributed to COPD exacerbation as well as possible aspiration.  CTA of the chest done on arrival at the emergency room negative for pulmonary embolism but did show patchy left lower lobe airspace disease.  Patient was started on antibiotics and referred to the hospitalist service for admission.       Interval history / Subjective:       Case discussed with speech therapist, continue diet, PPI,  updated, status post flexible endoscopic evaluation of swallow, no aspiration, no significant penetration, proximal escape consistent with her reflux, PPI twice daily  : coughing ,more weak than baseline, discussed with PT   Assessment & Plan:     Acute respiratory 
        [facility] Adult  Hospitalist Group                                                                                          Hospitalist Progress Note  Thais Hedrick MD  Office Phone: [phone]        Date of Service:  3/1/2025  NAME:  Savana Vogel  :  1945  MRN:  [id number]       Admission Summary:   Savana Vogel is a 79 y.o. female with past medical history significant for Parkinson disease, dementia, dyslipidemia, hypertension presented at the emergency room with shortness of breath.  Patient developed a choking episode while drinking cappuccino.  She was taken to urgent care center where the patient underwent chest x-ray which was negative and was treated with albuterol and discharged home.  Patient shortness of breath got worse at home especially with ambulation and spouse called the EMS and was brought to the emergency room for evaluation.  Patient arrived at the emergency room hypoxic with increased work of breathing and required being placed on 4 L of oxygen.  Patient is not on oxygen at home.  Patient unable to provide a good history because of her underlying dementia and the acuity of her condition.  She was last admitted to the hospital in 2022, patient was admitted and treated for respiratory failure attributed to COPD exacerbation as well as possible aspiration.  CTA of the chest done on arrival at the emergency room negative for pulmonary embolism but did show patchy left lower lobe airspace disease.  Patient was started on antibiotics and referred to the hospitalist service for admission.       Interval history / Subjective:      Patient's leg swelling better  Breathing ok  Intermittent cough after eating which is chronic per    Assessment & Plan:     Acute respiratory failure with hypoxia POA: Likely multifactorial, some improvement with Lasix, continue further evaluation and treatment, echocardiogram,   Tolerate lasix x1   continue daily lasix as long 
        [facility] Adult  Hospitalist Group                                                                                          Hospitalist Progress Note  Thais Hedrick MD  Office Phone: [phone]        Date of Service:  3/4/2025  NAME:  Savana Vogel  :  1945  MRN:  [id number]       Admission Summary:   Savana Vogel is a 79 y.o. female with past medical history significant for Parkinson disease, dementia, dyslipidemia, hypertension presented at the emergency room with shortness of breath.  Patient developed a choking episode while drinking cappuccino.  She was taken to urgent care center where the patient underwent chest x-ray which was negative and was treated with albuterol and discharged home.  Patient shortness of breath got worse at home especially with ambulation and spouse called the EMS and was brought to the emergency room for evaluation.  Patient arrived at the emergency room hypoxic with increased work of breathing and required being placed on 4 L of oxygen.  Patient is not on oxygen at home.  Patient unable to provide a good history because of her underlying dementia and the acuity of her condition.  She was last admitted to the hospital in 2022, patient was admitted and treated for respiratory failure attributed to COPD exacerbation as well as possible aspiration.  CTA of the chest done on arrival at the emergency room negative for pulmonary embolism but did show patchy left lower lobe airspace disease.  Patient was started on antibiotics and referred to the hospitalist service for admission.       Interval history / Subjective:      Patient seen and examined Her  present in the room.  Noticed Sao2 down to 86-87% on RA, 89% on 1L  93% on 2l       Assessment & Plan:     Acute respiratory failure with hypoxia POA predominantly due to pneumonia  -CT of the chest showed patchy left lower lobe airspace disease  -Weaned off of oxygen  - Complete treatment with IV 
  Physician Progress Note      PATIENT:               BHANU TYLER  CSN #:                  [id number]  :                       1945  ADMIT DATE:       2025 11:57 PM  DISCH DATE:  RESPONDING  PROVIDER #:        Edvin Thomas MD          QUERY TEXT:    Pt admitted with Bacterial pneumonia. Pt noted to have WBC 15.7,13.2, CRP   3.85, , 95, RR 24. If possible, please document in the progress notes   and discharge summary if you are evaluating and /or treating any of the   following:    The medical record reflects the following:  Risk Factors: 79 yr old female, Acute respiratory failure with hypoxia,  Clinical Indicators: IMPN on  Bacterial pneumonia, Acute respiratory   failure with hypoxia   ,   WBC 15.7,13.2, CRP 3.85, , 95, RR 24   HR 94,   RR 24,   RR 24, 25  Treatment: IV Rocephin and doxycycline, IVF, monitoring lab    Thank you,  Gayla Gudino [facility] CDS  Options provided:  -- Sepsis due to Bacterial pneumonia, present on admission  -- Bacterial pneumonia without Sepsis  -- Other - I will add my own diagnosis  -- Disagree - Not applicable / Not valid  -- Disagree - Clinically unable to determine / Unknown  -- Refer to Clinical Documentation Reviewer    PROVIDER RESPONSE TEXT:    This patient has sepsis due to Bacterial pneumonia which was present on   admission.    Query created by: Gayla Gudino on 2025 3:29 AM      Electronically signed by:  Edvin Thomas MD 3/3/2025 7:08 AM          
  Physician Progress Note      PATIENT:               BHANU TYLER  [facility] #:                  [id number]  :                       1945  ADMIT DATE:       2025 11:57 PM  DISCH DATE:  RESPONDING  PROVIDER #:        Thais Hedrick MD          QUERY TEXT:    Patient admitted with Bacterial pneumonia. Noted documentation of acute   respiratory failure in [facility] on . In order to support the diagnosis of   acute respiratory failure, please include additional clinical indicators in   your documentation. Or please document if the diagnosis of acute respiratory   failure has been ruled out after further study.    The medical record reflects the following:  Risk Factors: Bacterial pneumonia, Oropharyngeal dysphagia  Clinical Indicators: [facility] on  Acute respiratory failure with hypoxia   underlying etiological factors including bacterial pneumonia  ED note  No respiratory distress, clear lungs bilaterally with no   abnormal breath sounds  H&P note  Few expiratory wheezing and crackles  [facility] on   Patient arrived at the emergency room hypoxic with increased   work of breathing and required being placed on 4 L of oxygen.   SpO2 94%, RR 20,   SpO2 83%, RR 29, 24,   SpO2 91%, RR 16    Treatment: 2-4 L NC, CT chest, XR chest,    Thank you,  Gayla Gudino S CDS  Options provided:  -- Acute Respiratory Failure as evidenced by, Please document evidence.  -- Acute Respiratory Failure ruled out after study  -- Other - I will add my own diagnosis  -- Disagree - Not applicable / Not valid  -- Disagree - Clinically unable to determine / Unknown  -- Refer to Clinical Documentation Reviewer    PROVIDER RESPONSE TEXT:    This patient is in acute respiratory failure as evidenced by sao2< 88%    Query created by: Gayla Gudino on 2025 6:06 AM      Electronically signed by:  Thais Hedrick MD 3/1/2025 5:12 PM          
  Speech LAnguage Pathology EVALUATION/DISCHARGE    Patient: Savana Vogel (79 y.o. female)  Date: 2/25/2025  Primary Diagnosis: Leg swelling [M79.89]  Hypoxia [R09.02]  Acute respiratory failure with hypoxia (HCC) [J96.01]  Aspiration pneumonia of left lower lobe, unspecified aspiration pneumonia type (HCC) [J69.0]       Precautions:                     ASSESSMENT :  Patient seen for bedside swallow evaluation. Discussion at length with Pt just had an outpatient modified barium swallow study just last month (1/20/25 at [facility]). She had consistent throat clearing throughout study but only one instance of aspiration with thins to which she was sensate and possibly cleared. Otherwise, they recommended easy to chew diet/thin liquids. She is at chronic high risk for prandial and post-prandial aspiration (Parkinson's/Dementia/COPD for prandial and large hiatal hernia/GERD for post-prandial). Pt passed her swallow screen. Left sided pneumonias are not typically associated with prandial aspiration (could be post-prandial), but, given her background, she is at chronic risk for both. Would consider re-initiating her diet with strict aspiration/esophageal precautions and would recommend a goals of care conversation with her family/MPOA as suspect that this kind of event is bound to happen, especially with how large her hiatal hernia is and her other risk factors. Would also ensure that she is getting her Sinemet ~30 minutes prior to eating/drinking. SLP available to help as needed but will formally sign off.    Patient will be discharged from skilled speech-language pathology services at this time.     PLAN :  Recommendations and Planned Interventions:  Diet: Diet Recommendations: Thin liquids, Easy to chew diet  Time sinemet around mealtimes 30-60 minutes before  Consider increasing Protonix  Safe Swallow Recommendations: eat slowly, chew thoroughly, stay upright during/after meals, remain upright during/after meals (30-60 
1520: Patient's  stated that the patient began wheezing intensely, seems to be more lethargic with increased difficulty breathing after some time from eating lunch, and patient has not voided.     1530: Bladder scanned pt amount 295, Called respiratory for PRN nebs, messaged MD on perfect serve to express concerns. MD stated would assess at bedside.    1533: MD at bedside assessed patient. STAT chest xray placed, one time dose of IV lasix ordered, pt given PRN neb treatment.     1630: Pt urinated 800 ml with external catheter. Breathing improved, chest x ray results pending. Pt awake watching tv. No further concerns at this time  
4 Eyes Skin Assessment     NAME:  Savana Vogel  YOB: 1945  MEDICAL RECORD NUMBER:  [id number]    The patient is being assessed for  Admission    I agree that at least one RN has performed a thorough Head to Toe Skin Assessment on the patient. ALL assessment sites listed below have been assessed.      Areas assessed by both nurses:    Head, Face, Ears, Shoulders, Back, Chest, Arms, Elbows, Hands, Sacrum. Buttock, Coccyx, Ischium, Legs. Feet and Heels, and Under Medical Devices         Does the Patient have a Wound? No noted wound(s)       Bryan Prevention initiated by RN: Yes  Wound Care Orders initiated by RN: No    Pressure Injury (Stage 3,4, Unstageable, DTI, NWPT, and Complex wounds) if present, place Wound referral order by RN under : No    New Ostomies, if present place, Ostomy referral order under : No     Nurse 1 eSignature: Electronically signed by Ermelinda Stock RN on 2/24/25 at 9:00 PM EST    **SHARE this note so that the co-signing nurse can place an eSignature**    Nurse 2 eSignature: {Esignature:[id number]}    
Attempted to call report to [facility], number given just rings busy. , Madonna, made aware, attempting to see if there is a different number to call for report     [phone] - again attempted to call report to [facility] with both numbers provided by case management, no answer from either   
Clinical Pharmacy Note: Re: IV to PO Automatic Conversion - Antibiotic    Please note: Savana Vogel’s medication- Doxycycline has been changed from IV to PO based on the following criteria:    The patient:  Received IV therapy for at least 24 hours   Is tolerating diet more advanced than clear liquids  Is tolerating oral medications  Is not on vasopressor blood pressure support (i.e. no signs of shock)      This IV to PO conversion is based on the P&T approved automatic conversion policy for eligible patients.  Please call with questions.    
Noticed Sao2 down to 86-87% on RA, 89% on 1L  93% on 2l   Will arrange home O2   
Rounded on [facility] patients and provided Anointing of the Sick at request of patient/family.  
SLP Contact Note    Reconsult received. Discussed this patient at length with Dr. Thomas yesterday afternoon (after evaluation) and patient's  at bedside during session. There is concern for difficulty breathing after meals. Left sided airspace disease more consistent with post-prandial aspiration, and patient has a large hiatal hernia and is at chronic high risk for post-prandial aspiration given hiatal hernia and Parkinson's. In addition, pt is also at high chronic risk for prandial aspiration given COPD and Parkinson's Disease.     Discussed with Dr. Thomas. While SLP could certainly complete repeat objective imaging of the swallow (pt just had MBS last month that showed one instance of aspiration), even if patient does not show aspiration on the MBS it does not necessarily mean that she will not aspirate given her high chronic risks for both prandial and post-prandial aspiration. Do think that a goals of care conversation/palliative consult would be reasonable to address this, as patient would also not likely be a good candidate for a long-term feeding tube due to dementia, Parkinson's, and large hiatal hernia. Long term feeding tube would also not eliminate that risk of post-prandial aspiration given large hiatal hernia, and patient would likely possibly even need a J tube over a G tube which would be a more invasive procedure and likely contraindicated given dementia/Parkinson's.     Will plan to complete a Flexible Endoscopic Evaluation of Swallow (FEES) at bedside today to help guide overall goals of care.     Ny Berry M.Ed, PhD(c), CCC-SLP (pronouns: she/her/hers)  Speech-Language Pathologist            
Spiritual Health History and Assessment/Progress Note  [facility]    Rituals, Rites and Sacraments,  ,  ,      Name: Savana Vogel MRN: [id number]    Age: 79 y.o.     Sex: female   Language: English   Shinto: Buddhist   Acute respiratory failure with hypoxia (HCC)     Date: 2/26/2025            Total Time Calculated: 5 min              Spiritual Assessment began in [facility] MED SURG        Referral/Consult From: Clergy/   Encounter Overview/Reason: Rituals, Rites and Sacraments  Service Provided For: Patient    Crys, Belief, Meaning:   Patient is connected with a crys tradition or spiritual practice  Family/Friends are connected with a crys tradition or spiritual practice      Importance and Influence:  Patient has spiritual/personal beliefs that influence decisions regarding their health  Family/Friends have spiritual/personal beliefs that influence decisions regarding the patient's health    Community:  Patient  n ot at present  Family/Friends Other: not at present    Assessment and Plan of Care:     Patient Interventions include: Provided sacramental/Druze ritual  Family/Friends Interventions include: Provided sacramental/Druze ritual    Patient Plan of Care: Spiritual Care available upon further referral  Family/Friends Plan of Care: Spiritual Care available upon further referral    Electronically signed by Chaplain SHENG on 2/26/2025 at 12:37 PM     [facility] ministry visit. Mrs. Vogel was in bed. Her  was by her side.  She is currently NPO. Prayer for spiritual communion offered and Mr. Vogel received communion.  The Thelma have been  for 60 yrs next month.  Mr. Vogel was in the  and has served as a . [facility]. Suggested [place] since [place] is close to the couples residence.     Sr. DONAVON Flaherty, RN, ACSW, LCSW   Page:  [phone]  
Spiritual Health History and Assessment/Progress Note  [facility]    Rituals, Rites and Sacraments,  ,  ,      Name: Savana Vogel MRN: [id number]    Age: 79 y.o.     Sex: female   Language: English   Alevism: Spiritism   Acute respiratory failure with hypoxia (HCC)     Date: 3/3/2025            Total Time Calculated: 5 min              Spiritual Assessment continued in [facility] MED SURG        Referral/Consult From: Clergy/   Encounter Overview/Reason: Rituals, Rites and Sacraments  Service Provided For: Patient    Crys, Belief, Meaning:   Patient is connected with a crys tradition or spiritual practice  Family/Friends are connected with a crys tradition or spiritual practice      Importance and Influence:  Patient has spiritual/personal beliefs that influence decisions regarding their health  Family/Friends have spiritual/personal beliefs that influence decisions regarding the patient's health    Community:  Patient is connected with a spiritual community  Family/Friends are connected with a spiritual community:    Assessment and Plan of Care:     Patient Interventions include: Provided sacramental/Jain ritual  Family/Friends Interventions include: Provided sacramental/Jain ritual    Patient Plan of Care: Spiritual Care available upon further referral  Family/Friends Plan of Care: Spiritual Care available upon further referral    Electronically signed by Chaplain SHENG on 3/3/2025 at 3:02 PM     "[employer]" ministry visit.Mrs. Vogel was in bed and said she is feeling so much better. Her  was visiting. He shared he talked with Fr. Galarza and was grateful because he can now receive communion. Both were visited by our "[employer]"  today.     Sr. DONAVON Flaherty, RN, ACSW, LCSW   Page:  [phone]  
Spiritual Health History and Assessment/Progress Note  [facility]    Rituals, Rites and Sacraments,  ,  ,      Name: Savana Vogel MRN: [id number]    Age: 79 y.o.     Sex: female   Language: English   Cheondoism: Caodaism   Acute respiratory failure with hypoxia (HCC)     Date: 3/5/2025            Total Time Calculated: 5 min              Spiritual Assessment began in [facility] MED SURG        Referral/Consult From: Clergy/   Encounter Overview/Reason: Rituals, Rites and Sacraments  Service Provided For: Patient    Crys, Belief, Meaning:   Patient has beliefs or practices that help with coping during difficult times  Family/Friends identify as spiritual      Importance and Influence:  Patient has spiritual/personal beliefs that influence decisions regarding their health  Family/Friends have spiritual/personal beliefs that influence decisions regarding the patient's health    Community:  Patient is connected with a spiritual community  Family/Friends are connected with a spiritual community:    Assessment and Plan of Care:     Patient Interventions include: Provided sacramental/Adventism ritual  Family/Friends Interventions include: Provided sacramental/Adventism ritual    Patient Plan of Care: Spiritual Care available upon further referral  Family/Friends Plan of Care: Spiritual Care available upon further referral    Electronically signed by Chaplain SHENG on 3/5/2025 at 4:46 PM     [facility] visit.  Mrs. Vogel was in bed. Her huband was with her.  He reports she is suppose to go to a rehab tomorrow.  Prayer and communion and ashes offered couple.     Sr. ODNAVON Flaherty, RN, ACSW, LCSW   Page:  [phone]  
MG capsule Past Week    Sig: Take 1 capsule by mouth nightly   ferrous sulfate (FE TABS 325) 325 (65 Fe) MG EC tablet Past Week    Sig: Take 1 tablet by mouth nightly   gabapentin (NEURONTIN) 100 MG capsule Past Week    Sig: Take 2 capsules by mouth at bedtime.   memantine (NAMENDA) 10 MG tablet Past Week    Sig: Take 1 tablet by mouth 2 times daily   omeprazole (PRILOSEC) 40 MG delayed release capsule Past Week    Sig: Take 1 capsule by mouth daily   simvastatin (ZOCOR) 20 MG tablet Past Week    Sig: Take 1 tablet by mouth nightly   traZODone (DESYREL) 100 MG tablet Past Week    Sig: Take 2 tablets by mouth nightly      Facility-Administered Medications: None     Please contact the main inpatient pharmacy with any questions or concerns at [phone] and we will direct you to the clinical pharmacist covering this patient's care while in-house.   [facility], located within [facility]        
Data, Chewing or Swallowing, GI Status, Meal Time Behavior, Weight    Discharge Planning:    Continue current diet     Hanna Sheehan RD  Available via [employer]  
thick  2 trials by single straw sip Valleculae 1 1 - none; 0%, no residue 1 - none; 0%, no residue   Moderately Thick  2 trials by single straw sip Valleculae 1 1 - none; 0%, no residue 1 - none; 0%, no residue   Puree 2 trials by spoon Valleculae 1 3 - mild; 5-25%, epiglottic ligament visible 3 - mild; 5-25%, up wall to quarter full   Solid  2 trials Valleculae 1 3 - mild; 5-25%, epiglottic ligament visible 3 - mild; 5-25%, up wall to quarter full   Trialed compensatory swallow strategies and maneuvers: none  Effective compensatory swallow strategies and maneuvers: none     *Residue rated using Jaqueline Pharyngeal Residue Rating Scale    8-Point Penetration-Aspiration Scale (PAS)  1: Material does not enter airway  2: Material enters the airway, remains above the vocal folds, and is ejected from the airway.  3: Material enters the airway, remains above the vocal folds, and is not ejected from the airway.  4: Material enters the airway, contacts the vocal folds, and is ejected from the airway.  5: Material enters the airway, contacts the vocal folds, and is not ejected from the airway.  6: Material enters the ariway, passes below the vocal folds, and is ejected into the larynx or out of the airway.  7: Material enters the airway, passes below the vocal folds, and is not ejected from the trachea despite effort.  8: Material enters the airway, passes below the vocal folds, and no effort is made to eject.  Source:  LIT Yun, HYUN Roman, Toño DSOUZA, DESTIN Messina, & DESTIN Redmond.  A Penetration-Aspiration Scale.  Dysphagia  11:93-98, 1996.    Functional Oral Intake Scale (FOIS): 5--Total oral diet with multiple consistencies but requiring special preparation or compensations    COMMUNICATION/EDUCATION:       The patient's plan of care including recommendations, planned interventions, and recommended diet changes were discussed with: Registered nurse and Physician    Patient/family have participated as able in goal setting and 
    ______________________________________________________________________  EXPECTED LENGTH OF STAY: 11  ACTUAL LENGTH OF STAY:          9                 Thais Hedrick MD

## 2025-03-06 NOTE — PLAN OF CARE
Problem: Occupational Therapy - Adult  Goal: By Discharge: Performs self-care activities at highest level of function for planned discharge setting.  See evaluation for individualized goals.  Description: FUNCTIONAL STATUS PRIOR TO ADMISSION:  Patient lives with spouse in 1 level apartment, ramped entrance. Patient has history of Parkinson's, dementia, hiatal hernia.  Patient has paid caregiver 3 days/week for 4 hours. She and her spouse provide all ADL/transfer assistance. Patient can normally feed herself, participate in grooming tasks. Patient requires min to mod assist for transfers and can maintain standing during ADL with support of RW with CGA.   Apartment has tub/shower with tub bench though patient has difficulty with transfer so she sponge bathes while seated on toilet.   Has w/c, RW. Does not have BSC  Receives Help From: Family, Personal care attendant, Prior Level of Assist for ADLs: Needs assistance,  Prior Level of Assist for Homemaking: Needs assistance, Ambulation Assistance: Needs assistance, Prior Level of Assist for Transfers: Needs assistance, Active : No     Occupational Therapy Goals:  Initiated 2/26/2025  1.  Patient will perform static stand for ADL completion > 1 min with Minimal Assist at RW level within 7 day(s).  2.  Patient will perform self-feeding with Minimal Assist within 7 day(s).  3.  Patient will perform grooming with Minimal Assist within 7 day(s).  4.  Patient will perform toilet transfers with Moderate Assist  within 7 day(s).  5.  Patient will perform all aspects of toileting with Maximal Assist within 7 day(s).  Outcome: Progressing     OCCUPATIONAL THERAPY TREATMENT  Patient: Savana Vogel (79 y.o. female)  Date: 2/27/2025  Primary Diagnosis: Leg swelling [M79.89]  Hypoxia [R09.02]  Acute respiratory failure with hypoxia (HCC) [J96.01]  Aspiration pneumonia of left lower lobe, unspecified aspiration pneumonia type (HCC) [J69.0]       Precautions:                
  Problem: Physical Therapy - Adult  Goal: By Discharge: Performs mobility at highest level of function for planned discharge setting.  See evaluation for individualized goals.  Description: FUNCTIONAL STATUS PRIOR TO ADMISSION: patient overall required total assist for ADLS; able to transfer to wheelchair with spouse or care aide assist via stand pivot. Propelled wheelchair around home.    HOME SUPPORT PRIOR TO ADMISSION: The patient lived with spouse who assist with her care as well as a  home care aide 3x/week.  .    Physical Therapy Goals  Initiated 2/25/2025, Updated 3/5/2025  1.  Patient will move from supine to sit and sit to supine and roll side to side in bed with minimal assistance within 7 day(s).    2.  Patient will perform sit to stand with minimal assistance within 7 day(s).  3.  Patient will transfer from bed to chair and chair to bed with moderate assistance using the least restrictive device within 7 day(s).    Physical Therapy Goals  Initiated 2/25/2025  1.  Patient will move from supine to sit and sit to supine and roll side to side in bed with moderate assistance within 7 day(s).    2.  Patient will perform sit to stand with moderate assistance within 7 day(s).  3.  Patient will transfer from bed to chair and chair to bed with moderate assistance using the least restrictive device within 7 day(s).  3/6/2025 1353 by Jenny Khan, PT  Outcome: Progressing     Problem: Occupational Therapy - Adult  Goal: By Discharge: Performs self-care activities at highest level of function for planned discharge setting.  See evaluation for individualized goals.  Description: FUNCTIONAL STATUS PRIOR TO ADMISSION:  Patient lives with spouse in 1 level apartment, ramped entrance. Patient has history of Parkinson's, dementia, hiatal hernia.  Patient has paid caregiver 3 days/week for 4 hours. She and her spouse provide all ADL/transfer assistance. Patient can normally feed herself, participate in grooming tasks. 
  Problem: Physical Therapy - Adult  Goal: By Discharge: Performs mobility at highest level of function for planned discharge setting.  See evaluation for individualized goals.  Description: FUNCTIONAL STATUS PRIOR TO ADMISSION: patient overall required total assist for ADLS; able to transfer to wheelchair with spouse or care aide assist via stand pivot. Propelled wheelchair around home.    HOME SUPPORT PRIOR TO ADMISSION: The patient lived with spouse who assist with her care as well as a  home care aide 3x/week.  .    Physical Therapy Goals  Initiated 2/25/2025, Updated 3/5/2025  1.  Patient will move from supine to sit and sit to supine and roll side to side in bed with minimal assistance within 7 day(s).    2.  Patient will perform sit to stand with minimal assistance within 7 day(s).  3.  Patient will transfer from bed to chair and chair to bed with moderate assistance using the least restrictive device within 7 day(s).    Physical Therapy Goals  Initiated 2/25/2025  1.  Patient will move from supine to sit and sit to supine and roll side to side in bed with moderate assistance within 7 day(s).    2.  Patient will perform sit to stand with moderate assistance within 7 day(s).  3.  Patient will transfer from bed to chair and chair to bed with moderate assistance using the least restrictive device within 7 day(s).  Outcome: Progressing    PHYSICAL THERAPY TREATMENT: WEEKLY REASSESSMENT    Patient: Savana Vogel (79 y.o. female)  Date: 3/5/2025  Primary Diagnosis: Leg swelling [M79.89]  Hypoxia [R09.02]  Acute respiratory failure with hypoxia (HCC) [J96.01]  Aspiration pneumonia of left lower lobe, unspecified aspiration pneumonia type (HCC) [J69.0]       Precautions:            ASSESSMENT :  Patient continues to benefit from skilled PT services and is slowly progressing towards goals. Pt seen for PT weekly re-assessment, continuing to present with impaired balance, decreased strength, and impaired cognition, 
  Problem: Safety - Adult  Goal: Free from fall injury  2/27/2025 1242 by Katie Hassan RN  Outcome: Progressing  2/26/2025 2307 by Victor Manuel Caballero RN  Outcome: Progressing     Problem: Discharge Planning  Goal: Discharge to home or other facility with appropriate resources  2/27/2025 1242 by Katie Hassan RN  Outcome: Progressing  2/26/2025 2307 by Victor Manuel Caballero RN  Outcome: Progressing     Problem: Pain  Goal: Verbalizes/displays adequate comfort level or baseline comfort level  2/27/2025 1242 by Katie Hassan RN  Outcome: Progressing  2/26/2025 2307 by Victor Manuel Caballero RN  Outcome: Progressing     Problem: Skin/Tissue Integrity  Goal: Skin integrity remains intact  Description: 1.  Monitor for areas of redness and/or skin breakdown  2.  Assess vascular access sites hourly  3.  Every 4-6 hours minimum:  Change oxygen saturation probe site  4.  Every 4-6 hours:  If on nasal continuous positive airway pressure, respiratory therapy assess nares and determine need for appliance change or resting period  2/27/2025 1242 by Katie Hassan RN  Outcome: Progressing  2/26/2025 2307 by Victor Manuel Caballero RN  Outcome: Progressing  Flowsheets  Taken 2/26/2025 2125 by Victor Manuel Caballero RN  Skin Integrity Remains Intact: Monitor for areas of redness and/or skin breakdown  Taken 2/26/2025 1618 by Mansi Guillaume RN  Skin Integrity Remains Intact: Monitor for areas of redness and/or skin breakdown     
  Problem: Safety - Adult  Goal: Free from fall injury  3/6/2025 1206 by Maria Elena Diehl RN  Outcome: Progressing     Problem: Discharge Planning  Goal: Discharge to home or other facility with appropriate resources  3/6/2025 1206 by Maria Elena Diehl RN  Outcome: Progressing     Problem: Pain  Goal: Verbalizes/displays adequate comfort level or baseline comfort level  3/6/2025 1206 by Maria Elena Diehl RN  Outcome: Progressing     Problem: Skin/Tissue Integrity  Goal: Skin integrity remains intact  Description: 1.  Monitor for areas of redness and/or skin breakdown  2.  Assess vascular access sites hourly  3.  Every 4-6 hours minimum:  Change oxygen saturation probe site  4.  Every 4-6 hours:  If on nasal continuous positive airway pressure, respiratory therapy assess nares and determine need for appliance change or resting period  3/6/2025 0141 by Annette Chen, RN  Outcome: Progressing     
  Problem: Safety - Adult  Goal: Free from fall injury  Outcome: Progressing  Flowsheets  Taken 2/24/2025 1834 by Ermelinda Stock RN  Free From Fall Injury: Instruct family/caregiver on patient safety  Taken 2/24/2025 1730 by Ermelinda Stock RN  Free From Fall Injury: Instruct family/caregiver on patient safety     Problem: Discharge Planning  Goal: Discharge to home or other facility with appropriate resources  Outcome: Progressing  Flowsheets (Taken 2/24/2025 1729 by Ermelinda Stock, RN)  Discharge to home or other facility with appropriate resources: Identify barriers to discharge with patient and caregiver     Problem: Pain  Goal: Verbalizes/displays adequate comfort level or baseline comfort level  Outcome: Progressing     Problem: Skin/Tissue Integrity  Goal: Skin integrity remains intact  Description: 1.  Monitor for areas of redness and/or skin breakdown  2.  Assess vascular access sites hourly  3.  Every 4-6 hours minimum:  Change oxygen saturation probe site  4.  Every 4-6 hours:  If on nasal continuous positive airway pressure, respiratory therapy assess nares and determine need for appliance change or resting period  Outcome: Progressing     
  Problem: Safety - Adult  Goal: Free from fall injury  Outcome: Progressing  Flowsheets (Taken 2/25/2025 0813)  Free From Fall Injury: Instruct family/caregiver on patient safety     Problem: Discharge Planning  Goal: Discharge to home or other facility with appropriate resources  Outcome: Progressing  Flowsheets (Taken 2/25/2025 0813)  Discharge to home or other facility with appropriate resources: Identify barriers to discharge with patient and caregiver     Problem: Pain  Goal: Verbalizes/displays adequate comfort level or baseline comfort level  Outcome: Progressing     Problem: Skin/Tissue Integrity  Goal: Skin integrity remains intact  Description: 1.  Monitor for areas of redness and/or skin breakdown  2.  Assess vascular access sites hourly  3.  Every 4-6 hours minimum:  Change oxygen saturation probe site  4.  Every 4-6 hours:  If on nasal continuous positive airway pressure, respiratory therapy assess nares and determine need for appliance change or resting period  Outcome: Progressing  Flowsheets (Taken 2/25/2025 0813)  Skin Integrity Remains Intact: Monitor for areas of redness and/or skin breakdown     Problem: Physical Therapy - Adult  Goal: By Discharge: Performs mobility at highest level of function for planned discharge setting.  See evaluation for individualized goals.  Description: FUNCTIONAL STATUS PRIOR TO ADMISSION: patient overall required total assist for ADLS; able to transfer to wheelchair with spouse or care aide assist via stand pivot. Propelled wheelchair around home.    HOME SUPPORT PRIOR TO ADMISSION: The patient lived with spouse who assist with her care as well as a  home care aide 3x/week.  .    Physical Therapy Goals  Initiated 2/25/2025  1.  Patient will move from supine to sit and sit to supine and roll side to side in bed with moderate assistance within 7 day(s).    2.  Patient will perform sit to stand with moderate assistance within 7 day(s).  3.  Patient will transfer from 
  Problem: Safety - Adult  Goal: Free from fall injury  Outcome: Progressing  Flowsheets (Taken 2/26/2025 1618)  Free From Fall Injury: Instruct family/caregiver on patient safety     
  Problem: Safety - Adult  Goal: Free from fall injury  Outcome: Progressing  Flowsheets (Taken 3/1/2025 0900)  Free From Fall Injury: Instruct family/caregiver on patient safety     Problem: Discharge Planning  Goal: Discharge to home or other facility with appropriate resources  Outcome: Progressing  Flowsheets (Taken 3/1/2025 0850)  Discharge to home or other facility with appropriate resources: Identify barriers to discharge with patient and caregiver     Problem: Pain  Goal: Verbalizes/displays adequate comfort level or baseline comfort level  Outcome: Progressing     Problem: Skin/Tissue Integrity  Goal: Skin integrity remains intact  Description: 1.  Monitor for areas of redness and/or skin breakdown  2.  Assess vascular access sites hourly  3.  Every 4-6 hours minimum:  Change oxygen saturation probe site  4.  Every 4-6 hours:  If on nasal continuous positive airway pressure, respiratory therapy assess nares and determine need for appliance change or resting period  Outcome: Progressing  Flowsheets  Taken 3/1/2025 0900  Skin Integrity Remains Intact: Monitor for areas of redness and/or skin breakdown  Taken 3/1/2025 0850  Skin Integrity Remains Intact: Monitor for areas of redness and/or skin breakdown     
  Problem: Safety - Adult  Goal: Free from fall injury  Outcome: Progressing  Flowsheets (Taken 3/2/2025 0930)  Free From Fall Injury: Instruct family/caregiver on patient safety     Problem: Discharge Planning  Goal: Discharge to home or other facility with appropriate resources  Outcome: Progressing  Flowsheets (Taken 3/2/2025 0900)  Discharge to home or other facility with appropriate resources: Identify barriers to discharge with patient and caregiver     Problem: Pain  Goal: Verbalizes/displays adequate comfort level or baseline comfort level  Outcome: Progressing     Problem: Skin/Tissue Integrity  Goal: Skin integrity remains intact  Description: 1.  Monitor for areas of redness and/or skin breakdown  2.  Assess vascular access sites hourly  3.  Every 4-6 hours minimum:  Change oxygen saturation probe site  4.  Every 4-6 hours:  If on nasal continuous positive airway pressure, respiratory therapy assess nares and determine need for appliance change or resting period  Outcome: Progressing  Flowsheets (Taken 3/2/2025 0900)  Skin Integrity Remains Intact: Monitor for areas of redness and/or skin breakdown     
  Problem: Safety - Adult  Goal: Free from fall injury  Outcome: Progressing  Flowsheets (Taken 3/3/2025 0900)  Free From Fall Injury: Instruct family/caregiver on patient safety     Problem: Discharge Planning  Goal: Discharge to home or other facility with appropriate resources  Outcome: Progressing  Flowsheets (Taken 3/3/2025 0941)  Discharge to home or other facility with appropriate resources: Identify barriers to discharge with patient and caregiver     Problem: Pain  Goal: Verbalizes/displays adequate comfort level or baseline comfort level  Outcome: Progressing     Problem: Skin/Tissue Integrity  Goal: Skin integrity remains intact  Description: 1.  Monitor for areas of redness and/or skin breakdown  2.  Assess vascular access sites hourly  3.  Every 4-6 hours minimum:  Change oxygen saturation probe site  4.  Every 4-6 hours:  If on nasal continuous positive airway pressure, respiratory therapy assess nares and determine need for appliance change or resting period  Outcome: Progressing  Flowsheets  Taken 3/3/2025 0941  Skin Integrity Remains Intact: Monitor for areas of redness and/or skin breakdown  Taken 3/3/2025 0900  Skin Integrity Remains Intact: Monitor for areas of redness and/or skin breakdown     
  Problem: Safety - Adult  Goal: Free from fall injury  Outcome: Progressing  Flowsheets (Taken 3/4/2025 0853)  Free From Fall Injury: Instruct family/caregiver on patient safety     Problem: Discharge Planning  Goal: Discharge to home or other facility with appropriate resources  Outcome: Progressing  Flowsheets (Taken 3/4/2025 0853)  Discharge to home or other facility with appropriate resources: Identify barriers to discharge with patient and caregiver     Problem: Skin/Tissue Integrity  Goal: Skin integrity remains intact  Description: 1.  Monitor for areas of redness and/or skin breakdown  2.  Assess vascular access sites hourly  3.  Every 4-6 hours minimum:  Change oxygen saturation probe site  4.  Every 4-6 hours:  If on nasal continuous positive airway pressure, respiratory therapy assess nares and determine need for appliance change or resting period  Outcome: Progressing  Flowsheets (Taken 3/4/2025 0853)  Skin Integrity Remains Intact: Monitor for areas of redness and/or skin breakdown     
  Problem: Skin/Tissue Integrity  Goal: Skin integrity remains intact  Description: 1.  Monitor for areas of redness and/or skin breakdown  2.  Assess vascular access sites hourly  3.  Every 4-6 hours minimum:  Change oxygen saturation probe site  4.  Every 4-6 hours:  If on nasal continuous positive airway pressure, respiratory therapy assess nares and determine need for appliance change or resting period  Outcome: Progressing     Problem: Pain  Goal: Verbalizes/displays adequate comfort level or baseline comfort level  Outcome: Progressing     Problem: Discharge Planning  Goal: Discharge to home or other facility with appropriate resources  Outcome: Progressing     Problem: Safety - Adult  Goal: Free from fall injury  Outcome: Progressing     Problem: Safety - Adult  Goal: Free from fall injury  Outcome: Progressing     Problem: Discharge Planning  Goal: Discharge to home or other facility with appropriate resources  Outcome: Progressing     Problem: Pain  Goal: Verbalizes/displays adequate comfort level or baseline comfort level  Outcome: Progressing     Problem: Skin/Tissue Integrity  Goal: Skin integrity remains intact  Description: 1.  Monitor for areas of redness and/or skin breakdown  2.  Assess vascular access sites hourly  3.  Every 4-6 hours minimum:  Change oxygen saturation probe site  4.  Every 4-6 hours:  If on nasal continuous positive airway pressure, respiratory therapy assess nares and determine need for appliance change or resting period  Outcome: Progressing     
Problem: Occupational Therapy - Adult  Goal: By Discharge: Performs self-care activities at highest level of function for planned discharge setting.  See evaluation for individualized goals.  Description: FUNCTIONAL STATUS PRIOR TO ADMISSION:  Patient lives with spouse in 1 level apartment, ramped entrance. Patient has history of Parkinson's, dementia, hiatal hernia.  Patient has paid caregiver 3 days/week for 4 hours. She and her spouse provide all ADL/transfer assistance. Patient can normally feed herself, participate in grooming tasks. Patient requires min to mod assist for transfers and can maintain standing during ADL with support of RW with CGA.   Apartment has tub/shower with tub bench though patient has difficulty with transfer so she sponge bathes while seated on toilet.   Has w/c, RW. Does not have BSC  Receives Help From: Family, Personal care attendant, Prior Level of Assist for ADLs: Needs assistance,  Prior Level of Assist for Homemaking: Needs assistance, Ambulation Assistance: Needs assistance, Prior Level of Assist for Transfers: Needs assistance, Active : No     Occupational Therapy Goals:  Weekly Re-assessment 3/5: goals reviewed and updated  Initiated 2/26/2025  1.  Patient will perform static stand for ADL completion > 1 min with Minimal Assist at RW level within 7 day(s). MET UPGRADE to 3 minutes  2.  Patient will perform self-feeding with Minimal Assist within 7 day(s). CONTINUE  3.  Patient will perform grooming with Minimal Assist within 7 day(s). MET UPGRADE to SBA  4.  Patient will perform toilet transfers with Moderate Assist  within 7 day(s). CONTINUE  5.  Patient will perform all aspects of toileting with Maximal Assist within 7 day(s). CONTINUE  Outcome: Progressing   OCCUPATIONAL THERAPY TREATMENT  Patient: Savana Vogel (79 y.o. female)  Date: 3/6/2025  Primary Diagnosis: Leg swelling [M79.89]  Hypoxia [R09.02]  Acute respiratory failure with hypoxia (HCC) [J96.01]  Aspiration 
Problem: Occupational Therapy - Adult  Goal: By Discharge: Performs self-care activities at highest level of function for planned discharge setting.  See evaluation for individualized goals.  Description: FUNCTIONAL STATUS PRIOR TO ADMISSION:  Patient lives with spouse in 1 level apartment, ramped entrance. Patient has history of Parkinson's, dementia, hiatal hernia.  Patient has paid caregiver 3 days/week for 4 hours. She and her spouse provide all ADL/transfer assistance. Patient can normally feed herself, participate in grooming tasks. Patient requires min to mod assist for transfers and can maintain standing during ADL with support of RW with CGA.   Apartment has tub/shower with tub bench though patient has difficulty with transfer so she sponge bathes while seated on toilet.   Has w/c, RW. Does not have BSC  Receives Help From: Family, Personal care attendant, Prior Level of Assist for ADLs: Needs assistance,  Prior Level of Assist for Homemaking: Needs assistance, Ambulation Assistance: Needs assistance, Prior Level of Assist for Transfers: Needs assistance, Active : No     Occupational Therapy Goals:  Weekly Re-assessment 3/5: goals reviewed and updated  Initiated 2/26/2025  1.  Patient will perform static stand for ADL completion > 1 min with Minimal Assist at RW level within 7 day(s). MET UPGRADE to 3 minutes  2.  Patient will perform self-feeding with Minimal Assist within 7 day(s). CONTINUE  3.  Patient will perform grooming with Minimal Assist within 7 day(s). MET UPGRADE to SBA  4.  Patient will perform toilet transfers with Moderate Assist  within 7 day(s). CONTINUE  5.  Patient will perform all aspects of toileting with Maximal Assist within 7 day(s). CONTINUE  Outcome: Progressing   OCCUPATIONAL THERAPY TREATMENT: WEEKLY REASSESSMENT    Patient: Savana Vogel (79 y.o. female)  Date: 3/5/2025  Primary Diagnosis: Leg swelling [M79.89]  Hypoxia [R09.02]  Acute respiratory failure with hypoxia 
  Chart, occupational therapy assessment, plan of care, and goals were reviewed.    ASSESSMENT  Patient continues to benefit from skilled OT services and is slowly progressing towards goals. Pt demonstrates improved unsupported sitting balance and bed mobility from previous session. Pt remains very limited by impaired cognition impacting insight into deficits and consequences of actions. Pt becoming fatigued and without warning laying back on bed requiring MaxA for safety. Pt then requiring MaxA to maintain sitting and transfer back to supine.  reports her level of effort varies at home. Provided DME recs to increase safety with toileting at home. Pt left seated in bed with all needs met and  at bedside.       PLAN :  Patient continues to benefit from skilled intervention to address the above impairments.  Continue treatment per established plan of care to address goals.    Recommend with staff: bed in chair position 3x/day, toileting at bed level    Recommendation for discharge: (in order for the patient to meet his/her long term goals):   Moderate intensity short-term skilled occupational therapy up to 5x/week, If Pt refuses, recommend HH OT/PT    Other factors to consider for discharge: impaired cognition, high risk for falls, and concern for safely navigating or managing the home environment    IF patient discharges home will need the following DME:  BSC- family deferring at this time     SUBJECTIVE:   Patient stated “Yes.”    OBJECTIVE DATA SUMMARY:   Cognitive/Behavioral Status:     Cognition  Overall Cognitive Status: Exceptions  Arousal/Alertness: Delayed responses to stimuli  Following Commands: Follows one step commands with increased time  Attention Span: Impaired  Memory: Impaired  Safety Judgement: Impaired;Decreased awareness of need for safety  Problem Solving: Impaired;Decreased awareness of errors  Insights: Decreased awareness of deficits  Initiation: Requires cues for 
difficulty with transfer so she sponge bathes while seated on toilet.   Has w/c, RW. Does not have BSC  Receives Help From: Family, Personal care attendant, Prior Level of Assist for ADLs: Needs assistance,  Prior Level of Assist for Homemaking: Needs assistance, Ambulation Assistance: Needs assistance, Prior Level of Assist for Transfers: Needs assistance, Active : No     Occupational Therapy Goals:  Initiated 2/26/2025  1.  Patient will perform static stand for ADL completion > 1 min with Minimal Assist at RW level within 7 day(s).  2.  Patient will perform self-feeding with Minimal Assist within 7 day(s).  3.  Patient will perform grooming with Minimal Assist within 7 day(s).  4.  Patient will perform toilet transfers with Moderate Assist  within 7 day(s).  5.  Patient will perform all aspects of toileting with Maximal Assist within 7 day(s).  2/26/2025 1552 by Geneva Mars OT  Outcome: Progressing     
today compared to L side ( states pt typically leans left). Pt demo'ing improvement in bed mobility, but requiring increased assistance for transfers, ultimately using Kelley Stedy to transfer OOB > chair.   Continuing to recommend post-acute rehab upon discharge to maximize safety and function prior to return home. Will continue to follow.     1430- assisted patient back to bed with overall ModA x 2 and use of Kelley Stedy. Pt responding well to Vc'ing for hand placement on Kelley Stedy for increased leverage to stand. Required increased assistance to return BLEs to bed and for repositioning in bed.        PLAN:  Patient continues to benefit from skilled intervention to address the above impairments.  Continue treatment per established plan of care.    Recommendations for staff mobility and toileting assistance:  Recommend that staff completes patient mobility with assist x2 using Kelley Stedy.      Recommendation for discharge: (in order for the patient to meet his/her long term goals):   Moderate intensity short-term skilled physical therapy up to 5x/week    Other factors to consider for discharge: impaired cognition and concern for safely navigating or managing the home environment    IF patient discharges home will need the following DME: continuing to assess with progress       SUBJECTIVE:   Patient stated, \"I'm sorry .\"    OBJECTIVE DATA SUMMARY:   Critical Behavior:  Orientation  Orientation Level: Oriented to person;Oriented to place;Disoriented to time  Cognition  Overall Cognitive Status: Exceptions  Arousal/Alertness: Appears intact  Following Commands: Follows one step commands with repetition  Attention Span: Impaired  Memory: Impaired  Safety Judgement: Impaired;Decreased awareness of need for safety  Problem Solving: Impaired;Decreased awareness of errors  Insights: Not aware of deficits  Initiation: Requires cues for all  Sequencing: Requires cues for some    Functional Mobility Training:  Bed 
              Pain Ratin/10   Pain Intervention(s):       Activity Tolerance:   Fair  and requires frequent rest breaks    After treatment:   Patient left in no apparent distress in bed, Call bell within reach, Caregiver / family present, Side rails x3, and Heels elevated for pressure relief      COMMUNICATION/EDUCATION:   The patient's plan of care was discussed with: occupational therapist, registered nurse, physician, and            Kira Clark, PT  Minutes: 43   
Hypertension 15yrs    Ill-defined condition     h/o dizzy    Parkinson's disease     Psychiatric disorder     bipolar; short term memory lose    Psychiatric disorder     depressiom     Past Surgical History:   Procedure Laterality Date    CATARACT REMOVAL Left     HYSTERECTOMY (CERVIX STATUS UNKNOWN)            Expanded or extensive additional review of patient history:   Social/Functional History  Lives With: Spouse  Type of Home: Apartment  Home Layout: One level  Home Access: Ramped entrance  Bathroom Shower/Tub:  (sponge bathes)  Bathroom Equipment: Grab bars in shower, Shower chair  Bathroom Accessibility: Wheelchair accessible  Home Equipment: Cane, Walker - Rolling, Wheelchair - Manual  Receives Help From: Family, Personal care attendant  Prior Level of Assist for ADLs: Needs assistance  Prior Level of Assist for Homemaking: Needs assistance  Prior Level of Assist for Ambulation:  (non ambulatory ; wheelchair)  Prior Level of Assist for Transfers: Needs assistance  Active : No  Mode of Transportation: Van  Occupation: Retired    EXAMINATION OF PERFORMANCE DEFICITS:    Cognitive/Behavioral Status:  Orientation  Overall Orientation Status: Impaired  Orientation Level: Oriented to person;Oriented to place       Range of Motion:   AROM: Generally decreased, functional         Strength:  Strength: Generally decreased, functional      Coordination:  Coordination: Generally decreased, functional            Tone & Sensation:   Tone: Abnormal             Functional Mobility and Transfers for ADLs:    Bed Mobility:     Bed Mobility Training  Supine to Sit: Substantial/Maximal assistance  Sit to Supine: Substantial/Maximal assistance  Scooting: Substantial/Maximal assistance    Transfers:      Transfer Training  Sit to Stand: Partial/Moderate assistance (RW. decreased standing tolerance with R side lean. Duration ~ 1 min.)  Stand to Sit: Partial/Moderate assistance          Functional Mobility: Maximum assistance  
                                                             Pain Ratin/10       Activity Tolerance:   Poor and desaturates with activity and requires oxygen    After treatment:   Patient left in no apparent distress in bed, Call bell within reach, Side rails x3, and Heels elevated for pressure relief    COMMUNICATION/EDUCATION:   The patient's plan of care was discussed with: registered nurse    Patient Education  Education Given To: Patient  Education Provided: Role of Therapy;Plan of Care;Mobility Training  Education Method: Verbal  Barriers to Learning: Cognition  Education Outcome: Verbalized understanding;Continued education needed    Thank you for this referral.  NICOLASA CAMARILLO, PT  Minutes: 38      Physical Therapy Evaluation Charge Determination   History Examination Presentation Decision-Making   MEDIUM  Complexity : 1-2 comorbidities / personal factors will impact the outcome/ POC  LOW Complexity : 1-2 Standardized tests and measures addressing body structure, function, activity limitation and / or participation in recreation  LOW Complexity : Stable, uncomplicated  AM-PAC  HIGH    Based on the above components, the patient evaluation is determined to be of the following complexity level: Low

## 2025-03-06 NOTE — DISCHARGE INSTRUCTIONS
electrolytes as she is newly discharged on furosemide.       ACTIVITY: activity as tolerated        EQUIPMENT needed: own      DISCHARGE MEDICATIONS:  Current Facility-Administered Medications   Medication Dose Route Frequency Provider Last Rate Last Admin    furosemide (LASIX) tablet 40 mg  40 mg Oral Daily Cecil Michael MD   40 mg at 03/06/25 0856    potassium bicarb-citric acid (EFFER-K) effervescent tablet 20 mEq  20 mEq Oral Daily Thais Hedrick MD   20 mEq at 03/06/25 0856    guaiFENesin (ROBITUSSIN) 100 MG/5ML liquid 200 mg  200 mg Oral Q6H Thais Hedrick MD   200 mg at 03/06/25 0558    pantoprazole (PROTONIX) tablet 40 mg  40 mg Oral BID Edvin Thomas MD   40 mg at 03/05/25 2102    miconazole (MICOTIN) 2 % powder   Topical BID Tasneem Thurston APRN - NP   Given at 03/06/25 0858    memantine (NAMENDA) tablet 10 mg  10 mg Oral BID Edvin Thomas MD   10 mg at 03/06/25 0857    ipratropium 0.5 mg-albuterol 2.5 mg (DUONEB) nebulizer solution 1 Dose  1 Dose Inhalation Q4H PRN Bela Reagan MD   1 Dose at 02/25/25 1514    carbidopa-levodopa (SINEMET)  MG per tablet 1 tablet  1 tablet Oral TID Bela Reaagn MD   1 tablet at 03/06/25 0857    diazePAM (VALIUM) tablet 5 mg  5 mg Oral Nightly Bela Reagan MD   5 mg at 03/05/25 2102    divalproex (DEPAKOTE SPRINKLE) DR capsule 375 mg  375 mg Oral BID Edvin Thomas MD   375 mg at 03/06/25 0856    donepezil (ARICEPT) tablet 10 mg  10 mg Oral Nightly Bela Reagan MD   10 mg at 03/05/25 2102    DULoxetine (CYMBALTA) extended release capsule 60 mg  60 mg Oral Daily Edvin Thomas MD   60 mg at 03/06/25 0857    gabapentin (NEURONTIN) capsule 200 mg  200 mg Oral Nightly Bela Reagan MD   200 mg at 03/05/25 2102    atorvastatin (LIPITOR) tablet 10 mg  10 mg Oral Daily Bela Reagan MD   10 mg at 03/06/25 0857    traZODone (DESYREL) tablet 200 mg  200 mg Oral Nightly Bela Reagan MD   200 mg at 03/05/25 2101    sodium chloride flush 0.9 %

## 2025-03-06 NOTE — DISCHARGE SUMMARY
Discharge Summary       PATIENT ID: Savana Vogel  MRN: [id number]   YOB: 1945    DATE OF ADMISSION: 2/23/2025 11:57 PM    DATE OF DISCHARGE: 03/06/25     PRIMARY CARE PROVIDER: Weiland, Gustave, MD     ATTENDING PHYSICIAN: Cecil Michael MD    DISCHARGING PROVIDER: Cecil Michael MD    To contact this individual call [phone] and ask the  to page.  If unavailable ask to be transferred the Adult Hospitalist Department.    CONSULTATIONS: IP CONSULT TO CASE MANAGEMENT    PROCEDURES/SURGERIES: * No surgery found *    ADMITTING DIAGNOSES & HOSPITAL COURSE:   Savana Vogel presented to the emergency room on 2/23/2025 with shortness of breath.  She apparently had a choking episode drink cappuccino and was taken to an urgent care, underwent chest x-ray which was negative was discharged home after being treated with albuterol.  She continues to be short of breath so her spouse called EMS.  On arrival to the ED she was found to be hypoxic with increased work of breathing requiring 4 L of oxygen via nasal cannula.  She was admitted for observation, further evaluation and management.  She was last admitted to this hospital in January 2022 for respiratory failure attributed to COPD exacerbation and possible aspiration.  CT angiogram of the chest on arrival in the ED was negative for pulm embolism but did show patchy left lower lobe airspace disease.    PMH significant for  -Hypertension  -Hyperlipidemia  - Arthritis  - Dementia, depression, bipolar disorder  - Parkinson's disease          DISCHARGE DIAGNOSES / PLAN:    1.  Acute hypoxic respiratory failure due to pneumonia.  CT angiogram of the chest was negative for PE but showed left lower lobe airspace disease.    Patient was admitted and treated with IV ceftriaxone and doxycycline, completed.  Hypoxia improved, currently on 2 L,

## 2025-03-06 NOTE — CARE COORDINATION
Care Management Initial Assessment      RUR: 13%  Readmission? No  1st IM letter given? Yes - 2/24 1st  letter given: No    CM verified demographics and insurance info. CM spoke to pt spouse, Lucio, to determine discharge disposition plan. Stated PT/OT recommend SNF. Pt and pt spouse decline going to SNF but are agreeable to  PT/OT. No preferred agency.    Pt needs assistance with ADLs and mobility. She mostly uses WC. She has private caregivers that aid her 3 times a week. DME - RW, Cane, WC. WC is baseline.    HH - agencies in [place]  SNF - in [place]  IPR - no hx.    Spouse will transport; he has WC and van.    CM will provide support.    CM sent to [email or web address]  - ACCEPTED. Orders sent via [employer]. AVS updated.    Madonna RANKIN CM    [facility]     02/26/25 9609   Service Assessment   Patient Orientation Alert and Oriented   Cognition Dementia / Early Alzheimer's   History Provided By Spouse   Primary Caregiver Spouse   Support Systems Spouse/Significant Other   Patient's Healthcare Decision Maker is: Named in Scanned ACP Document   PCP Verified by CM Yes   Last Visit to PCP Within last 6 months   Prior Functional Level Assistance with the following:;Bathing;Dressing;Toileting;Feeding;Cooking;Housework;Shopping;Mobility   Current Functional Level Assistance with the following:;Bathing;Dressing;Toileting;Feeding;Cooking;Housework;Mobility;Shopping   Can patient return to prior living arrangement Unknown at present   Ability to make needs known: Fair   Family able to assist with home care needs: Yes   Financial Resources Medicaid;Medicare   CM/SW Referral ADLs/IADLs   Social/Functional History   Lives With Spouse   Type of Home Apartment   Home Layout One level   Home Access Ramped entrance   Bathroom Equipment Grab bars in shower;Shower chair   Bathroom Accessibility Wheelchair accessible   Home Equipment Cane;Walker - Rolling;Wheelchair - Manual   Receives Help From 
Transition of Care Plan:    RUR: 13%  Prior Level of Functioning: needs assistance  Disposition: SNF vs HH  YAMILE: TBD  DME needed: MD recommendations  Transportation at discharge: BLS  IM/IMM Medicare/ letter given: 2/24  Caregiver Contact: Lucio / spouse / [phone]  Discharge Caregiver contacted prior to discharge? Y  Care Conference needed? N  Barriers to discharge: N    1400 - Currently pending response from Diandra at [facility]-- pt's first choice. CM followed up via phone call; HIPAA compliant voicemail given.      1030 - spouse and pt stated they would like pt to go to [facility]. CM explained the difference between IPR vs SNF --and currently PT/OT are recommending SNF for now. Pt and spouse agreeable to SNF. Choices given:    [facility] - pending   [facility] - accepted  [facility]  - accepted    0837 - CM acknowledges DME - O2 order. CM sent clinicals and order to [facility]. Pending acceptance and further instruction needed. Pt will need O2 tank before discharging.    CM will continue to follow.    Madonna Echeverria RN BSN CM    [facility]    
Transition of Care Plan:    RUR: 13%  Prior Level of Functioning: needs assistance  Disposition: SNF vs HH  YAMILE: TBD  DME needed: MD recommendations  Transportation at discharge: BLS  IM/IMM Medicare/ letter given: 2/24  Caregiver Contact: Lucio / spouse / [phone]  Discharge Caregiver contacted prior to discharge? Y  Care Conference needed? N  Barriers to discharge: medical stability    CM acknowledges PT/OT recommendations for SNF. CM spoke to Lucio and pt at the bedside to determine choice for disposition. Pt and pt spouse refuse SNF. Stated she will not participate in a facility.  agency set up - [facility] - and they are aware.    Spouse and pt agreeable with [facility] stretcher transport once medically cleared for DC.    CM will continue to follow.    Madonna Echeverria RN BSN CM    [facility]  
Transition of Care Plan:    RUR: 14%  Prior Level of Functioning: Needs assistance  Disposition: SNF  YAMILE: 3/6/25  If SNF or IPR: Date FOC offered: 3/4/25  Date FOC received: 3/4/25  Accepting facility[facility], family chooses [facility]  Follow up appointments: Per attending's recommendations  DME needed: Defer to SNF  Transportation at discharge: BLS  IM/IMM Medicare/ letter given: 2/24/25  Is patient a [place] and connected with VA? No              If yes, was [place] transfer form completed and VA notified? N/A  Caregiver Contact: Lucio Vogel, [phone]  Discharge Caregiver contacted prior to discharge? Yes, CM will contact  Care Conference needed? No  Barriers to discharge:  N      1500 - Bedside RN stated number provided is not reaching facility. Bedside RN noted multiple attempted calls. CM contacted liaison to receive Nursing Supervisor's number. P:[phone] - CM spoke to pt spouse and pt at the bedside to update plan for DC disposition. Pt agreeable to SNF plan - [facility].    Pt and pt spouse agreeable to [facility] stretcher transport.     RM: 123  [facility] Stretcher time of pickup: 1530  Report number# [phone]    CM provided update to MD and Bedside RN.    CM will continue to follow.    Madonna Echeverria RN BSN CM    [facility]  
Transition of Care Plan:    RUR: 14%  Prior Level of Functioning: Needs assistance  Disposition: SNF  YAMILE: 3/6/25  If SNF or IPR: Date FOC offered: 3/4/25  Date FOC received: 3/4/25  Accepting facility: [facility], family chooses [facility]  Follow up appointments: Per attending's recommendations  DME needed: Defer to SNF  Transportation at discharge: BLS  IM/IMM Medicare/ letter given: 2/24/25  Is patient a [place] and connected with VA? No   If yes, was  transfer form completed and VA notified? N/A  Caregiver Contact: Lucio Jaspreet, [phone]  Discharge Caregiver contacted prior to discharge? Yes, CM will contact  Care Conference needed? No  Barriers to discharge:  No available bed at chosen facility    CM met with patient and her spouse at bedside to notify them that [facility] is unable to accept due to no bed availability. The patient and her spouse have chosen [facility]. CM spoke to admissions with [facility], [phone], and they do not have any bed availability today.    CM to continue to follow for LOIDA needs.    Tasneem Malloy, BSN, RN, ONC, CMSRN  Nurse Care Manager, [phone]  
Completed    []UAI Completed and copy given to pt/family  and copy given to pt/family  []A screening has previously been completed.    []Level II Completed    [x] Private pay individual who will not become   financially eligible for Medicaid within 6 months from admission to a [facility].     [] Individual refused to have screening conducted.     []Medicaid Application Completed    []The screening denied because it was determined individual did not need/did not qualify for nursing facility level of care.  [] Out of state residents seeking direct admission to a VA nursing facility.  [] Individuals who are inpatients of an out of state hospital, or in state or out of state veterans/ hospital and seek direct admission to a VA nursing facility  [] Individuals who are pateints or residents of a state owned/operated facility that is licensed by Department of Behavioral Services (DBHDS) and seek direct admission to VA nursing facility  [] A screening not required for enrollment in Medicaid Hospice services as set out in 12 VAC 30-  [] [facility] ([facility]) staff shall perform screenings of the [facility] clients.    Advanced Care Plan:  []Surrogate Decision Maker of Care  []POA  []Communicated Code Status and copy sent.    Other:

## 2025-03-07 ENCOUNTER — OFFICE VISIT (OUTPATIENT)
Facility: CLINIC | Age: 80
End: 2025-03-07
Payer: MEDICARE

## 2025-03-07 DIAGNOSIS — I10 PRIMARY HYPERTENSION: ICD-10-CM

## 2025-03-07 DIAGNOSIS — R60.0 PERIPHERAL EDEMA: ICD-10-CM

## 2025-03-07 DIAGNOSIS — J69.0 ASPIRATION PNEUMONIA OF LEFT LOWER LOBE DUE TO REGURGITATED FOOD (HCC): ICD-10-CM

## 2025-03-07 DIAGNOSIS — G20.B2 PARKINSON'S DISEASE WITH DYSKINESIA AND FLUCTUATING MANIFESTATIONS (HCC): ICD-10-CM

## 2025-03-07 DIAGNOSIS — D69.6 THROMBOCYTOPENIA: ICD-10-CM

## 2025-03-07 DIAGNOSIS — J41.8 MIXED SIMPLE AND MUCOPURULENT CHRONIC BRONCHITIS (HCC): ICD-10-CM

## 2025-03-07 DIAGNOSIS — R13.12 OROPHARYNGEAL DYSPHAGIA: ICD-10-CM

## 2025-03-07 DIAGNOSIS — E11.65 UNCONTROLLED TYPE 2 DIABETES MELLITUS WITH HYPERGLYCEMIA (HCC): ICD-10-CM

## 2025-03-07 DIAGNOSIS — F99 PSYCHIATRIC DISORDER: ICD-10-CM

## 2025-03-07 DIAGNOSIS — E78.00 HYPERCHOLESTEROLEMIA: ICD-10-CM

## 2025-03-07 DIAGNOSIS — K44.9 HIATAL HERNIA: ICD-10-CM

## 2025-03-07 DIAGNOSIS — J96.01 ACUTE RESPIRATORY FAILURE WITH HYPOXIA (HCC): Primary | ICD-10-CM

## 2025-03-07 DIAGNOSIS — F02.B18 MODERATE DEMENTIA ASSOCIATED WITH OTHER UNDERLYING DISEASE, WITH OTHER BEHAVIORAL DISTURBANCE (HCC): ICD-10-CM

## 2025-03-07 PROCEDURE — 1123F ACP DISCUSS/DSCN MKR DOCD: CPT | Performed by: FAMILY MEDICINE

## 2025-03-07 PROCEDURE — 99306 1ST NF CARE HIGH MDM 50: CPT | Performed by: FAMILY MEDICINE

## 2025-03-07 PROCEDURE — 3044F HG A1C LEVEL LT 7.0%: CPT | Performed by: FAMILY MEDICINE

## 2025-03-08 NOTE — ASSESSMENT & PLAN NOTE
Chronic, not at goal (unstable), continue current treatment plan and medication adherence emphasized  Will continue with collar be dopa levodopa 25/100 mg 1 tablet 3 times daily

## 2025-03-08 NOTE — ASSESSMENT & PLAN NOTE
Chronic, at goal (stable), continue current treatment plan and medication adherence emphasized  No suicidal homicidal ideation continue with Cymbalta 60 mg capsule 1 tablet daily trazodone 100 mg 2 tablet nightly Valium 5 mg 1 tablet nightly Depakote 125 mg extended release 3 capsule 2 times daily doxepin 50 mg 1 capsule nightly

## 2025-03-08 NOTE — ASSESSMENT & PLAN NOTE
Chronic, at goal (stable), continue current treatment plan and medication adherence emphasized    Continue to provide preventive vaccination including flu and pneumonia vaccination monitor pulse ox and incentive spirometry

## 2025-03-08 NOTE — ASSESSMENT & PLAN NOTE
Chronic, not at goal (unstable), continue current treatment plan and medication adherence emphasized  Continue with Aricept 10 mg 1 tablet nightly continue with Namenda 10 mg 1 tablet twice daily

## 2025-03-08 NOTE — ASSESSMENT & PLAN NOTE
Chronic, at goal (stable), continue current treatment plan and medication adherence emphasized  Continue with blood pressure monitoring low-salt diet call provider if blood pressure greater than 140/90 or less than 90/60

## 2025-03-08 NOTE — PROGRESS NOTES
preventive vaccination including flu and pneumonia vaccination monitor pulse ox and incentive spirometry       Aspiration pneumonia of left lower lobe due to regurgitated food (HCC)   Chronic, not at goal (unstable), continue current treatment plan and medication adherence emphasized  Continue with pulmonary hygiene elevate head of the bed to 30 degrees upright position after meal for 1 hour and continue with pulse ox monitoring       Peripheral edema   Chronic, not at goal (unstable), continue current treatment plan and medication adherence emphasized  Will continue with furosemide 40 mg 1 tablet daily       Thrombocytopenia   Chronic, at goal (stable), continue current treatment plan and medication adherence emphasized  Repeat CBC on the next draw     Uncontrolled type 2 diabetes mellitus with hyperglycemia (HCC)   Chronic, at goal (stable), continue current treatment plan and medication adherence emphasized  Normal A1c continue to monitor glucose level       Parkinson's disease with dyskinesia and fluctuating manifestations (HCC)   Chronic, not at goal (unstable), continue current treatment plan and medication adherence emphasized  Will continue with collar be dopa levodopa 25/100 mg 1 tablet 3 times daily       Moderate dementia associated with other underlying disease, with other behavioral disturbance (HCC)   Chronic, not at goal (unstable), continue current treatment plan and medication adherence emphasized  Continue with Aricept 10 mg 1 tablet nightly continue with Namenda 10 mg 1 tablet twice daily       Primary hypertension   Chronic, at goal (stable), continue current treatment plan and medication adherence emphasized  Continue with blood pressure monitoring low-salt diet call provider if blood pressure greater than 140/90 or less than 90/60       Hypercholesterolemia   Chronic, at goal (stable), continue current treatment plan and medication adherence emphasized  Continue with low-fat low-cholesterol

## 2025-03-08 NOTE — ASSESSMENT & PLAN NOTE
Chronic, at goal (stable), continue current treatment plan and medication adherence emphasized  Continue with pulse ox monitoring and continue to wean off nasal cannula oxygen patient has finished course of IV antibiotic including doxycycline

## 2025-03-10 ENCOUNTER — OFFICE VISIT (OUTPATIENT)
Facility: CLINIC | Age: 80
End: 2025-03-10
Payer: MEDICARE

## 2025-03-10 DIAGNOSIS — R60.0 PERIPHERAL EDEMA: ICD-10-CM

## 2025-03-10 DIAGNOSIS — R13.12 OROPHARYNGEAL DYSPHAGIA: ICD-10-CM

## 2025-03-10 DIAGNOSIS — E78.00 HYPERCHOLESTEROLEMIA: ICD-10-CM

## 2025-03-10 DIAGNOSIS — E11.65 UNCONTROLLED TYPE 2 DIABETES MELLITUS WITH HYPERGLYCEMIA (HCC): ICD-10-CM

## 2025-03-10 DIAGNOSIS — J41.8 MIXED SIMPLE AND MUCOPURULENT CHRONIC BRONCHITIS (HCC): ICD-10-CM

## 2025-03-10 DIAGNOSIS — I10 PRIMARY HYPERTENSION: ICD-10-CM

## 2025-03-10 DIAGNOSIS — J96.01 ACUTE RESPIRATORY FAILURE WITH HYPOXIA (HCC): Primary | ICD-10-CM

## 2025-03-10 DIAGNOSIS — K44.9 HIATAL HERNIA: ICD-10-CM

## 2025-03-10 DIAGNOSIS — I50.9 CONGESTIVE HEART FAILURE, UNSPECIFIED HF CHRONICITY, UNSPECIFIED HEART FAILURE TYPE (HCC): ICD-10-CM

## 2025-03-10 DIAGNOSIS — G20.B2 PARKINSON'S DISEASE WITH DYSKINESIA AND FLUCTUATING MANIFESTATIONS (HCC): ICD-10-CM

## 2025-03-10 DIAGNOSIS — F02.B18 MODERATE DEMENTIA ASSOCIATED WITH OTHER UNDERLYING DISEASE, WITH OTHER BEHAVIORAL DISTURBANCE (HCC): ICD-10-CM

## 2025-03-10 DIAGNOSIS — J69.0 ASPIRATION PNEUMONIA OF LEFT LOWER LOBE DUE TO REGURGITATED FOOD (HCC): ICD-10-CM

## 2025-03-10 DIAGNOSIS — D69.6 THROMBOCYTOPENIA: ICD-10-CM

## 2025-03-10 PROCEDURE — 1123F ACP DISCUSS/DSCN MKR DOCD: CPT

## 2025-03-10 PROCEDURE — 99310 SBSQ NF CARE HIGH MDM 45: CPT

## 2025-03-10 PROCEDURE — 3044F HG A1C LEVEL LT 7.0%: CPT

## 2025-03-10 NOTE — PROGRESS NOTES
PLACE OF SERVICE:  [facility] [address] [place] [address]    SKILLED VISIT      Chief Complaint:  skilled visit, hospital chart reviewed.     HPI  Patient is a 79-year-old female who presented to the emergency room with chief complaint of shortness of breath.  She reported that she had a choking episode drinking cappuccino and was taken to urgent care underwent chest x-ray which was negative discharged home after being treated with albuterol however she continued to complain of shortness of breath on arrival to the emergency room she was found to be hypoxic with increased work of breathing requiring 4 L of oxygen by nasal cannula of..CT of the chest done in the hospital was negative for pulmonary embolism but she did show patchy left lower lobe airspace disease patient was treated with IV ceftriaxone and doxycycline oxygen decreased to 2 L upon discharge echo done in the hospital was unremarkable.she was last admitted in the hospital January 2022 for an episode of respiratory failure attributed to COPD exacerbation and possible aspiration.Patient also had bilateral lower extremity swelling in the hospital venous Doppler done was negative for DVT echocardiogram unremarkable she was started on Lasix responding well.  After stabilizing in the hospital patient ice to SNF for continued PT OT skilled nursing.    Seen patient in room awake alert only x 2 mentation although not in any acute distress at this time .  Patient's  by bedside provider answered all questions concerns.  Per  patient was eating breakfast this morning however did not swallow any of her oatmeal and  Was spitting everything out.  Later provider noticed patient drinking coffee and coughing on the coffee.  Provider have requested primary nurse to notify speech.  Orders in place for speech consult.  Patient has history of aspiration pneumonia chest x-ray ordered   vital signs in the facility remained stable

## 2025-03-11 ENCOUNTER — OFFICE VISIT (OUTPATIENT)
Facility: CLINIC | Age: 80
End: 2025-03-11
Payer: MEDICARE

## 2025-03-11 DIAGNOSIS — D69.6 THROMBOCYTOPENIA: ICD-10-CM

## 2025-03-11 DIAGNOSIS — E78.00 HYPERCHOLESTEROLEMIA: ICD-10-CM

## 2025-03-11 DIAGNOSIS — I10 PRIMARY HYPERTENSION: ICD-10-CM

## 2025-03-11 DIAGNOSIS — J69.0 ASPIRATION PNEUMONIA OF LEFT LOWER LOBE DUE TO REGURGITATED FOOD (HCC): ICD-10-CM

## 2025-03-11 DIAGNOSIS — R13.10 DYSPHAGIA, UNSPECIFIED TYPE: ICD-10-CM

## 2025-03-11 DIAGNOSIS — J96.01 ACUTE RESPIRATORY FAILURE WITH HYPOXIA (HCC): Primary | ICD-10-CM

## 2025-03-11 DIAGNOSIS — G20.B2 PARKINSON'S DISEASE WITH DYSKINESIA AND FLUCTUATING MANIFESTATIONS (HCC): ICD-10-CM

## 2025-03-11 DIAGNOSIS — K44.9 HIATAL HERNIA: ICD-10-CM

## 2025-03-11 DIAGNOSIS — F02.B18 MODERATE DEMENTIA ASSOCIATED WITH OTHER UNDERLYING DISEASE, WITH OTHER BEHAVIORAL DISTURBANCE (HCC): ICD-10-CM

## 2025-03-11 DIAGNOSIS — I50.9 CONGESTIVE HEART FAILURE, UNSPECIFIED HF CHRONICITY, UNSPECIFIED HEART FAILURE TYPE (HCC): ICD-10-CM

## 2025-03-11 PROCEDURE — 99309 SBSQ NF CARE MODERATE MDM 30: CPT

## 2025-03-11 PROCEDURE — 1123F ACP DISCUSS/DSCN MKR DOCD: CPT

## 2025-03-11 NOTE — PROGRESS NOTES
agitated.  Appropriate affect, mood, judgment and insight.  Genitourinary: No suprapubic tenderness or flank tenderness  Heme, lymph, immuno: No pallor;         Assessment/Plans:   Acute hypoxic respiratory failure due to pneumonia  Patient treated with IV ceftriaxone and doxycycline completed antibiotic treatment prior to discharge from the hospital currently on 2 L via nasal cannula will continue to wean per discharge instructions.  Echo done in the hospital unremarkable.  Bilateral lower extremity swelling  Venous Doppler done in the hospital negative for DVT echocardiogram gram unremarkable.  Patient started on Lasix edema +2 at baseline  Dysphagia  Seen by speech today diet now puréed texture with nectar liquid consistency  Per  patient ate her breakfast well this morning did not spit anything out additionally provider also examined patient drinking nectar thick liquid and patient did not cough.  Patient has history of aspiration pneumonia and chest x-ray ordered to rule out aspiration pneumonia chest x-ray pending staff requested to follow-up.    Thrombocytopenia  Chronic, cytopenia present prior to admission to the hospital will follow-up with baseline labs in the facility    Dementia  Continue memantine mean continue reorientation will appreciate in-house psych input  Parkinson's  Continue carbidopa-levodopa falls precautions in place no falls reported  Seizures  Patient on Depakote remains seizure-free in the facility  Neuropathy  Continue gabapentin  Hyperlipidemia  Continue simvastatin  Hypertension   blood pressure trends reviewed remained stable in the facility no headache no dizziness no lightheadedness no vision changes reported continue amlodipine  Anxiety  Continue diazepam  Depression  Continue duloxetine  Hiatal hernia  Continue Protonix   Seen by in house speech, will follow recommendations.   Patient needs to be completely upright during meals alternating liquids and solids small sips and

## 2025-03-13 ENCOUNTER — OFFICE VISIT (OUTPATIENT)
Facility: CLINIC | Age: 80
End: 2025-03-13

## 2025-03-13 DIAGNOSIS — F99 PSYCHIATRIC DISORDER: ICD-10-CM

## 2025-03-13 DIAGNOSIS — J41.8 MIXED SIMPLE AND MUCOPURULENT CHRONIC BRONCHITIS (HCC): ICD-10-CM

## 2025-03-13 DIAGNOSIS — R60.0 PERIPHERAL EDEMA: ICD-10-CM

## 2025-03-13 DIAGNOSIS — D69.6 THROMBOCYTOPENIA: ICD-10-CM

## 2025-03-13 DIAGNOSIS — R13.12 OROPHARYNGEAL DYSPHAGIA: ICD-10-CM

## 2025-03-13 DIAGNOSIS — F02.B18 MODERATE DEMENTIA ASSOCIATED WITH OTHER UNDERLYING DISEASE, WITH OTHER BEHAVIORAL DISTURBANCE (HCC): ICD-10-CM

## 2025-03-13 DIAGNOSIS — E78.00 HYPERCHOLESTEROLEMIA: ICD-10-CM

## 2025-03-13 DIAGNOSIS — I50.9 CONGESTIVE HEART FAILURE, UNSPECIFIED HF CHRONICITY, UNSPECIFIED HEART FAILURE TYPE (HCC): ICD-10-CM

## 2025-03-13 DIAGNOSIS — G20.B2 PARKINSON'S DISEASE WITH DYSKINESIA AND FLUCTUATING MANIFESTATIONS (HCC): ICD-10-CM

## 2025-03-13 DIAGNOSIS — E11.65 UNCONTROLLED TYPE 2 DIABETES MELLITUS WITH HYPERGLYCEMIA (HCC): ICD-10-CM

## 2025-03-13 DIAGNOSIS — J69.0 ASPIRATION PNEUMONIA OF LEFT LOWER LOBE DUE TO REGURGITATED FOOD (HCC): ICD-10-CM

## 2025-03-13 DIAGNOSIS — I10 PRIMARY HYPERTENSION: ICD-10-CM

## 2025-03-13 DIAGNOSIS — R13.10 DYSPHAGIA, UNSPECIFIED TYPE: ICD-10-CM

## 2025-03-13 DIAGNOSIS — J96.01 ACUTE RESPIRATORY FAILURE WITH HYPOXIA (HCC): Primary | ICD-10-CM

## 2025-03-13 DIAGNOSIS — K44.9 HIATAL HERNIA: ICD-10-CM

## 2025-03-13 NOTE — PROGRESS NOTES
PLACE OF SERVICE:  [facility] [place] [address] [place] [address]    SKILLED VISIT      Chief Complaint:  skilled visit, labs and chest x ray reviewed    HPI  Patient is a 79-year-old female who presented to the emergency room with chief complaint of shortness of breath.  She reported that she had a choking episode drinking cappuccino and was taken to urgent care underwent chest x-ray which was negative discharged home after being treated with albuterol however she continued to complain of shortness of breath on arrival to the emergency room she was found to be hypoxic with increased work of breathing requiring 4 L of oxygen by nasal cannula of..CT of the chest done in the hospital was negative for pulmonary embolism but she did show patchy left lower lobe airspace disease patient was treated with IV ceftriaxone and doxycycline oxygen decreased to 2 L upon discharge echo done in the hospital was unremarkable.she was last admitted in the hospital January 2022 for an episode of respiratory failure attributed to COPD exacerbation and possible aspiration.Patient also had bilateral lower extremity swelling in the hospital venous Doppler done was negative for DVT echocardiogram unremarkable she was started on Lasix responding well.  After stabilizing in the hospital patient ice to SNF for continued PT OT skilled nursing.    Seen patient in room awake alert only x 2 mentation although not in any acute distress at this time .  Patient's  by bedside provider answered all questions concerns.  Patient has been concerned about patient going back home and him able to take care of her.  Have referred him to  in the facility to discuss long-term plans of care for the patient. Patient seen by speech and diet now puréed texture nectar thick liquids.  Per spouse patient has been eating  and drinking better .   Chest x-ray ordered for history of aspiration pneumonia yesterday   Indicated

## 2025-03-17 ENCOUNTER — OFFICE VISIT (OUTPATIENT)
Facility: CLINIC | Age: 80
End: 2025-03-17
Payer: MEDICARE

## 2025-03-17 DIAGNOSIS — J41.8 MIXED SIMPLE AND MUCOPURULENT CHRONIC BRONCHITIS (HCC): ICD-10-CM

## 2025-03-17 DIAGNOSIS — K44.9 HIATAL HERNIA: ICD-10-CM

## 2025-03-17 DIAGNOSIS — J18.9 PNEUMONIA OF RIGHT LUNG DUE TO INFECTIOUS ORGANISM, UNSPECIFIED PART OF LUNG: Primary | ICD-10-CM

## 2025-03-17 DIAGNOSIS — I50.9 CONGESTIVE HEART FAILURE, UNSPECIFIED HF CHRONICITY, UNSPECIFIED HEART FAILURE TYPE (HCC): ICD-10-CM

## 2025-03-17 DIAGNOSIS — D69.6 THROMBOCYTOPENIA: ICD-10-CM

## 2025-03-17 DIAGNOSIS — E78.00 HYPERCHOLESTEROLEMIA: ICD-10-CM

## 2025-03-17 DIAGNOSIS — R60.0 PERIPHERAL EDEMA: ICD-10-CM

## 2025-03-17 DIAGNOSIS — F02.B18 MODERATE DEMENTIA ASSOCIATED WITH OTHER UNDERLYING DISEASE, WITH OTHER BEHAVIORAL DISTURBANCE: ICD-10-CM

## 2025-03-17 DIAGNOSIS — R13.10 DYSPHAGIA, UNSPECIFIED TYPE: ICD-10-CM

## 2025-03-17 DIAGNOSIS — R13.12 OROPHARYNGEAL DYSPHAGIA: ICD-10-CM

## 2025-03-17 DIAGNOSIS — J69.0 ASPIRATION PNEUMONIA OF LEFT LOWER LOBE DUE TO REGURGITATED FOOD (HCC): ICD-10-CM

## 2025-03-17 DIAGNOSIS — I10 PRIMARY HYPERTENSION: ICD-10-CM

## 2025-03-17 DIAGNOSIS — G20.B2 PARKINSON'S DISEASE WITH DYSKINESIA AND FLUCTUATING MANIFESTATIONS (HCC): ICD-10-CM

## 2025-03-17 PROCEDURE — 1123F ACP DISCUSS/DSCN MKR DOCD: CPT

## 2025-03-17 PROCEDURE — 99310 SBSQ NF CARE HIGH MDM 45: CPT

## 2025-03-17 NOTE — PROGRESS NOTES
PLACE OF SERVICE:  [facility] [place] [address] [place] [address]    SKILLED VISIT      Chief Complaint:  skilled visit, labs and chest x ray reviewed    HPI  Patient is a 79-year-old female who presented to the emergency room with chief complaint of shortness of breath.  She reported that she had a choking episode drinking cappuccino and was taken to urgent care underwent chest x-ray which was negative discharged home after being treated with albuterol however she continued to complain of shortness of breath on arrival to the emergency room she was found to be hypoxic with increased work of breathing requiring 4 L of oxygen by nasal cannula of..CT of the chest done in the hospital was negative for pulmonary embolism but she did show patchy left lower lobe airspace disease patient was treated with IV ceftriaxone and doxycycline oxygen decreased to 2 L upon discharge echo done in the hospital was unremarkable.she was last admitted in the hospital January 2022 for an episode of respiratory failure attributed to COPD exacerbation and possible aspiration.Patient also had bilateral lower extremity swelling in the hospital venous Doppler done was negative for DVT echocardiogram unremarkable she was started on Lasix responding well.  After stabilizing in the hospital patient ice to SNF for continued PT OT skilled nursing.    Seen patient in room awake alert only x 2 mentation although not in any acute distress at this time .      Repeat Chest x-ray ordered f shows small right base infiltrate patient started on levofloxacin 750 mg .  patient noted to have pulmonary vascular congestion.  Lungs remain diminished no wheezing no shortness of breath noted any acute respiratory distress oxygen saturation 93% on 2 L  . labs have been ordered 3/17/2025 pending results she remains clinically stable no fever no temperature vital signs remained stable oxygen saturation 93% on 2 L  Patient working with therapy.

## 2025-03-19 ENCOUNTER — OFFICE VISIT (OUTPATIENT)
Facility: CLINIC | Age: 80
End: 2025-03-19

## 2025-03-19 DIAGNOSIS — E78.00 HYPERCHOLESTEROLEMIA: ICD-10-CM

## 2025-03-19 DIAGNOSIS — R60.0 PERIPHERAL EDEMA: ICD-10-CM

## 2025-03-19 DIAGNOSIS — F02.B18 MODERATE DEMENTIA ASSOCIATED WITH OTHER UNDERLYING DISEASE, WITH OTHER BEHAVIORAL DISTURBANCE: ICD-10-CM

## 2025-03-19 DIAGNOSIS — G20.B2 PARKINSON'S DISEASE WITH DYSKINESIA AND FLUCTUATING MANIFESTATIONS (HCC): ICD-10-CM

## 2025-03-19 DIAGNOSIS — I10 PRIMARY HYPERTENSION: ICD-10-CM

## 2025-03-19 DIAGNOSIS — J69.0 ASPIRATION PNEUMONIA OF LEFT LOWER LOBE DUE TO REGURGITATED FOOD (HCC): ICD-10-CM

## 2025-03-19 DIAGNOSIS — J18.9 PNEUMONIA OF RIGHT LUNG DUE TO INFECTIOUS ORGANISM, UNSPECIFIED PART OF LUNG: Primary | ICD-10-CM

## 2025-03-19 DIAGNOSIS — D69.6 THROMBOCYTOPENIA: ICD-10-CM

## 2025-03-19 DIAGNOSIS — I50.9 CONGESTIVE HEART FAILURE, UNSPECIFIED HF CHRONICITY, UNSPECIFIED HEART FAILURE TYPE (HCC): ICD-10-CM

## 2025-03-19 DIAGNOSIS — K44.9 HIATAL HERNIA: ICD-10-CM

## 2025-03-19 DIAGNOSIS — R13.10 DYSPHAGIA, UNSPECIFIED TYPE: ICD-10-CM

## 2025-03-19 NOTE — PROGRESS NOTES
PLACE OF SERVICE:  [facility] [place] [address] [place] [address]    SKILLED VISIT      Chief Complaint:  skilled visit, labs and chest x ray reviewed    HPI  Patient is a 79-year-old female who presented to the emergency room with chief complaint of shortness of breath.  She reported that she had a choking episode drinking cappuccino and was taken to urgent care underwent chest x-ray which was negative discharged home after being treated with albuterol however she continued to complain of shortness of breath on arrival to the emergency room she was found to be hypoxic with increased work of breathing requiring 4 L of oxygen by nasal cannula of..CT of the chest done in the hospital was negative for pulmonary embolism but she did show patchy left lower lobe airspace disease patient was treated with IV ceftriaxone and doxycycline oxygen decreased to 2 L upon discharge echo done in the hospital was unremarkable.she was last admitted in the hospital January 2022 for an episode of respiratory failure attributed to COPD exacerbation and possible aspiration.Patient also had bilateral lower extremity swelling in the hospital venous Doppler done was negative for DVT echocardiogram unremarkable she was started on Lasix responding well.  After stabilizing in the hospital patient ice to SNF for continued PT OT skilled nursing.    Seen patient in room awake alert only x 2 mentation although not in any acute distress at this time .     Repeat Chest x-ray ordered f shows small right base infiltrate patient started on levofloxacin 750 mg .  patient noted to have pulmonary vascular congestion.  Lungs remain diminished no wheezing no shortness of breath noted any acute respiratory distress oxygen saturation 93% on 2 L  . labs have been ordered 3/17/2025 reviewed and remained stable she remains clinically stable no fever no temperature vital signs remained stable oxygen saturation 93% on 2 L  Patient working

## 2025-03-21 ENCOUNTER — OFFICE VISIT (OUTPATIENT)
Facility: CLINIC | Age: 80
End: 2025-03-21

## 2025-03-21 DIAGNOSIS — R60.0 PERIPHERAL EDEMA: ICD-10-CM

## 2025-03-21 DIAGNOSIS — K44.9 HIATAL HERNIA: ICD-10-CM

## 2025-03-21 DIAGNOSIS — J18.9 PNEUMONIA OF RIGHT LUNG DUE TO INFECTIOUS ORGANISM, UNSPECIFIED PART OF LUNG: Primary | ICD-10-CM

## 2025-03-21 DIAGNOSIS — F02.B18 MODERATE DEMENTIA ASSOCIATED WITH OTHER UNDERLYING DISEASE, WITH OTHER BEHAVIORAL DISTURBANCE: ICD-10-CM

## 2025-03-21 DIAGNOSIS — J96.01 ACUTE RESPIRATORY FAILURE WITH HYPOXIA: ICD-10-CM

## 2025-03-21 DIAGNOSIS — J69.0 ASPIRATION PNEUMONIA OF LEFT LOWER LOBE DUE TO REGURGITATED FOOD (HCC): ICD-10-CM

## 2025-03-21 DIAGNOSIS — E78.00 HYPERCHOLESTEROLEMIA: ICD-10-CM

## 2025-03-21 DIAGNOSIS — I50.9 CONGESTIVE HEART FAILURE, UNSPECIFIED HF CHRONICITY, UNSPECIFIED HEART FAILURE TYPE (HCC): ICD-10-CM

## 2025-03-21 DIAGNOSIS — D69.6 THROMBOCYTOPENIA: ICD-10-CM

## 2025-03-21 DIAGNOSIS — I10 PRIMARY HYPERTENSION: ICD-10-CM

## 2025-03-21 DIAGNOSIS — R13.12 OROPHARYNGEAL DYSPHAGIA: ICD-10-CM

## 2025-03-21 DIAGNOSIS — J41.8 MIXED SIMPLE AND MUCOPURULENT CHRONIC BRONCHITIS (HCC): ICD-10-CM

## 2025-03-21 DIAGNOSIS — G20.B2 PARKINSON'S DISEASE WITH DYSKINESIA AND FLUCTUATING MANIFESTATIONS (HCC): ICD-10-CM

## 2025-03-21 NOTE — PROGRESS NOTES
infiltrate patient currently started on levofloxacin 750 mg end date 3/24/2025  Speech to continue to reevaluate patient.  Thrombocytopenia  Platelets done in the facility -[id number]  No bruising noted no bleeding reported she remains clinically stable  Has Chronic, cytopenia present prior to admission to the hospital  Dementia  Continue memantine mean continue reorientation will appreciate in-house psych input  Parkinson's  Continue carbidopa-levodopa falls precautions in place no falls reported  Seizures  Patient on Depakote remains seizure-free in the facility  Neuropathy  Continue gabapentin  Hyperlipidemia  Continue simvastatin  Hypertension   blood pressure trends reviewed remained stable in the facility no headache no dizziness no lightheadedness no vision changes reported continue amlodipine. Hold parameters in place.   Anxiety  Continue diazepam  Depression  Continue duloxetine  Hiatal hernia  Continue Protonix   Seen by in house speech, will follow recommendations.   Patient needs to be completely upright during meals alternating liquids and solids small sips and bites  Avoid overly acidic food and drinks.  Patient needs to be upright for 1 hour after eating or drinking  Elevate head of bed while sleeping  Debility/high risk for falls  Fall precautions in place no falls reported continue PT OT  CHF  Chest x ray in hospital : Mild interstitial edema.   Follow up cxr in the facility :Early changes of infrahilar atelectasis or pneumonia. Bilateral perihilar fullness is present, nonspecific in nature. Possible etiologies include central pulmonary vascular congestion, adenopathy, or reactive airway disease.  Lasix increased to 40 mg twice a day  Follow-up chest x-ray ordered 3/25/25   CHF compensated on Lasix no orthopnea no PND no shortness of breath not in any acute respiratory distress weights ordered Monday Wednesday Friday to monitor CHF status baseline labs ordered 3/17/2025 reviewed and remained stable.

## 2025-03-24 ENCOUNTER — OFFICE VISIT (OUTPATIENT)
Facility: CLINIC | Age: 80
End: 2025-03-24
Payer: MEDICARE

## 2025-03-24 DIAGNOSIS — F02.B18 MODERATE DEMENTIA ASSOCIATED WITH OTHER UNDERLYING DISEASE, WITH OTHER BEHAVIORAL DISTURBANCE: ICD-10-CM

## 2025-03-24 DIAGNOSIS — D69.6 THROMBOCYTOPENIA: ICD-10-CM

## 2025-03-24 DIAGNOSIS — R60.0 PERIPHERAL EDEMA: ICD-10-CM

## 2025-03-24 DIAGNOSIS — K44.9 HIATAL HERNIA: ICD-10-CM

## 2025-03-24 DIAGNOSIS — R13.12 OROPHARYNGEAL DYSPHAGIA: ICD-10-CM

## 2025-03-24 DIAGNOSIS — I10 PRIMARY HYPERTENSION: ICD-10-CM

## 2025-03-24 DIAGNOSIS — I50.9 CONGESTIVE HEART FAILURE, UNSPECIFIED HF CHRONICITY, UNSPECIFIED HEART FAILURE TYPE (HCC): ICD-10-CM

## 2025-03-24 DIAGNOSIS — J69.0 ASPIRATION PNEUMONIA OF LEFT LOWER LOBE DUE TO REGURGITATED FOOD (HCC): ICD-10-CM

## 2025-03-24 DIAGNOSIS — E78.00 HYPERCHOLESTEROLEMIA: ICD-10-CM

## 2025-03-24 DIAGNOSIS — G20.B2 PARKINSON'S DISEASE WITH DYSKINESIA AND FLUCTUATING MANIFESTATIONS (HCC): ICD-10-CM

## 2025-03-24 DIAGNOSIS — J18.9 PNEUMONIA OF RIGHT LUNG DUE TO INFECTIOUS ORGANISM, UNSPECIFIED PART OF LUNG: Primary | ICD-10-CM

## 2025-03-24 PROCEDURE — 99309 SBSQ NF CARE MODERATE MDM 30: CPT

## 2025-03-24 PROCEDURE — 1123F ACP DISCUSS/DSCN MKR DOCD: CPT

## 2025-03-24 NOTE — PROGRESS NOTES
PLACE OF SERVICE:  [facility] [place] [address] [place] [address]    SKILLED VISIT      Chief Complaint:  skilled visit,   HPI  Patient is a 79-year-old female who presented to the emergency room with chief complaint of shortness of breath.  She reported that she had a choking episode drinking cappuccino and was taken to urgent care underwent chest x-ray which was negative discharged home after being treated with albuterol however she continued to complain of shortness of breath on arrival to the emergency room she was found to be hypoxic with increased work of breathing requiring 4 L of oxygen by nasal cannula of..CT of the chest done in the hospital was negative for pulmonary embolism but she did show patchy left lower lobe airspace disease patient was treated with IV ceftriaxone and doxycycline oxygen decreased to 2 L upon discharge echo done in the hospital was unremarkable.she was last admitted in the hospital January 2022 for an episode of respiratory failure attributed to COPD exacerbation and possible aspiration.Patient also had bilateral lower extremity swelling in the hospital venous Doppler done was negative for DVT echocardiogram unremarkable she was started on Lasix responding well.  After stabilizing in the hospital patient ice to SNF for continued PT OT skilled nursing.    Seen patient in room awake alert only x 2 mentation although not in any acute distress at this time .   Patient's spouse by bedside, had no questions concerns today.    .  Last week patient complains of left foot pain, left foot examined no swelling no bruising noted however left great toe nail has bruising and patient has  pain on light palpation to her left great toe nail.  x-ray of left foot ordered pending results given patient's underlying dementia she remains a poor historian does not remember hurting it or hitting it.  No injuries/no falls have been reported by staff.   Repeat Chest x-ray ordered f

## 2025-03-26 ENCOUNTER — OFFICE VISIT (OUTPATIENT)
Facility: CLINIC | Age: 80
End: 2025-03-26
Payer: MEDICARE

## 2025-03-26 DIAGNOSIS — G20.B2 PARKINSON'S DISEASE WITH DYSKINESIA AND FLUCTUATING MANIFESTATIONS (HCC): ICD-10-CM

## 2025-03-26 DIAGNOSIS — I10 PRIMARY HYPERTENSION: ICD-10-CM

## 2025-03-26 DIAGNOSIS — E78.00 HYPERCHOLESTEROLEMIA: ICD-10-CM

## 2025-03-26 DIAGNOSIS — F02.B18 MODERATE DEMENTIA ASSOCIATED WITH OTHER UNDERLYING DISEASE, WITH OTHER BEHAVIORAL DISTURBANCE: ICD-10-CM

## 2025-03-26 DIAGNOSIS — R60.0 PERIPHERAL EDEMA: ICD-10-CM

## 2025-03-26 DIAGNOSIS — J41.8 MIXED SIMPLE AND MUCOPURULENT CHRONIC BRONCHITIS (HCC): ICD-10-CM

## 2025-03-26 DIAGNOSIS — K44.9 HIATAL HERNIA: ICD-10-CM

## 2025-03-26 DIAGNOSIS — I50.9 CONGESTIVE HEART FAILURE, UNSPECIFIED HF CHRONICITY, UNSPECIFIED HEART FAILURE TYPE (HCC): Primary | ICD-10-CM

## 2025-03-26 DIAGNOSIS — J69.0 ASPIRATION PNEUMONIA OF LEFT LOWER LOBE DUE TO REGURGITATED FOOD (HCC): ICD-10-CM

## 2025-03-26 DIAGNOSIS — D69.6 THROMBOCYTOPENIA: ICD-10-CM

## 2025-03-26 DIAGNOSIS — R13.12 OROPHARYNGEAL DYSPHAGIA: ICD-10-CM

## 2025-03-26 DIAGNOSIS — J18.9 PNEUMONIA OF RIGHT LUNG DUE TO INFECTIOUS ORGANISM, UNSPECIFIED PART OF LUNG: ICD-10-CM

## 2025-03-26 PROCEDURE — 99309 SBSQ NF CARE MODERATE MDM 30: CPT

## 2025-03-26 PROCEDURE — 1123F ACP DISCUSS/DSCN MKR DOCD: CPT

## 2025-03-26 NOTE — PROGRESS NOTES
PLACE OF SERVICE:  [facility] [place] [address] [place] [address]    SKILLED VISIT      Chief Complaint:  skilled visit, chest x ray reviewed   HPI  Patient is a 79-year-old female who presented to the emergency room with chief complaint of shortness of breath.  She reported that she had a choking episode drinking cappuccino and was taken to urgent care underwent chest x-ray which was negative discharged home after being treated with albuterol however she continued to complain of shortness of breath on arrival to the emergency room she was found to be hypoxic with increased work of breathing requiring 4 L of oxygen by nasal cannula of..CT of the chest done in the hospital was negative for pulmonary embolism but she did show patchy left lower lobe airspace disease patient was treated with IV ceftriaxone and doxycycline oxygen decreased to 2 L upon discharge echo done in the hospital was unremarkable.she was last admitted in the hospital January 2022 for an episode of respiratory failure attributed to COPD exacerbation and possible aspiration.Patient also had bilateral lower extremity swelling in the hospital venous Doppler done was negative for DVT echocardiogram unremarkable she was started on Lasix responding well.  After stabilizing in the hospital patient ice to SNF for continued PT OT skilled nursing.    Seen patient in room awake alert only x 2 mentation although not in any acute distress at this time .   . Repeat Chest x-ray ordered remains unremarkable only shows hiatal hernia Lungs remain diminished no wheezing no shortness of breath noted any acute respiratory distress oxygen saturation 97% on 2 L  . labs on 3/17/2025 reviewed and remained stable she remains clinically stable no fever no temperature vital signs remained stable oxygen saturation 97% on 2 L Patient working with therapy.  Notes reviewed.   patient had  thrombocytopenia prior to admission remained stable will continue

## 2025-03-31 ENCOUNTER — OFFICE VISIT (OUTPATIENT)
Facility: CLINIC | Age: 80
End: 2025-03-31
Payer: MEDICARE

## 2025-03-31 DIAGNOSIS — J69.0 ASPIRATION PNEUMONIA OF LEFT LOWER LOBE DUE TO REGURGITATED FOOD (HCC): ICD-10-CM

## 2025-03-31 DIAGNOSIS — F02.B18 MODERATE DEMENTIA ASSOCIATED WITH OTHER UNDERLYING DISEASE, WITH OTHER BEHAVIORAL DISTURBANCE: ICD-10-CM

## 2025-03-31 DIAGNOSIS — I50.9 CONGESTIVE HEART FAILURE, UNSPECIFIED HF CHRONICITY, UNSPECIFIED HEART FAILURE TYPE (HCC): Primary | ICD-10-CM

## 2025-03-31 DIAGNOSIS — K44.9 HIATAL HERNIA: ICD-10-CM

## 2025-03-31 DIAGNOSIS — R60.0 PERIPHERAL EDEMA: ICD-10-CM

## 2025-03-31 DIAGNOSIS — J18.9 PNEUMONIA OF RIGHT LUNG DUE TO INFECTIOUS ORGANISM, UNSPECIFIED PART OF LUNG: ICD-10-CM

## 2025-03-31 DIAGNOSIS — G20.B2 PARKINSON'S DISEASE WITH DYSKINESIA AND FLUCTUATING MANIFESTATIONS (HCC): ICD-10-CM

## 2025-03-31 DIAGNOSIS — I10 PRIMARY HYPERTENSION: ICD-10-CM

## 2025-03-31 DIAGNOSIS — R13.12 OROPHARYNGEAL DYSPHAGIA: ICD-10-CM

## 2025-03-31 DIAGNOSIS — R13.10 DYSPHAGIA, UNSPECIFIED TYPE: ICD-10-CM

## 2025-03-31 DIAGNOSIS — D69.6 THROMBOCYTOPENIA: ICD-10-CM

## 2025-03-31 DIAGNOSIS — E78.00 HYPERCHOLESTEROLEMIA: ICD-10-CM

## 2025-03-31 DIAGNOSIS — J41.8 MIXED SIMPLE AND MUCOPURULENT CHRONIC BRONCHITIS (HCC): ICD-10-CM

## 2025-03-31 PROCEDURE — 1123F ACP DISCUSS/DSCN MKR DOCD: CPT

## 2025-03-31 PROCEDURE — 99310 SBSQ NF CARE HIGH MDM 45: CPT

## 2025-03-31 NOTE — PROGRESS NOTES
PLACE OF SERVICE:  [facility] [place] [address] [place] [address]    SKILLED VISIT      Chief Complaint:  skilled visit,   HPI  Patient is a 79-year-old female who presented to the emergency room with chief complaint of shortness of breath.  She reported that she had a choking episode drinking cappuccino and was taken to urgent care underwent chest x-ray which was negative discharged home after being treated with albuterol however she continued to complain of shortness of breath on arrival to the emergency room she was found to be hypoxic with increased work of breathing requiring 4 L of oxygen by nasal cannula of..CT of the chest done in the hospital was negative for pulmonary embolism but she did show patchy left lower lobe airspace disease patient was treated with IV ceftriaxone and doxycycline oxygen decreased to 2 L upon discharge echo done in the hospital was unremarkable.she was last admitted in the hospital January 2022 for an episode of respiratory failure attributed to COPD exacerbation and possible aspiration.Patient also had bilateral lower extremity swelling in the hospital venous Doppler done was negative for DVT echocardiogram unremarkable she was started on Lasix responding well.  After stabilizing in the hospital patient ice to SNF for continued PT OT skilled nursing.    Seen patient in room awake alert only x 2 mentation although not in any acute distress at this time .   . Repeat Chest x-ray ordered remains unremarkable only shows hiatal hernia Lungs remain diminished no wheezing no shortness of breath noted any acute respiratory distress oxygen saturation 97% on 2 L  . labs on 3/17/2025 reviewed and remained stable she remains clinically stable no fever no temperature vital signs remained stable oxygen saturation 97% on 2 L Patient working with therapy.  Notes reviewed.   patient had  thrombocytopenia prior to admission remained stable will continue to monitor with

## 2025-04-03 ENCOUNTER — OFFICE VISIT (OUTPATIENT)
Facility: CLINIC | Age: 80
End: 2025-04-03

## 2025-04-03 DIAGNOSIS — I10 PRIMARY HYPERTENSION: ICD-10-CM

## 2025-04-03 DIAGNOSIS — K44.9 HIATAL HERNIA: ICD-10-CM

## 2025-04-03 DIAGNOSIS — J69.0 ASPIRATION PNEUMONIA OF LEFT LOWER LOBE DUE TO REGURGITATED FOOD (HCC): ICD-10-CM

## 2025-04-03 DIAGNOSIS — R13.10 DYSPHAGIA, UNSPECIFIED TYPE: ICD-10-CM

## 2025-04-03 DIAGNOSIS — G20.B2 PARKINSON'S DISEASE WITH DYSKINESIA AND FLUCTUATING MANIFESTATIONS (HCC): ICD-10-CM

## 2025-04-03 DIAGNOSIS — E78.00 HYPERCHOLESTEROLEMIA: ICD-10-CM

## 2025-04-03 DIAGNOSIS — I50.9 CONGESTIVE HEART FAILURE, UNSPECIFIED HF CHRONICITY, UNSPECIFIED HEART FAILURE TYPE (HCC): Primary | ICD-10-CM

## 2025-04-03 DIAGNOSIS — R60.0 PERIPHERAL EDEMA: ICD-10-CM

## 2025-04-03 DIAGNOSIS — D69.6 THROMBOCYTOPENIA: ICD-10-CM

## 2025-04-03 DIAGNOSIS — F02.B18 MODERATE DEMENTIA ASSOCIATED WITH OTHER UNDERLYING DISEASE, WITH OTHER BEHAVIORAL DISTURBANCE: ICD-10-CM

## 2025-04-03 NOTE — PROGRESS NOTES
[place]ACE [place]ICE[place]  [facility] [place] [address] [place] [address]    SKILLED VISIT      Chief Complaint:  skilled visit,   HPI  Patient is a 79-year-old female who presented to the emergency room with chief complaint of shortness of breath.  She reported that she had a choking episode drinking cappuccino and was taken to urgent care underwent chest x-ray which was negative discharged home after being treated with albuterol however she continued to complain of shortness of breath on arrival to the emergency room she was found to be hypoxic with increased work of breathing requiring 4 L of oxygen by nasal cannula of..CT of the chest done in the hospital was negative for pulmonary embolism but she did show patchy left lower lobe airspace disease patient was treated with IV ceftriaxone and doxycycline oxygen decreased to 2 L upon discharge echo done in the hospital was unremarkable.she was last admitted in the hospital January 2022 for an episode of respiratory failure attributed to COPD exacerbation and possible aspiration.Patient also had bilateral lower extremity swelling in the hospital venous Doppler done was negative for DVT echocardiogram unremarkable she was started on Lasix responding well.  After stabilizing in the hospital patient ice to SNF for continued PT OT skilled nursing.    Seen patient in room awake alert only x 2 mentation although not in any acute distress at this time .   . Repeat Chest x-ray ordered remains unremarkable only shows hiatal hernia Lungs remain diminished no wheezing no shortness of breath noted any acute respiratory distress oxygen saturation 97% on 2 L  . labs on 3/17/2025 reviewed and remained stable she remains clinically stable no fever no temperature vital signs remained stable oxygen saturation 97% on 2 L Patient working with therapy.  Notes reviewed.   patient had  thrombocytopenia prior to admission remained stable will continue to monitor with

## 2025-04-07 ENCOUNTER — OFFICE VISIT (OUTPATIENT)
Facility: CLINIC | Age: 80
End: 2025-04-07

## 2025-04-07 DIAGNOSIS — R60.0 PERIPHERAL EDEMA: ICD-10-CM

## 2025-04-07 DIAGNOSIS — G20.B2 PARKINSON'S DISEASE WITH DYSKINESIA AND FLUCTUATING MANIFESTATIONS (HCC): ICD-10-CM

## 2025-04-07 DIAGNOSIS — R13.10 DYSPHAGIA, UNSPECIFIED TYPE: ICD-10-CM

## 2025-04-07 DIAGNOSIS — I50.9 CONGESTIVE HEART FAILURE, UNSPECIFIED HF CHRONICITY, UNSPECIFIED HEART FAILURE TYPE (HCC): ICD-10-CM

## 2025-04-07 DIAGNOSIS — F32.A DEPRESSION, UNSPECIFIED DEPRESSION TYPE: ICD-10-CM

## 2025-04-07 DIAGNOSIS — R60.9 EDEMA, UNSPECIFIED TYPE: ICD-10-CM

## 2025-04-07 DIAGNOSIS — I95.9 HYPOTENSION, UNSPECIFIED HYPOTENSION TYPE: Primary | ICD-10-CM

## 2025-04-07 DIAGNOSIS — E78.00 HYPERCHOLESTEROLEMIA: ICD-10-CM

## 2025-04-07 DIAGNOSIS — F02.B18 MODERATE DEMENTIA ASSOCIATED WITH OTHER UNDERLYING DISEASE, WITH OTHER BEHAVIORAL DISTURBANCE: ICD-10-CM

## 2025-04-07 DIAGNOSIS — F41.9 ANXIETY: ICD-10-CM

## 2025-04-07 DIAGNOSIS — K44.9 HIATAL HERNIA: ICD-10-CM

## 2025-04-07 DIAGNOSIS — D69.6 THROMBOCYTOPENIA: ICD-10-CM

## 2025-04-07 NOTE — PROGRESS NOTES
Lasix 40 mg twice a day.  No orthopnea no PND no shortness of breath no signs or symptoms suggestive of CHF exacerbation at this time.  TIFFANY Tong - NP

## 2025-04-09 ENCOUNTER — OFFICE VISIT (OUTPATIENT)
Facility: CLINIC | Age: 80
End: 2025-04-09

## 2025-04-09 DIAGNOSIS — G20.B2 PARKINSON'S DISEASE WITH DYSKINESIA AND FLUCTUATING MANIFESTATIONS (HCC): ICD-10-CM

## 2025-04-09 DIAGNOSIS — I50.9 CONGESTIVE HEART FAILURE, UNSPECIFIED HF CHRONICITY, UNSPECIFIED HEART FAILURE TYPE (HCC): Primary | ICD-10-CM

## 2025-04-09 DIAGNOSIS — F32.A DEPRESSION, UNSPECIFIED DEPRESSION TYPE: ICD-10-CM

## 2025-04-09 DIAGNOSIS — R60.0 PERIPHERAL EDEMA: ICD-10-CM

## 2025-04-09 DIAGNOSIS — F02.B18 MODERATE DEMENTIA ASSOCIATED WITH OTHER UNDERLYING DISEASE, WITH OTHER BEHAVIORAL DISTURBANCE: ICD-10-CM

## 2025-04-09 DIAGNOSIS — R60.9 EDEMA, UNSPECIFIED TYPE: ICD-10-CM

## 2025-04-09 DIAGNOSIS — E78.00 HYPERCHOLESTEROLEMIA: ICD-10-CM

## 2025-04-09 DIAGNOSIS — R13.10 DYSPHAGIA, UNSPECIFIED TYPE: ICD-10-CM

## 2025-04-09 DIAGNOSIS — D69.6 THROMBOCYTOPENIA: ICD-10-CM

## 2025-04-09 DIAGNOSIS — K44.9 HIATAL HERNIA: ICD-10-CM

## 2025-04-09 DIAGNOSIS — F41.9 ANXIETY: ICD-10-CM

## 2025-04-09 DIAGNOSIS — I95.9 HYPOTENSION, UNSPECIFIED HYPOTENSION TYPE: ICD-10-CM

## 2025-04-09 NOTE — PROGRESS NOTES
discontinued patient's amlodipine and patient will remain on Lasix 40 mg twice a day.  No orthopnea no PND no shortness of breath no signs or symptoms suggestive of CHF exacerbation at this time.  TIFFANY Tong - NP

## 2025-04-14 ENCOUNTER — OFFICE VISIT (OUTPATIENT)
Facility: CLINIC | Age: 80
End: 2025-04-14

## 2025-04-14 DIAGNOSIS — I95.9 HYPOTENSION, UNSPECIFIED HYPOTENSION TYPE: ICD-10-CM

## 2025-04-14 DIAGNOSIS — I50.9 CONGESTIVE HEART FAILURE, UNSPECIFIED HF CHRONICITY, UNSPECIFIED HEART FAILURE TYPE (HCC): ICD-10-CM

## 2025-04-14 DIAGNOSIS — F32.A DEPRESSION, UNSPECIFIED DEPRESSION TYPE: ICD-10-CM

## 2025-04-14 DIAGNOSIS — F02.B18 MODERATE DEMENTIA ASSOCIATED WITH OTHER UNDERLYING DISEASE, WITH OTHER BEHAVIORAL DISTURBANCE: Primary | ICD-10-CM

## 2025-04-14 DIAGNOSIS — R13.10 DYSPHAGIA, UNSPECIFIED TYPE: ICD-10-CM

## 2025-04-14 DIAGNOSIS — D69.6 THROMBOCYTOPENIA: ICD-10-CM

## 2025-04-14 DIAGNOSIS — R60.0 PERIPHERAL EDEMA: ICD-10-CM

## 2025-04-14 DIAGNOSIS — E78.00 HYPERCHOLESTEROLEMIA: ICD-10-CM

## 2025-04-14 DIAGNOSIS — K44.9 HIATAL HERNIA: ICD-10-CM

## 2025-04-14 DIAGNOSIS — G20.B2 PARKINSON'S DISEASE WITH DYSKINESIA AND FLUCTUATING MANIFESTATIONS (HCC): ICD-10-CM

## 2025-04-14 DIAGNOSIS — F41.9 ANXIETY: ICD-10-CM

## 2025-04-14 NOTE — PROGRESS NOTES
PLACE OF [place]ICE[place]  [facility] [place] [address] [place] [address]    SKILLED VISIT      Chief Complaint:  skilled visit, labs reviewed      HPI  Patient is a 79-year-old female who presented to the emergency room with chief complaint of shortness of breath.  She reported that she had a choking episode drinking cappuccino and was taken to urgent care underwent chest x-ray which was negative discharged home after being treated with albuterol however she continued to complain of shortness of breath on arrival to the emergency room she was found to be hypoxic with increased work of breathing requiring 4 L of oxygen by nasal cannula of..CT of the chest done in the hospital was negative for pulmonary embolism but she did show patchy left lower lobe airspace disease patient was treated with IV ceftriaxone and doxycycline oxygen decreased to 2 L upon discharge echo done in the hospital was unremarkable.she was last admitted in the hospital January 2022 for an episode of respiratory failure attributed to COPD exacerbation and possible aspiration.Patient also had bilateral lower extremity swelling in the hospital venous Doppler done was negative for DVT echocardiogram unremarkable she was started on Lasix responding well.  After stabilizing in the hospital patient ice to SNF for continued PT OT skilled nursing.    Seen patient in the therapy gym today awake alert only x 2 mentation although not in any acute distress at this time .   . Repeat Chest x-ray ordered remains unremarkable only shows hiatal hernia Lungs remain diminished no wheezing no shortness of breath noted any acute respiratory distress oxygen saturation 93% on 2 L  . labs on 4/9/25 reviewed and remained stable she remains clinically stable no fever no temperature vital signs remained stable oxygen saturation 93% on 2 L Patient working with therapy.  Notes reviewed.   patient had  thrombocytopenia prior to admission remained stable will

## 2025-04-16 ENCOUNTER — OFFICE VISIT (OUTPATIENT)
Facility: CLINIC | Age: 80
End: 2025-04-16

## 2025-04-16 DIAGNOSIS — F41.9 ANXIETY: ICD-10-CM

## 2025-04-16 DIAGNOSIS — F02.B18 MODERATE DEMENTIA ASSOCIATED WITH OTHER UNDERLYING DISEASE, WITH OTHER BEHAVIORAL DISTURBANCE: Primary | ICD-10-CM

## 2025-04-16 DIAGNOSIS — K44.9 HIATAL HERNIA: ICD-10-CM

## 2025-04-16 DIAGNOSIS — E78.00 HYPERCHOLESTEROLEMIA: ICD-10-CM

## 2025-04-16 DIAGNOSIS — R13.10 DYSPHAGIA, UNSPECIFIED TYPE: ICD-10-CM

## 2025-04-16 DIAGNOSIS — I50.9 CONGESTIVE HEART FAILURE, UNSPECIFIED HF CHRONICITY, UNSPECIFIED HEART FAILURE TYPE (HCC): ICD-10-CM

## 2025-04-16 DIAGNOSIS — G20.B2 PARKINSON'S DISEASE WITH DYSKINESIA AND FLUCTUATING MANIFESTATIONS (HCC): ICD-10-CM

## 2025-04-16 DIAGNOSIS — R60.0 PERIPHERAL EDEMA: ICD-10-CM

## 2025-04-16 DIAGNOSIS — D69.6 THROMBOCYTOPENIA: ICD-10-CM

## 2025-04-16 DIAGNOSIS — I95.9 HYPOTENSION, UNSPECIFIED HYPOTENSION TYPE: ICD-10-CM

## 2025-04-16 NOTE — PROGRESS NOTES
PLACE OF SERVICE:  [facility] [place] [address] [place] [address]    SKILLED VISIT      Chief Complaint:  skilled visit, labs reviewed      HPI  Patient is a 79-year-old female who presented to the emergency room with chief complaint of shortness of breath.  She reported that she had a choking episode drinking cappuccino and was taken to urgent care underwent chest x-ray which was negative discharged home after being treated with albuterol however she continued to complain of shortness of breath on arrival to the emergency room she was found to be hypoxic with increased work of breathing requiring 4 L of oxygen by nasal cannula of..CT of the chest done in the hospital was negative for pulmonary embolism but she did show patchy left lower lobe airspace disease patient was treated with IV ceftriaxone and doxycycline oxygen decreased to 2 L upon discharge echo done in the hospital was unremarkable.she was last admitted in the hospital January 2022 for an episode of respiratory failure attributed to COPD exacerbation and possible aspiration.Patient also had bilateral lower extremity swelling in the hospital venous Doppler done was negative for DVT echocardiogram unremarkable she was started on Lasix responding well.  After stabilizing in the hospital patient ice to SNF for continued PT OT skilled nursing.    Seen patient in the therapy gym today awake alert only x 2 mentation although not in any acute distress at this time .   . Repeat Chest x-ray ordered remains unremarkable only shows hiatal hernia Lungs remain diminished no wheezing no shortness of breath noted any acute respiratory distress oxygen saturation 93% on 2 L  . labs on 4/9/25 reviewed and remained stable she remains clinically stable no fever no temperature vital signs remained stable oxygen saturation 93% on 2 L Patient working with therapy.  Notes reviewed.   patient had  thrombocytopenia prior to admission remained stable will

## 2025-04-21 ENCOUNTER — OFFICE VISIT (OUTPATIENT)
Facility: CLINIC | Age: 80
End: 2025-04-21
Payer: MEDICARE

## 2025-04-21 DIAGNOSIS — F02.B18 MODERATE DEMENTIA ASSOCIATED WITH OTHER UNDERLYING DISEASE, WITH OTHER BEHAVIORAL DISTURBANCE (HCC): Primary | ICD-10-CM

## 2025-04-21 DIAGNOSIS — R13.10 DYSPHAGIA, UNSPECIFIED TYPE: ICD-10-CM

## 2025-04-21 DIAGNOSIS — F41.9 ANXIETY: ICD-10-CM

## 2025-04-21 DIAGNOSIS — I95.9 HYPOTENSION, UNSPECIFIED HYPOTENSION TYPE: ICD-10-CM

## 2025-04-21 DIAGNOSIS — R60.9 EDEMA, UNSPECIFIED TYPE: ICD-10-CM

## 2025-04-21 DIAGNOSIS — R60.0 PERIPHERAL EDEMA: ICD-10-CM

## 2025-04-21 DIAGNOSIS — G20.B2 PARKINSON'S DISEASE WITH DYSKINESIA AND FLUCTUATING MANIFESTATIONS (HCC): ICD-10-CM

## 2025-04-21 DIAGNOSIS — E78.00 HYPERCHOLESTEROLEMIA: ICD-10-CM

## 2025-04-21 DIAGNOSIS — K44.9 HIATAL HERNIA: ICD-10-CM

## 2025-04-21 DIAGNOSIS — D69.6 THROMBOCYTOPENIA: ICD-10-CM

## 2025-04-21 DIAGNOSIS — G62.9 NEUROPATHY: Primary | ICD-10-CM

## 2025-04-21 DIAGNOSIS — I50.9 CONGESTIVE HEART FAILURE, UNSPECIFIED HF CHRONICITY, UNSPECIFIED HEART FAILURE TYPE (HCC): ICD-10-CM

## 2025-04-21 DIAGNOSIS — F32.A DEPRESSION, UNSPECIFIED DEPRESSION TYPE: ICD-10-CM

## 2025-04-21 PROCEDURE — 1123F ACP DISCUSS/DSCN MKR DOCD: CPT

## 2025-04-21 PROCEDURE — 99310 SBSQ NF CARE HIGH MDM 45: CPT

## 2025-04-21 RX ORDER — DULOXETIN HYDROCHLORIDE 60 MG/1
60 CAPSULE, DELAYED RELEASE ORAL DAILY
Qty: 30 CAPSULE | Refills: 0 | Status: SHIPPED | OUTPATIENT
Start: 2025-04-21

## 2025-04-21 RX ORDER — GABAPENTIN 100 MG/1
200 CAPSULE ORAL NIGHTLY
Qty: 10 CAPSULE | Refills: 0 | Status: SHIPPED | OUTPATIENT
Start: 2025-04-21 | End: 2025-04-26

## 2025-04-21 RX ORDER — GUAIFENESIN 200 MG/10ML
200 LIQUID ORAL EVERY 6 HOURS
Qty: 1200 ML | Refills: 0 | Status: SHIPPED | OUTPATIENT
Start: 2025-04-21

## 2025-04-21 RX ORDER — FERROUS SULFATE 325(65) MG
325 TABLET, DELAYED RELEASE (ENTERIC COATED) ORAL
Qty: 30 TABLET | Refills: 0 | Status: SHIPPED | OUTPATIENT
Start: 2025-04-21

## 2025-04-21 RX ORDER — POLYETHYLENE GLYCOL 3350 17 G/17G
17 POWDER, FOR SOLUTION ORAL DAILY PRN
Qty: 510 G | Refills: 0 | Status: SHIPPED | OUTPATIENT
Start: 2025-04-21 | End: 2025-05-21

## 2025-04-21 RX ORDER — FUROSEMIDE 40 MG/1
40 TABLET ORAL 2 TIMES DAILY
Qty: 60 TABLET | Refills: 0 | Status: SHIPPED | OUTPATIENT
Start: 2025-04-21

## 2025-04-21 RX ORDER — MULTIVITAMIN WITH IRON
1 TABLET ORAL DAILY
Qty: 30 TABLET | Refills: 0 | Status: SHIPPED | OUTPATIENT
Start: 2025-04-21

## 2025-04-21 RX ORDER — DIAZEPAM 5 MG/1
5 TABLET ORAL NIGHTLY
Qty: 5 TABLET | Refills: 0 | Status: SHIPPED | OUTPATIENT
Start: 2025-04-21 | End: 2025-04-26

## 2025-04-21 RX ORDER — DONEPEZIL HYDROCHLORIDE 10 MG/1
10 TABLET, FILM COATED ORAL NIGHTLY
Qty: 30 TABLET | Refills: 0 | Status: SHIPPED | OUTPATIENT
Start: 2025-04-21

## 2025-04-21 RX ORDER — SIMVASTATIN 20 MG
20 TABLET ORAL NIGHTLY
Qty: 30 TABLET | Refills: 0 | Status: SHIPPED | OUTPATIENT
Start: 2025-04-21

## 2025-04-21 RX ORDER — DIVALPROEX SODIUM 125 MG/1
375 CAPSULE, COATED PELLETS ORAL 2 TIMES DAILY
Qty: 180 CAPSULE | Refills: 0 | Status: SHIPPED | OUTPATIENT
Start: 2025-04-21

## 2025-04-21 RX ORDER — MEMANTINE HYDROCHLORIDE 10 MG/1
10 TABLET ORAL 2 TIMES DAILY
Qty: 60 TABLET | Refills: 0 | Status: SHIPPED | OUTPATIENT
Start: 2025-04-21

## 2025-04-21 RX ORDER — CARBIDOPA AND LEVODOPA 25; 100 MG/1; MG/1
1 TABLET ORAL 3 TIMES DAILY
Qty: 90 TABLET | Refills: 0 | Status: SHIPPED | OUTPATIENT
Start: 2025-04-21

## 2025-04-21 RX ORDER — OMEPRAZOLE 40 MG/1
40 CAPSULE, DELAYED RELEASE ORAL DAILY
Qty: 30 CAPSULE | Refills: 0 | Status: SHIPPED | OUTPATIENT
Start: 2025-04-21

## 2025-04-21 RX ORDER — DOXEPIN HYDROCHLORIDE 10 MG/1
10 CAPSULE ORAL NIGHTLY
Qty: 30 CAPSULE | Refills: 0 | Status: SHIPPED | OUTPATIENT
Start: 2025-04-21

## 2025-04-21 RX ORDER — SENNOSIDES 8.6 MG
1 TABLET ORAL 2 TIMES DAILY
Qty: 60 TABLET | Refills: 0 | Status: SHIPPED | OUTPATIENT
Start: 2025-04-21

## 2025-04-21 RX ORDER — TRAZODONE HYDROCHLORIDE 100 MG/1
200 TABLET ORAL NIGHTLY
Qty: 60 TABLET | Refills: 0 | Status: SHIPPED | OUTPATIENT
Start: 2025-04-21

## 2025-04-21 NOTE — PROGRESS NOTES
PLACE OF SERVICE:  [facility] [place] [address] [place] [address]    SKILLED VISIT      Chief Complaint:  skilled visit, family meeting, referrals made to [facility]  HPI  Patient is a 79-year-old female who presented to the emergency room with chief complaint of shortness of breath.  She reported that she had a choking episode drinking cappuccino and was taken to urgent care underwent chest x-ray which was negative discharged home after being treated with albuterol however she continued to complain of shortness of breath on arrival to the emergency room she was found to be hypoxic with increased work of breathing requiring 4 L of oxygen by nasal cannula of..CT of the chest done in the hospital was negative for pulmonary embolism but she did show patchy left lower lobe airspace disease patient was treated with IV ceftriaxone and doxycycline oxygen decreased to 2 L upon discharge echo done in the hospital was unremarkable.she was last admitted in the hospital January 2022 for an episode of respiratory failure attributed to COPD exacerbation and possible aspiration.Patient also had bilateral lower extremity swelling in the hospital venous Doppler done was negative for DVT echocardiogram unremarkable she was started on Lasix responding well.  After stabilizing in the hospital patient ice to SNF for continued PT OT skilled nursing.    Seen patient in the her room  today awake alert only x 2 mentation although not in any acute distress at this time .  Patient's spouse by bedside.  Patient plans to get discharged home tomorrow.  Provider educated spouse that patient will need follow-up with PCP once discharged .  Patient's spouse had expressed concern that patient going to PCP has become more difficult since patient needs transportation and transportation as costing him $150.00 way to the doctor's appointment.  Provider offered primary care at home with [facility] where PCP

## 2025-04-22 ENCOUNTER — OFFICE VISIT (OUTPATIENT)
Facility: CLINIC | Age: 80
End: 2025-04-22

## 2025-04-22 DIAGNOSIS — I95.9 HYPOTENSION, UNSPECIFIED HYPOTENSION TYPE: ICD-10-CM

## 2025-04-22 DIAGNOSIS — G20.B2 PARKINSON'S DISEASE WITH DYSKINESIA AND FLUCTUATING MANIFESTATIONS (HCC): ICD-10-CM

## 2025-04-22 DIAGNOSIS — R60.0 PERIPHERAL EDEMA: ICD-10-CM

## 2025-04-22 DIAGNOSIS — R13.10 DYSPHAGIA, UNSPECIFIED TYPE: ICD-10-CM

## 2025-04-22 DIAGNOSIS — F41.9 ANXIETY: ICD-10-CM

## 2025-04-22 DIAGNOSIS — E78.00 HYPERCHOLESTEROLEMIA: ICD-10-CM

## 2025-04-22 DIAGNOSIS — K44.9 HIATAL HERNIA: ICD-10-CM

## 2025-04-22 DIAGNOSIS — F02.B18 MODERATE DEMENTIA ASSOCIATED WITH OTHER UNDERLYING DISEASE, WITH OTHER BEHAVIORAL DISTURBANCE (HCC): Primary | ICD-10-CM

## 2025-04-22 DIAGNOSIS — D69.6 THROMBOCYTOPENIA: ICD-10-CM

## 2025-04-22 DIAGNOSIS — I50.9 CONGESTIVE HEART FAILURE, UNSPECIFIED HF CHRONICITY, UNSPECIFIED HEART FAILURE TYPE (HCC): ICD-10-CM

## 2025-04-22 DIAGNOSIS — R60.9 EDEMA, UNSPECIFIED TYPE: ICD-10-CM

## 2025-04-22 DIAGNOSIS — F32.A DEPRESSION, UNSPECIFIED DEPRESSION TYPE: ICD-10-CM

## 2025-04-22 NOTE — PROGRESS NOTES
Place of Service:  [facility] [address] [place] [address]                                                                     Discharge Summary       Admit Dx:  shortness of breath, pneumonia.     Disposition: Home with Family HH PT/OT/SN.    Presentation:  Patient is a 79-year-old female who presented to the emergency room with chief complaint of shortness of breath.  She reported that she had a choking episode drinking cappuccino and was taken to urgent care underwent chest x-ray which was negative discharged home after being treated with albuterol however she continued to complain of shortness of breath on arrival to the emergency room she was found to be hypoxic with increased work of breathing requiring 4 L of oxygen by nasal cannula of..CT of the chest done in the hospital was negative for pulmonary embolism but she did show patchy left lower lobe airspace disease patient was treated with IV ceftriaxone and doxycycline oxygen decreased to 2 L upon discharge echo done in the hospital was unremarkable.she was last admitted in the hospital January 2022 for an episode of respiratory failure attributed to COPD exacerbation and possible aspiration.Patient also had bilateral lower extremity swelling in the hospital venous Doppler done was negative for DVT echocardiogram unremarkable she was started on Lasix responding well.  After stabilizing in the hospital patient ice to SNF for continued PT OT skilled nursing    Discharge time : > 30 mins    Brief SNF Course: Patient had an IM stay while she was at the hospital vitals in the facility remained stable labs reviewed remained stable no falls reported while she was at the facility she continued to work well with therapy remained very motivated family involved in patient's care her initial chest x-ray done in the facility continue to show pneumonia she was treated with IV antibiotics follow-up chest x-rays remain clear and unremarkable.  No

## 2025-04-28 ENCOUNTER — TELEPHONE (OUTPATIENT)
Facility: CLINIC | Age: 80
End: 2025-04-28

## 2025-04-28 NOTE — TELEPHONE ENCOUNTER
[facility] Primary Care at Home ([facility])   Phone:  [phone]      Fax:  [phone] [address], [address]  [place] [address]    Name:  Savana Vogel  YOB: 1945    Received Primary Care at Home referral for Savana Vogel from Ana Woodard LMSW with [facility].  Ms. Vogel was hospitalized at [facility] 2/23/25-3/6/25 with acute respiratory failure due to pneumonia.  She was discharged to [facility] SNF/Rehab 3/6/25-4/22/25.      This nurse called patient's , no answer, left message requesting call back.      Luisa Abbasi RN, Gerontological Nursing-BC, CHPN

## 2025-04-29 ENCOUNTER — TELEPHONE (OUTPATIENT)
Facility: CLINIC | Age: 80
End: 2025-04-29

## 2025-04-29 NOTE — TELEPHONE ENCOUNTER
[facility] Primary Care at Home   Phone:  [phone]      Fax:  [phone]  [facility], [address], [address]  [place] [address]    Name:  Savana Vogel  YOB: 1945    The Primary Care at Home team discussed Savana Vogel's referral in the weekly IDG meeting on 4/29/25 and patient was approved for Primary Care at Home services.      This nurse called and spoke with patient's  to schedule a start of care date with Dr. Bradford Abbasi.  Offered 5/7/25.  Mr. Vogel states that he has an appointment for an endoscopy that day for himself; this nurse offered 5/15/25 start of care date for Ms. Vogel and he is agreeable to that date.  This nurse informed him that  Tracie Solis LCSW will be contacting him to set up an appointment for initial SW assessment and to get new patient paperwork signed prior to Dr. Abbasi's start of care visit.       Luisa Abbasi RN, Gerontological Nursing-BC, PN

## 2025-04-30 ENCOUNTER — TELEPHONE (OUTPATIENT)
Facility: CLINIC | Age: 80
End: 2025-04-30

## 2025-04-30 NOTE — TELEPHONE ENCOUNTER
LCSW called pt in attempt to coordinate in-home apt for completion of H intake paperwork and social work assessment. LCSW to offer Friday 5/2/25 @ 11:30am. No answer. LCSW left voicemail message, provided contact information, and encouraged a call back at her convenience.     KEO Aguilar, MIKAW     [facility]  Primary Care at [facility]   [address], [address]   [place] [address]  C) [phone]  (O) [phone]  [email or web address]

## 2025-04-30 NOTE — TELEPHONE ENCOUNTER
LCSW rec'd return call from pt's spouse. He confirmed appt for Friday 5/2/25 @ 11:30am, for completion of H intake paperwork and social work assessment.     KEO Aguilar, [facility] HospitalW     [facility]  Primary Care at [facility] Building   [address], [address]   [place] [address]  (C) [phone]  (O) [phone]  [email or web address]

## 2025-05-02 ENCOUNTER — OFFICE VISIT (OUTPATIENT)
Facility: CLINIC | Age: 80
End: 2025-05-02

## 2025-05-02 DIAGNOSIS — Z76.89 ENCOUNTER FOR SOCIAL WORK INTERVENTION: Primary | ICD-10-CM

## 2025-05-02 SDOH — HEALTH STABILITY: MENTAL HEALTH: HOW OFTEN DO YOU HAVE A DRINK CONTAINING ALCOHOL?: NEVER

## 2025-05-02 SDOH — SOCIAL STABILITY: SOCIAL INSECURITY
WITHIN THE LAST YEAR, HAVE YOU BEEN RAPED OR FORCED TO HAVE ANY KIND OF SEXUAL ACTIVITY BY YOUR PARTNER OR EX-PARTNER?: NO

## 2025-05-02 SDOH — HEALTH STABILITY: MENTAL HEALTH: HOW MANY DRINKS CONTAINING ALCOHOL DO YOU HAVE ON A TYPICAL DAY WHEN YOU ARE DRINKING?: PATIENT DOES NOT DRINK

## 2025-05-02 SDOH — SOCIAL STABILITY: SOCIAL INSECURITY: WITHIN THE LAST YEAR, HAVE YOU BEEN AFRAID OF YOUR PARTNER OR EX-PARTNER?: NO

## 2025-05-02 SDOH — SOCIAL STABILITY: SOCIAL NETWORK: HOW OFTEN DO YOU ATTEND CHURCH OR RELIGIOUS SERVICES?: NEVER

## 2025-05-02 SDOH — ECONOMIC STABILITY: FOOD INSECURITY: HOW HARD IS IT FOR YOU TO PAY FOR THE VERY BASICS LIKE FOOD, HOUSING, MEDICAL CARE, AND HEATING?: NOT HARD AT ALL

## 2025-05-02 SDOH — SOCIAL STABILITY: SOCIAL NETWORK
IN A TYPICAL WEEK, HOW MANY TIMES DO YOU TALK ON THE PHONE WITH FAMILY, FRIENDS, OR NEIGHBORS?: MORE THAN THREE TIMES A WEEK

## 2025-05-02 SDOH — ECONOMIC STABILITY: HOUSING INSECURITY: IN THE LAST 12 MONTHS, WAS THERE A TIME WHEN YOU WERE NOT ABLE TO PAY THE MORTGAGE OR RENT ON TIME?: NO

## 2025-05-02 SDOH — ECONOMIC STABILITY: FOOD INSECURITY: WITHIN THE PAST 12 MONTHS, THE FOOD YOU BOUGHT JUST DIDN'T LAST AND YOU DIDN'T HAVE MONEY TO GET MORE.: NEVER TRUE

## 2025-05-02 SDOH — HEALTH STABILITY: PHYSICAL HEALTH: ON AVERAGE, HOW MANY DAYS PER WEEK DO YOU ENGAGE IN MODERATE TO STRENUOUS EXERCISE (LIKE A BRISK WALK)?: 0 DAYS

## 2025-05-02 SDOH — SOCIAL STABILITY: SOCIAL INSECURITY
WITHIN THE LAST YEAR, HAVE YOU BEEN KICKED, HIT, SLAPPED, OR OTHERWISE PHYSICALLY HURT BY YOUR PARTNER OR EX-PARTNER?: NO

## 2025-05-02 SDOH — SOCIAL STABILITY: SOCIAL INSECURITY: WITHIN THE LAST YEAR, HAVE YOU BEEN HUMILIATED OR EMOTIONALLY ABUSED IN OTHER WAYS BY YOUR PARTNER OR EX-PARTNER?: NO

## 2025-05-02 SDOH — ECONOMIC STABILITY: FOOD INSECURITY: WITHIN THE PAST 12 MONTHS, YOU WORRIED THAT YOUR FOOD WOULD RUN OUT BEFORE YOU GOT THE MONEY TO BUY MORE.: NEVER TRUE

## 2025-05-02 SDOH — SOCIAL STABILITY: SOCIAL INSECURITY: ARE YOU MARRIED, WIDOWED, DIVORCED, SEPARATED, NEVER MARRIED, OR LIVING WITH A PARTNER?: MARRIED

## 2025-05-02 SDOH — HEALTH STABILITY: PHYSICAL HEALTH: ON AVERAGE, HOW MANY MINUTES DO YOU ENGAGE IN EXERCISE AT THIS LEVEL?: 0 MIN

## 2025-05-02 SDOH — SOCIAL STABILITY: SOCIAL NETWORK
DO YOU BELONG TO ANY CLUBS OR ORGANIZATIONS SUCH AS CHURCH GROUPS, UNIONS, FRATERNAL OR ATHLETIC GROUPS, OR SCHOOL GROUPS?: NO

## 2025-05-02 SDOH — ECONOMIC STABILITY: TRANSPORTATION INSECURITY: IN THE PAST 12 MONTHS, HAS LACK OF TRANSPORTATION KEPT YOU FROM MEDICAL APPOINTMENTS OR FROM GETTING MEDICATIONS?: NO

## 2025-05-02 SDOH — SOCIAL STABILITY: SOCIAL NETWORK: HOW OFTEN DO YOU GET TOGETHER WITH FRIENDS OR RELATIVES?: MORE THAN THREE TIMES A WEEK

## 2025-05-02 SDOH — HEALTH STABILITY: MENTAL HEALTH
DO YOU FEEL STRESS - TENSE, RESTLESS, NERVOUS, OR ANXIOUS, OR UNABLE TO SLEEP AT NIGHT BECAUSE YOUR MIND IS TROUBLED ALL THE TIME - THESE DAYS?: NOT AT ALL

## 2025-05-02 SDOH — SOCIAL STABILITY: SOCIAL NETWORK: HOW OFTEN DO YOU ATTEND MEETINGS OF THE CLUBS OR ORGANIZATIONS YOU BELONG TO?: NEVER

## 2025-05-02 ASSESSMENT — ACTIVITIES OF DAILY LIVING (ADL): LACK_OF_TRANSPORTATION: NO

## 2025-05-02 NOTE — PROGRESS NOTES
[facility]  Initial Social Work Assessment    Date and Time of Initial In-Home Visit: 5/2/25    Reason for Assessment: Completion of [facility] intake paperwork, initial social work assessment    Sources of Information: Pt's spouse    SOCIAL HISTORY  Relationship Status:   [] Single  [x]   [] Significant Other  []   [] /  [] Other:     Living Circumstances: Pt lives in ground-level apartment with her spouse    Current/Type of Housing:  [] Public/subsidized housing  [x] Apartment, Is there an elevator: Yes, however they are on the ground level  [] Assisted Living  [] Private House, How many floors:     FINANCIAL/INSURANCE  Source of Income:    [x] Social Security   [] SSI   [] Pension   [] Employment Income   [] Spouse income   [] Other:    Insurance Information:   [x] Medicare   [x] Medicaid   [] Private Insurance   [] Other:     Are you having trouble paying for things like rent, food, medications? Not at this time    Is there an agency or person who pays your bills? If yes, who? Spouse manage the finances    ENVIRONMENT CHECKLIST  Food Security: No concerns, pt has access to food and prepared meals    Problem with bed bugs, odors, pests, or pets? None observed or reported. 1 cat in the apartment.     Working smoke alarm? [x] Yes [] No    Difficulty getting to exits from the home? No    Firearm Assessment:   Are there firearms in the home?  [x] Yes, Where are they kept? Spouse had a firearm on his person during today's appt. He is retired from [employer] in [place], [place], and says he wears his firearm as habit.   [] No      SOCIAL SUPPORT ENVIRONMENT  Support System: Limited social support system    How often do social supports visit? Pt lives with primary support    How do you socialize? When people call or come to visit    Are you involved with any community agencies? Name/Contact: Not at this time, but home health is \"starting soon\". Spouse was unsure which

## 2025-05-08 ENCOUNTER — TELEPHONE (OUTPATIENT)
Facility: CLINIC | Age: 80
End: 2025-05-08

## 2025-05-08 NOTE — TELEPHONE ENCOUNTER
Called patient to confirm their in home visit with Dr. Abbasi on 05/15/25, arrival between 10-4.  Got voicemail. Left message.

## 2025-05-11 ENCOUNTER — APPOINTMENT (OUTPATIENT)
Facility: HOSPITAL | Age: 80
DRG: 871 | End: 2025-05-11
Payer: MEDICARE

## 2025-05-11 ENCOUNTER — HOSPITAL ENCOUNTER (INPATIENT)
Facility: HOSPITAL | Age: 80
LOS: 1 days | DRG: 871 | End: 2025-05-12
Attending: STUDENT IN AN ORGANIZED HEALTH CARE EDUCATION/TRAINING PROGRAM | Admitting: STUDENT IN AN ORGANIZED HEALTH CARE EDUCATION/TRAINING PROGRAM
Payer: MEDICARE

## 2025-05-11 DIAGNOSIS — J96.01 ACUTE HYPOXIC RESPIRATORY FAILURE (HCC): ICD-10-CM

## 2025-05-11 DIAGNOSIS — J96.02 ACUTE HYPERCAPNIC RESPIRATORY FAILURE (HCC): ICD-10-CM

## 2025-05-11 DIAGNOSIS — R65.20 SEVERE SEPSIS (HCC): Primary | ICD-10-CM

## 2025-05-11 DIAGNOSIS — J69.0 ASPIRATION PNEUMONIA OF BOTH LUNGS DUE TO VOMIT, UNSPECIFIED PART OF LUNG (HCC): ICD-10-CM

## 2025-05-11 DIAGNOSIS — A41.9 SEVERE SEPSIS (HCC): Primary | ICD-10-CM

## 2025-05-11 DIAGNOSIS — R57.9 SHOCK (HCC): ICD-10-CM

## 2025-05-11 LAB
ACB COMPLEX DNA BLD POS QL NAA+NON-PROBE: NOT DETECTED
ACCESSION NUMBER, LLC1M: ABNORMAL
ALBUMIN SERPL-MCNC: 3.4 G/DL (ref 3.5–5)
ALBUMIN/GLOB SERPL: 0.8 (ref 1.1–2.2)
ALP SERPL-CCNC: 107 U/L (ref 45–117)
ALT SERPL-CCNC: 10 U/L (ref 12–78)
ANION GAP SERPL CALC-SCNC: 5 MMOL/L (ref 2–12)
ANION GAP SERPL CALC-SCNC: 5 MMOL/L (ref 2–12)
ANION GAP SERPL CALC-SCNC: 6 MMOL/L (ref 2–12)
APPEARANCE UR: CLEAR
ARTERIAL PATENCY WRIST A: ABNORMAL
ARTERIAL PATENCY WRIST A: POSITIVE
ARTERIAL PATENCY WRIST A: YES
AST SERPL-CCNC: 17 U/L (ref 15–37)
B FRAGILIS DNA BLD POS QL NAA+NON-PROBE: NOT DETECTED
B PERT DNA SPEC QL NAA+PROBE: NOT DETECTED
BACTERIA URNS QL MICRO: ABNORMAL /HPF
BASE DEFICIT BLD-SCNC: 0.1 MMOL/L
BASE DEFICIT BLD-SCNC: 1.7 MMOL/L
BASE DEFICIT BLD-SCNC: 3.5 MMOL/L
BASE DEFICIT BLDA-SCNC: 3.3 MMOL/L
BASE DEFICIT BLDA-SCNC: 6.5 MMOL/L
BASE DEFICIT BLDA-SCNC: 6.7 MMOL/L
BASE EXCESS BLD CALC-SCNC: 5.7 MMOL/L
BASE EXCESS BLD CALC-SCNC: 7 MMOL/L
BASOPHILS # BLD: 0 K/UL (ref 0–0.1)
BASOPHILS # BLD: 0.06 K/UL (ref 0–0.1)
BASOPHILS NFR BLD: 0 % (ref 0–1)
BASOPHILS NFR BLD: 0.3 % (ref 0–1)
BDY SITE: ABNORMAL
BILIRUB SERPL-MCNC: 0.3 MG/DL (ref 0.2–1)
BILIRUB UR QL: NEGATIVE
BIOFIRE TEST COMMENT: ABNORMAL
BORDETELLA PARAPERTUSSIS BY PCR: NOT DETECTED
BUN SERPL-MCNC: 18 MG/DL (ref 6–20)
BUN SERPL-MCNC: 20 MG/DL (ref 6–20)
BUN SERPL-MCNC: 24 MG/DL (ref 6–20)
BUN/CREAT SERPL: 15 (ref 12–20)
BUN/CREAT SERPL: 18 (ref 12–20)
BUN/CREAT SERPL: 20 (ref 12–20)
C ALBICANS DNA BLD POS QL NAA+NON-PROBE: NOT DETECTED
C AURIS DNA BLD POS QL NAA+NON-PROBE: NOT DETECTED
C GATTII+NEOFOR DNA BLD POS QL NAA+N-PRB: NOT DETECTED
C GLABRATA DNA BLD POS QL NAA+NON-PROBE: NOT DETECTED
C KRUSEI DNA BLD POS QL NAA+NON-PROBE: NOT DETECTED
C PARAP DNA BLD POS QL NAA+NON-PROBE: NOT DETECTED
C PNEUM DNA SPEC QL NAA+PROBE: NOT DETECTED
C TROPICLS DNA BLD POS QL NAA+NON-PROBE: NOT DETECTED
CALCIUM SERPL-MCNC: 8.3 MG/DL (ref 8.5–10.1)
CALCIUM SERPL-MCNC: 8.5 MG/DL (ref 8.5–10.1)
CALCIUM SERPL-MCNC: 9.4 MG/DL (ref 8.5–10.1)
CHLORIDE SERPL-SCNC: 100 MMOL/L (ref 97–108)
CHLORIDE SERPL-SCNC: 107 MMOL/L (ref 97–108)
CHLORIDE SERPL-SCNC: 108 MMOL/L (ref 97–108)
CO2 SERPL-SCNC: 29 MMOL/L (ref 21–32)
CO2 SERPL-SCNC: 29 MMOL/L (ref 21–32)
CO2 SERPL-SCNC: 33 MMOL/L (ref 21–32)
COHGB MFR BLD: 0.5 % (ref 1–2)
COLOR UR: YELLOW
COMMENT:: NORMAL
CREAT SERPL-MCNC: 1.01 MG/DL (ref 0.55–1.02)
CREAT SERPL-MCNC: 1.02 MG/DL (ref 0.55–1.02)
CREAT SERPL-MCNC: 1.56 MG/DL (ref 0.55–1.02)
DIFFERENTIAL METHOD BLD: ABNORMAL
DIFFERENTIAL METHOD BLD: ABNORMAL
E CLOAC COMP DNA BLD POS NAA+NON-PROBE: NOT DETECTED
E COLI DNA BLD POS QL NAA+NON-PROBE: NOT DETECTED
E FAECALIS DNA BLD POS QL NAA+NON-PROBE: NOT DETECTED
E FAECIUM DNA BLD POS QL NAA+NON-PROBE: NOT DETECTED
ENTEROBACTERALES DNA BLD POS NAA+N-PRB: NOT DETECTED
EOSINOPHIL # BLD: 0.01 K/UL (ref 0–0.4)
EOSINOPHIL # BLD: 0.06 K/UL (ref 0–0.4)
EOSINOPHIL NFR BLD: 0 % (ref 0–7)
EOSINOPHIL NFR BLD: 1 % (ref 0–7)
EPITH CASTS URNS QL MICRO: ABNORMAL /LPF
ERYTHROCYTE [DISTWIDTH] IN BLOOD BY AUTOMATED COUNT: 13.8 % (ref 11.5–14.5)
ERYTHROCYTE [DISTWIDTH] IN BLOOD BY AUTOMATED COUNT: 13.8 % (ref 11.5–14.5)
FIO2 ON VENT: 100 %
FLUAV RNA SPEC QL NAA+PROBE: NOT DETECTED
FLUAV SUBTYP SPEC NAA+PROBE: NOT DETECTED
FLUBV RNA SPEC QL NAA+PROBE: NOT DETECTED
FLUBV RNA SPEC QL NAA+PROBE: NOT DETECTED
GAS FLOW.O2 O2 DELIVERY SYS: ABNORMAL
GAS FLOW.O2 SETTING OXYMISER: 22 BPM
GAS FLOW.O2 SETTING OXYMISER: 24 BPM
GLOBULIN SER CALC-MCNC: 4.4 G/DL (ref 2–4)
GLUCOSE SERPL-MCNC: 143 MG/DL (ref 65–100)
GLUCOSE SERPL-MCNC: 169 MG/DL (ref 65–100)
GLUCOSE SERPL-MCNC: 174 MG/DL (ref 65–100)
GLUCOSE UR STRIP.AUTO-MCNC: NEGATIVE MG/DL
GP B STREP DNA BLD POS QL NAA+NON-PROBE: NOT DETECTED
GRAN CASTS URNS QL MICRO: ABNORMAL /LPF
HADV DNA SPEC QL NAA+PROBE: NOT DETECTED
HAEM INFLU DNA BLD POS QL NAA+NON-PROBE: NOT DETECTED
HCO3 BLD-SCNC: 23.7 MMOL/L (ref 21–28)
HCO3 BLD-SCNC: 28.1 MMOL/L (ref 21–28)
HCO3 BLD-SCNC: 29.6 MMOL/L (ref 21–28)
HCO3 BLD-SCNC: 34.8 MMOL/L (ref 21–28)
HCO3 BLD-SCNC: 36.2 MMOL/L (ref 21–28)
HCO3 BLDA-SCNC: 20 MMOL/L (ref 22–26)
HCO3 BLDA-SCNC: 22 MMOL/L (ref 22–26)
HCO3 BLDA-SCNC: 27 MMOL/L (ref 22–26)
HCOV 229E RNA SPEC QL NAA+PROBE: NOT DETECTED
HCOV HKU1 RNA SPEC QL NAA+PROBE: NOT DETECTED
HCOV NL63 RNA SPEC QL NAA+PROBE: NOT DETECTED
HCOV OC43 RNA SPEC QL NAA+PROBE: NOT DETECTED
HCT VFR BLD AUTO: 45.5 % (ref 35–47)
HCT VFR BLD AUTO: 46.7 % (ref 35–47)
HGB BLD-MCNC: 13.9 G/DL (ref 11.5–16)
HGB BLD-MCNC: 14.4 G/DL (ref 11.5–16)
HGB UR QL STRIP: NEGATIVE
HMPV RNA SPEC QL NAA+PROBE: NOT DETECTED
HPIV1 RNA SPEC QL NAA+PROBE: NOT DETECTED
HPIV2 RNA SPEC QL NAA+PROBE: NOT DETECTED
HPIV3 RNA SPEC QL NAA+PROBE: NOT DETECTED
HPIV4 RNA SPEC QL NAA+PROBE: NOT DETECTED
IMM GRANULOCYTES # BLD AUTO: 0 K/UL
IMM GRANULOCYTES # BLD AUTO: 0.21 K/UL (ref 0–0.04)
IMM GRANULOCYTES NFR BLD AUTO: 0 %
IMM GRANULOCYTES NFR BLD AUTO: 0.9 % (ref 0–0.5)
IPAP/PIP/HIGH PEEP: 18
IPAP/PIP/HIGH PEEP: 34
K OXYTOCA DNA BLD POS QL NAA+NON-PROBE: NOT DETECTED
KETONES UR QL STRIP.AUTO: NEGATIVE MG/DL
KLEBSIELLA SP DNA BLD POS QL NAA+NON-PRB: NOT DETECTED
KLEBSIELLA SP DNA BLD POS QL NAA+NON-PRB: NOT DETECTED
L MONOCYTOG DNA BLD POS QL NAA+NON-PROBE: NOT DETECTED
LACTATE BLD-SCNC: 2.52 MMOL/L (ref 0.4–2)
LACTATE SERPL-SCNC: 4.3 MMOL/L (ref 0.4–2)
LACTATE SERPL-SCNC: 4.7 MMOL/L (ref 0.4–2)
LACTATE SERPL-SCNC: 4.7 MMOL/L (ref 0.4–2)
LEUKOCYTE ESTERASE UR QL STRIP.AUTO: NEGATIVE
LYMPHOCYTES # BLD: 0.84 K/UL (ref 0.8–3.5)
LYMPHOCYTES # BLD: 1.81 K/UL (ref 0.8–3.5)
LYMPHOCYTES NFR BLD: 14 % (ref 12–49)
LYMPHOCYTES NFR BLD: 7.9 % (ref 12–49)
M PNEUMO DNA SPEC QL NAA+PROBE: NOT DETECTED
MAGNESIUM SERPL-MCNC: 2 MG/DL (ref 1.6–2.4)
MCH RBC QN AUTO: 28.6 PG (ref 26–34)
MCH RBC QN AUTO: 29 PG (ref 26–34)
MCHC RBC AUTO-ENTMCNC: 30.5 G/DL (ref 30–36.5)
MCHC RBC AUTO-ENTMCNC: 30.8 G/DL (ref 30–36.5)
MCV RBC AUTO: 92.7 FL (ref 80–99)
MCV RBC AUTO: 95 FL (ref 80–99)
MECA+MECC ISLT/SPM QL: DETECTED
METHGB MFR BLD: 0.7 % (ref 0–1.4)
MONOCYTES # BLD: 0.12 K/UL (ref 0–1)
MONOCYTES # BLD: 1.04 K/UL (ref 0–1)
MONOCYTES NFR BLD: 2 % (ref 5–13)
MONOCYTES NFR BLD: 4.5 % (ref 5–13)
N MEN DNA BLD POS QL NAA+NON-PROBE: NOT DETECTED
NEUTS SEG # BLD: 19.81 K/UL (ref 1.8–8)
NEUTS SEG # BLD: 4.98 K/UL (ref 1.8–8)
NEUTS SEG NFR BLD: 83 % (ref 32–75)
NEUTS SEG NFR BLD: 86.4 % (ref 32–75)
NITRITE UR QL STRIP.AUTO: NEGATIVE
NRBC # BLD: 0 K/UL (ref 0–0.01)
NRBC # BLD: 0 K/UL (ref 0–0.01)
NRBC BLD-RTO: 0 PER 100 WBC
NRBC BLD-RTO: 0 PER 100 WBC
NT PRO BNP: 913 PG/ML
O2/TOTAL GAS SETTING VFR VENT: 100 %
O2/TOTAL GAS SETTING VFR VENT: 100 %
O2/TOTAL GAS SETTING VFR VENT: 80 %
OXYHGB MFR BLD: 95.8 % (ref 94–97)
P AERUGINOSA DNA BLD POS NAA+NON-PROBE: NOT DETECTED
PAW @ MEAN EXP FLOW ON VENT: 21 CMH2O
PCO2 BLD: 50.2 MMHG (ref 35–48)
PCO2 BLD: 59.4 MMHG (ref 35–48)
PCO2 BLD: 60 MMHG (ref 35–48)
PCO2 BLD: 76.9 MMHG (ref 35–48)
PCO2 BLD: 80.1 MMHG (ref 35–48)
PCO2 BLDA: 44 MMHG (ref 35–45)
PCO2 BLDA: 57 MMHG (ref 35–45)
PCO2 BLDA: 74 MMHG (ref 35–45)
PEEP RESPIRATORY: 12
PEEP RESPIRATORY: 16 CMH2O
PH BLD: 7.18 (ref 7.35–7.45)
PH BLD: 7.28 (ref 7.35–7.45)
PH BLD: 7.37 (ref 7.35–7.45)
PH BLDA: 7.18 (ref 7.35–7.45)
PH BLDA: 7.21 (ref 7.35–7.45)
PH BLDA: 7.28 (ref 7.35–7.45)
PH UR STRIP: 6 (ref 5–8)
PHOSPHATE SERPL-MCNC: 4.2 MG/DL (ref 2.6–4.7)
PLATELET # BLD AUTO: 205 K/UL (ref 150–400)
PLATELET # BLD AUTO: 227 K/UL (ref 150–400)
PMV BLD AUTO: 11.4 FL (ref 8.9–12.9)
PMV BLD AUTO: 12.2 FL (ref 8.9–12.9)
PO2 BLD: 159 MMHG (ref 83–108)
PO2 BLD: 52 MMHG (ref 83–108)
PO2 BLD: 57 MMHG (ref 83–108)
PO2 BLD: 71 MMHG (ref 83–108)
PO2 BLD: 78 MMHG (ref 83–108)
PO2 BLDA: 111 MMHG (ref 80–100)
PO2 BLDA: 63 MMHG (ref 80–100)
PO2 BLDA: 88 MMHG (ref 80–100)
POTASSIUM SERPL-SCNC: 4 MMOL/L (ref 3.5–5.1)
POTASSIUM SERPL-SCNC: 4 MMOL/L (ref 3.5–5.1)
POTASSIUM SERPL-SCNC: 4.1 MMOL/L (ref 3.5–5.1)
PROCALCITONIN SERPL-MCNC: <0.05 NG/ML
PROCALCITONIN SERPL-MCNC: <0.05 NG/ML
PROT SERPL-MCNC: 7.8 G/DL (ref 6.4–8.2)
PROT UR STRIP-MCNC: 100 MG/DL
PROTEUS SP DNA BLD POS QL NAA+NON-PROBE: NOT DETECTED
RBC # BLD AUTO: 4.79 M/UL (ref 3.8–5.2)
RBC # BLD AUTO: 5.04 M/UL (ref 3.8–5.2)
RBC #/AREA URNS HPF: ABNORMAL /HPF (ref 0–5)
RBC MORPH BLD: ABNORMAL
RESISTANT GENE TARGETS: ABNORMAL
RSV RNA SPEC QL NAA+PROBE: NOT DETECTED
RV+EV RNA SPEC QL NAA+PROBE: NOT DETECTED
S AUREUS DNA BLD POS QL NAA+NON-PROBE: NOT DETECTED
S AUREUS+CONS DNA BLD POS NAA+NON-PROBE: DETECTED
S EPIDERMIDIS DNA BLD POS QL NAA+NON-PRB: DETECTED
S LUGDUNENSIS DNA BLD POS QL NAA+NON-PRB: NOT DETECTED
S MALTOPHILIA DNA BLD POS QL NAA+NON-PRB: NOT DETECTED
S MARCESCENS DNA BLD POS NAA+NON-PROBE: NOT DETECTED
S PNEUM DNA BLD POS QL NAA+NON-PROBE: NOT DETECTED
S PYO DNA BLD POS QL NAA+NON-PROBE: NOT DETECTED
SALMONELLA DNA BLD POS QL NAA+NON-PROBE: NOT DETECTED
SAO2 % BLD: 79.6 % (ref 92–97)
SAO2 % BLD: 80 % (ref 92–97)
SAO2 % BLD: 86 % (ref 92–97)
SAO2 % BLD: 91.7 % (ref 92–97)
SAO2 % BLD: 94.5 % (ref 92–97)
SAO2 % BLD: 95 % (ref 92–97)
SAO2 % BLD: 97 % (ref 95–99)
SAO2 % BLD: 98 % (ref 92–97)
SAO2 % BLD: 99.1 % (ref 92–97)
SAO2% DEVICE SAO2% SENSOR NAME: ABNORMAL
SARS-COV-2 RNA RESP QL NAA+PROBE: NOT DETECTED
SARS-COV-2 RNA RESP QL NAA+PROBE: NOT DETECTED
SERVICE CMNT-IMP: ABNORMAL
SODIUM SERPL-SCNC: 138 MMOL/L (ref 136–145)
SODIUM SERPL-SCNC: 142 MMOL/L (ref 136–145)
SODIUM SERPL-SCNC: 142 MMOL/L (ref 136–145)
SOURCE: NORMAL
SP GR UR REFRACTOMETRY: ABNORMAL (ref 1–1.03)
SPECIMEN HOLD: NORMAL
SPECIMEN SITE: ABNORMAL
SPECIMEN TYPE: ABNORMAL
STREPTOCOCCUS DNA BLD POS NAA+NON-PROBE: NOT DETECTED
TRIGL SERPL-MCNC: 84 MG/DL
TROPONIN I SERPL HS-MCNC: 15 NG/L (ref 0–51)
TROPONIN I SERPL HS-MCNC: 43 NG/L (ref 0–51)
URINE CULTURE IF INDICATED: ABNORMAL
UROBILINOGEN UR QL STRIP.AUTO: 0.2 EU/DL (ref 0.2–1)
VENTILATION MODE VENT: ABNORMAL
VENTILATION MODE VENT: ABNORMAL
VT SETTING VENT: 320 ML
VT SETTING VENT: 320 ML
WBC # BLD AUTO: 22.9 K/UL (ref 3.6–11)
WBC # BLD AUTO: 6 K/UL (ref 3.6–11)
WBC URNS QL MICRO: ABNORMAL /HPF (ref 0–4)

## 2025-05-11 PROCEDURE — 2000000000 HC ICU R&B

## 2025-05-11 PROCEDURE — 2580000003 HC RX 258: Performed by: NURSE PRACTITIONER

## 2025-05-11 PROCEDURE — 5A12012 PERFORMANCE OF CARDIAC OUTPUT, SINGLE, MANUAL: ICD-10-PCS | Performed by: STUDENT IN AN ORGANIZED HEALTH CARE EDUCATION/TRAINING PROGRAM

## 2025-05-11 PROCEDURE — 2500000003 HC RX 250 WO HCPCS: Performed by: STUDENT IN AN ORGANIZED HEALTH CARE EDUCATION/TRAINING PROGRAM

## 2025-05-11 PROCEDURE — 2500000003 HC RX 250 WO HCPCS: Performed by: PHYSICIAN ASSISTANT

## 2025-05-11 PROCEDURE — 94002 VENT MGMT INPAT INIT DAY: CPT

## 2025-05-11 PROCEDURE — 2500000003 HC RX 250 WO HCPCS

## 2025-05-11 PROCEDURE — 84484 ASSAY OF TROPONIN QUANT: CPT

## 2025-05-11 PROCEDURE — 80053 COMPREHEN METABOLIC PANEL: CPT

## 2025-05-11 PROCEDURE — 87154 CUL TYP ID BLD PTHGN 6+ TRGT: CPT

## 2025-05-11 PROCEDURE — 6370000000 HC RX 637 (ALT 250 FOR IP): Performed by: STUDENT IN AN ORGANIZED HEALTH CARE EDUCATION/TRAINING PROGRAM

## 2025-05-11 PROCEDURE — 87636 SARSCOV2 & INF A&B AMP PRB: CPT

## 2025-05-11 PROCEDURE — 2580000003 HC RX 258: Performed by: STUDENT IN AN ORGANIZED HEALTH CARE EDUCATION/TRAINING PROGRAM

## 2025-05-11 PROCEDURE — 36620 INSERTION CATHETER ARTERY: CPT

## 2025-05-11 PROCEDURE — 6360000002 HC RX W HCPCS: Performed by: STUDENT IN AN ORGANIZED HEALTH CARE EDUCATION/TRAINING PROGRAM

## 2025-05-11 PROCEDURE — 82375 ASSAY CARBOXYHB QUANT: CPT

## 2025-05-11 PROCEDURE — 83735 ASSAY OF MAGNESIUM: CPT

## 2025-05-11 PROCEDURE — 84100 ASSAY OF PHOSPHORUS: CPT

## 2025-05-11 PROCEDURE — 94660 CPAP INITIATION&MGMT: CPT

## 2025-05-11 PROCEDURE — 6360000002 HC RX W HCPCS: Performed by: NURSE PRACTITIONER

## 2025-05-11 PROCEDURE — 6370000000 HC RX 637 (ALT 250 FOR IP): Performed by: NURSE PRACTITIONER

## 2025-05-11 PROCEDURE — 0BH18EZ INSERTION OF ENDOTRACHEAL AIRWAY INTO TRACHEA, VIA NATURAL OR ARTIFICIAL OPENING ENDOSCOPIC: ICD-10-PCS | Performed by: STUDENT IN AN ORGANIZED HEALTH CARE EDUCATION/TRAINING PROGRAM

## 2025-05-11 PROCEDURE — 80048 BASIC METABOLIC PNL TOTAL CA: CPT

## 2025-05-11 PROCEDURE — 87070 CULTURE OTHR SPECIMN AEROBIC: CPT

## 2025-05-11 PROCEDURE — 96361 HYDRATE IV INFUSION ADD-ON: CPT

## 2025-05-11 PROCEDURE — 82803 BLOOD GASES ANY COMBINATION: CPT

## 2025-05-11 PROCEDURE — 87086 URINE CULTURE/COLONY COUNT: CPT

## 2025-05-11 PROCEDURE — 6370000000 HC RX 637 (ALT 250 FOR IP): Performed by: INTERNAL MEDICINE

## 2025-05-11 PROCEDURE — 94640 AIRWAY INHALATION TREATMENT: CPT

## 2025-05-11 PROCEDURE — 5A1945Z RESPIRATORY VENTILATION, 24-96 CONSECUTIVE HOURS: ICD-10-PCS | Performed by: STUDENT IN AN ORGANIZED HEALTH CARE EDUCATION/TRAINING PROGRAM

## 2025-05-11 PROCEDURE — 81001 URINALYSIS AUTO W/SCOPE: CPT

## 2025-05-11 PROCEDURE — 2580000003 HC RX 258

## 2025-05-11 PROCEDURE — 87040 BLOOD CULTURE FOR BACTERIA: CPT

## 2025-05-11 PROCEDURE — 03HY32Z INSERTION OF MONITORING DEVICE INTO UPPER ARTERY, PERCUTANEOUS APPROACH: ICD-10-PCS | Performed by: INTERNAL MEDICINE

## 2025-05-11 PROCEDURE — 36600 WITHDRAWAL OF ARTERIAL BLOOD: CPT

## 2025-05-11 PROCEDURE — 6360000002 HC RX W HCPCS

## 2025-05-11 PROCEDURE — 96375 TX/PRO/DX INJ NEW DRUG ADDON: CPT

## 2025-05-11 PROCEDURE — 36556 INSERT NON-TUNNEL CV CATH: CPT

## 2025-05-11 PROCEDURE — 94760 N-INVAS EAR/PLS OXIMETRY 1: CPT

## 2025-05-11 PROCEDURE — 5A09357 ASSISTANCE WITH RESPIRATORY VENTILATION, LESS THAN 24 CONSECUTIVE HOURS, CONTINUOUS POSITIVE AIRWAY PRESSURE: ICD-10-PCS | Performed by: INTERNAL MEDICINE

## 2025-05-11 PROCEDURE — 71045 X-RAY EXAM CHEST 1 VIEW: CPT

## 2025-05-11 PROCEDURE — 3E033XZ INTRODUCTION OF VASOPRESSOR INTO PERIPHERAL VEIN, PERCUTANEOUS APPROACH: ICD-10-PCS | Performed by: STUDENT IN AN ORGANIZED HEALTH CARE EDUCATION/TRAINING PROGRAM

## 2025-05-11 PROCEDURE — 83605 ASSAY OF LACTIC ACID: CPT

## 2025-05-11 PROCEDURE — 6370000000 HC RX 637 (ALT 250 FOR IP): Performed by: PHYSICIAN ASSISTANT

## 2025-05-11 PROCEDURE — 84145 PROCALCITONIN (PCT): CPT

## 2025-05-11 PROCEDURE — 85025 COMPLETE CBC W/AUTO DIFF WBC: CPT

## 2025-05-11 PROCEDURE — 2500000003 HC RX 250 WO HCPCS: Performed by: NURSE PRACTITIONER

## 2025-05-11 PROCEDURE — 83050 HGB METHEMOGLOBIN QUAN: CPT

## 2025-05-11 PROCEDURE — 99285 EMERGENCY DEPT VISIT HI MDM: CPT

## 2025-05-11 PROCEDURE — 02HV33Z INSERTION OF INFUSION DEVICE INTO SUPERIOR VENA CAVA, PERCUTANEOUS APPROACH: ICD-10-PCS | Performed by: INTERNAL MEDICINE

## 2025-05-11 PROCEDURE — 96365 THER/PROPH/DIAG IV INF INIT: CPT

## 2025-05-11 PROCEDURE — 83880 ASSAY OF NATRIURETIC PEPTIDE: CPT

## 2025-05-11 PROCEDURE — 87205 SMEAR GRAM STAIN: CPT

## 2025-05-11 PROCEDURE — 84478 ASSAY OF TRIGLYCERIDES: CPT

## 2025-05-11 PROCEDURE — 0202U NFCT DS 22 TRGT SARS-COV-2: CPT

## 2025-05-11 PROCEDURE — 37799 UNLISTED PX VASCULAR SURGERY: CPT

## 2025-05-11 RX ORDER — IPRATROPIUM BROMIDE AND ALBUTEROL SULFATE 2.5; .5 MG/3ML; MG/3ML
1 SOLUTION RESPIRATORY (INHALATION)
Status: DISCONTINUED | OUTPATIENT
Start: 2025-05-11 | End: 2025-05-11

## 2025-05-11 RX ORDER — ROCURONIUM BROMIDE 10 MG/ML
INJECTION, SOLUTION INTRAVENOUS
Status: DISPENSED
Start: 2025-05-11 | End: 2025-05-11

## 2025-05-11 RX ORDER — GUAIFENESIN 200 MG/10ML
400 LIQUID ORAL EVERY 4 HOURS
Status: DISCONTINUED | OUTPATIENT
Start: 2025-05-11 | End: 2025-05-13 | Stop reason: HOSPADM

## 2025-05-11 RX ORDER — ACETAMINOPHEN 650 MG/1
650 SUPPOSITORY RECTAL EVERY 6 HOURS PRN
Status: DISCONTINUED | OUTPATIENT
Start: 2025-05-11 | End: 2025-05-13 | Stop reason: HOSPADM

## 2025-05-11 RX ORDER — SODIUM CHLORIDE 9 MG/ML
INJECTION, SOLUTION INTRAVENOUS PRN
Status: DISCONTINUED | OUTPATIENT
Start: 2025-05-11 | End: 2025-05-13 | Stop reason: HOSPADM

## 2025-05-11 RX ORDER — IPRATROPIUM BROMIDE AND ALBUTEROL SULFATE 2.5; .5 MG/3ML; MG/3ML
1 SOLUTION RESPIRATORY (INHALATION) EVERY 6 HOURS
Status: DISCONTINUED | OUTPATIENT
Start: 2025-05-11 | End: 2025-05-13 | Stop reason: HOSPADM

## 2025-05-11 RX ORDER — SENNOSIDES 8.8 MG/5ML
5 LIQUID ORAL 2 TIMES DAILY PRN
Status: DISCONTINUED | OUTPATIENT
Start: 2025-05-11 | End: 2025-05-13 | Stop reason: HOSPADM

## 2025-05-11 RX ORDER — ROCURONIUM BROMIDE 10 MG/ML
INJECTION, SOLUTION INTRAVENOUS
Status: COMPLETED
Start: 2025-05-11 | End: 2025-05-11

## 2025-05-11 RX ORDER — ETOMIDATE 2 MG/ML
INJECTION INTRAVENOUS
Status: COMPLETED
Start: 2025-05-11 | End: 2025-05-11

## 2025-05-11 RX ORDER — NOREPINEPHRINE BITARTRATE 0.06 MG/ML
INJECTION, SOLUTION INTRAVENOUS
Status: COMPLETED
Start: 2025-05-11 | End: 2025-05-11

## 2025-05-11 RX ORDER — INDOMETHACIN 25 MG/1
CAPSULE ORAL
Status: COMPLETED
Start: 2025-05-11 | End: 2025-05-11

## 2025-05-11 RX ORDER — FENTANYL CITRATE 50 UG/ML
INJECTION, SOLUTION INTRAMUSCULAR; INTRAVENOUS
Status: COMPLETED
Start: 2025-05-11 | End: 2025-05-11

## 2025-05-11 RX ORDER — FENTANYL CITRATE-0.9 % NACL/PF 10 MCG/ML
25-200 PLASTIC BAG, INJECTION (ML) INTRAVENOUS CONTINUOUS
Refills: 0 | Status: DISCONTINUED | OUTPATIENT
Start: 2025-05-11 | End: 2025-05-11

## 2025-05-11 RX ORDER — ETOMIDATE 2 MG/ML
30 INJECTION INTRAVENOUS ONCE
Status: COMPLETED | OUTPATIENT
Start: 2025-05-11 | End: 2025-05-11

## 2025-05-11 RX ORDER — INDOMETHACIN 25 MG/1
50 CAPSULE ORAL ONCE
Status: COMPLETED | OUTPATIENT
Start: 2025-05-11 | End: 2025-05-11

## 2025-05-11 RX ORDER — EPINEPHRINE IN SOD CHLOR,ISO 1 MG/10 ML
SYRINGE (ML) INTRAVENOUS DAILY PRN
Status: COMPLETED | OUTPATIENT
Start: 2025-05-11 | End: 2025-05-11

## 2025-05-11 RX ORDER — HYDROCORTISONE SODIUM SUCCINATE 100 MG/2ML
50 INJECTION INTRAMUSCULAR; INTRAVENOUS EVERY 6 HOURS
Status: DISCONTINUED | OUTPATIENT
Start: 2025-05-11 | End: 2025-05-12

## 2025-05-11 RX ORDER — ENOXAPARIN SODIUM 100 MG/ML
30 INJECTION SUBCUTANEOUS 2 TIMES DAILY
Status: DISCONTINUED | OUTPATIENT
Start: 2025-05-11 | End: 2025-05-12

## 2025-05-11 RX ORDER — BUDESONIDE 0.5 MG/2ML
0.5 INHALANT ORAL
Status: DISCONTINUED | OUTPATIENT
Start: 2025-05-11 | End: 2025-05-12

## 2025-05-11 RX ORDER — CHLORHEXIDINE GLUCONATE ORAL RINSE 1.2 MG/ML
15 SOLUTION DENTAL 2 TIMES DAILY
Status: DISCONTINUED | OUTPATIENT
Start: 2025-05-11 | End: 2025-05-13 | Stop reason: HOSPADM

## 2025-05-11 RX ORDER — PROPOFOL 10 MG/ML
5-50 INJECTION, EMULSION INTRAVENOUS CONTINUOUS
Status: DISCONTINUED | OUTPATIENT
Start: 2025-05-11 | End: 2025-05-11

## 2025-05-11 RX ORDER — PROPOFOL 10 MG/ML
5-50 INJECTION, EMULSION INTRAVENOUS CONTINUOUS
Status: DISPENSED | OUTPATIENT
Start: 2025-05-11 | End: 2025-05-12

## 2025-05-11 RX ORDER — ROCURONIUM BROMIDE 10 MG/ML
100 INJECTION, SOLUTION INTRAVENOUS ONCE
Status: COMPLETED | OUTPATIENT
Start: 2025-05-11 | End: 2025-05-11

## 2025-05-11 RX ORDER — PROPOFOL 10 MG/ML
INJECTION, EMULSION INTRAVENOUS
Status: COMPLETED
Start: 2025-05-11 | End: 2025-05-11

## 2025-05-11 RX ORDER — SODIUM CHLORIDE 0.9 % (FLUSH) 0.9 %
5-40 SYRINGE (ML) INJECTION PRN
Status: DISCONTINUED | OUTPATIENT
Start: 2025-05-11 | End: 2025-05-13 | Stop reason: HOSPADM

## 2025-05-11 RX ORDER — FENTANYL CITRATE-0.9 % NACL/PF 10 MCG/ML
25-200 PLASTIC BAG, INJECTION (ML) INTRAVENOUS CONTINUOUS
Status: DISCONTINUED | OUTPATIENT
Start: 2025-05-11 | End: 2025-05-13 | Stop reason: HOSPADM

## 2025-05-11 RX ORDER — IPRATROPIUM BROMIDE AND ALBUTEROL SULFATE 2.5; .5 MG/3ML; MG/3ML
1 SOLUTION RESPIRATORY (INHALATION) EVERY 6 HOURS
Status: DISCONTINUED | OUTPATIENT
Start: 2025-05-11 | End: 2025-05-11

## 2025-05-11 RX ORDER — MIDAZOLAM HYDROCHLORIDE 1 MG/ML
INJECTION, SOLUTION INTRAMUSCULAR; INTRAVENOUS
Status: COMPLETED
Start: 2025-05-11 | End: 2025-05-11

## 2025-05-11 RX ORDER — NOREPINEPHRINE BITARTRATE 0.06 MG/ML
1-100 INJECTION, SOLUTION INTRAVENOUS CONTINUOUS
Status: DISCONTINUED | OUTPATIENT
Start: 2025-05-11 | End: 2025-05-13 | Stop reason: HOSPADM

## 2025-05-11 RX ORDER — ACETAMINOPHEN 325 MG/1
650 TABLET ORAL EVERY 6 HOURS PRN
Status: DISCONTINUED | OUTPATIENT
Start: 2025-05-11 | End: 2025-05-13 | Stop reason: HOSPADM

## 2025-05-11 RX ORDER — 0.9 % SODIUM CHLORIDE 0.9 %
1000 INTRAVENOUS SOLUTION INTRAVENOUS ONCE
Status: COMPLETED | OUTPATIENT
Start: 2025-05-11 | End: 2025-05-11

## 2025-05-11 RX ORDER — ONDANSETRON 2 MG/ML
4 INJECTION INTRAMUSCULAR; INTRAVENOUS EVERY 6 HOURS PRN
Status: DISCONTINUED | OUTPATIENT
Start: 2025-05-11 | End: 2025-05-13 | Stop reason: HOSPADM

## 2025-05-11 RX ORDER — MAGNESIUM HYDROXIDE/ALUMINUM HYDROXICE/SIMETHICONE 120; 1200; 1200 MG/30ML; MG/30ML; MG/30ML
30 SUSPENSION ORAL EVERY 6 HOURS PRN
Status: DISCONTINUED | OUTPATIENT
Start: 2025-05-11 | End: 2025-05-13 | Stop reason: HOSPADM

## 2025-05-11 RX ORDER — SODIUM CHLORIDE 0.9 % (FLUSH) 0.9 %
5-40 SYRINGE (ML) INJECTION EVERY 12 HOURS SCHEDULED
Status: DISCONTINUED | OUTPATIENT
Start: 2025-05-11 | End: 2025-05-13 | Stop reason: HOSPADM

## 2025-05-11 RX ADMIN — HYDROCORTISONE SODIUM SUCCINATE 50 MG: 100 INJECTION, POWDER, FOR SOLUTION INTRAMUSCULAR; INTRAVENOUS at 23:54

## 2025-05-11 RX ADMIN — NOREPINEPHRINE BITARTRATE 46 MCG/MIN: 1 INJECTION, SOLUTION, CONCENTRATE INTRAVENOUS at 12:53

## 2025-05-11 RX ADMIN — VASOPRESSIN 0.03 UNITS/MIN: 20 INJECTION, SOLUTION INTRAVENOUS at 12:52

## 2025-05-11 RX ADMIN — HYDROCORTISONE SODIUM SUCCINATE 50 MG: 100 INJECTION, POWDER, FOR SOLUTION INTRAMUSCULAR; INTRAVENOUS at 12:46

## 2025-05-11 RX ADMIN — FENTANYL CITRATE 125 MCG/HR: 50 INJECTION INTRAVENOUS at 14:08

## 2025-05-11 RX ADMIN — Medication 2500 MG: at 02:43

## 2025-05-11 RX ADMIN — VASOPRESSIN 0.03 UNITS/MIN: 20 INJECTION, SOLUTION INTRAVENOUS at 05:51

## 2025-05-11 RX ADMIN — FENTANYL CITRATE 100 MCG: 50 INJECTION, SOLUTION INTRAMUSCULAR; INTRAVENOUS at 04:02

## 2025-05-11 RX ADMIN — GUAIFENESIN 400 MG: 200 SOLUTION ORAL at 20:15

## 2025-05-11 RX ADMIN — PROPOFOL 20 MCG/KG/MIN: 10 INJECTION, EMULSION INTRAVENOUS at 02:30

## 2025-05-11 RX ADMIN — FENTANYL CITRATE 125 MCG/HR: 50 INJECTION INTRAVENOUS at 22:13

## 2025-05-11 RX ADMIN — FENTANYL CITRATE 100 MCG: 50 INJECTION INTRAMUSCULAR; INTRAVENOUS at 04:02

## 2025-05-11 RX ADMIN — SODIUM BICARBONATE 50 MEQ: 84 INJECTION INTRAVENOUS at 06:03

## 2025-05-11 RX ADMIN — IPRATROPIUM BROMIDE AND ALBUTEROL SULFATE 1 DOSE: .5; 3 SOLUTION RESPIRATORY (INHALATION) at 19:50

## 2025-05-11 RX ADMIN — ACETAMINOPHEN 650 MG: 325 TABLET ORAL at 10:20

## 2025-05-11 RX ADMIN — IPRATROPIUM BROMIDE AND ALBUTEROL SULFATE 1 DOSE: .5; 3 SOLUTION RESPIRATORY (INHALATION) at 13:29

## 2025-05-11 RX ADMIN — NOREPINEPHRINE BITARTRATE 56 MCG/MIN: 1 INJECTION, SOLUTION, CONCENTRATE INTRAVENOUS at 17:49

## 2025-05-11 RX ADMIN — ROCURONIUM BROMIDE 100 MG: 10 INJECTION, SOLUTION INTRAVENOUS at 04:05

## 2025-05-11 RX ADMIN — NOREPINEPHRINE BITARTRATE 5 MCG/MIN: 1 INJECTION, SOLUTION, CONCENTRATE INTRAVENOUS at 02:29

## 2025-05-11 RX ADMIN — INDOMETHACIN 50 MEQ: 25 CAPSULE ORAL at 06:03

## 2025-05-11 RX ADMIN — ENOXAPARIN SODIUM 30 MG: 100 INJECTION SUBCUTANEOUS at 22:15

## 2025-05-11 RX ADMIN — MIDAZOLAM 4 MG: 1 INJECTION INTRAMUSCULAR; INTRAVENOUS at 04:02

## 2025-05-11 RX ADMIN — GUAIFENESIN 400 MG: 200 SOLUTION ORAL at 23:57

## 2025-05-11 RX ADMIN — SODIUM CHLORIDE, PRESERVATIVE FREE 10 ML: 5 INJECTION INTRAVENOUS at 10:10

## 2025-05-11 RX ADMIN — GUAIFENESIN 400 MG: 200 SOLUTION ORAL at 16:42

## 2025-05-11 RX ADMIN — PROPOFOL 30 MCG/KG/MIN: 10 INJECTION, EMULSION INTRAVENOUS at 08:42

## 2025-05-11 RX ADMIN — ETOMIDATE 30 MG: 2 INJECTION, SOLUTION INTRAVENOUS at 01:15

## 2025-05-11 RX ADMIN — GUAIFENESIN 400 MG: 200 SOLUTION ORAL at 12:46

## 2025-05-11 RX ADMIN — CHLORHEXIDINE GLUCONATE 15 ML: 1.2 RINSE ORAL at 10:10

## 2025-05-11 RX ADMIN — ROCURONIUM BROMIDE 100 MG: 10 INJECTION, SOLUTION INTRAVENOUS at 01:15

## 2025-05-11 RX ADMIN — HYDROCORTISONE SODIUM SUCCINATE 50 MG: 100 INJECTION, POWDER, FOR SOLUTION INTRAMUSCULAR; INTRAVENOUS at 17:49

## 2025-05-11 RX ADMIN — CISATRACURIUM BESYLATE 2 MCG/KG/MIN: 2 INJECTION, SOLUTION INTRAVENOUS at 07:13

## 2025-05-11 RX ADMIN — BUDESONIDE 500 MCG: 0.5 INHALANT RESPIRATORY (INHALATION) at 07:23

## 2025-05-11 RX ADMIN — FENTANYL CITRATE 50 MCG/HR: 50 INJECTION INTRAVENOUS at 06:34

## 2025-05-11 RX ADMIN — PIPERACILLIN AND TAZOBACTAM 4500 MG: 4; .5 INJECTION, POWDER, LYOPHILIZED, FOR SOLUTION INTRAVENOUS at 16:42

## 2025-05-11 RX ADMIN — IPRATROPIUM BROMIDE AND ALBUTEROL SULFATE 1 DOSE: .5; 3 SOLUTION RESPIRATORY (INHALATION) at 07:23

## 2025-05-11 RX ADMIN — NOREPINEPHRINE BITARTRATE 20 MCG/MIN: 1 INJECTION, SOLUTION, CONCENTRATE INTRAVENOUS at 05:49

## 2025-05-11 RX ADMIN — CEFEPIME 2000 MG: 2 INJECTION, POWDER, FOR SOLUTION INTRAVENOUS at 01:49

## 2025-05-11 RX ADMIN — EPINEPHRINE 1 MG: 0.1 INJECTION INTRACARDIAC; INTRAVENOUS at 01:30

## 2025-05-11 RX ADMIN — PIPERACILLIN AND TAZOBACTAM 4500 MG: 4; .5 INJECTION, POWDER, LYOPHILIZED, FOR SOLUTION INTRAVENOUS at 10:07

## 2025-05-11 RX ADMIN — PROPOFOL 30 MCG/KG/MIN: 10 INJECTION, EMULSION INTRAVENOUS at 03:49

## 2025-05-11 RX ADMIN — ENOXAPARIN SODIUM 30 MG: 100 INJECTION SUBCUTANEOUS at 09:06

## 2025-05-11 RX ADMIN — SODIUM CHLORIDE, PRESERVATIVE FREE 10 ML: 5 INJECTION INTRAVENOUS at 22:20

## 2025-05-11 RX ADMIN — CHLORHEXIDINE GLUCONATE 15 ML: 1.2 RINSE ORAL at 22:20

## 2025-05-11 RX ADMIN — ETOMIDATE 30 MG: 2 INJECTION INTRAVENOUS at 01:15

## 2025-05-11 RX ADMIN — PROPOFOL 30 MCG/KG/MIN: 10 INJECTION, EMULSION INTRAVENOUS at 13:16

## 2025-05-11 RX ADMIN — FAMOTIDINE 20 MG: 10 INJECTION, SOLUTION INTRAVENOUS at 09:07

## 2025-05-11 RX ADMIN — SODIUM CHLORIDE 1000 ML: 0.9 INJECTION, SOLUTION INTRAVENOUS at 01:51

## 2025-05-11 RX ADMIN — ROCURONIUM BROMIDE 100 MG: 10 SOLUTION INTRAVENOUS at 04:05

## 2025-05-11 RX ADMIN — HYDROCORTISONE SODIUM SUCCINATE 50 MG: 100 INJECTION, POWDER, FOR SOLUTION INTRAMUSCULAR; INTRAVENOUS at 06:27

## 2025-05-11 RX ADMIN — MIDAZOLAM HYDROCHLORIDE 4 MG: 2 INJECTION, SOLUTION INTRAMUSCULAR; INTRAVENOUS at 04:02

## 2025-05-11 RX ADMIN — PROPOFOL 30 MCG/KG/MIN: 10 INJECTION, EMULSION INTRAVENOUS at 22:13

## 2025-05-11 RX ADMIN — PROPOFOL 30 MCG/KG/MIN: 10 INJECTION, EMULSION INTRAVENOUS at 17:47

## 2025-05-11 RX ADMIN — BUDESONIDE 500 MCG: 0.5 INHALANT RESPIRATORY (INHALATION) at 19:50

## 2025-05-11 ASSESSMENT — PULMONARY FUNCTION TESTS
PIF_VALUE: 37
PIF_VALUE: 37
PIF_VALUE: 32
PIF_VALUE: 31
PIF_VALUE: 31

## 2025-05-11 NOTE — PROCEDURES
Bedside Bronch Note       Indication: hypoxia   Sedation: see MAR  Consent: emergent    Procedure:    The bronchoscope was lubricated and introduced through the indwelling ETT. ETT clear. Pippa sharp. We first examined the right lung. There were minimal scattered secretions. These were suctioned. We then examined the left. Anatomy normal and no overt mucus plug. The bronchoscope was then wedged in the RML. 60 CC NS instilled with approximately 30 CC return. Noted return of food particulate matter. Patient tolerated well with no immediate complication.     Trevon Damian M.D.  Pulmonary and Critical Care Medicine   05/11/25

## 2025-05-11 NOTE — PROGRESS NOTES
Spiritual Health History and Assessment/Progress Note  [facility]    Follow-up, Code Blue, Anticipatory Grief,      Name: Savana Vogel MRN: [id number]    Age: 80 y.o.     Sex: female   Language: English   Zoroastrian: Jewish   Acute hypercapnic respiratory failure (HCC)     Date: 5/11/2025            Total Time Calculated: 70 min              Spiritual Assessment continued in [facility] 7S1 INTENSIVE CARE        Referral/Consult From: Nurse   Encounter Overview/Reason: Follow-up  Service Provided For: Family    Crys, Belief, Meaning:   Patient identifies as spiritual  Family/Friends identify as spiritual, are connected with a crys tradition or spiritual practice, and have beliefs or practices that help with coping during difficult times      Importance and Influence:  Patient unable to assess at this time  Family/Friends have no beliefs influential to healthcare decision-making identified during this visit    Community:  Patient   Family/Friends feel well-supported. Support system includes: Children, Crys Community, and Extended family    Assessment and Plan of Care:     Patient Interventions include:   Family/Friends Interventions include: Facilitated expression of thoughts and feelings and Affirmed coping skills/support systems    Patient Plan of Care: Spiritual Care available upon further referral  Family/Friends Plan of Care: Spiritual Care available upon further referral    I visited Savana Vogel in response to a request by patient's nurse.       Patient's  Lucio is requesting a  for Anointing of the Sick. He understands that today is Sunday and Mother's Day. A call was made to to [facility]. Left  voice mail requesting a call back and visit. The day shift  will be apprised of the request to follow up.    I offered presence and rendered a prayer of commendation at bedside at Lucio's request. Their son Lucio and daughter-in-law Christine were present as well.

## 2025-05-11 NOTE — PROGRESS NOTES
Spiritual Health History and Assessment/Progress Note  [facility]    Crisis, Code Blue,  ,      Name: Savana Vogel MRN: [id number]    Age: 80 y.o.     Sex: female   Language: English   Latter-day: Adventism   <principal problem not specified>     Date: 5/11/2025            Total Time Calculated: 45 min              Spiritual Assessment began in [facility] EMERGENCY DEPARTMENT        Referral/Consult From: Other (comment) (Code Blue)   Encounter Overview/Reason: Crisis  Service Provided For: Significant other    Crys, Belief, Meaning:   Patient identifies as spiritual  Family/Friends identify as spiritual and have beliefs or practices that help with coping during difficult times      Importance and Influence:  Patient unable to assess at this time  Family/Friends have no beliefs influential to healthcare decision-making identified during this visit    Community:  Patient   Family/Friends feel well-supported. Support system includes: Children and Extended family    Assessment and Plan of Care:     Patient Interventions include:   Family/Friends Interventions include: Facilitated expression of thoughts and feelings and Affirmed coping skills/support systems    Patient Plan of Care: Spiritual Care available upon further referral  Family/Friends Plan of Care: Spiritual Care available upon further referral    I visited Savana Vogel in response to a code blue. The patient was on vent support.    I met her  Lucio at her bedside. Offered presence and listened as he spoke about their 60 year marriage and her ongoing health issues. They live alone and he is her primary care giver. She has had recent hospital stays and voiced concern about her future.     He is a person of Baptism (Adventism) crys and derives a sense of hope and support from his crys.     Electronically signed by YASMIN MICHELLE on 5/11/2025 at 2:42 AM

## 2025-05-11 NOTE — SIGNIFICANT EVENT
Code Blue  Code Blue room ED2 called overhead at 0129. RRT responding.    Upon RRT arrival, Arrival at 0131. At this point a palpable pulse was felt and the code was completed. Patient showing Sinus tach 150's and Hypertension on monitor. O2 sats are 94%. Code events as follows:       Prior to code:     0112: Patient is vommiting on Bipap. Sudden respiratory distress triggered sequential intubation.    0120: etomidate given for intubation sequence.     0121: Rocuronium 100mg given     0124: Due to vomitting, airway complications with tube placement.    0127: 7.0 ET tube placement, 22 at lip    0128: Palpable pulse lost, PEA on monitor. Compressions started.    0130: 1 Epi given  Pulse Check completed at this time. A palpable pulse is felt ending code blue Event.    0131: RRT arrival. Orders for propfol for sedation.    0135: propofol started at 20 mcg/kg/min.    At this time awaiting placement for ICU room. Primary nurse has no further questions. Will assist with transfer and assess further when in ICU.    In summary, ROSC was achieved after  1 rounds of compressions. Total down time approximately 2 minutes.    ROSC was achieved at 0130.    Please see additional documentation, such as notes, Code Blue Narrator, and MAR for more information.      Post-ROSC Temperature Control  Will this patient be under TC? (\"Yes\" includes protective normothermia and hypothermia.) No, NA    Provider making the decision: ED MD Rosario       Present during code:  Rickie intensivist  SUSIE, hospitalist  Bambi RN, RRT  Mony, CCU RN  Madeline, ICU RN  ED team, primary RN  ED RT, RT  Casimiro, Pharmacist  All present, Nursing supervisor  Chaplain RICHARDS

## 2025-05-11 NOTE — PROGRESS NOTES
Spiritual Health History and Assessment/Progress Note  [facility]    Follow-up, Family Care, Anticipatory Grief,      Name: Savana Vogel MRN: [id number]    Age: 80 y.o.     Sex: female   Language: English   Adventist: Methodist   Acute hypercapnic respiratory failure (HCC)     Date: 5/11/2025            Total Time Calculated: 10 min              Spiritual Assessment continued in [facility] 7S1 INTENSIVE CARE        Referral/Consult From: Family   Encounter Overview/Reason: Follow-up  Service Provided For: Family, Patient not available    Crys, Belief, Meaning:   Patient unable to assess at this time  Family/Friends have beliefs or practices that help with coping during difficult times      Importance and Influence:  Patient unable to assess at this time  Family/Friends have spiritual/personal beliefs that influence decisions regarding the patient's health    Community:  Patient feels well-supported. Support system includes: Spouse/Partner, Children, and Extended family  Family/Friends feel well-supported. Support system includes: Spouse/Partner, Parent/s, and Extended family    Assessment and Plan of Care:     Patient Interventions include: Other: Patient appeared to be sleeping  Family/Friends Interventions include: Facilitated expression of thoughts and feelings, Explored spiritual coping/struggle/distress, and Affirmed coping skills/support systems    Patient Plan of Care: Spiritual Care available upon further referral  Family/Friends Plan of Care: Spiritual Care available upon further referral    I visited Savana Vogel and family again to follow up on their request for a . Savana appeared to be sleeping.         Patient's  Lucio requested a  for Anointing of the Sick, and the previously attending  placed a call to [facility] leaving a voice message. I called and left a second voice message with the Sikhism to follow up, requesting a call back to confirm and a visit.

## 2025-05-11 NOTE — PROGRESS NOTES
ICU UPDATED PROGRESS NOTE      Name: Savana Vogel   : 1945   MRN: [id number]   Date: 2025      SUBJECTIVE   Savana Vogel is a 80yoF w/ COPD, alzheimer's, HTN, HLD, recent admit for PNA, who was admitted with acute hypoxic/hypercapnic respiratory failure c/b witnessed aspiration with cardiac arrest requiring intubation now in ARDS, shock.     ASSESSMENT & PLAN     Acute Hypoxemic Respiratory Failure  ARDS   - Viral panel negative, MRSA nares pending, RC pending   - Continue low TV strategy, high PEEP as tolerated  - Jun at 20ppm   - Proned at 0600, plan to supinate at 2200   - Continue cisat for vent synchrony   - Continue vanc/zosyn  - ABG with vent changes  - Sat goal 92%    Shock   Likely sepsis also with sedation   - Probable pulmonary source   - RC pending   - Vanc, zosyn   - Levo, vaso for MAP goal >65    Goals of Care   -  at bedside updated on tenuous status with poor prognosis. Her vasopressor requirement continues to increase. He understands that her heart stopped on admission and he is worried it will happen again. He does not wish her to suffer and has agreed to DNR. Both he and his son understand that even if she were to survive this, she will have a long road of recovery and require rehabilitation.  states that she does not want to live in a facility again. They would like to give her 24 hours to see if there is improvement; if not, they will consider comfort care.     OBJECTIVE   BP (!) 148/74   Pulse (!) 127   Temp 97.7 °F (36.5 °C)   Resp 25   Wt 107.3 kg (236 lb 8.9 oz)   SpO2 92%   BMI 43.27 kg/m²      Temp (24hrs), Av.8 °F (35.4 °C), Min:94.5 °F (34.7 °C), Max:98.2 °F (36.8 °C)         Physical Exam  Constitutional:       Appearance: She is obese. She is ill-appearing and toxic-appearing.   Eyes:      Pupils: Pupils are equal, round, and reactive to light.   Cardiovascular:      Rate and Rhythm: Regular rhythm. Tachycardia present.      Pulses: Normal

## 2025-05-11 NOTE — H&P
ADMISSION NOTE    Name: Savana Vogel   : 1945   MRN: [id number]   Date: 2025      Reason for ICU Admission:      ASSESSMENT and PLAN     Acute hypercapnic and hypoxic respiratory failure intubated on MV   Acute respiratory acidosis 2' to above  Vent bundle  Chest xray   S/p bronchoscopy - see procedure note  BAL sent  A- line for frequent ABGs.    Refractory hypoxia   PF ratio: 74  On inhaled Nitric Oxide at 40 ppm  Prone positioning   Sedation with fentanyl and propofol.   Start nimbex drip     Aspiration pneumonitis may develop into aspiration pneumonia  Zosyn, and Vancomycin per pharmacy to dose  Shock, likely septic, possible pneumonia, possible due to aspiration   On Hydrocortisone 50 mg q 6 hrs IV  BAL cx pending follow up   Broad spectrum antibiotics  Trend LA and Procalcitonin   Too unstable for CT     Encephalopathy  May be hypoxic vs toxic metabolic   However with HPI of gurgling and difficulty with saliva would get a head CT, however too unstable for CT at this time.   Check Ammonia     Vomiting  Too unstable for CT abd/pelvis to evaluate.   KUB   OGT/NGT to LIWS   Zofran N/V   Pepcid     Shock   Likely septic from aspiration pneumonitis and hypoxia  --> ARDS  On Vasopressin and Levophed for MAP goal greater than 65 mmHg  Started on stress dose steroids.   Cultures pending  Send Troponin   EKG and Echo when able    Hold home meds    HISTORY OF PRESENT ILLNESS:     Savana oVgel is a 80 y.o. female  with a history of arthritis, parkinson's, bipolar, depression, hypertension who presented to ED via EMS with respiratory distress and hypoxia.  at bedside states that patient had been hospitalized with pneumonia back in February of this year with a 10 day hospital stay and had been treated for pneumonia in which he thought had fully resolved. However about a week ago patient started to cough again and progressively got worse. Last night she started a gurgling wet coughing and  ml     Imaging    Chest xray 5/11/25  Satisfactory endotracheal tube and enteric tube placement. Increased mid and  lower lung opacities may represent atelectasis, edema, or aspiration.    CRITICAL CARE DOCUMENTATION.   This patient is critically ill with systemic organ failure and life threatening illness.    I had a face to face encounter with the patient, reviewed and interpreted patient data including clinical events, labs, images, vital signs, I/O's, and examined patient.  I have discussed the case and the plan and management of the patient's care with the consulting services, the bedside nurses and the respiratory therapist.      NOTE OF PERSONAL INVOLVEMENT IN CARE   This patient has a high probability of imminent, clinically significant deterioration, which requires the highest level of preparedness to intervene urgently. I participated in the decision-making and personally managed or directed the management of the following life and organ supporting interventions that required my frequent assessment to treat or prevent imminent deterioration.    I personally spent 120 minutes of critical care time.  This is time spent at this critically ill patient's bedside actively involved in patient care as well as the coordination of care.  This does not include any procedural time which has been billed separately.    JENNI Amezcua  [facility] Critical Care  5/11/2025

## 2025-05-11 NOTE — ED PROVIDER NOTES
[facility] EMERGENCY DEPARTMENT  EMERGENCY DEPARTMENT ENCOUNTER      Pt Name: Savana Vogel  MRN: [id number]  Birthdate 1945  Date of evaluation: 5/11/2025  Provider: Angelic Rosario MD    CHIEF COMPLAINT       Chief Complaint   Patient presents with    Respiratory Distress       ALLERGIES     Ipratropium-albuterol, Ibuprofen, and Adhesive tape    ENCOUNTER     HISTORY OF PRESENT ILLNESS    A female patient with a past medical history significant for Chronic Obstructive Pulmonary Disease (COPD), Alzheimer's disease, hypertension, and hyperlipidemia was transported to the Emergency Department by EMS. The history was provided by EMS and a family member. The patient presents with coughing and difficulty breathing, with a suspected aspiration per EMS. Her  noted a wet cough and snoring, and reported possible difficulty swallowing saliva. There is no fever noted by the patient or her . The patient has ongoing issues with pneumonia and reported feeling improved after recent treatment for pneumonia for about a week, but began coughing more in the last few days. She denies any fever, pain, or nausea but has experienced diarrhea today. Her  reported frequent episodes of large-volume diarrhea in the last 24 hours and noted that the patient has been very weak, requiring significant assistance to get up from the bathroom. The patient is at high risk for aspiration and has been recommended a pureed diet, which she is compliant with.    PAST MEDICAL HISTORY    - Chronic Obstructive Pulmonary Disease (COPD)    - Alzheimer's disease    - Hypertension (HTN)    - Hyperlipidemia (HLD)      PHYSICAL EXAM    Vitals: Tachycardic to 110s, tachypneic in 30s, /82, oxygen saturation 93% on 15 LPM NC.    General: In respiratory distress. Female patient appears weak and requires assistance.    Eyes: Appear normal with no scleral icterus.    HENT: Atraumatic. Moist mucous membranes, no pharyngeal

## 2025-05-11 NOTE — ED NOTES
01:12: Patient vomited with BiPAP on. BiPAP off at this time. Respiratory called. MD at bedside preparing to intubate patient.     01:17: O2 saturation 60% at this time. Patient being bagged via BVM at this time. Respiratory recalled. Patient being suctioned at this time.     01:20: 30 mg etomidate given at this time.     01:21: 100mg rocuronium given at this time. Respiratory at bedside.     01:24: Patient being suctioned, patient continues to be ventilated via BVM at this time. O2 saturation 72%.     01:27: Patient intubated with 7.0 ET tube at this time. 22 at the lip.     01:28: Patient does not have a palpable pulse at this time. PEA on the monitor. See code narrator for further details.     01:35: Second respiratory therapist at bedside at this time. Pharmacy at bedside. Rapid RNs at bedside.     01:36: Propofol infusing at 20mcg/kg/min at this time per verbal order by Dr. Rosario.     01:55: NG in place and secured at this time, 70cm at the nares.

## 2025-05-11 NOTE — PROGRESS NOTES
Spiritual Health History and Assessment/Progress Note  [facility]    Follow-up, Family Care, Anticipatory Grief,      Name: Savana Vogel MRN: [id number]    Age: 80 y.o.     Sex: female   Language: English   Anabaptist: Latter day   Acute hypercapnic respiratory failure (HCC)     Date: 5/11/2025            Total Time Calculated: 26 min              Spiritual Assessment continued in [facility] 7S1 INTENSIVE CARE        Referral/Consult From: Other    Encounter Overview/Reason: Follow-up  Service Provided For: Family    Crys, Belief, Meaning:   Patient unable to assess at this time  Family/Friends are connected with a crys tradition or spiritual practice and have beliefs or practices that help with coping during difficult times      Importance and Influence:  Patient unable to assess at this time  Family/Friends have spiritual/personal beliefs that influence decisions regarding the patient's health    Community:  Patient feels well-supported. Support system includes: Unknown  Family/Friends feel well-supported. Support system includes: Spouse/Partner and Children    Assessment and Plan of Care:     Patient Interventions include: Other: Unable to assess at this time  Family/Friends Interventions include: Facilitated expression of thoughts and feelings and Explored spiritual coping/struggle/distress    Patient Plan of Care: Spiritual Care available upon further referral  Family/Friends Plan of Care: Spiritual Care available upon further referral    I visited Savana Vogel and family in response to a request by the previous shift's night  on duty. Savana appeared to be sleeping.         Patient's  Lucio requested a  for Anointing of the Sick, and the previously attending  placed a call to [facility] leaving a voice message. I called and left a second voice message with the Gnostic[employer] to follow up, requesting a call back to confirm and a visit.       I offered presence, visited

## 2025-05-11 NOTE — PROCEDURES
PROCEDURE NOTE  Date: 5/11/2025   Name: Savana Vogel  YOB: 1945    CENTRAL LINE    Date/Time: 5/11/2025 5:56 AM    Performed by: Mallika John NP-C  Authorized by: Mallika John NP-C  Consent: The procedure was performed in an emergent situation. Written consent obtained.  Risks and benefits: risks, benefits and alternatives were discussed  Consent given by: spouse  Test results: test results available and properly labeled  Imaging studies: imaging studies available  Required items: required blood products, implants, devices, and special equipment available  Patient identity confirmed: arm band, hospital-assigned identification number and anonymous protocol, patient vented/unresponsive  Time out: Immediately prior to procedure a \"time out\" was called to verify the correct patient, procedure, equipment, support staff and site/side marked as required.  Indications: vascular access  Anesthesia: local infiltration    Anesthesia:  Local Anesthetic: lidocaine 1% without epinephrine  Anesthetic total: 5 mL    Sedation:  Patient sedated: yes  Sedatives: see MAR for details  Analgesia: see MAR for details  Vitals: Vital signs were monitored during sedation.    Preparation: skin prepped with ChloraPrep  Skin prep agent dried: skin prep agent completely dried prior to procedure  Sterile barriers: all five maximum sterile barriers used - cap, mask, sterile gown, sterile gloves, and large sterile sheet  Hand hygiene: hand hygiene performed prior to central venous catheter insertion  Location details: right internal jugular  Patient position: flat  Catheter type: triple lumen  Catheter size: 7.5 Fr  Pre-procedure: landmarks identified  Ultrasound guidance: yes  Sterile ultrasound techniques: sterile gel and sterile probe covers were used  Number of attempts: 1  Successful placement: yes  Post-procedure: line sutured and dressing applied  Assessment: placement verified by x-ray, blood return through all

## 2025-05-11 NOTE — PROGRESS NOTES
Pharmacist Note - Vancomycin Dosing    Consult provided for this 80 y.o. female for indication of sepsis.  Antibiotic regimen(s): vancomycin, zosyn   Patient on vancomycin PTA? NO   Pregnancy status: N/A    Recent Labs     25  0021 25  0446   WBC 22.9* 6.0   CREATININE 1.01 1.02   BUN 18 20     Frequency of BMP: daily through   Height: 157.5 cm  Weight: 107.3 kg  Est CrCl: 51 ml/min; UO: -- ml/kg/hr  Temp (24hrs), Av.5 °F (35.3 °C), Min:94.5 °F (34.7 °C), Max:98.2 °F (36.8 °C)    Cultures:   - blood cultures pending   - respiratory washing cultures pending   - urine culture pending     MRSA Swab ordered (if applicable)? N/A    The plan below is expected to result in a target range of AUC/YARED 400-600    Therapy will be initiated with a loading dose of 2500 mg IV x 1 to be followed by a maintenance dose of 1500 mg IV every 24 hours.  Pharmacy to follow patient daily and order levels / make dose adjustments as appropriate.    *Vancomycin has been dosed used Bayesian kinetics software to target an AUC/YARED of 400-600, which provides adequate exposure for an assumed infection due to MRSA with an YARED of 1 or less while reducing the risk of nephrotoxicity as seen with traditional trough based dosing goals.      Zuleyma Reyna, PharmD  [phone]

## 2025-05-11 NOTE — PROCEDURES
PROCEDURE NOTE  Date: 5/11/2025   Name: Savana Vogel  YOB: 1945    Insert Arterial Line    Date/Time: 5/11/2025 4:30 AM    Performed by: Mallika John NP-C  Authorized by: Mallika John NP-C  Consent: The procedure was performed in an emergent situation. Written consent obtained.  Risks and benefits: risks, benefits and alternatives were discussed  Consent given by: spouse  Test results: test results available and properly labeled  Required items: required blood products, implants, devices, and special equipment available  Patient identity confirmed: arm band, hospital-assigned identification number and anonymous protocol, patient vented/unresponsive  Time out: Immediately prior to procedure a \"time out\" was called to verify the correct patient, procedure, equipment, support staff and site/side marked as required.  Preparation: Patient was prepped and draped in the usual sterile fashion.  Indications: multiple ABGs, respiratory failure and hemodynamic monitoring  Location: right radial  Anesthesia: local infiltration    Anesthesia:  Local Anesthetic: lidocaine 1% without epinephrine  Anesthetic total: 2 mL    Sedation:  Patient sedated: yes  Sedatives: see MAR for details  Analgesia: see MAR for details  Vitals: Vital signs were monitored during sedation.    Arnulfo's test normal: yes  Needle gauge: 20  Seldinger technique: Seldinger technique used  Cutdown: cutdown required  Number of attempts: 1  Post-procedure: line sutured and dressing applied  Post-procedure CMS: unchanged and normal  Patient tolerance: patient tolerated the procedure well with no immediate complications      JENNI Amezcua    Critical Care Medicine  [facility]

## 2025-05-12 ENCOUNTER — APPOINTMENT (OUTPATIENT)
Facility: HOSPITAL | Age: 80
DRG: 871 | End: 2025-05-12
Payer: MEDICARE

## 2025-05-12 ENCOUNTER — TELEPHONE (OUTPATIENT)
Facility: CLINIC | Age: 80
End: 2025-05-12

## 2025-05-12 VITALS
DIASTOLIC BLOOD PRESSURE: 70 MMHG | HEIGHT: 62 IN | TEMPERATURE: 97.9 F | SYSTOLIC BLOOD PRESSURE: 104 MMHG | OXYGEN SATURATION: 94 % | BODY MASS INDEX: 45.64 KG/M2 | WEIGHT: 248 LBS

## 2025-05-12 LAB
AMMONIA PLAS-SCNC: <10 UMOL/L
ANION GAP SERPL CALC-SCNC: 11 MMOL/L (ref 2–12)
BACTERIA SPEC CULT: ABNORMAL
BACTERIA SPEC CULT: ABNORMAL
BACTERIA SPEC CULT: NORMAL
BASOPHILS # BLD: 0 K/UL (ref 0–0.1)
BASOPHILS NFR BLD: 0 % (ref 0–1)
BUN SERPL-MCNC: 32 MG/DL (ref 6–20)
BUN/CREAT SERPL: 19 (ref 12–20)
CALCIUM SERPL-MCNC: 8.7 MG/DL (ref 8.5–10.1)
CHLORIDE SERPL-SCNC: 107 MMOL/L (ref 97–108)
CO2 SERPL-SCNC: 23 MMOL/L (ref 21–32)
CREAT SERPL-MCNC: 1.71 MG/DL (ref 0.55–1.02)
DIFFERENTIAL METHOD BLD: ABNORMAL
ECHO BSA: 2.22 M2
ECHO LV EF PHYSICIAN: 55 %
EOSINOPHIL # BLD: 0 K/UL (ref 0–0.4)
EOSINOPHIL NFR BLD: 0 % (ref 0–7)
ERYTHROCYTE [DISTWIDTH] IN BLOOD BY AUTOMATED COUNT: 13.8 % (ref 11.5–14.5)
GLUCOSE SERPL-MCNC: 186 MG/DL (ref 65–100)
HCT VFR BLD AUTO: 47.2 % (ref 35–47)
HGB BLD-MCNC: 15.1 G/DL (ref 11.5–16)
IMM GRANULOCYTES # BLD AUTO: 0 K/UL
IMM GRANULOCYTES NFR BLD AUTO: 0 %
INR PPP: 1.3 (ref 0.9–1.1)
LYMPHOCYTES # BLD: 2.01 K/UL (ref 0.8–3.5)
LYMPHOCYTES NFR BLD: 8 % (ref 12–49)
MAGNESIUM SERPL-MCNC: 1.9 MG/DL (ref 1.6–2.4)
MCH RBC QN AUTO: 29.3 PG (ref 26–34)
MCHC RBC AUTO-ENTMCNC: 32 G/DL (ref 30–36.5)
MCV RBC AUTO: 91.5 FL (ref 80–99)
MONOCYTES # BLD: 1.26 K/UL (ref 0–1)
MONOCYTES NFR BLD: 5 % (ref 5–13)
NEUTS SEG # BLD: 21.83 K/UL (ref 1.8–8)
NEUTS SEG NFR BLD: 87 % (ref 32–75)
NRBC # BLD: 0 K/UL (ref 0–0.01)
NRBC BLD-RTO: 0 PER 100 WBC
PHOSPHATE SERPL-MCNC: 5.4 MG/DL (ref 2.6–4.7)
PLATELET # BLD AUTO: 163 K/UL (ref 150–400)
PMV BLD AUTO: 12.6 FL (ref 8.9–12.9)
POTASSIUM SERPL-SCNC: 3.7 MMOL/L (ref 3.5–5.1)
PROCALCITONIN SERPL-MCNC: 55.63 NG/ML
PROTHROMBIN TIME: 13.8 SEC (ref 9.2–11.2)
RBC # BLD AUTO: 5.16 M/UL (ref 3.8–5.2)
RBC MORPH BLD: ABNORMAL
SERVICE CMNT-IMP: ABNORMAL
SERVICE CMNT-IMP: NORMAL
SERVICE CMNT-IMP: NORMAL
SODIUM SERPL-SCNC: 141 MMOL/L (ref 136–145)
VANCOMYCIN SERPL-MCNC: 16.6 UG/ML
WBC # BLD AUTO: 25.1 K/UL (ref 3.6–11)

## 2025-05-12 PROCEDURE — 2580000003 HC RX 258: Performed by: STUDENT IN AN ORGANIZED HEALTH CARE EDUCATION/TRAINING PROGRAM

## 2025-05-12 PROCEDURE — 2500000003 HC RX 250 WO HCPCS: Performed by: PHYSICIAN ASSISTANT

## 2025-05-12 PROCEDURE — 80202 ASSAY OF VANCOMYCIN: CPT

## 2025-05-12 PROCEDURE — 6360000002 HC RX W HCPCS: Performed by: NURSE PRACTITIONER

## 2025-05-12 PROCEDURE — 6370000000 HC RX 637 (ALT 250 FOR IP): Performed by: NURSE PRACTITIONER

## 2025-05-12 PROCEDURE — 6360000002 HC RX W HCPCS

## 2025-05-12 PROCEDURE — 80048 BASIC METABOLIC PNL TOTAL CA: CPT

## 2025-05-12 PROCEDURE — 93306 TTE W/DOPPLER COMPLETE: CPT | Performed by: SPECIALIST

## 2025-05-12 PROCEDURE — 2500000003 HC RX 250 WO HCPCS: Performed by: STUDENT IN AN ORGANIZED HEALTH CARE EDUCATION/TRAINING PROGRAM

## 2025-05-12 PROCEDURE — 94640 AIRWAY INHALATION TREATMENT: CPT

## 2025-05-12 PROCEDURE — 84145 PROCALCITONIN (PCT): CPT

## 2025-05-12 PROCEDURE — 85025 COMPLETE CBC W/AUTO DIFF WBC: CPT

## 2025-05-12 PROCEDURE — 93306 TTE W/DOPPLER COMPLETE: CPT

## 2025-05-12 PROCEDURE — 71045 X-RAY EXAM CHEST 1 VIEW: CPT

## 2025-05-12 PROCEDURE — 2580000003 HC RX 258: Performed by: NURSE PRACTITIONER

## 2025-05-12 PROCEDURE — 2700000000 HC OXYGEN THERAPY PER DAY

## 2025-05-12 PROCEDURE — 6360000002 HC RX W HCPCS: Performed by: STUDENT IN AN ORGANIZED HEALTH CARE EDUCATION/TRAINING PROGRAM

## 2025-05-12 PROCEDURE — 84100 ASSAY OF PHOSPHORUS: CPT

## 2025-05-12 PROCEDURE — 6370000000 HC RX 637 (ALT 250 FOR IP): Performed by: INTERNAL MEDICINE

## 2025-05-12 PROCEDURE — 94003 VENT MGMT INPAT SUBQ DAY: CPT

## 2025-05-12 PROCEDURE — 82140 ASSAY OF AMMONIA: CPT

## 2025-05-12 PROCEDURE — 83735 ASSAY OF MAGNESIUM: CPT

## 2025-05-12 PROCEDURE — 2500000003 HC RX 250 WO HCPCS: Performed by: NURSE PRACTITIONER

## 2025-05-12 PROCEDURE — 85610 PROTHROMBIN TIME: CPT

## 2025-05-12 RX ORDER — MORPHINE SULFATE 2 MG/ML
2 INJECTION, SOLUTION INTRAMUSCULAR; INTRAVENOUS
Status: DISCONTINUED | OUTPATIENT
Start: 2025-05-12 | End: 2025-05-13 | Stop reason: HOSPADM

## 2025-05-12 RX ORDER — PROPOFOL 10 MG/ML
5-50 INJECTION, EMULSION INTRAVENOUS CONTINUOUS
Status: DISCONTINUED | OUTPATIENT
Start: 2025-05-12 | End: 2025-05-12 | Stop reason: SDUPTHER

## 2025-05-12 RX ORDER — MIDAZOLAM HYDROCHLORIDE 2 MG/2ML
2 INJECTION, SOLUTION INTRAMUSCULAR; INTRAVENOUS ONCE
Status: COMPLETED | OUTPATIENT
Start: 2025-05-12 | End: 2025-05-12

## 2025-05-12 RX ORDER — MORPHINE SULFATE 4 MG/ML
4 INJECTION, SOLUTION INTRAMUSCULAR; INTRAVENOUS
Refills: 0 | Status: DISCONTINUED | OUTPATIENT
Start: 2025-05-12 | End: 2025-05-13 | Stop reason: HOSPADM

## 2025-05-12 RX ORDER — MIDAZOLAM HYDROCHLORIDE 1 MG/ML
INJECTION, SOLUTION INTRAMUSCULAR; INTRAVENOUS
Status: COMPLETED
Start: 2025-05-12 | End: 2025-05-12

## 2025-05-12 RX ORDER — PROPOFOL 10 MG/ML
5-50 INJECTION, EMULSION INTRAVENOUS CONTINUOUS
Status: DISCONTINUED | OUTPATIENT
Start: 2025-05-12 | End: 2025-05-12 | Stop reason: HOSPADM

## 2025-05-12 RX ADMIN — VASOPRESSIN 0.03 UNITS/MIN: 20 INJECTION, SOLUTION INTRAVENOUS at 13:10

## 2025-05-12 RX ADMIN — NOREPINEPHRINE BITARTRATE 40 MCG/MIN: 1 INJECTION, SOLUTION, CONCENTRATE INTRAVENOUS at 00:49

## 2025-05-12 RX ADMIN — MIDAZOLAM HYDROCHLORIDE 2 MG: 2 INJECTION, SOLUTION INTRAMUSCULAR; INTRAVENOUS at 16:25

## 2025-05-12 RX ADMIN — MORPHINE SULFATE 4 MG: 4 INJECTION INTRAVENOUS at 16:58

## 2025-05-12 RX ADMIN — CHLORHEXIDINE GLUCONATE 15 ML: 1.2 RINSE ORAL at 08:28

## 2025-05-12 RX ADMIN — HYDROCORTISONE SODIUM SUCCINATE 50 MG: 100 INJECTION, POWDER, FOR SOLUTION INTRAMUSCULAR; INTRAVENOUS at 06:06

## 2025-05-12 RX ADMIN — IPRATROPIUM BROMIDE AND ALBUTEROL SULFATE 1 DOSE: .5; 3 SOLUTION RESPIRATORY (INHALATION) at 04:12

## 2025-05-12 RX ADMIN — PROPOFOL 25 MCG/KG/MIN: 10 INJECTION, EMULSION INTRAVENOUS at 08:40

## 2025-05-12 RX ADMIN — FENTANYL CITRATE 125 MCG/HR: 50 INJECTION INTRAVENOUS at 13:43

## 2025-05-12 RX ADMIN — PROPOFOL 25 MCG/KG/MIN: 10 INJECTION, EMULSION INTRAVENOUS at 03:49

## 2025-05-12 RX ADMIN — NOREPINEPHRINE BITARTRATE 34 MCG/MIN: 1 INJECTION, SOLUTION, CONCENTRATE INTRAVENOUS at 08:42

## 2025-05-12 RX ADMIN — FENTANYL CITRATE 125 MCG/HR: 50 INJECTION INTRAVENOUS at 06:19

## 2025-05-12 RX ADMIN — CISATRACURIUM BESYLATE 0.5 MCG/KG/MIN: 2 INJECTION, SOLUTION INTRAVENOUS at 04:21

## 2025-05-12 RX ADMIN — GUAIFENESIN 400 MG: 200 SOLUTION ORAL at 08:03

## 2025-05-12 RX ADMIN — FAMOTIDINE 20 MG: 10 INJECTION, SOLUTION INTRAVENOUS at 08:03

## 2025-05-12 RX ADMIN — PIPERACILLIN AND TAZOBACTAM 4500 MG: 4; .5 INJECTION, POWDER, LYOPHILIZED, FOR SOLUTION INTRAVENOUS at 00:03

## 2025-05-12 RX ADMIN — SODIUM CHLORIDE, PRESERVATIVE FREE 10 ML: 5 INJECTION INTRAVENOUS at 08:25

## 2025-05-12 RX ADMIN — GUAIFENESIN 400 MG: 200 SOLUTION ORAL at 03:52

## 2025-05-12 RX ADMIN — MIDAZOLAM HYDROCHLORIDE 2 MG: 1 INJECTION, SOLUTION INTRAMUSCULAR; INTRAVENOUS at 16:25

## 2025-05-12 RX ADMIN — PIPERACILLIN AND TAZOBACTAM 4500 MG: 4; .5 INJECTION, POWDER, LYOPHILIZED, FOR SOLUTION INTRAVENOUS at 08:12

## 2025-05-12 RX ADMIN — NOREPINEPHRINE BITARTRATE 45 MCG/MIN: 1 INJECTION, SOLUTION, CONCENTRATE INTRAVENOUS at 15:40

## 2025-05-12 RX ADMIN — IPRATROPIUM BROMIDE AND ALBUTEROL SULFATE 1 DOSE: .5; 3 SOLUTION RESPIRATORY (INHALATION) at 08:00

## 2025-05-12 RX ADMIN — BUDESONIDE 500 MCG: 0.5 INHALANT RESPIRATORY (INHALATION) at 08:00

## 2025-05-12 RX ADMIN — VASOPRESSIN 0.03 UNITS/MIN: 20 INJECTION, SOLUTION INTRAVENOUS at 00:49

## 2025-05-12 RX ADMIN — PROPOFOL 20 MCG/KG/MIN: 10 INJECTION, EMULSION INTRAVENOUS at 14:39

## 2025-05-12 ASSESSMENT — PULMONARY FUNCTION TESTS
PIF_VALUE: 38
PIF_VALUE: 34
PIF_VALUE: 35

## 2025-05-12 NOTE — TELEPHONE ENCOUNTER
[facility] ([facility])   Phone:  [phone]      Fax:  [phone] [address], [address]  [place] [address]    Name:  Savana Vogel  YOB: 1945    Incoming call from patient's  Lucio Vogel.  He called to inform this nurse that his wife was admitted to [facility] yesterday with likely aspiration pneumonia.  He wants to let this nurse know about the hospitalization and to cancel Dr. Bradford Abbasi's [facility] start of care visit that is scheduled for 5/15/25.   Mr. Vogel says that she suffered a cardiac arrest in the ED and required CPR.  She is now in the ICU and he reports that the doctors have told him that she \"may not make it out of the hospital.\"  He says that they have been  for 60 years and it will be hard to let her go but he has been told that if she codes again, CPR would likely not work.   This nurse offered empathetic listening.      Primary Care at Home start of care visit on 5/15/25 cancelled.  Will reschedule if patient goes home.    Luisa Abbasi RN, Gerontological Nursing-BC, [facility]

## 2025-05-12 NOTE — PROGRESS NOTES
1100 Pt's family planning on full comfort measures this afternoon, all medications except pressors and sedation held per Wesley NP.

## 2025-05-12 NOTE — PROGRESS NOTES
Spiritual Health History and Assessment/Progress Note  [facility]    Follow-up,     Name: Savana Vogel MRN: [id number]    Age: 80 y.o.     Sex: female   Language: English   Restorationism: Roman Catholic   Acute hypercapnic respiratory failure (HCC)     Date: 5/12/2025            Total Time Calculated: 17 min              Responded to a page from SONG Gonzalez. Family requesting more information about anointing of the sick.   Chaplain López has been in touch with [facility] who have assured that a  will be coming today (5/12).  Spiritual care will follow-up to ensure that the visit has been completed in the afternoon (5/12).    Electronically signed by Chaplain Ravindra Resident on 5/12/2025 at 9:42 AM

## 2025-05-12 NOTE — PLAN OF CARE
Problem: Discharge Planning  Goal: Discharge to home or other facility with appropriate resources  Outcome: Progressing  Flowsheets (Taken 5/11/2025 2000)  Discharge to home or other facility with appropriate resources:   Identify barriers to discharge with patient and caregiver   Identify discharge learning needs (meds, wound care, etc)   Arrange for needed discharge resources and transportation as appropriate     Problem: Pain  Goal: Verbalizes/displays adequate comfort level or baseline comfort level  Outcome: Progressing     Problem: Skin/Tissue Integrity  Goal: Skin integrity remains intact  Description: 1.  Monitor for areas of redness and/or skin breakdown2.  Assess vascular access sites hourly3.  Every 4-6 hours minimum:  Change oxygen saturation probe site4.  Every 4-6 hours:  If on nasal continuous positive airway pressure, respiratory therapy assess nares and determine need for appliance change or resting period  Outcome: Progressing  Flowsheets (Taken 5/11/2025 2000)  Skin Integrity Remains Intact:   Monitor for areas of redness and/or skin breakdown   Monitor skin under medical devices     Problem: Safety - Adult  Goal: Free from fall injury  Outcome: Progressing     Problem: ABCDS Injury Assessment  Goal: Absence of physical injury  Outcome: Progressing  Flowsheets (Taken 5/11/2025 2000)  Absence of Physical Injury: Implement safety measures based on patient assessment      Critical Care Critical Care Critical Care Neurology Neurology Neurology Internal Medicine Critical Care Internal Medicine Internal Medicine Palliative Care Internal Medicine Internal Medicine

## 2025-05-12 NOTE — DISCHARGE SUMMARY
Death Discharge Summary   PATIENT ID: Savana Vogel  MRN: [id number]   YOB: 1945    DATE OF ADMISSION: 5/11/2025 12:12 AM    PRIMARY CARE PROVIDER: Weiland, Gustave, MD   ATTENDING PHYSICIAN: TIFFANY Evans NP  CONSULTATIONS:   IP CONSULT TO PHARMACY  IP CONSULT TO PHARMACY    PROCEDURES/SURGERIES:   * No surgery found *    REASON FOR ADMISSION: Acute hypercapnic respiratory failure (HCC)     HOSPITAL PROBLEM LIST:  Patient Active Problem List   Diagnosis    Parkinson's disease (HCC)    Hypertension    COPD (chronic obstructive pulmonary disease) (HCC)    Dementia (HCC)    Psychiatric disorder    Leukocytosis    Parkinson disease (HCC)    Weakness    Acute respiratory failure with hypoxia (HCC)    Acute hypercapnic respiratory failure (HCC)       DATE AND TIME OF DEATH: 5/12/2025 19:04     CODE STATUS AT DISCHARGE:   Full Code   X DNR    Partial   X Comfort Care     DISCHARGE DIAGNOSES: Acute Hypoxic/Hypercapnic Respiratory Failure   Septic Shock     Goals of care discussion had this morning at approx 930 AM with patients son and  at the bedside. Discussed the extent of her multi organ failure requiring full life support. Discussed that she has not mad the gains that we would have expected for her to make any meaningful recovery at this point. Mr. Vogel reflects on his 60+ years of marriage and details that their discussions regarding end of life have been such that she has expressed not wanting to live in a ventilator or with a feeding tube. She has stated \"if nothing can be reasonably done to bring me back to my previous quality of life, I dont want to be kept alive artificially.\" He has therefore decided to transition her to comfort care and remove ventilator support at approx 1630.     Brief HPI and Hospital Course:      Acute Hypoxemic Respiratory Failure  ARDS   - Viral panel negative, MRSA nares pending, RC pending   - Continue low TV strategy, high PEEP as tolerated  -

## 2025-05-12 NOTE — PROGRESS NOTES
Spiritual Health History and Assessment/Progress Note  [facility]    Grief, Loss, and Adjustments, Family Care, Death,      Name: Savana Vogel MRN: [id number]    Age: 80 y.o.     Sex: female   Language: English   Jain: Congregational     Date: 5/12/2025            Total Time Calculated: 26 min              Spiritual Assessment continued in [facility] 7S1 INTENSIVE CARE        Referral/Consult From: Nurse   Encounter Overview/Reason: Grief, Loss, and Adjustments  Service Provided For: Family    Notified by RN of death.  from [place] had completed the anointing earlier in the day.     I provided compassionate presence and affirmations of feelings of grief for the family that were gathered at bedside to say their final goodbyes.   Patient's , Lucio, told me of their 60 year marriage and how much he will miss her. He also expressed feelings of comfort that she is not longer suffering. Family appropriately tearful and expressing sadness.     Per family request, I offered prayer for God's presence, comfort, and Savana's acceptance into heaven. I concluded with Psalm 23.     No further needs identified. Spiritual care available upon request.       Electronically signed by Murali Wallis Chaplain Resident on 5/12/2025 at 7:41 PM

## 2025-05-13 LAB
BACTERIA SPEC CULT: NORMAL
GRAM STN SPEC: NORMAL
SERVICE CMNT-IMP: NORMAL

## 2025-05-13 NOTE — PROGRESS NOTES
Patient death recorded on the [facility] internal restraint log on 5/12/2025 date at 2330 time.

## 2025-05-14 RX ORDER — FENTANYL CITRATE 50 UG/ML
100 INJECTION, SOLUTION INTRAMUSCULAR; INTRAVENOUS ONCE
Status: COMPLETED | OUTPATIENT
Start: 2025-05-11 | End: 2025-05-11

## 2025-05-14 RX ORDER — MIDAZOLAM HYDROCHLORIDE 2 MG/2ML
4 INJECTION, SOLUTION INTRAMUSCULAR; INTRAVENOUS ONCE
Status: COMPLETED | OUTPATIENT
Start: 2025-05-11 | End: 2025-05-11

## 2025-05-15 LAB
EKG ATRIAL RATE: 112 BPM
EKG ATRIAL RATE: 132 BPM
EKG DIAGNOSIS: NORMAL
EKG DIAGNOSIS: NORMAL
EKG P AXIS: 103 DEGREES
EKG P AXIS: 76 DEGREES
EKG P-R INTERVAL: 132 MS
EKG P-R INTERVAL: 146 MS
EKG Q-T INTERVAL: 272 MS
EKG Q-T INTERVAL: 358 MS
EKG QRS DURATION: 102 MS
EKG QRS DURATION: 84 MS
EKG QTC CALCULATION (BAZETT): 403 MS
EKG QTC CALCULATION (BAZETT): 488 MS
EKG R AXIS: 202 DEGREES
EKG R AXIS: 69 DEGREES
EKG T AXIS: 131 DEGREES
EKG T AXIS: 25 DEGREES
EKG VENTRICULAR RATE: 112 BPM
EKG VENTRICULAR RATE: 132 BPM

## 2025-05-16 ENCOUNTER — TELEPHONE (OUTPATIENT)
Facility: CLINIC | Age: 80
End: 2025-05-16

## 2025-05-16 LAB
BACTERIA SPEC CULT: NORMAL
SERVICE CMNT-IMP: NORMAL

## 2025-05-16 NOTE — TELEPHONE ENCOUNTER
Lucio Vogel called to let the [facility] Team know that the patient passed away on Monday, 5/12/25.  I extended deepest condolences to the patient's family on behalf of Dr. Abbasi and the [facility] Team.

## 2025-05-21 NOTE — CARE COORDINATION
Care Management Initial Assessment       RUR: 21 %   Readmission? No  1st IM letter given? Yes - 5/11 05/12/25 1018   Service Assessment   Patient Orientation Unable to Assess  (Patient is intubated)   Cognition Other (see comment)  (unable to assess)   History Provided By Medical Record   Primary Caregiver Self   Support Systems Spouse/Significant Other  (Spouse Lucio Vogel [phone])   Patient's Healthcare Decision Maker is: Named in Scanned ACP Document   PCP Verified by CM No  (unable to verify)   Can patient return to prior living arrangement No   Ability to make needs known: Unable   Financial Resources Medicaid  (Medicare A,B with [employer] secondary)     Patient presented to the ED from home with respiratory distress. In the ED patient suffered a cardiac arrest requiring intubation now in ARDS. Per previous CM notes patient lives with her  in a single level apartment with a ramped entrance. DME: shower chair cane RW and WC. Previous HH through [facility] and has been to [facility] in March after a hospital stay. She last saw her PCP Dr Gustave Weiland within the last 6 months to a year. Plan will be to transitioned to comfort care.   Care management available.   Madeline Ayala RN,Care Management    Patient needs refill on Metoprolol Succinate XL 50mg takes one a day   Omeprazole 40mg takes one a day #90  Pharmacy [facility]